# Patient Record
Sex: MALE | Race: WHITE | NOT HISPANIC OR LATINO | Employment: OTHER | URBAN - NONMETROPOLITAN AREA
[De-identification: names, ages, dates, MRNs, and addresses within clinical notes are randomized per-mention and may not be internally consistent; named-entity substitution may affect disease eponyms.]

---

## 2017-02-16 ENCOUNTER — HOSPITAL ENCOUNTER (EMERGENCY)
Facility: HOSPITAL | Age: 57
Discharge: HOME | End: 2017-02-16
Attending: EMERGENCY MEDICINE | Admitting: EMERGENCY MEDICINE

## 2017-02-16 ENCOUNTER — APPOINTMENT (OUTPATIENT)
Dept: CT IMAGING | Facility: HOSPITAL | Age: 57
End: 2017-02-16

## 2017-02-16 ENCOUNTER — TELEPHONE (OUTPATIENT)
Dept: PRIMARY CARE CLINIC | Age: 57
End: 2017-02-16

## 2017-02-16 VITALS
WEIGHT: 153 LBS | HEIGHT: 63 IN | OXYGEN SATURATION: 97 % | HEART RATE: 74 BPM | BODY MASS INDEX: 27.11 KG/M2 | DIASTOLIC BLOOD PRESSURE: 65 MMHG | SYSTOLIC BLOOD PRESSURE: 130 MMHG | RESPIRATION RATE: 14 BRPM | TEMPERATURE: 98.1 F

## 2017-02-16 DIAGNOSIS — R93.5 ABNORMAL CT OF THE ABDOMEN: ICD-10-CM

## 2017-02-16 DIAGNOSIS — R10.10 PAIN OF UPPER ABDOMEN: Primary | ICD-10-CM

## 2017-02-16 LAB
ALBUMIN SERPL-MCNC: 3.9 G/DL (ref 3.5–5)
ALBUMIN/GLOB SERPL: 1.4 G/DL (ref 1.1–2.5)
ALP SERPL-CCNC: 135 U/L (ref 24–120)
ALT SERPL W P-5'-P-CCNC: 53 U/L (ref 0–54)
AMYLASE SERPL-CCNC: 58 U/L (ref 30–110)
ANION GAP SERPL CALCULATED.3IONS-SCNC: 5 MMOL/L (ref 4–13)
AST SERPL-CCNC: 30 U/L (ref 7–45)
BASOPHILS # BLD AUTO: 0.04 10*3/MM3 (ref 0–0.2)
BASOPHILS NFR BLD AUTO: 0.3 % (ref 0–2)
BILIRUB SERPL-MCNC: 0.4 MG/DL (ref 0.1–1)
BILIRUB UR QL STRIP: NEGATIVE
BUN BLD-MCNC: 12 MG/DL (ref 5–21)
BUN/CREAT SERPL: 16.4 (ref 7–25)
CALCIUM SPEC-SCNC: 8.8 MG/DL (ref 8.4–10.4)
CHLORIDE SERPL-SCNC: 107 MMOL/L (ref 98–110)
CLARITY UR: CLEAR
CO2 SERPL-SCNC: 27 MMOL/L (ref 24–31)
COLOR UR: YELLOW
CREAT BLD-MCNC: 0.73 MG/DL (ref 0.5–1.4)
D-LACTATE SERPL-SCNC: <0.5 MMOL/L (ref 0.5–2)
DEPRECATED RDW RBC AUTO: 44.9 FL (ref 40–54)
EOSINOPHIL # BLD AUTO: 0.36 10*3/MM3 (ref 0–0.7)
EOSINOPHIL NFR BLD AUTO: 3.1 % (ref 0–4)
ERYTHROCYTE [DISTWIDTH] IN BLOOD BY AUTOMATED COUNT: 13.3 % (ref 12–15)
GFR SERPL CREATININE-BSD FRML MDRD: 111 ML/MIN/1.73
GLOBULIN UR ELPH-MCNC: 2.7 GM/DL
GLUCOSE BLD-MCNC: 83 MG/DL (ref 70–100)
GLUCOSE UR STRIP-MCNC: NEGATIVE MG/DL
HCT VFR BLD AUTO: 42.6 % (ref 40–52)
HGB BLD-MCNC: 14.6 G/DL (ref 14–18)
HGB UR QL STRIP.AUTO: NEGATIVE
HOLD SPECIMEN: NORMAL
HOLD SPECIMEN: NORMAL
IMM GRANULOCYTES # BLD: 0.04 10*3/MM3 (ref 0–0.03)
IMM GRANULOCYTES NFR BLD: 0.3 % (ref 0–5)
KETONES UR QL STRIP: NEGATIVE
LEUKOCYTE ESTERASE UR QL STRIP.AUTO: NEGATIVE
LIPASE SERPL-CCNC: 85 U/L (ref 23–203)
LYMPHOCYTES # BLD AUTO: 2.24 10*3/MM3 (ref 0.72–4.86)
LYMPHOCYTES NFR BLD AUTO: 19.5 % (ref 15–45)
MCH RBC QN AUTO: 31.9 PG (ref 28–32)
MCHC RBC AUTO-ENTMCNC: 34.3 G/DL (ref 33–36)
MCV RBC AUTO: 93 FL (ref 82–95)
MONOCYTES # BLD AUTO: 1.17 10*3/MM3 (ref 0.19–1.3)
MONOCYTES NFR BLD AUTO: 10.2 % (ref 4–12)
NEUTROPHILS # BLD AUTO: 7.63 10*3/MM3 (ref 1.87–8.4)
NEUTROPHILS NFR BLD AUTO: 66.6 % (ref 39–78)
NITRITE UR QL STRIP: NEGATIVE
PH UR STRIP.AUTO: 6.5 [PH] (ref 5–8)
PLATELET # BLD AUTO: 230 10*3/MM3 (ref 130–400)
PMV BLD AUTO: 10.8 FL (ref 6–12)
POTASSIUM BLD-SCNC: 4.9 MMOL/L (ref 3.5–5.3)
PROT SERPL-MCNC: 6.6 G/DL (ref 6.3–8.7)
PROT UR QL STRIP: NEGATIVE
RBC # BLD AUTO: 4.58 10*6/MM3 (ref 4.8–5.9)
SODIUM BLD-SCNC: 139 MMOL/L (ref 135–145)
SP GR UR STRIP: 1.02 (ref 1–1.03)
TROPONIN I SERPL-MCNC: <0.012 NG/ML (ref 0–0.03)
UROBILINOGEN UR QL STRIP: NORMAL
WBC NRBC COR # BLD: 11.48 10*3/MM3 (ref 4.8–10.8)
WHOLE BLOOD HOLD SPECIMEN: NORMAL
WHOLE BLOOD HOLD SPECIMEN: NORMAL

## 2017-02-16 PROCEDURE — 84484 ASSAY OF TROPONIN QUANT: CPT | Performed by: EMERGENCY MEDICINE

## 2017-02-16 PROCEDURE — 99284 EMERGENCY DEPT VISIT MOD MDM: CPT

## 2017-02-16 PROCEDURE — 74177 CT ABD & PELVIS W/CONTRAST: CPT

## 2017-02-16 PROCEDURE — 81003 URINALYSIS AUTO W/O SCOPE: CPT | Performed by: EMERGENCY MEDICINE

## 2017-02-16 PROCEDURE — 96375 TX/PRO/DX INJ NEW DRUG ADDON: CPT

## 2017-02-16 PROCEDURE — 25010000002 ONDANSETRON PER 1 MG: Performed by: EMERGENCY MEDICINE

## 2017-02-16 PROCEDURE — 25010000002 HYDROMORPHONE PER 4 MG: Performed by: EMERGENCY MEDICINE

## 2017-02-16 PROCEDURE — 96376 TX/PRO/DX INJ SAME DRUG ADON: CPT

## 2017-02-16 PROCEDURE — 83605 ASSAY OF LACTIC ACID: CPT | Performed by: EMERGENCY MEDICINE

## 2017-02-16 PROCEDURE — 93010 ELECTROCARDIOGRAM REPORT: CPT | Performed by: INTERNAL MEDICINE

## 2017-02-16 PROCEDURE — 83690 ASSAY OF LIPASE: CPT | Performed by: EMERGENCY MEDICINE

## 2017-02-16 PROCEDURE — 80053 COMPREHEN METABOLIC PANEL: CPT | Performed by: EMERGENCY MEDICINE

## 2017-02-16 PROCEDURE — 0 IOPAMIDOL PER 1 ML: Performed by: EMERGENCY MEDICINE

## 2017-02-16 PROCEDURE — 93005 ELECTROCARDIOGRAM TRACING: CPT

## 2017-02-16 PROCEDURE — 85025 COMPLETE CBC W/AUTO DIFF WBC: CPT | Performed by: EMERGENCY MEDICINE

## 2017-02-16 PROCEDURE — 96374 THER/PROPH/DIAG INJ IV PUSH: CPT

## 2017-02-16 PROCEDURE — 82150 ASSAY OF AMYLASE: CPT | Performed by: EMERGENCY MEDICINE

## 2017-02-16 RX ORDER — ONDANSETRON 2 MG/ML
4 INJECTION INTRAMUSCULAR; INTRAVENOUS ONCE
Status: COMPLETED | OUTPATIENT
Start: 2017-02-16 | End: 2017-02-16

## 2017-02-16 RX ORDER — AMITRIPTYLINE HYDROCHLORIDE 10 MG/1
TABLET, FILM COATED ORAL DAILY
COMMUNITY
Start: 2016-11-28 | End: 2019-06-09

## 2017-02-16 RX ORDER — HYDROCODONE BITARTRATE AND ACETAMINOPHEN 7.5; 325 MG/1; MG/1
1 TABLET ORAL EVERY 4 HOURS PRN
Qty: 15 TABLET | Refills: 0 | Status: SHIPPED | OUTPATIENT
Start: 2017-02-16 | End: 2019-06-09

## 2017-02-16 RX ORDER — SODIUM CHLORIDE 0.9 % (FLUSH) 0.9 %
10 SYRINGE (ML) INJECTION AS NEEDED
Status: DISCONTINUED | OUTPATIENT
Start: 2017-02-16 | End: 2017-02-16 | Stop reason: HOSPADM

## 2017-02-16 RX ADMIN — ONDANSETRON HYDROCHLORIDE 4 MG: 2 SOLUTION INTRAMUSCULAR; INTRAVENOUS at 05:03

## 2017-02-16 RX ADMIN — Medication 10 ML: at 07:35

## 2017-02-16 RX ADMIN — ONDANSETRON HYDROCHLORIDE 4 MG: 2 SOLUTION INTRAMUSCULAR; INTRAVENOUS at 07:34

## 2017-02-16 RX ADMIN — HYDROMORPHONE HYDROCHLORIDE 1 MG: 1 INJECTION, SOLUTION INTRAMUSCULAR; INTRAVENOUS; SUBCUTANEOUS at 07:34

## 2017-02-16 RX ADMIN — HYDROMORPHONE HYDROCHLORIDE 1 MG: 1 INJECTION, SOLUTION INTRAMUSCULAR; INTRAVENOUS; SUBCUTANEOUS at 05:03

## 2017-02-16 RX ADMIN — SODIUM CHLORIDE 1000 ML: 9 INJECTION, SOLUTION INTRAVENOUS at 05:04

## 2017-02-16 RX ADMIN — IOPAMIDOL 100 ML: 755 INJECTION, SOLUTION INTRAVENOUS at 06:20

## 2017-02-16 NOTE — ED PROVIDER NOTES
"Subjective   HPI Comments: C/o pain in epigastric area that started 4 days ago.  Has waxed and waned since then and got worse again this AM.  Similar to pain of past when had GB out and then later had to have stones removed from bile duct.  Also had to have \"bowels rerouted\" and has had appendix out.    Patient is a 56 y.o. male presenting with abdominal pain.   History provided by:  Patient   used: No    Abdominal Pain   Pain location:  Epigastric  Pain quality: aching    Pain radiates to:  LUQ and RUQ  Pain severity:  Moderate  Onset quality:  Gradual  Duration:  4 days  Timing:  Constant  Progression:  Waxing and waning  Chronicity:  Recurrent  Context: previous surgery    Context: not alcohol use, not awakening from sleep, not diet changes, not eating, not laxative use, not medication withdrawal, not recent illness, not recent sexual activity, not recent travel, not retching, not sick contacts, not suspicious food intake and not trauma    Relieved by:  Nothing  Worsened by:  Nothing  Ineffective treatments:  None tried  Associated symptoms: nausea    Associated symptoms: no anorexia, no belching, no chest pain, no chills, no constipation, no cough, no diarrhea, no dysuria, no fatigue, no fever, no flatus, no hematemesis, no hematochezia, no hematuria, no melena, no shortness of breath, no sore throat, no vaginal bleeding, no vaginal discharge and no vomiting    Risk factors: multiple surgeries    Risk factors: no alcohol abuse, no aspirin use, not elderly, no NSAID use, not obese, not pregnant and no recent hospitalization        Review of Systems   Constitutional: Negative.  Negative for chills, fatigue and fever.   HENT: Negative.  Negative for sore throat.    Respiratory: Negative.  Negative for cough and shortness of breath.    Cardiovascular: Negative.  Negative for chest pain.   Gastrointestinal: Positive for abdominal pain and nausea. Negative for anorexia, constipation, diarrhea, " flatus, hematemesis, hematochezia, melena and vomiting.   Genitourinary: Negative.  Negative for dysuria, hematuria, vaginal bleeding and vaginal discharge.   Musculoskeletal: Negative.    Neurological: Negative.    Hematological: Negative.        History reviewed. No pertinent past medical history.    No Known Allergies    Past Surgical History   Procedure Laterality Date   • Abdominal surgery     • Knee surgery Left    • Eye surgery         History reviewed. No pertinent family history.    Social History     Social History   • Marital status: Single     Spouse name: N/A   • Number of children: N/A   • Years of education: N/A     Social History Main Topics   • Smoking status: Current Every Day Smoker     Packs/day: 1.00   • Smokeless tobacco: None   • Alcohol use No   • Drug use: Defer   • Sexual activity: Defer     Other Topics Concern   • None     Social History Narrative   • None       Prior to Admission medications    Medication Sig Start Date End Date Taking? Authorizing Provider   amitriptyline (ELAVIL) 10 MG tablet Daily. 11/28/16  Yes Historical Provider, MD       Medications   HYDROmorphone (DILAUDID) injection 1 mg (1 mg Intravenous Given 2/16/17 0503)   ondansetron (ZOFRAN) injection 4 mg (4 mg Intravenous Given 2/16/17 0503)   sodium chloride 0.9 % bolus 1,000 mL (0 mL Intravenous Stopped 2/16/17 0657)   iopamidol (ISOVUE-370) 76 % injection 100 mL (100 mL Intravenous Given 2/16/17 0620)   HYDROmorphone (DILAUDID) injection 1 mg (1 mg Intravenous Given 2/16/17 0734)   ondansetron (ZOFRAN) injection 4 mg (4 mg Intravenous Given 2/16/17 0734)       Vitals:    02/16/17 0801   BP: 130/65   Pulse: 74   Resp: 14   Temp:    SpO2: 97%         Objective   Physical Exam   Constitutional: He is oriented to person, place, and time. He appears well-developed and well-nourished.   HENT:   Head: Normocephalic and atraumatic.   Neck: Normal range of motion. Neck supple.   Cardiovascular: Normal rate and regular rhythm.     Pulmonary/Chest: Effort normal and breath sounds normal.   Abdominal: Soft. There is tenderness.   Moderated epigastric tenderness   Musculoskeletal: Normal range of motion.   Neurological: He is alert and oriented to person, place, and time.   Skin: Skin is warm and dry.   Psychiatric: He has a normal mood and affect. His behavior is normal.   Nursing note and vitals reviewed.      Procedures         Lab Results (last 24 hours)     Procedure Component Value Units Date/Time    CBC & Differential [[id number]] Collected:  02/16/17 0459    Specimen:  Blood Updated:  02/16/17 0520    Narrative:       The following orders were created for panel order CBC & Differential.  Procedure                               Abnormality         Status                     ---------                               -----------         ------                     CBC Auto Differential[[id number]]         Abnormal            Final result                 Please view results for these tests on the individual orders.    CBC Auto Differential [[id number]]  (Abnormal) Collected:  02/16/17 0459    Specimen:  Blood Updated:  02/16/17 0520     WBC 11.48 (H) 10*3/mm3      RBC 4.58 (L) 10*6/mm3      Hemoglobin 14.6 g/dL      Hematocrit 42.6 %      MCV 93.0 fL      MCH 31.9 pg      MCHC 34.3 g/dL      RDW 13.3 %      RDW-SD 44.9 fl      MPV 10.8 fL      Platelets 230 10*3/mm3      Neutrophil % 66.6 %      Lymphocyte % 19.5 %      Monocyte % 10.2 %      Eosinophil % 3.1 %      Basophil % 0.3 %      Immature Grans % 0.3 %      Neutrophils, Absolute 7.63 10*3/mm3      Lymphocytes, Absolute 2.24 10*3/mm3      Monocytes, Absolute 1.17 10*3/mm3      Eosinophils, Absolute 0.36 10*3/mm3      Basophils, Absolute 0.04 10*3/mm3      Immature Grans, Absolute 0.04 (H) 10*3/mm3     Comprehensive Metabolic Panel [[id number]]  (Abnormal) Collected:  02/16/17 0547    Specimen:  Blood from Arm, Left Updated:  02/16/17 0607     Glucose 83 mg/dL      BUN 12 mg/dL      Creatinine  0.73 mg/dL      Sodium 139 mmol/L      Potassium 4.9 mmol/L      Chloride 107 mmol/L      CO2 27.0 mmol/L      Calcium 8.8 mg/dL      Total Protein 6.6 g/dL      Albumin 3.90 g/dL      ALT (SGPT) 53 U/L      AST (SGOT) 30 U/L      Alkaline Phosphatase 135 (H) U/L      Total Bilirubin 0.4 mg/dL      eGFR Non African Amer 111 mL/min/1.73      Globulin 2.7 gm/dL      A/G Ratio 1.4 g/dL      BUN/Creatinine Ratio 16.4      Anion Gap 5.0 mmol/L     Lipase [[id number]]  (Normal) Collected:  02/16/17 0547    Specimen:  Blood from Arm, Left Updated:  02/16/17 0607     Lipase 85 U/L     Lactic Acid, Plasma [[id number]]  (Abnormal) Collected:  02/16/17 0547    Specimen:  Blood from Arm, Left Updated:  02/16/17 0608     Lactate <0.5 (L) mmol/L     Amylase [[id number]]  (Normal) Collected:  02/16/17 0547    Specimen:  Blood from Arm, Left Updated:  02/16/17 0607     Amylase 58 U/L     Troponin [[id number]]  (Normal) Collected:  02/16/17 0547    Specimen:  Blood from Arm, Left Updated:  02/16/17 0618     Troponin I <0.012 ng/mL     Urinalysis With / Culture If Indicated [[id number]]  (Normal) Collected:  02/16/17 0658    Specimen:  Urine from Urine, Clean Catch Updated:  02/16/17 0705     Color, UA Yellow      Appearance, UA Clear      pH, UA 6.5      Specific Gravity, UA 1.017      Glucose, UA Negative      Ketones, UA Negative      Bilirubin, UA Negative      Blood, UA Negative      Protein, UA Negative      Leuk Esterase, UA Negative      Nitrite, UA Negative      Urobilinogen, UA 0.2 E.U./dL     Narrative:       Urine microscopic not indicated.          CT Abdomen Pelvis With Contrast   Final Result   1. There is focal dilatation of bile ducts in the left hepatic lobe of   the liver with some patchy abnormal enhancement in this area. There is   also some ill-defined low density at the location where the left hepatic   ducts become more dilated. The common hepatic and common bile ducts are   also dilated but I do not see any obvious  stone in these portions of the   duct. There is no pancreatic head mass. Infiltrating tumor within the   liver is in the differential. The most likely type would be   cholangiocarcinoma. MRI examination with liver mass protocol and GI   consultation recommended. Noncalcified stones within the biliary system   are also possible.   2. There has been prior cholecystectomy.   3. There is a 2.1 cm cyst in the right mid kidney. A tiny cortical   lesion in the mid to lower pole cortex on the left side is too small to   characterize and likely a small cyst.   4. No oral contrast administered. No small bowel distention is seen.   There are a few fluid-filled small bowel loops, nonspecific.   5. Diverticulosis of the colon with no CT findings of diverticulitis.       Results were discussed with Dr. Laboy in the emergency room.           This report was finalized on 02/16/2017 08:59 by Dr. Luis Armando Egan MD.          ED Course  ED Course   Comment By Time   Told patient of CT results and advised him to follow up with his PCP for further evaluation and GI referral and told him of possibility of cancer.  He says he has appointment with his PCP on 2/27 and will talk with him then. Sky Laboy Jr., MD 02/16 6623          [facility]    Final diagnoses:   Pain of upper abdomen   Abnormal CT of the abdomen        Sky Laboy Jr., MD  02/16/17 5344

## 2017-02-17 ENCOUNTER — OFFICE VISIT (OUTPATIENT)
Dept: PRIMARY CARE CLINIC | Age: 57
End: 2017-02-17
Payer: MEDICAID

## 2017-02-17 VITALS
BODY MASS INDEX: 27.29 KG/M2 | HEIGHT: 63 IN | SYSTOLIC BLOOD PRESSURE: 112 MMHG | DIASTOLIC BLOOD PRESSURE: 68 MMHG | HEART RATE: 95 BPM | OXYGEN SATURATION: 98 % | WEIGHT: 154 LBS | RESPIRATION RATE: 20 BRPM

## 2017-02-17 DIAGNOSIS — G89.29 CHRONIC NECK PAIN: ICD-10-CM

## 2017-02-17 DIAGNOSIS — K80.50: Primary | ICD-10-CM

## 2017-02-17 DIAGNOSIS — M54.2 CHRONIC NECK PAIN: ICD-10-CM

## 2017-02-17 PROCEDURE — 99214 OFFICE O/P EST MOD 30 MIN: CPT | Performed by: INTERNAL MEDICINE

## 2017-02-17 RX ORDER — HYDROCODONE BITARTRATE AND ACETAMINOPHEN 7.5; 325 MG/1; MG/1
1 TABLET ORAL EVERY 6 HOURS PRN
Qty: 20 TABLET | Refills: 0 | Status: SHIPPED | OUTPATIENT
Start: 2017-02-17 | End: 2017-03-06 | Stop reason: SDUPTHER

## 2017-02-17 RX ORDER — HYDROCODONE BITARTRATE AND ACETAMINOPHEN 7.5; 325 MG/1; MG/1
1 TABLET ORAL EVERY 4 HOURS PRN
COMMUNITY
Start: 2017-02-16 | End: 2017-02-17 | Stop reason: DRUGHIGH

## 2017-02-20 ENCOUNTER — APPOINTMENT (OUTPATIENT)
Dept: CT IMAGING | Age: 57
DRG: 443 | End: 2017-02-20
Payer: MEDICAID

## 2017-02-20 ENCOUNTER — TELEPHONE (OUTPATIENT)
Dept: PRIMARY CARE CLINIC | Age: 57
End: 2017-02-20

## 2017-02-20 ENCOUNTER — HOSPITAL ENCOUNTER (INPATIENT)
Age: 57
LOS: 3 days | Discharge: ANOTHER HOSPITAL | DRG: 443 | End: 2017-02-23
Attending: EMERGENCY MEDICINE | Admitting: FAMILY MEDICINE
Payer: MEDICAID

## 2017-02-20 DIAGNOSIS — K75.0 LIVER ABSCESS: Primary | ICD-10-CM

## 2017-02-20 DIAGNOSIS — R10.10 PAIN OF UPPER ABDOMEN: ICD-10-CM

## 2017-02-20 DIAGNOSIS — R79.89 ELEVATED LIVER FUNCTION TESTS: ICD-10-CM

## 2017-02-20 PROBLEM — K83.8 DILATION OF BILIARY TRACT: Status: ACTIVE | Noted: 2017-02-20

## 2017-02-20 PROBLEM — R22.2 PLEURAL NODULE: Status: ACTIVE | Noted: 2017-02-20

## 2017-02-20 LAB
ALBUMIN SERPL-MCNC: 3.6 G/DL (ref 3.5–5.2)
ALP BLD-CCNC: 336 U/L (ref 40–130)
ALT SERPL-CCNC: 131 U/L (ref 5–41)
AMYLASE: 28 U/L (ref 28–100)
ANION GAP SERPL CALCULATED.3IONS-SCNC: 12 MMOL/L (ref 7–19)
AST SERPL-CCNC: 106 U/L (ref 5–40)
BACTERIA: NEGATIVE /HPF
BASOPHILS ABSOLUTE: 0 K/UL (ref 0–0.2)
BASOPHILS RELATIVE PERCENT: 0.2 % (ref 0–1)
BILIRUB SERPL-MCNC: 1.8 MG/DL (ref 0.2–1.2)
BILIRUBIN URINE: ABNORMAL
BLOOD, URINE: NEGATIVE
BUN BLDV-MCNC: 15 MG/DL (ref 6–20)
CALCIUM SERPL-MCNC: 8.6 MG/DL (ref 8.6–10)
CHLORIDE BLD-SCNC: 99 MMOL/L (ref 98–111)
CLARITY: CLEAR
CO2: 23 MMOL/L (ref 22–29)
COLOR: ABNORMAL
CREAT SERPL-MCNC: 0.7 MG/DL (ref 0.5–1.2)
EOSINOPHILS ABSOLUTE: 0.2 K/UL (ref 0–0.6)
EOSINOPHILS RELATIVE PERCENT: 1.9 % (ref 0–5)
EPITHELIAL CELLS, UA: 3 /HPF (ref 0–5)
GFR NON-AFRICAN AMERICAN: >60
GLOBULIN: 3.2 G/DL
GLUCOSE BLD-MCNC: 108 MG/DL (ref 74–109)
GLUCOSE URINE: NEGATIVE MG/DL
HCT VFR BLD CALC: 42.5 % (ref 42–52)
HEMOGLOBIN: 14.2 G/DL (ref 14–18)
HYALINE CASTS: 17 /HPF (ref 0–8)
KETONES, URINE: ABNORMAL MG/DL
LACTIC ACID: 1.4 MG/DL (ref 0.5–1.9)
LEUKOCYTE ESTERASE, URINE: ABNORMAL
LIPASE: 56 U/L (ref 13–60)
LYMPHOCYTES ABSOLUTE: 1.8 K/UL (ref 1.1–4.5)
LYMPHOCYTES RELATIVE PERCENT: 14.1 % (ref 20–40)
MCH RBC QN AUTO: 32.1 PG (ref 27–31)
MCHC RBC AUTO-ENTMCNC: 33.4 G/DL (ref 33–37)
MCV RBC AUTO: 95.9 FL (ref 80–94)
MONOCYTES ABSOLUTE: 1.6 K/UL (ref 0–0.9)
MONOCYTES RELATIVE PERCENT: 12.7 % (ref 0–10)
NEUTROPHILS ABSOLUTE: 9.1 K/UL (ref 1.5–7.5)
NEUTROPHILS RELATIVE PERCENT: 70.9 % (ref 50–65)
NITRITE, URINE: POSITIVE
PDW BLD-RTO: 12.9 % (ref 11.5–14.5)
PH UA: 6
PLATELET # BLD: 266 K/UL (ref 130–400)
PMV BLD AUTO: 11 FL (ref 7.4–10.4)
POTASSIUM SERPL-SCNC: 4 MMOL/L (ref 3.5–5)
PROTEIN UA: 100 MG/DL
RBC # BLD: 4.43 M/UL (ref 4.7–6.1)
RBC UA: 8 /HPF (ref 0–4)
SODIUM BLD-SCNC: 134 MMOL/L (ref 136–145)
SPECIFIC GRAVITY UA: 1.03
TOTAL PROTEIN: 6.8 G/DL (ref 6.6–8.7)
UROBILINOGEN, URINE: 2 E.U./DL
WBC # BLD: 12.8 K/UL (ref 4.8–10.8)
WBC UA: 5 /HPF (ref 0–5)

## 2017-02-20 PROCEDURE — C9113 INJ PANTOPRAZOLE SODIUM, VIA: HCPCS | Performed by: CLINICAL NURSE SPECIALIST

## 2017-02-20 PROCEDURE — 93005 ELECTROCARDIOGRAM TRACING: CPT

## 2017-02-20 PROCEDURE — 6370000000 HC RX 637 (ALT 250 FOR IP): Performed by: FAMILY MEDICINE

## 2017-02-20 PROCEDURE — 87086 URINE CULTURE/COLONY COUNT: CPT

## 2017-02-20 PROCEDURE — 6360000002 HC RX W HCPCS: Performed by: EMERGENCY MEDICINE

## 2017-02-20 PROCEDURE — 87040 BLOOD CULTURE FOR BACTERIA: CPT

## 2017-02-20 PROCEDURE — 96376 TX/PRO/DX INJ SAME DRUG ADON: CPT

## 2017-02-20 PROCEDURE — 6360000002 HC RX W HCPCS: Performed by: FAMILY MEDICINE

## 2017-02-20 PROCEDURE — 99285 EMERGENCY DEPT VISIT HI MDM: CPT | Performed by: EMERGENCY MEDICINE

## 2017-02-20 PROCEDURE — 99285 EMERGENCY DEPT VISIT HI MDM: CPT

## 2017-02-20 PROCEDURE — 74177 CT ABD & PELVIS W/CONTRAST: CPT

## 2017-02-20 PROCEDURE — 2580000003 HC RX 258: Performed by: FAMILY MEDICINE

## 2017-02-20 PROCEDURE — 6360000002 HC RX W HCPCS: Performed by: CLINICAL NURSE SPECIALIST

## 2017-02-20 PROCEDURE — 83690 ASSAY OF LIPASE: CPT

## 2017-02-20 PROCEDURE — 6370000000 HC RX 637 (ALT 250 FOR IP): Performed by: CLINICAL NURSE SPECIALIST

## 2017-02-20 PROCEDURE — 6360000004 HC RX CONTRAST MEDICATION

## 2017-02-20 PROCEDURE — 81001 URINALYSIS AUTO W/SCOPE: CPT

## 2017-02-20 PROCEDURE — 1210000000 HC MED SURG R&B

## 2017-02-20 PROCEDURE — 85025 COMPLETE CBC W/AUTO DIFF WBC: CPT

## 2017-02-20 PROCEDURE — 96375 TX/PRO/DX INJ NEW DRUG ADDON: CPT

## 2017-02-20 PROCEDURE — 2580000003 HC RX 258: Performed by: EMERGENCY MEDICINE

## 2017-02-20 PROCEDURE — 82150 ASSAY OF AMYLASE: CPT

## 2017-02-20 PROCEDURE — 96365 THER/PROPH/DIAG IV INF INIT: CPT

## 2017-02-20 PROCEDURE — 99223 1ST HOSP IP/OBS HIGH 75: CPT | Performed by: FAMILY MEDICINE

## 2017-02-20 PROCEDURE — 2500000003 HC RX 250 WO HCPCS: Performed by: FAMILY MEDICINE

## 2017-02-20 PROCEDURE — 80053 COMPREHEN METABOLIC PANEL: CPT

## 2017-02-20 PROCEDURE — 2700000000 HC OXYGEN THERAPY PER DAY

## 2017-02-20 PROCEDURE — 36415 COLL VENOUS BLD VENIPUNCTURE: CPT

## 2017-02-20 PROCEDURE — 83605 ASSAY OF LACTIC ACID: CPT

## 2017-02-20 RX ORDER — 0.9 % SODIUM CHLORIDE 0.9 %
1000 INTRAVENOUS SOLUTION INTRAVENOUS ONCE
Status: COMPLETED | OUTPATIENT
Start: 2017-02-20 | End: 2017-02-20

## 2017-02-20 RX ORDER — SODIUM CHLORIDE 0.9 % (FLUSH) 0.9 %
10 SYRINGE (ML) INJECTION PRN
Status: DISCONTINUED | OUTPATIENT
Start: 2017-02-20 | End: 2017-02-24 | Stop reason: HOSPADM

## 2017-02-20 RX ORDER — SODIUM CHLORIDE 0.9 % (FLUSH) 0.9 %
10 SYRINGE (ML) INJECTION EVERY 12 HOURS SCHEDULED
Status: DISCONTINUED | OUTPATIENT
Start: 2017-02-20 | End: 2017-02-24 | Stop reason: HOSPADM

## 2017-02-20 RX ORDER — HYDROCODONE BITARTRATE AND ACETAMINOPHEN 7.5; 325 MG/1; MG/1
1 TABLET ORAL EVERY 4 HOURS PRN
Status: DISCONTINUED | OUTPATIENT
Start: 2017-02-20 | End: 2017-02-24 | Stop reason: HOSPADM

## 2017-02-20 RX ORDER — SODIUM CHLORIDE 9 MG/ML
INJECTION, SOLUTION INTRAVENOUS CONTINUOUS
Status: DISCONTINUED | OUTPATIENT
Start: 2017-02-20 | End: 2017-02-20

## 2017-02-20 RX ORDER — ONDANSETRON 2 MG/ML
4 INJECTION INTRAMUSCULAR; INTRAVENOUS ONCE
Status: COMPLETED | OUTPATIENT
Start: 2017-02-20 | End: 2017-02-20

## 2017-02-20 RX ORDER — PANTOPRAZOLE SODIUM 40 MG/10ML
40 INJECTION, POWDER, LYOPHILIZED, FOR SOLUTION INTRAVENOUS DAILY
Status: DISCONTINUED | OUTPATIENT
Start: 2017-02-20 | End: 2017-02-24 | Stop reason: HOSPADM

## 2017-02-20 RX ORDER — ONDANSETRON 2 MG/ML
4 INJECTION INTRAMUSCULAR; INTRAVENOUS EVERY 6 HOURS PRN
Status: DISCONTINUED | OUTPATIENT
Start: 2017-02-20 | End: 2017-02-24 | Stop reason: HOSPADM

## 2017-02-20 RX ORDER — AMITRIPTYLINE HYDROCHLORIDE 10 MG/1
10 TABLET, FILM COATED ORAL NIGHTLY PRN
Status: DISCONTINUED | OUTPATIENT
Start: 2017-02-20 | End: 2017-02-24 | Stop reason: HOSPADM

## 2017-02-20 RX ORDER — LEVOFLOXACIN 5 MG/ML
500 INJECTION, SOLUTION INTRAVENOUS EVERY 24 HOURS
Status: DISCONTINUED | OUTPATIENT
Start: 2017-02-20 | End: 2017-02-24 | Stop reason: HOSPADM

## 2017-02-20 RX ORDER — VANCOMYCIN HYDROCHLORIDE 1 G/200ML
1000 INJECTION, SOLUTION INTRAVENOUS ONCE
Status: COMPLETED | OUTPATIENT
Start: 2017-02-20 | End: 2017-02-20

## 2017-02-20 RX ORDER — SODIUM CHLORIDE 9 MG/ML
INJECTION, SOLUTION INTRAVENOUS CONTINUOUS
Status: DISCONTINUED | OUTPATIENT
Start: 2017-02-20 | End: 2017-02-24 | Stop reason: HOSPADM

## 2017-02-20 RX ORDER — ONDANSETRON 2 MG/ML
4 INJECTION INTRAMUSCULAR; INTRAVENOUS EVERY 4 HOURS PRN
Status: DISCONTINUED | OUTPATIENT
Start: 2017-02-20 | End: 2017-02-20

## 2017-02-20 RX ADMIN — ONDANSETRON 4 MG: 2 INJECTION INTRAMUSCULAR; INTRAVENOUS at 12:29

## 2017-02-20 RX ADMIN — HYDROCODONE BITARTRATE AND ACETAMINOPHEN 1 TABLET: 7.5; 325 TABLET ORAL at 20:52

## 2017-02-20 RX ADMIN — AMITRIPTYLINE HYDROCHLORIDE 10 MG: 10 TABLET, FILM COATED ORAL at 22:14

## 2017-02-20 RX ADMIN — LEVOFLOXACIN 500 MG: 5 INJECTION, SOLUTION INTRAVENOUS at 22:14

## 2017-02-20 RX ADMIN — HYDROMORPHONE HYDROCHLORIDE 1 MG: 1 INJECTION, SOLUTION INTRAMUSCULAR; INTRAVENOUS; SUBCUTANEOUS at 15:41

## 2017-02-20 RX ADMIN — SODIUM CHLORIDE 1000 ML: 9 INJECTION, SOLUTION INTRAVENOUS at 12:29

## 2017-02-20 RX ADMIN — METRONIDAZOLE 500 MG: 500 INJECTION, SOLUTION INTRAVENOUS at 23:23

## 2017-02-20 RX ADMIN — ONDANSETRON 4 MG: 2 INJECTION INTRAMUSCULAR; INTRAVENOUS at 15:41

## 2017-02-20 RX ADMIN — TAZOBACTAM SODIUM AND PIPERACILLIN SODIUM 3.38 G: 375; 3 INJECTION, SOLUTION INTRAVENOUS at 15:41

## 2017-02-20 RX ADMIN — VANCOMYCIN HYDROCHLORIDE 1000 MG: 1 INJECTION, SOLUTION INTRAVENOUS at 16:44

## 2017-02-20 RX ADMIN — PANTOPRAZOLE SODIUM 40 MG: 40 INJECTION, POWDER, FOR SOLUTION INTRAVENOUS at 18:52

## 2017-02-20 RX ADMIN — SODIUM CHLORIDE: 9 INJECTION, SOLUTION INTRAVENOUS at 20:52

## 2017-02-20 RX ADMIN — IOVERSOL 90 ML: 741 INJECTION INTRA-ARTERIAL; INTRAVENOUS at 13:49

## 2017-02-20 RX ADMIN — HYDROMORPHONE HYDROCHLORIDE 1 MG: 1 INJECTION, SOLUTION INTRAMUSCULAR; INTRAVENOUS; SUBCUTANEOUS at 12:29

## 2017-02-20 RX ADMIN — Medication 10 ML: at 20:52

## 2017-02-20 ASSESSMENT — ENCOUNTER SYMPTOMS
BLOOD IN STOOL: 0
ABDOMINAL PAIN: 1
NAUSEA: 1
EYES NEGATIVE: 1
VOMITING: 0
ALLERGIC/IMMUNOLOGIC NEGATIVE: 1
RESPIRATORY NEGATIVE: 1
DIARRHEA: 1
CONSTIPATION: 0
ABDOMINAL DISTENTION: 1

## 2017-02-20 ASSESSMENT — PAIN DESCRIPTION - DESCRIPTORS: DESCRIPTORS: ACHING

## 2017-02-20 ASSESSMENT — PAIN DESCRIPTION - PAIN TYPE: TYPE: ACUTE PAIN

## 2017-02-20 ASSESSMENT — PAIN SCALES - GENERAL
PAINLEVEL_OUTOF10: 7
PAINLEVEL_OUTOF10: 7

## 2017-02-21 LAB
ALBUMIN SERPL-MCNC: 3.1 G/DL (ref 3.5–5.2)
ALP BLD-CCNC: 299 U/L (ref 40–130)
ALT SERPL-CCNC: 96 U/L (ref 5–41)
ANION GAP SERPL CALCULATED.3IONS-SCNC: 14 MMOL/L (ref 7–19)
APTT: 38.8 SEC (ref 26–36.2)
AST SERPL-CCNC: 56 U/L (ref 5–40)
BASOPHILS ABSOLUTE: 0 K/UL (ref 0–0.2)
BASOPHILS RELATIVE PERCENT: 0.3 % (ref 0–1)
BILIRUB SERPL-MCNC: 1.8 MG/DL (ref 0.2–1.2)
BUN BLDV-MCNC: 13 MG/DL (ref 6–20)
CALCIUM SERPL-MCNC: 8.6 MG/DL (ref 8.6–10)
CHLORIDE BLD-SCNC: 100 MMOL/L (ref 98–111)
CHOLESTEROL, TOTAL: 144 MG/DL (ref 160–199)
CO2: 24 MMOL/L (ref 22–29)
CREAT SERPL-MCNC: 0.6 MG/DL (ref 0.5–1.2)
EKG P AXIS: 66 DEGREES
EKG P-R INTERVAL: 130 MS
EKG Q-T INTERVAL: 324 MS
EKG QRS DURATION: 88 MS
EKG QTC CALCULATION (BAZETT): 388 MS
EKG T AXIS: 56 DEGREES
EOSINOPHILS ABSOLUTE: 0.4 K/UL (ref 0–0.6)
EOSINOPHILS RELATIVE PERCENT: 3.7 % (ref 0–5)
GFR NON-AFRICAN AMERICAN: >60
GLOBULIN: 3.3 G/DL
GLUCOSE BLD-MCNC: 97 MG/DL (ref 74–109)
HCT VFR BLD CALC: 35.1 % (ref 42–52)
HDLC SERPL-MCNC: 19 MG/DL (ref 55–121)
HEMOGLOBIN: 11.7 G/DL (ref 14–18)
INR BLD: 1.12 (ref 0.88–1.18)
LDL CHOLESTEROL CALCULATED: 102 MG/DL
LYMPHOCYTES ABSOLUTE: 1.5 K/UL (ref 1.1–4.5)
LYMPHOCYTES RELATIVE PERCENT: 14.4 % (ref 20–40)
MAGNESIUM: 2.1 MG/DL (ref 1.6–2.6)
MCH RBC QN AUTO: 31.7 PG (ref 27–31)
MCHC RBC AUTO-ENTMCNC: 33.3 G/DL (ref 33–37)
MCV RBC AUTO: 95.1 FL (ref 80–94)
MONOCYTES ABSOLUTE: 1.9 K/UL (ref 0–0.9)
MONOCYTES RELATIVE PERCENT: 18.6 % (ref 0–10)
NEUTROPHILS ABSOLUTE: 6.3 K/UL (ref 1.5–7.5)
NEUTROPHILS RELATIVE PERCENT: 62.5 % (ref 50–65)
PDW BLD-RTO: 12.8 % (ref 11.5–14.5)
PHOSPHORUS: 3.4 MG/DL (ref 2.5–4.5)
PLATELET # BLD: 224 K/UL (ref 130–400)
PMV BLD AUTO: 10.3 FL (ref 7.4–10.4)
POTASSIUM SERPL-SCNC: 4.3 MMOL/L (ref 3.5–5)
PROTHROMBIN TIME: 14.4 SEC (ref 12–14.6)
RBC # BLD: 3.69 M/UL (ref 4.7–6.1)
SODIUM BLD-SCNC: 138 MMOL/L (ref 136–145)
TOTAL PROTEIN: 6.4 G/DL (ref 6.6–8.7)
TRIGL SERPL-MCNC: 114 MG/DL (ref 150–199)
TSH SERPL DL<=0.05 MIU/L-ACNC: 2.3 UIU/ML (ref 0.27–4.2)
WBC # BLD: 10.1 K/UL (ref 4.8–10.8)

## 2017-02-21 PROCEDURE — 84100 ASSAY OF PHOSPHORUS: CPT

## 2017-02-21 PROCEDURE — 2580000003 HC RX 258: Performed by: FAMILY MEDICINE

## 2017-02-21 PROCEDURE — 36415 COLL VENOUS BLD VENIPUNCTURE: CPT

## 2017-02-21 PROCEDURE — 83735 ASSAY OF MAGNESIUM: CPT

## 2017-02-21 PROCEDURE — 1210000000 HC MED SURG R&B

## 2017-02-21 PROCEDURE — 6360000002 HC RX W HCPCS: Performed by: CLINICAL NURSE SPECIALIST

## 2017-02-21 PROCEDURE — 85730 THROMBOPLASTIN TIME PARTIAL: CPT

## 2017-02-21 PROCEDURE — 80053 COMPREHEN METABOLIC PANEL: CPT

## 2017-02-21 PROCEDURE — 84443 ASSAY THYROID STIM HORMONE: CPT

## 2017-02-21 PROCEDURE — 99254 IP/OBS CNSLTJ NEW/EST MOD 60: CPT | Performed by: INTERNAL MEDICINE

## 2017-02-21 PROCEDURE — 99233 SBSQ HOSP IP/OBS HIGH 50: CPT | Performed by: HOSPITALIST

## 2017-02-21 PROCEDURE — 80061 LIPID PANEL: CPT

## 2017-02-21 PROCEDURE — 2500000003 HC RX 250 WO HCPCS: Performed by: FAMILY MEDICINE

## 2017-02-21 PROCEDURE — 85025 COMPLETE CBC W/AUTO DIFF WBC: CPT

## 2017-02-21 PROCEDURE — 85610 PROTHROMBIN TIME: CPT

## 2017-02-21 PROCEDURE — C9113 INJ PANTOPRAZOLE SODIUM, VIA: HCPCS | Performed by: CLINICAL NURSE SPECIALIST

## 2017-02-21 PROCEDURE — 6360000002 HC RX W HCPCS: Performed by: FAMILY MEDICINE

## 2017-02-21 PROCEDURE — 6370000000 HC RX 637 (ALT 250 FOR IP): Performed by: FAMILY MEDICINE

## 2017-02-21 RX ADMIN — METRONIDAZOLE 500 MG: 500 INJECTION, SOLUTION INTRAVENOUS at 06:47

## 2017-02-21 RX ADMIN — LEVOFLOXACIN 500 MG: 5 INJECTION, SOLUTION INTRAVENOUS at 20:46

## 2017-02-21 RX ADMIN — PANTOPRAZOLE SODIUM 40 MG: 40 INJECTION, POWDER, FOR SOLUTION INTRAVENOUS at 18:43

## 2017-02-21 RX ADMIN — HYDROCODONE BITARTRATE AND ACETAMINOPHEN 1 TABLET: 7.5; 325 TABLET ORAL at 19:43

## 2017-02-21 RX ADMIN — HYDROCODONE BITARTRATE AND ACETAMINOPHEN 1 TABLET: 7.5; 325 TABLET ORAL at 15:12

## 2017-02-21 RX ADMIN — HYDROMORPHONE HYDROCHLORIDE 0.5 MG: 1 INJECTION, SOLUTION INTRAMUSCULAR; INTRAVENOUS; SUBCUTANEOUS at 11:20

## 2017-02-21 RX ADMIN — METRONIDAZOLE 500 MG: 500 INJECTION, SOLUTION INTRAVENOUS at 23:18

## 2017-02-21 RX ADMIN — HYDROCODONE BITARTRATE AND ACETAMINOPHEN 1 TABLET: 7.5; 325 TABLET ORAL at 02:35

## 2017-02-21 RX ADMIN — HYDROCODONE BITARTRATE AND ACETAMINOPHEN 1 TABLET: 7.5; 325 TABLET ORAL at 08:56

## 2017-02-21 RX ADMIN — SODIUM CHLORIDE: 9 INJECTION, SOLUTION INTRAVENOUS at 20:47

## 2017-02-21 RX ADMIN — METRONIDAZOLE 500 MG: 500 INJECTION, SOLUTION INTRAVENOUS at 15:18

## 2017-02-21 ASSESSMENT — PAIN SCALES - GENERAL
PAINLEVEL_OUTOF10: 6
PAINLEVEL_OUTOF10: 7
PAINLEVEL_OUTOF10: 6
PAINLEVEL_OUTOF10: 6
PAINLEVEL_OUTOF10: 8

## 2017-02-21 ASSESSMENT — ENCOUNTER SYMPTOMS
NAUSEA: 1
EYES NEGATIVE: 1
ABDOMINAL PAIN: 1
RESPIRATORY NEGATIVE: 1

## 2017-02-22 ENCOUNTER — APPOINTMENT (OUTPATIENT)
Dept: MRI IMAGING | Age: 57
DRG: 443 | End: 2017-02-22
Payer: MEDICAID

## 2017-02-22 ENCOUNTER — APPOINTMENT (OUTPATIENT)
Dept: GENERAL RADIOLOGY | Age: 57
DRG: 443 | End: 2017-02-22
Payer: MEDICAID

## 2017-02-22 ENCOUNTER — ANESTHESIA (OUTPATIENT)
Dept: ENDOSCOPY | Age: 57
DRG: 443 | End: 2017-02-22
Payer: MEDICAID

## 2017-02-22 ENCOUNTER — ANESTHESIA EVENT (OUTPATIENT)
Dept: ENDOSCOPY | Age: 57
DRG: 443 | End: 2017-02-22
Payer: MEDICAID

## 2017-02-22 ENCOUNTER — SURGERY (OUTPATIENT)
Age: 57
End: 2017-02-22

## 2017-02-22 VITALS
DIASTOLIC BLOOD PRESSURE: 101 MMHG | SYSTOLIC BLOOD PRESSURE: 133 MMHG | RESPIRATION RATE: 2 BRPM | OXYGEN SATURATION: 100 %

## 2017-02-22 LAB
ALBUMIN SERPL-MCNC: 3 G/DL (ref 3.5–5.2)
ALP BLD-CCNC: 297 U/L (ref 40–130)
ALT SERPL-CCNC: 83 U/L (ref 5–41)
ANION GAP SERPL CALCULATED.3IONS-SCNC: 15 MMOL/L (ref 7–19)
AST SERPL-CCNC: 46 U/L (ref 5–40)
BILIRUB SERPL-MCNC: 0.9 MG/DL (ref 0.2–1.2)
BUN BLDV-MCNC: 12 MG/DL (ref 6–20)
CA 19-9: 216 U/ML (ref 0–35)
CALCIUM SERPL-MCNC: 8.4 MG/DL (ref 8.6–10)
CHLORIDE BLD-SCNC: 100 MMOL/L (ref 98–111)
CO2: 23 MMOL/L (ref 22–29)
CREAT SERPL-MCNC: 0.6 MG/DL (ref 0.5–1.2)
FERRITIN: 386 NG/ML (ref 30–400)
GFR NON-AFRICAN AMERICAN: >60
GLOBULIN: 3.2 G/DL
GLUCOSE BLD-MCNC: 103 MG/DL (ref 74–109)
HCT VFR BLD CALC: 34 % (ref 42–52)
HEMOGLOBIN: 11.4 G/DL (ref 14–18)
IRON SATURATION: 18 % (ref 14–50)
IRON: 33 UG/DL (ref 59–158)
MCH RBC QN AUTO: 31.7 PG (ref 27–31)
MCHC RBC AUTO-ENTMCNC: 33.5 G/DL (ref 33–37)
MCV RBC AUTO: 94.4 FL (ref 80–94)
PDW BLD-RTO: 12.6 % (ref 11.5–14.5)
PLATELET # BLD: 234 K/UL (ref 130–400)
PMV BLD AUTO: 10.4 FL (ref 7.4–10.4)
POTASSIUM SERPL-SCNC: 4.4 MMOL/L (ref 3.5–5)
RBC # BLD: 3.6 M/UL (ref 4.7–6.1)
SODIUM BLD-SCNC: 138 MMOL/L (ref 136–145)
TOTAL IRON BINDING CAPACITY: 181 UG/DL (ref 250–400)
TOTAL PROTEIN: 6.2 G/DL (ref 6.6–8.7)
URINE CULTURE, ROUTINE: NORMAL
VITAMIN B-12: 616 PG/ML (ref 211–946)
WBC # BLD: 11.2 K/UL (ref 4.8–10.8)

## 2017-02-22 PROCEDURE — 82607 VITAMIN B-12: CPT

## 2017-02-22 PROCEDURE — C9113 INJ PANTOPRAZOLE SODIUM, VIA: HCPCS | Performed by: CLINICAL NURSE SPECIALIST

## 2017-02-22 PROCEDURE — 83550 IRON BINDING TEST: CPT

## 2017-02-22 PROCEDURE — 0FJB8ZZ INSPECTION OF HEPATOBILIARY DUCT, VIA NATURAL OR ARTIFICIAL OPENING ENDOSCOPIC: ICD-10-PCS | Performed by: FAMILY MEDICINE

## 2017-02-22 PROCEDURE — 82728 ASSAY OF FERRITIN: CPT

## 2017-02-22 PROCEDURE — C1769 GUIDE WIRE: HCPCS | Performed by: INTERNAL MEDICINE

## 2017-02-22 PROCEDURE — 43260 ERCP W/SPECIMEN COLLECTION: CPT | Performed by: INTERNAL MEDICINE

## 2017-02-22 PROCEDURE — 1210000000 HC MED SURG R&B

## 2017-02-22 PROCEDURE — 2580000003 HC RX 258: Performed by: FAMILY MEDICINE

## 2017-02-22 PROCEDURE — 6360000004 HC RX CONTRAST MEDICATION: Performed by: INTERNAL MEDICINE

## 2017-02-22 PROCEDURE — BF11YZZ FLUOROSCOPY OF BILIARY AND PANCREATIC DUCTS USING OTHER CONTRAST: ICD-10-PCS | Performed by: FAMILY MEDICINE

## 2017-02-22 PROCEDURE — A9579 GAD-BASE MR CONTRAST NOS,1ML: HCPCS | Performed by: INTERNAL MEDICINE

## 2017-02-22 PROCEDURE — 0FJD8ZZ INSPECTION OF PANCREATIC DUCT, VIA NATURAL OR ARTIFICIAL OPENING ENDOSCOPIC: ICD-10-PCS | Performed by: FAMILY MEDICINE

## 2017-02-22 PROCEDURE — 6360000002 HC RX W HCPCS: Performed by: NURSE ANESTHETIST, CERTIFIED REGISTERED

## 2017-02-22 PROCEDURE — 6370000000 HC RX 637 (ALT 250 FOR IP): Performed by: FAMILY MEDICINE

## 2017-02-22 PROCEDURE — 86301 IMMUNOASSAY TUMOR CA 19-9: CPT

## 2017-02-22 PROCEDURE — 2500000003 HC RX 250 WO HCPCS: Performed by: NURSE ANESTHETIST, CERTIFIED REGISTERED

## 2017-02-22 PROCEDURE — 80053 COMPREHEN METABOLIC PANEL: CPT

## 2017-02-22 PROCEDURE — 74328 X-RAY BILE DUCT ENDOSCOPY: CPT | Performed by: INTERNAL MEDICINE

## 2017-02-22 PROCEDURE — 6360000002 HC RX W HCPCS: Performed by: FAMILY MEDICINE

## 2017-02-22 PROCEDURE — 83540 ASSAY OF IRON: CPT

## 2017-02-22 PROCEDURE — 3609014400 HC ERCP DIAGNOSTIC: Performed by: INTERNAL MEDICINE

## 2017-02-22 PROCEDURE — 99239 HOSP IP/OBS DSCHRG MGMT >30: CPT | Performed by: HOSPITALIST

## 2017-02-22 PROCEDURE — 2580000003 HC RX 258: Performed by: NURSE ANESTHETIST, CERTIFIED REGISTERED

## 2017-02-22 PROCEDURE — 6360000002 HC RX W HCPCS: Performed by: CLINICAL NURSE SPECIALIST

## 2017-02-22 PROCEDURE — 7100000000 HC PACU RECOVERY - FIRST 15 MIN: Performed by: INTERNAL MEDICINE

## 2017-02-22 PROCEDURE — 7100000001 HC PACU RECOVERY - ADDTL 15 MIN: Performed by: INTERNAL MEDICINE

## 2017-02-22 PROCEDURE — 74183 MRI ABD W/O CNTR FLWD CNTR: CPT

## 2017-02-22 PROCEDURE — 3209999900 FLUORO FOR SURGICAL PROCEDURES

## 2017-02-22 PROCEDURE — 74328 X-RAY BILE DUCT ENDOSCOPY: CPT

## 2017-02-22 PROCEDURE — 36415 COLL VENOUS BLD VENIPUNCTURE: CPT

## 2017-02-22 PROCEDURE — 85027 COMPLETE CBC AUTOMATED: CPT

## 2017-02-22 PROCEDURE — 2500000003 HC RX 250 WO HCPCS: Performed by: FAMILY MEDICINE

## 2017-02-22 RX ORDER — LIDOCAINE HYDROCHLORIDE 10 MG/ML
INJECTION, SOLUTION EPIDURAL; INFILTRATION; INTRACAUDAL; PERINEURAL PRN
Status: DISCONTINUED | OUTPATIENT
Start: 2017-02-22 | End: 2017-02-22 | Stop reason: SDUPTHER

## 2017-02-22 RX ORDER — FENTANYL CITRATE 50 UG/ML
INJECTION, SOLUTION INTRAMUSCULAR; INTRAVENOUS PRN
Status: DISCONTINUED | OUTPATIENT
Start: 2017-02-22 | End: 2017-02-22 | Stop reason: SDUPTHER

## 2017-02-22 RX ORDER — ENALAPRILAT 2.5 MG/2ML
1.25 INJECTION INTRAVENOUS
Status: DISCONTINUED | OUTPATIENT
Start: 2017-02-22 | End: 2017-02-22

## 2017-02-22 RX ORDER — MIDAZOLAM HYDROCHLORIDE 1 MG/ML
INJECTION INTRAMUSCULAR; INTRAVENOUS PRN
Status: DISCONTINUED | OUTPATIENT
Start: 2017-02-22 | End: 2017-02-22 | Stop reason: SDUPTHER

## 2017-02-22 RX ORDER — HYDRALAZINE HYDROCHLORIDE 20 MG/ML
5 INJECTION INTRAMUSCULAR; INTRAVENOUS EVERY 10 MIN PRN
Status: DISCONTINUED | OUTPATIENT
Start: 2017-02-22 | End: 2017-02-22

## 2017-02-22 RX ORDER — DIPHENHYDRAMINE HYDROCHLORIDE 50 MG/ML
12.5 INJECTION INTRAMUSCULAR; INTRAVENOUS
Status: DISCONTINUED | OUTPATIENT
Start: 2017-02-22 | End: 2017-02-22

## 2017-02-22 RX ORDER — MORPHINE SULFATE 4 MG/ML
2 INJECTION, SOLUTION INTRAMUSCULAR; INTRAVENOUS EVERY 5 MIN PRN
Status: DISCONTINUED | OUTPATIENT
Start: 2017-02-22 | End: 2017-02-22

## 2017-02-22 RX ORDER — METOCLOPRAMIDE HYDROCHLORIDE 5 MG/ML
10 INJECTION INTRAMUSCULAR; INTRAVENOUS
Status: DISCONTINUED | OUTPATIENT
Start: 2017-02-22 | End: 2017-02-22

## 2017-02-22 RX ORDER — SODIUM CHLORIDE, SODIUM LACTATE, POTASSIUM CHLORIDE, CALCIUM CHLORIDE 600; 310; 30; 20 MG/100ML; MG/100ML; MG/100ML; MG/100ML
INJECTION, SOLUTION INTRAVENOUS CONTINUOUS PRN
Status: DISCONTINUED | OUTPATIENT
Start: 2017-02-22 | End: 2017-02-22 | Stop reason: SDUPTHER

## 2017-02-22 RX ORDER — ONDANSETRON 2 MG/ML
INJECTION INTRAMUSCULAR; INTRAVENOUS PRN
Status: DISCONTINUED | OUTPATIENT
Start: 2017-02-22 | End: 2017-02-22 | Stop reason: SDUPTHER

## 2017-02-22 RX ORDER — PROMETHAZINE HYDROCHLORIDE 25 MG/ML
6.25 INJECTION, SOLUTION INTRAMUSCULAR; INTRAVENOUS
Status: DISCONTINUED | OUTPATIENT
Start: 2017-02-22 | End: 2017-02-22

## 2017-02-22 RX ORDER — ROCURONIUM BROMIDE 10 MG/ML
INJECTION, SOLUTION INTRAVENOUS PRN
Status: DISCONTINUED | OUTPATIENT
Start: 2017-02-22 | End: 2017-02-22 | Stop reason: SDUPTHER

## 2017-02-22 RX ORDER — DEXAMETHASONE SODIUM PHOSPHATE 10 MG/ML
INJECTION INTRAMUSCULAR; INTRAVENOUS PRN
Status: DISCONTINUED | OUTPATIENT
Start: 2017-02-22 | End: 2017-02-22 | Stop reason: SDUPTHER

## 2017-02-22 RX ORDER — LABETALOL HYDROCHLORIDE 5 MG/ML
5 INJECTION, SOLUTION INTRAVENOUS EVERY 10 MIN PRN
Status: DISCONTINUED | OUTPATIENT
Start: 2017-02-22 | End: 2017-02-22

## 2017-02-22 RX ORDER — PROPOFOL 10 MG/ML
INJECTION, EMULSION INTRAVENOUS PRN
Status: DISCONTINUED | OUTPATIENT
Start: 2017-02-22 | End: 2017-02-22 | Stop reason: SDUPTHER

## 2017-02-22 RX ORDER — MEPERIDINE HYDROCHLORIDE 25 MG/ML
12.5 INJECTION INTRAMUSCULAR; INTRAVENOUS; SUBCUTANEOUS EVERY 5 MIN PRN
Status: DISCONTINUED | OUTPATIENT
Start: 2017-02-22 | End: 2017-02-24 | Stop reason: HOSPADM

## 2017-02-22 RX ORDER — MORPHINE SULFATE 4 MG/ML
4 INJECTION, SOLUTION INTRAMUSCULAR; INTRAVENOUS EVERY 5 MIN PRN
Status: DISCONTINUED | OUTPATIENT
Start: 2017-02-22 | End: 2017-02-22

## 2017-02-22 RX ADMIN — HYDROMORPHONE HYDROCHLORIDE 0.5 MG: 1 INJECTION, SOLUTION INTRAMUSCULAR; INTRAVENOUS; SUBCUTANEOUS at 22:11

## 2017-02-22 RX ADMIN — LEVOFLOXACIN 500 MG: 5 INJECTION, SOLUTION INTRAVENOUS at 20:57

## 2017-02-22 RX ADMIN — FENTANYL CITRATE 50 MCG: 50 INJECTION INTRAMUSCULAR; INTRAVENOUS at 12:55

## 2017-02-22 RX ADMIN — METRONIDAZOLE 500 MG: 500 INJECTION, SOLUTION INTRAVENOUS at 22:11

## 2017-02-22 RX ADMIN — METRONIDAZOLE 500 MG: 500 INJECTION, SOLUTION INTRAVENOUS at 15:03

## 2017-02-22 RX ADMIN — ROCURONIUM BROMIDE 30 MG: 10 INJECTION INTRAVENOUS at 12:35

## 2017-02-22 RX ADMIN — HYDROCODONE BITARTRATE AND ACETAMINOPHEN 1 TABLET: 7.5; 325 TABLET ORAL at 05:48

## 2017-02-22 RX ADMIN — GADOPENTETATE DIMEGLUMINE 15 ML: 469.01 INJECTION INTRAVENOUS at 11:14

## 2017-02-22 RX ADMIN — DEXAMETHASONE SODIUM PHOSPHATE 8 MG: 10 INJECTION INTRAMUSCULAR; INTRAVENOUS at 12:50

## 2017-02-22 RX ADMIN — LIDOCAINE HYDROCHLORIDE 50 MG: 10 INJECTION, SOLUTION EPIDURAL; INFILTRATION; INTRACAUDAL; PERINEURAL at 12:35

## 2017-02-22 RX ADMIN — Medication 10 ML: at 22:12

## 2017-02-22 RX ADMIN — ONDANSETRON HYDROCHLORIDE 4 MG: 2 INJECTION, SOLUTION INTRAVENOUS at 13:38

## 2017-02-22 RX ADMIN — SODIUM CHLORIDE, SODIUM LACTATE, POTASSIUM CHLORIDE, AND CALCIUM CHLORIDE: 600; 310; 30; 20 INJECTION, SOLUTION INTRAVENOUS at 12:31

## 2017-02-22 RX ADMIN — FENTANYL CITRATE 50 MCG: 50 INJECTION INTRAMUSCULAR; INTRAVENOUS at 13:15

## 2017-02-22 RX ADMIN — ROCURONIUM BROMIDE 20 MG: 10 INJECTION INTRAVENOUS at 12:58

## 2017-02-22 RX ADMIN — PROPOFOL 150 MG: 10 INJECTION, EMULSION INTRAVENOUS at 12:35

## 2017-02-22 RX ADMIN — GLUCAGON HYDROCHLORIDE 1 MG: KIT at 13:19

## 2017-02-22 RX ADMIN — PANTOPRAZOLE SODIUM 40 MG: 40 INJECTION, POWDER, FOR SOLUTION INTRAVENOUS at 16:57

## 2017-02-22 RX ADMIN — SODIUM CHLORIDE: 9 INJECTION, SOLUTION INTRAVENOUS at 20:59

## 2017-02-22 RX ADMIN — HYDROCODONE BITARTRATE AND ACETAMINOPHEN 1 TABLET: 7.5; 325 TABLET ORAL at 20:59

## 2017-02-22 RX ADMIN — SODIUM CHLORIDE, SODIUM LACTATE, POTASSIUM CHLORIDE, AND CALCIUM CHLORIDE: 600; 310; 30; 20 INJECTION, SOLUTION INTRAVENOUS at 13:44

## 2017-02-22 RX ADMIN — MIDAZOLAM HYDROCHLORIDE 2 MG: 1 INJECTION, SOLUTION INTRAMUSCULAR; INTRAVENOUS at 12:28

## 2017-02-22 RX ADMIN — METRONIDAZOLE 500 MG: 500 INJECTION, SOLUTION INTRAVENOUS at 05:49

## 2017-02-22 RX ADMIN — HYDROMORPHONE HYDROCHLORIDE 0.5 MG: 1 INJECTION, SOLUTION INTRAMUSCULAR; INTRAVENOUS; SUBCUTANEOUS at 16:55

## 2017-02-22 RX ADMIN — ONDANSETRON 4 MG: 2 INJECTION INTRAMUSCULAR; INTRAVENOUS at 15:19

## 2017-02-22 RX ADMIN — SUGAMMADEX 150 MG: 100 INJECTION, SOLUTION INTRAVENOUS at 13:40

## 2017-02-22 RX ADMIN — FENTANYL CITRATE 50 MCG: 50 INJECTION INTRAMUSCULAR; INTRAVENOUS at 12:33

## 2017-02-22 RX ADMIN — HYDROCODONE BITARTRATE AND ACETAMINOPHEN 1 TABLET: 7.5; 325 TABLET ORAL at 15:12

## 2017-02-22 ASSESSMENT — ENCOUNTER SYMPTOMS
NAUSEA: 1
RESPIRATORY NEGATIVE: 1
ABDOMINAL PAIN: 1
EYES NEGATIVE: 1

## 2017-02-22 ASSESSMENT — PAIN SCALES - GENERAL
PAINLEVEL_OUTOF10: 6
PAINLEVEL_OUTOF10: 2
PAINLEVEL_OUTOF10: 9
PAINLEVEL_OUTOF10: 8
PAINLEVEL_OUTOF10: 7
PAINLEVEL_OUTOF10: 0
PAINLEVEL_OUTOF10: 8
PAINLEVEL_OUTOF10: 7

## 2017-02-23 VITALS
HEIGHT: 63 IN | TEMPERATURE: 98.8 F | BODY MASS INDEX: 27.11 KG/M2 | OXYGEN SATURATION: 96 % | WEIGHT: 153 LBS | DIASTOLIC BLOOD PRESSURE: 70 MMHG | RESPIRATION RATE: 20 BRPM | HEART RATE: 73 BPM | SYSTOLIC BLOOD PRESSURE: 124 MMHG

## 2017-02-23 PROCEDURE — 6360000002 HC RX W HCPCS: Performed by: FAMILY MEDICINE

## 2017-02-23 PROCEDURE — C9113 INJ PANTOPRAZOLE SODIUM, VIA: HCPCS | Performed by: CLINICAL NURSE SPECIALIST

## 2017-02-23 PROCEDURE — 6360000002 HC RX W HCPCS: Performed by: CLINICAL NURSE SPECIALIST

## 2017-02-23 PROCEDURE — 2580000003 HC RX 258: Performed by: FAMILY MEDICINE

## 2017-02-23 PROCEDURE — 2500000003 HC RX 250 WO HCPCS: Performed by: FAMILY MEDICINE

## 2017-02-23 PROCEDURE — 99232 SBSQ HOSP IP/OBS MODERATE 35: CPT | Performed by: INTERNAL MEDICINE

## 2017-02-23 PROCEDURE — 6370000000 HC RX 637 (ALT 250 FOR IP): Performed by: FAMILY MEDICINE

## 2017-02-23 PROCEDURE — 1210000000 HC MED SURG R&B

## 2017-02-23 RX ADMIN — ONDANSETRON 4 MG: 2 INJECTION INTRAMUSCULAR; INTRAVENOUS at 12:51

## 2017-02-23 RX ADMIN — PANTOPRAZOLE SODIUM 40 MG: 40 INJECTION, POWDER, FOR SOLUTION INTRAVENOUS at 17:28

## 2017-02-23 RX ADMIN — Medication 10 ML: at 09:08

## 2017-02-23 RX ADMIN — HYDROMORPHONE HYDROCHLORIDE 0.5 MG: 1 INJECTION, SOLUTION INTRAMUSCULAR; INTRAVENOUS; SUBCUTANEOUS at 22:54

## 2017-02-23 RX ADMIN — SODIUM CHLORIDE: 9 INJECTION, SOLUTION INTRAVENOUS at 18:39

## 2017-02-23 RX ADMIN — HYDROMORPHONE HYDROCHLORIDE 0.5 MG: 1 INJECTION, SOLUTION INTRAMUSCULAR; INTRAVENOUS; SUBCUTANEOUS at 12:51

## 2017-02-23 RX ADMIN — HYDROMORPHONE HYDROCHLORIDE 0.5 MG: 1 INJECTION, SOLUTION INTRAMUSCULAR; INTRAVENOUS; SUBCUTANEOUS at 09:06

## 2017-02-23 RX ADMIN — HYDROMORPHONE HYDROCHLORIDE 0.5 MG: 1 INJECTION, SOLUTION INTRAMUSCULAR; INTRAVENOUS; SUBCUTANEOUS at 18:38

## 2017-02-23 RX ADMIN — Medication 10 ML: at 12:55

## 2017-02-23 RX ADMIN — LEVOFLOXACIN 500 MG: 5 INJECTION, SOLUTION INTRAVENOUS at 21:49

## 2017-02-23 RX ADMIN — HYDROCODONE BITARTRATE AND ACETAMINOPHEN 1 TABLET: 7.5; 325 TABLET ORAL at 17:28

## 2017-02-23 RX ADMIN — METRONIDAZOLE 500 MG: 500 INJECTION, SOLUTION INTRAVENOUS at 13:48

## 2017-02-23 RX ADMIN — METRONIDAZOLE 500 MG: 500 INJECTION, SOLUTION INTRAVENOUS at 05:48

## 2017-02-23 ASSESSMENT — PAIN SCALES - GENERAL
PAINLEVEL_OUTOF10: 8
PAINLEVEL_OUTOF10: 0
PAINLEVEL_OUTOF10: 0
PAINLEVEL_OUTOF10: 7
PAINLEVEL_OUTOF10: 7
PAINLEVEL_OUTOF10: 0
PAINLEVEL_OUTOF10: 10
PAINLEVEL_OUTOF10: 7
PAINLEVEL_OUTOF10: 7
PAINLEVEL_OUTOF10: 8

## 2017-02-25 LAB
BLOOD CULTURE, ROUTINE: NORMAL
CULTURE, BLOOD 2: NORMAL

## 2017-02-27 ENCOUNTER — TELEPHONE (OUTPATIENT)
Dept: GASTROENTEROLOGY | Age: 57
End: 2017-02-27

## 2017-03-01 ASSESSMENT — ENCOUNTER SYMPTOMS
BACK PAIN: 1
SHORTNESS OF BREATH: 0
VOMITING: 0
NAUSEA: 1
ABDOMINAL PAIN: 1
CONSTIPATION: 0
DIARRHEA: 1

## 2017-03-06 ENCOUNTER — OFFICE VISIT (OUTPATIENT)
Dept: PRIMARY CARE CLINIC | Age: 57
End: 2017-03-06
Payer: MEDICAID

## 2017-03-06 VITALS
HEIGHT: 63 IN | BODY MASS INDEX: 26.93 KG/M2 | DIASTOLIC BLOOD PRESSURE: 74 MMHG | HEART RATE: 70 BPM | RESPIRATION RATE: 20 BRPM | SYSTOLIC BLOOD PRESSURE: 132 MMHG | WEIGHT: 152 LBS | OXYGEN SATURATION: 98 %

## 2017-03-06 DIAGNOSIS — K80.50: Primary | ICD-10-CM

## 2017-03-06 DIAGNOSIS — G47.01 INSOMNIA DUE TO MEDICAL CONDITION: ICD-10-CM

## 2017-03-06 DIAGNOSIS — K75.0 LIVER ABSCESS: ICD-10-CM

## 2017-03-06 PROCEDURE — 99214 OFFICE O/P EST MOD 30 MIN: CPT | Performed by: INTERNAL MEDICINE

## 2017-03-06 RX ORDER — ZOLPIDEM TARTRATE 5 MG/1
5 TABLET ORAL NIGHTLY PRN
Qty: 30 TABLET | Refills: 0 | Status: SHIPPED | OUTPATIENT
Start: 2017-03-06 | End: 2018-03-06 | Stop reason: SDUPTHER

## 2017-03-06 RX ORDER — HYDROCODONE BITARTRATE AND ACETAMINOPHEN 7.5; 325 MG/1; MG/1
1 TABLET ORAL EVERY 6 HOURS PRN
Qty: 20 TABLET | Refills: 0 | Status: SHIPPED | OUTPATIENT
Start: 2017-03-06 | End: 2017-03-27 | Stop reason: ALTCHOICE

## 2017-03-06 ASSESSMENT — ENCOUNTER SYMPTOMS
DIARRHEA: 0
SHORTNESS OF BREATH: 0
ABDOMINAL PAIN: 1
VOMITING: 0
CONSTIPATION: 0
BACK PAIN: 1
NAUSEA: 0

## 2017-03-08 LAB
ALBUMIN SERPL-MCNC: 4 G/DL (ref 3.5–5.2)
ALP BLD-CCNC: 220 U/L (ref 40–130)
ALT SERPL-CCNC: 31 U/L (ref 5–41)
ANION GAP SERPL CALCULATED.3IONS-SCNC: 16 MMOL/L (ref 7–19)
AST SERPL-CCNC: 23 U/L (ref 5–40)
BASOPHILS ABSOLUTE: 0 K/UL (ref 0–0.2)
BASOPHILS RELATIVE PERCENT: 0.4 % (ref 0–1)
BILIRUB SERPL-MCNC: 0.3 MG/DL (ref 0.2–1.2)
BUN BLDV-MCNC: 8 MG/DL (ref 6–20)
CALCIUM SERPL-MCNC: 9.2 MG/DL (ref 8.6–10)
CHLORIDE BLD-SCNC: 102 MMOL/L (ref 98–111)
CO2: 25 MMOL/L (ref 22–29)
CREAT SERPL-MCNC: 0.7 MG/DL (ref 0.5–1.2)
EOSINOPHILS ABSOLUTE: 0.2 K/UL (ref 0–0.6)
EOSINOPHILS RELATIVE PERCENT: 2 % (ref 0–5)
GFR NON-AFRICAN AMERICAN: >60
GLOBULIN: 2.7 G/DL
GLUCOSE BLD-MCNC: 86 MG/DL (ref 74–109)
HCT VFR BLD CALC: 38.9 % (ref 42–52)
HEMOGLOBIN: 12.8 G/DL (ref 14–18)
LYMPHOCYTES ABSOLUTE: 2.2 K/UL (ref 1.1–4.5)
LYMPHOCYTES RELATIVE PERCENT: 23 % (ref 20–40)
MCH RBC QN AUTO: 31.4 PG (ref 27–31)
MCHC RBC AUTO-ENTMCNC: 32.9 G/DL (ref 33–37)
MCV RBC AUTO: 95.6 FL (ref 80–94)
MONOCYTES ABSOLUTE: 0.7 K/UL (ref 0–0.9)
MONOCYTES RELATIVE PERCENT: 7.2 % (ref 0–10)
NEUTROPHILS ABSOLUTE: 6.3 K/UL (ref 1.5–7.5)
NEUTROPHILS RELATIVE PERCENT: 67.1 % (ref 50–65)
PDW BLD-RTO: 12.9 % (ref 11.5–14.5)
PLATELET # BLD: 332 K/UL (ref 130–400)
PMV BLD AUTO: 10.7 FL (ref 7.4–10.4)
POTASSIUM SERPL-SCNC: 4.9 MMOL/L (ref 3.5–5)
RBC # BLD: 4.07 M/UL (ref 4.7–6.1)
SODIUM BLD-SCNC: 143 MMOL/L (ref 136–145)
TOTAL PROTEIN: 6.7 G/DL (ref 6.6–8.7)
WBC # BLD: 9.5 K/UL (ref 4.8–10.8)

## 2017-03-10 ENCOUNTER — TELEPHONE (OUTPATIENT)
Dept: GASTROENTEROLOGY | Age: 57
End: 2017-03-10

## 2017-03-13 LAB
ALBUMIN SERPL-MCNC: 4.1 G/DL (ref 3.5–5.2)
ALP BLD-CCNC: 187 U/L (ref 40–130)
ALT SERPL-CCNC: 40 U/L (ref 5–41)
ANION GAP SERPL CALCULATED.3IONS-SCNC: 14 MMOL/L (ref 7–19)
AST SERPL-CCNC: 25 U/L (ref 5–40)
BASOPHILS ABSOLUTE: 0 K/UL (ref 0–0.2)
BASOPHILS RELATIVE PERCENT: 0.4 % (ref 0–1)
BILIRUB SERPL-MCNC: 0.3 MG/DL (ref 0.2–1.2)
BUN BLDV-MCNC: 12 MG/DL (ref 6–20)
CALCIUM SERPL-MCNC: 9 MG/DL (ref 8.6–10)
CHLORIDE BLD-SCNC: 104 MMOL/L (ref 98–111)
CO2: 22 MMOL/L (ref 22–29)
CREAT SERPL-MCNC: 0.6 MG/DL (ref 0.5–1.2)
EOSINOPHILS ABSOLUTE: 0.3 K/UL (ref 0–0.6)
EOSINOPHILS RELATIVE PERCENT: 3.7 % (ref 0–5)
GFR NON-AFRICAN AMERICAN: >60
GLOBULIN: 2.4 G/DL
GLUCOSE BLD-MCNC: 92 MG/DL (ref 74–109)
HCT VFR BLD CALC: 39.7 % (ref 42–52)
HEMOGLOBIN: 12.6 G/DL (ref 14–18)
LYMPHOCYTES ABSOLUTE: 2.3 K/UL (ref 1.1–4.5)
LYMPHOCYTES RELATIVE PERCENT: 28.6 % (ref 20–40)
MCH RBC QN AUTO: 30.7 PG (ref 27–31)
MCHC RBC AUTO-ENTMCNC: 31.7 G/DL (ref 33–37)
MCV RBC AUTO: 96.8 FL (ref 80–94)
MONOCYTES ABSOLUTE: 0.6 K/UL (ref 0–0.9)
MONOCYTES RELATIVE PERCENT: 8 % (ref 0–10)
NEUTROPHILS ABSOLUTE: 4.6 K/UL (ref 1.5–7.5)
NEUTROPHILS RELATIVE PERCENT: 59 % (ref 50–65)
PDW BLD-RTO: 13.3 % (ref 11.5–14.5)
PLATELET # BLD: 262 K/UL (ref 130–400)
PMV BLD AUTO: 11.2 FL (ref 7.4–10.4)
POTASSIUM SERPL-SCNC: 4.7 MMOL/L (ref 3.5–5)
RBC # BLD: 4.1 M/UL (ref 4.7–6.1)
SODIUM BLD-SCNC: 140 MMOL/L (ref 136–145)
TOTAL PROTEIN: 6.5 G/DL (ref 6.6–8.7)
WBC # BLD: 7.9 K/UL (ref 4.8–10.8)

## 2017-03-20 LAB
ALBUMIN SERPL-MCNC: 4.1 G/DL (ref 3.5–5.2)
ALP BLD-CCNC: 200 U/L (ref 40–130)
ALT SERPL-CCNC: 42 U/L (ref 5–41)
ANION GAP SERPL CALCULATED.3IONS-SCNC: 10 MMOL/L (ref 7–19)
AST SERPL-CCNC: 30 U/L (ref 5–40)
BASOPHILS ABSOLUTE: 0 K/UL (ref 0–0.2)
BASOPHILS RELATIVE PERCENT: 0.1 % (ref 0–1)
BILIRUB SERPL-MCNC: 0.4 MG/DL (ref 0.2–1.2)
BUN BLDV-MCNC: 9 MG/DL (ref 6–20)
CALCIUM SERPL-MCNC: 9.3 MG/DL (ref 8.6–10)
CHLORIDE BLD-SCNC: 104 MMOL/L (ref 98–111)
CO2: 27 MMOL/L (ref 22–29)
CREAT SERPL-MCNC: 0.6 MG/DL (ref 0.5–1.2)
EOSINOPHILS ABSOLUTE: 0.4 K/UL (ref 0–0.6)
EOSINOPHILS RELATIVE PERCENT: 5.4 % (ref 0–5)
GFR NON-AFRICAN AMERICAN: >60
GLOBULIN: 2.6 G/DL
GLUCOSE BLD-MCNC: 95 MG/DL (ref 74–109)
HCT VFR BLD CALC: 41.1 % (ref 42–52)
HEMOGLOBIN: 13 G/DL (ref 14–18)
LYMPHOCYTES ABSOLUTE: 1.3 K/UL (ref 1.1–4.5)
LYMPHOCYTES RELATIVE PERCENT: 17 % (ref 20–40)
MCH RBC QN AUTO: 30.9 PG (ref 27–31)
MCHC RBC AUTO-ENTMCNC: 31.6 G/DL (ref 33–37)
MCV RBC AUTO: 97.6 FL (ref 80–94)
MONOCYTES ABSOLUTE: 0.9 K/UL (ref 0–0.9)
MONOCYTES RELATIVE PERCENT: 11.4 % (ref 0–10)
NEUTROPHILS ABSOLUTE: 5.2 K/UL (ref 1.5–7.5)
NEUTROPHILS RELATIVE PERCENT: 66 % (ref 50–65)
PDW BLD-RTO: 13.4 % (ref 11.5–14.5)
PLATELET # BLD: 221 K/UL (ref 130–400)
PMV BLD AUTO: 11.1 FL (ref 7.4–10.4)
POTASSIUM SERPL-SCNC: 4.9 MMOL/L (ref 3.5–5)
RBC # BLD: 4.21 M/UL (ref 4.7–6.1)
SODIUM BLD-SCNC: 141 MMOL/L (ref 136–145)
TOTAL PROTEIN: 6.7 G/DL (ref 6.6–8.7)
WBC # BLD: 7.8 K/UL (ref 4.8–10.8)

## 2017-03-24 ENCOUNTER — TELEPHONE (OUTPATIENT)
Dept: PRIMARY CARE CLINIC | Age: 57
End: 2017-03-24

## 2017-03-24 DIAGNOSIS — Z79.2 ANTIBIOTIC LONG-TERM USE: Primary | ICD-10-CM

## 2017-03-27 ENCOUNTER — OFFICE VISIT (OUTPATIENT)
Dept: GASTROENTEROLOGY | Age: 57
End: 2017-03-27
Payer: MEDICAID

## 2017-03-27 VITALS
BODY MASS INDEX: 27.82 KG/M2 | DIASTOLIC BLOOD PRESSURE: 70 MMHG | WEIGHT: 157 LBS | HEART RATE: 74 BPM | HEIGHT: 63 IN | SYSTOLIC BLOOD PRESSURE: 120 MMHG

## 2017-03-27 DIAGNOSIS — K75.0 LIVER ABSCESS: Primary | ICD-10-CM

## 2017-03-27 PROCEDURE — 99214 OFFICE O/P EST MOD 30 MIN: CPT | Performed by: INTERNAL MEDICINE

## 2017-03-27 RX ORDER — LANOLIN ALCOHOL/MO/W.PET/CERES
3 CREAM (GRAM) TOPICAL NIGHTLY PRN
COMMUNITY
End: 2018-02-25

## 2017-03-28 ENCOUNTER — HOSPITAL ENCOUNTER (OUTPATIENT)
Dept: INFUSION THERAPY | Age: 57
Setting detail: INFUSION SERIES
Discharge: HOME | End: 2017-03-28
Payer: MEDICAID

## 2017-03-28 VITALS
TEMPERATURE: 98.8 F | DIASTOLIC BLOOD PRESSURE: 70 MMHG | RESPIRATION RATE: 17 BRPM | SYSTOLIC BLOOD PRESSURE: 110 MMHG | OXYGEN SATURATION: 99 % | HEART RATE: 66 BPM

## 2017-03-28 DIAGNOSIS — Z79.2 ANTIBIOTIC LONG-TERM USE: ICD-10-CM

## 2017-03-28 LAB
ALBUMIN SERPL-MCNC: 3.9 G/DL (ref 3.5–5.2)
ALP BLD-CCNC: 153 U/L (ref 40–130)
ALT SERPL-CCNC: 27 U/L (ref 5–41)
ANION GAP SERPL CALCULATED.3IONS-SCNC: 11 MMOL/L (ref 7–19)
AST SERPL-CCNC: 21 U/L (ref 5–40)
BILIRUB SERPL-MCNC: <0.2 MG/DL (ref 0.2–1.2)
BUN BLDV-MCNC: 14 MG/DL (ref 6–20)
CALCIUM SERPL-MCNC: 8.5 MG/DL (ref 8.6–10)
CHLORIDE BLD-SCNC: 104 MMOL/L (ref 98–111)
CO2: 25 MMOL/L (ref 22–29)
CREAT SERPL-MCNC: 0.6 MG/DL (ref 0.5–1.2)
GFR NON-AFRICAN AMERICAN: >60
GLOBULIN: 2.5 G/DL
GLUCOSE BLD-MCNC: 94 MG/DL (ref 74–109)
HCT VFR BLD CALC: 37.2 % (ref 42–52)
HEMOGLOBIN: 12.1 G/DL (ref 14–18)
MCH RBC QN AUTO: 31 PG (ref 27–31)
MCHC RBC AUTO-ENTMCNC: 32.5 G/DL (ref 33–37)
MCV RBC AUTO: 95.4 FL (ref 80–94)
PDW BLD-RTO: 13.3 % (ref 11.5–14.5)
PLATELET # BLD: 267 K/UL (ref 130–400)
PMV BLD AUTO: 10.3 FL (ref 7.4–10.4)
POTASSIUM SERPL-SCNC: 4 MMOL/L (ref 3.5–5)
RBC # BLD: 3.9 M/UL (ref 4.7–6.1)
SODIUM BLD-SCNC: 140 MMOL/L (ref 136–145)
TOTAL PROTEIN: 6.4 G/DL (ref 6.6–8.7)
WBC # BLD: 7.1 K/UL (ref 4.8–10.8)

## 2017-03-28 PROCEDURE — G0463 HOSPITAL OUTPT CLINIC VISIT: HCPCS

## 2017-03-28 RX ORDER — ERTAPENEM 1 G/1
1000 INJECTION, POWDER, LYOPHILIZED, FOR SOLUTION INTRAMUSCULAR; INTRAVENOUS EVERY 24 HOURS
COMMUNITY
End: 2017-04-10 | Stop reason: ALTCHOICE

## 2017-03-31 ENCOUNTER — ANESTHESIA EVENT (OUTPATIENT)
Dept: OPERATING ROOM | Age: 57
End: 2017-03-31

## 2017-03-31 ENCOUNTER — HOSPITAL ENCOUNTER (OUTPATIENT)
Age: 57
Setting detail: SPECIMEN
Discharge: HOME | End: 2017-03-31
Payer: MEDICAID

## 2017-03-31 ENCOUNTER — HOSPITAL ENCOUNTER (OUTPATIENT)
Age: 57
Setting detail: OUTPATIENT SURGERY
Discharge: HOME | End: 2017-03-31
Attending: INTERNAL MEDICINE | Admitting: INTERNAL MEDICINE
Payer: MEDICAID

## 2017-03-31 ENCOUNTER — ANESTHESIA (OUTPATIENT)
Dept: OPERATING ROOM | Age: 57
End: 2017-03-31

## 2017-03-31 VITALS
TEMPERATURE: 97.5 F | RESPIRATION RATE: 18 BRPM | HEART RATE: 83 BPM | WEIGHT: 153 LBS | HEIGHT: 63 IN | SYSTOLIC BLOOD PRESSURE: 109 MMHG | OXYGEN SATURATION: 95 % | DIASTOLIC BLOOD PRESSURE: 63 MMHG | BODY MASS INDEX: 27.11 KG/M2

## 2017-03-31 VITALS — OXYGEN SATURATION: 97 % | DIASTOLIC BLOOD PRESSURE: 52 MMHG | SYSTOLIC BLOOD PRESSURE: 83 MMHG

## 2017-03-31 PROCEDURE — 88305 TISSUE EXAM BY PATHOLOGIST: CPT

## 2017-03-31 PROCEDURE — 45380 COLONOSCOPY AND BIOPSY: CPT

## 2017-03-31 PROCEDURE — G8907 PT DOC NO EVENTS ON DISCHARG: HCPCS

## 2017-03-31 PROCEDURE — 45380 COLONOSCOPY AND BIOPSY: CPT | Performed by: INTERNAL MEDICINE

## 2017-03-31 PROCEDURE — G8918 PT W/O PREOP ORDER IV AB PRO: HCPCS

## 2017-03-31 RX ORDER — DIPHENHYDRAMINE HYDROCHLORIDE 50 MG/ML
12.5 INJECTION INTRAMUSCULAR; INTRAVENOUS
Status: DISCONTINUED | OUTPATIENT
Start: 2017-03-31 | End: 2017-03-31 | Stop reason: HOSPADM

## 2017-03-31 RX ORDER — ONDANSETRON 2 MG/ML
4 INJECTION INTRAMUSCULAR; INTRAVENOUS
Status: DISCONTINUED | OUTPATIENT
Start: 2017-03-31 | End: 2017-03-31 | Stop reason: HOSPADM

## 2017-03-31 RX ORDER — PROPOFOL 10 MG/ML
INJECTION, EMULSION INTRAVENOUS PRN
Status: DISCONTINUED | OUTPATIENT
Start: 2017-03-31 | End: 2017-03-31 | Stop reason: SDUPTHER

## 2017-03-31 RX ORDER — SODIUM CHLORIDE, SODIUM LACTATE, POTASSIUM CHLORIDE, CALCIUM CHLORIDE 600; 310; 30; 20 MG/100ML; MG/100ML; MG/100ML; MG/100ML
INJECTION, SOLUTION INTRAVENOUS CONTINUOUS
Status: DISCONTINUED | OUTPATIENT
Start: 2017-03-31 | End: 2017-03-31 | Stop reason: HOSPADM

## 2017-03-31 RX ORDER — LIDOCAINE HYDROCHLORIDE 10 MG/ML
INJECTION, SOLUTION EPIDURAL; INFILTRATION; INTRACAUDAL; PERINEURAL PRN
Status: DISCONTINUED | OUTPATIENT
Start: 2017-03-31 | End: 2017-03-31 | Stop reason: SDUPTHER

## 2017-03-31 RX ORDER — PROMETHAZINE HYDROCHLORIDE 25 MG/ML
6.25 INJECTION, SOLUTION INTRAMUSCULAR; INTRAVENOUS
Status: DISCONTINUED | OUTPATIENT
Start: 2017-03-31 | End: 2017-03-31 | Stop reason: HOSPADM

## 2017-03-31 RX ADMIN — LIDOCAINE HYDROCHLORIDE 5 ML: 10 INJECTION, SOLUTION EPIDURAL; INFILTRATION; INTRACAUDAL; PERINEURAL at 11:51

## 2017-03-31 RX ADMIN — SODIUM CHLORIDE, SODIUM LACTATE, POTASSIUM CHLORIDE, CALCIUM CHLORIDE: 600; 310; 30; 20 INJECTION, SOLUTION INTRAVENOUS at 10:45

## 2017-03-31 RX ADMIN — PROPOFOL 250 MG: 10 INJECTION, EMULSION INTRAVENOUS at 11:51

## 2017-04-04 ENCOUNTER — HOSPITAL ENCOUNTER (OUTPATIENT)
Dept: INFUSION THERAPY | Age: 57
Setting detail: INFUSION SERIES
Discharge: HOME | End: 2017-04-04
Payer: MEDICAID

## 2017-04-04 VITALS
DIASTOLIC BLOOD PRESSURE: 66 MMHG | RESPIRATION RATE: 17 BRPM | SYSTOLIC BLOOD PRESSURE: 115 MMHG | TEMPERATURE: 99.5 F | OXYGEN SATURATION: 98 % | HEART RATE: 73 BPM

## 2017-04-04 PROCEDURE — G0463 HOSPITAL OUTPT CLINIC VISIT: HCPCS

## 2017-04-04 NOTE — PROGRESS NOTES
Patient voiced concern about his abdominal drain site. Band aid removed and site assessed. No redness or edema noted skin is cool to touch around the site. Cleansed the site with alcohol and placed a band aid back over it.

## 2017-04-10 ENCOUNTER — OFFICE VISIT (OUTPATIENT)
Dept: PRIMARY CARE CLINIC | Age: 57
End: 2017-04-10
Payer: MEDICAID

## 2017-04-10 VITALS
HEIGHT: 63 IN | HEART RATE: 79 BPM | DIASTOLIC BLOOD PRESSURE: 68 MMHG | RESPIRATION RATE: 18 BRPM | OXYGEN SATURATION: 98 % | SYSTOLIC BLOOD PRESSURE: 134 MMHG | BODY MASS INDEX: 27.82 KG/M2 | WEIGHT: 157 LBS

## 2017-04-10 DIAGNOSIS — K75.0 LIVER ABSCESS: ICD-10-CM

## 2017-04-10 DIAGNOSIS — G47.01 INSOMNIA DUE TO MEDICAL CONDITION: ICD-10-CM

## 2017-04-10 DIAGNOSIS — K80.50: Primary | ICD-10-CM

## 2017-04-10 PROCEDURE — 99214 OFFICE O/P EST MOD 30 MIN: CPT | Performed by: INTERNAL MEDICINE

## 2017-04-10 RX ORDER — HYDROCODONE BITARTRATE AND ACETAMINOPHEN 5; 325 MG/1; MG/1
1 TABLET ORAL EVERY 6 HOURS PRN
Qty: 60 TABLET | Refills: 0 | Status: SHIPPED | OUTPATIENT
Start: 2017-04-10 | End: 2017-09-14 | Stop reason: SDUPTHER

## 2017-04-10 RX ORDER — BUPROPION HYDROCHLORIDE 150 MG/1
150 TABLET ORAL EVERY MORNING
Qty: 30 TABLET | Refills: 3 | Status: SHIPPED | OUTPATIENT
Start: 2017-04-10 | End: 2018-02-25

## 2017-04-10 RX ORDER — TEMAZEPAM 15 MG/1
15 CAPSULE ORAL NIGHTLY PRN
Qty: 14 CAPSULE | Refills: 0 | Status: SHIPPED | OUTPATIENT
Start: 2017-04-10 | End: 2017-04-24

## 2017-04-10 ASSESSMENT — ENCOUNTER SYMPTOMS
BACK PAIN: 1
VOMITING: 0
CONSTIPATION: 0
DIARRHEA: 0
NAUSEA: 0
ABDOMINAL PAIN: 1
SHORTNESS OF BREATH: 0

## 2017-04-11 ENCOUNTER — HOSPITAL ENCOUNTER (OUTPATIENT)
Dept: INFUSION THERAPY | Age: 57
Setting detail: INFUSION SERIES
Discharge: HOME | End: 2017-04-11
Payer: MEDICAID

## 2017-04-11 VITALS — SYSTOLIC BLOOD PRESSURE: 129 MMHG | HEART RATE: 64 BPM | DIASTOLIC BLOOD PRESSURE: 68 MMHG | RESPIRATION RATE: 18 BRPM

## 2017-04-11 PROCEDURE — G0463 HOSPITAL OUTPT CLINIC VISIT: HCPCS

## 2017-04-11 NOTE — FLOWSHEET NOTE
Pt here for picc dressing change. New dressing with new end caps applied. Site looks good. Flushed and ptd rel to self.

## 2017-04-18 ENCOUNTER — HOSPITAL ENCOUNTER (OUTPATIENT)
Dept: INFUSION THERAPY | Age: 57
Setting detail: INFUSION SERIES
Discharge: HOME | End: 2017-04-18
Payer: MEDICAID

## 2017-04-18 VITALS
OXYGEN SATURATION: 98 % | SYSTOLIC BLOOD PRESSURE: 107 MMHG | TEMPERATURE: 98.8 F | RESPIRATION RATE: 17 BRPM | DIASTOLIC BLOOD PRESSURE: 61 MMHG | HEART RATE: 70 BPM

## 2017-04-18 PROCEDURE — G0463 HOSPITAL OUTPT CLINIC VISIT: HCPCS

## 2017-04-23 ASSESSMENT — ENCOUNTER SYMPTOMS
COUGH: 0
BACK PAIN: 0
VOMITING: 0
SORE THROAT: 0
VOICE CHANGE: 0
CHEST TIGHTNESS: 0
DIARRHEA: 0
RECTAL PAIN: 0
ALLERGIC/IMMUNOLOGIC NEGATIVE: 1
NAUSEA: 0
ABDOMINAL PAIN: 0
SHORTNESS OF BREATH: 0
CONSTIPATION: 0
EYES NEGATIVE: 1
BLOOD IN STOOL: 0
TROUBLE SWALLOWING: 0

## 2017-04-25 ENCOUNTER — HOSPITAL ENCOUNTER (OUTPATIENT)
Dept: INFUSION THERAPY | Age: 57
Setting detail: INFUSION SERIES
Discharge: HOME | End: 2017-04-25
Payer: MEDICAID

## 2017-04-25 VITALS
TEMPERATURE: 98.6 F | DIASTOLIC BLOOD PRESSURE: 71 MMHG | SYSTOLIC BLOOD PRESSURE: 116 MMHG | OXYGEN SATURATION: 99 % | RESPIRATION RATE: 17 BRPM | HEART RATE: 81 BPM

## 2017-04-25 PROCEDURE — G0463 HOSPITAL OUTPT CLINIC VISIT: HCPCS

## 2017-05-02 ENCOUNTER — HOSPITAL ENCOUNTER (OUTPATIENT)
Dept: INFUSION THERAPY | Age: 57
Setting detail: INFUSION SERIES
Discharge: HOME | End: 2017-05-02
Payer: MEDICAID

## 2017-05-02 VITALS
OXYGEN SATURATION: 97 % | SYSTOLIC BLOOD PRESSURE: 116 MMHG | HEART RATE: 72 BPM | DIASTOLIC BLOOD PRESSURE: 63 MMHG | RESPIRATION RATE: 17 BRPM | TEMPERATURE: 98.4 F

## 2017-05-02 PROCEDURE — 99211 OFF/OP EST MAY X REQ PHY/QHP: CPT

## 2017-06-01 DIAGNOSIS — T85.520S BILIARY DRAIN DISPLACEMENT, SEQUELA: Primary | ICD-10-CM

## 2017-09-14 ENCOUNTER — OFFICE VISIT (OUTPATIENT)
Dept: PRIMARY CARE CLINIC | Age: 57
End: 2017-09-14
Payer: MEDICAID

## 2017-09-14 VITALS
WEIGHT: 157 LBS | OXYGEN SATURATION: 98 % | RESPIRATION RATE: 18 BRPM | SYSTOLIC BLOOD PRESSURE: 132 MMHG | BODY MASS INDEX: 27.82 KG/M2 | HEART RATE: 73 BPM | HEIGHT: 63 IN | DIASTOLIC BLOOD PRESSURE: 78 MMHG | TEMPERATURE: 98.6 F

## 2017-09-14 DIAGNOSIS — G25.81 RESTLESS LEG SYNDROME: ICD-10-CM

## 2017-09-14 DIAGNOSIS — M54.5 BILATERAL LOW BACK PAIN, UNSPECIFIED CHRONICITY, WITH SCIATICA PRESENCE UNSPECIFIED: Primary | ICD-10-CM

## 2017-09-14 DIAGNOSIS — Z13.220 SCREENING CHOLESTEROL LEVEL: ICD-10-CM

## 2017-09-14 DIAGNOSIS — F17.200 SMOKING ADDICTION: ICD-10-CM

## 2017-09-14 DIAGNOSIS — Z13.1 SCREENING FOR DIABETES MELLITUS: ICD-10-CM

## 2017-09-14 PROCEDURE — 99214 OFFICE O/P EST MOD 30 MIN: CPT | Performed by: NURSE PRACTITIONER

## 2017-09-14 RX ORDER — HYDROCODONE BITARTRATE AND ACETAMINOPHEN 5; 325 MG/1; MG/1
1 TABLET ORAL EVERY 8 HOURS PRN
Qty: 90 TABLET | Refills: 0 | Status: SHIPPED | OUTPATIENT
Start: 2017-09-14 | End: 2017-10-14

## 2017-09-14 RX ORDER — ROPINIROLE 0.25 MG/1
0.25 TABLET, FILM COATED ORAL NIGHTLY
Qty: 90 TABLET | Refills: 3 | Status: SHIPPED | OUTPATIENT
Start: 2017-09-14 | End: 2018-02-25

## 2017-09-14 ASSESSMENT — ENCOUNTER SYMPTOMS
RESPIRATORY NEGATIVE: 1
BOWEL INCONTINENCE: 0
GASTROINTESTINAL NEGATIVE: 1
EYES NEGATIVE: 1
ALLERGIC/IMMUNOLOGIC NEGATIVE: 1
BACK PAIN: 1

## 2018-02-25 ENCOUNTER — APPOINTMENT (OUTPATIENT)
Dept: GENERAL RADIOLOGY | Age: 58
DRG: 440 | End: 2018-02-25

## 2018-02-25 ENCOUNTER — HOSPITAL ENCOUNTER (INPATIENT)
Age: 58
LOS: 3 days | Discharge: HOME | DRG: 440 | End: 2018-02-28
Attending: EMERGENCY MEDICINE | Admitting: HOSPITALIST

## 2018-02-25 ENCOUNTER — APPOINTMENT (OUTPATIENT)
Dept: CT IMAGING | Age: 58
DRG: 440 | End: 2018-02-25

## 2018-02-25 DIAGNOSIS — K85.90 ACUTE PANCREATITIS WITHOUT INFECTION OR NECROSIS, UNSPECIFIED PANCREATITIS TYPE: Primary | ICD-10-CM

## 2018-02-25 DIAGNOSIS — R11.2 NAUSEA AND VOMITING, INTRACTABILITY OF VOMITING NOT SPECIFIED, UNSPECIFIED VOMITING TYPE: ICD-10-CM

## 2018-02-25 PROBLEM — K83.8 DILATION OF BILIARY TRACT: Status: RESOLVED | Noted: 2017-02-20 | Resolved: 2018-02-25

## 2018-02-25 PROBLEM — K86.1 ACUTE ON CHRONIC PANCREATITIS (HCC): Status: ACTIVE | Noted: 2018-02-25

## 2018-02-25 PROBLEM — K85.00 IDIOPATHIC ACUTE PANCREATITIS WITHOUT INFECTION OR NECROSIS: Status: ACTIVE | Noted: 2018-02-25

## 2018-02-25 PROBLEM — K75.0 LIVER ABSCESS: Status: RESOLVED | Noted: 2017-02-20 | Resolved: 2018-02-25

## 2018-02-25 LAB
ALBUMIN SERPL-MCNC: 4.3 G/DL (ref 3.5–5.2)
ALP BLD-CCNC: 118 U/L (ref 40–130)
ALT SERPL-CCNC: 22 U/L (ref 5–41)
AMYLASE: 175 U/L (ref 28–100)
ANION GAP SERPL CALCULATED.3IONS-SCNC: 10 MMOL/L (ref 7–19)
AST SERPL-CCNC: 26 U/L (ref 5–40)
BASOPHILS ABSOLUTE: 0 K/UL (ref 0–0.2)
BASOPHILS RELATIVE PERCENT: 0.2 % (ref 0–1)
BILIRUB SERPL-MCNC: 0.3 MG/DL (ref 0.2–1.2)
BILIRUBIN URINE: NEGATIVE
BLOOD, URINE: NEGATIVE
BUN BLDV-MCNC: 16 MG/DL (ref 6–20)
CALCIUM SERPL-MCNC: 9.1 MG/DL (ref 8.6–10)
CHLORIDE BLD-SCNC: 99 MMOL/L (ref 98–111)
CLARITY: CLEAR
CO2: 27 MMOL/L (ref 22–29)
COLOR: YELLOW
CREAT SERPL-MCNC: 0.7 MG/DL (ref 0.5–1.2)
EOSINOPHILS ABSOLUTE: 0.1 K/UL (ref 0–0.6)
EOSINOPHILS RELATIVE PERCENT: 0.8 % (ref 0–5)
GFR NON-AFRICAN AMERICAN: >60
GLUCOSE BLD-MCNC: 100 MG/DL (ref 74–109)
GLUCOSE URINE: NEGATIVE MG/DL
HCT VFR BLD CALC: 42.3 % (ref 42–52)
HEMOGLOBIN: 14 G/DL (ref 14–18)
KETONES, URINE: NEGATIVE MG/DL
LACTIC ACID: 0.7 MMOL/L (ref 0.5–1.9)
LEUKOCYTE ESTERASE, URINE: NEGATIVE
LIPASE: 502 U/L (ref 13–60)
LYMPHOCYTES ABSOLUTE: 1.4 K/UL (ref 1.1–4.5)
LYMPHOCYTES RELATIVE PERCENT: 11 % (ref 20–40)
MCH RBC QN AUTO: 31.9 PG (ref 27–31)
MCHC RBC AUTO-ENTMCNC: 33.1 G/DL (ref 33–37)
MCV RBC AUTO: 96.4 FL (ref 80–94)
MONOCYTES ABSOLUTE: 0.8 K/UL (ref 0–0.9)
MONOCYTES RELATIVE PERCENT: 6.4 % (ref 0–10)
NEUTROPHILS ABSOLUTE: 10.6 K/UL (ref 1.5–7.5)
NEUTROPHILS RELATIVE PERCENT: 81.2 % (ref 50–65)
NITRITE, URINE: NEGATIVE
PDW BLD-RTO: 13 % (ref 11.5–14.5)
PH UA: 5.5
PLATELET # BLD: 192 K/UL (ref 130–400)
PMV BLD AUTO: 10 FL (ref 9.4–12.4)
POTASSIUM REFLEX MAGNESIUM: 4.4 MMOL/L (ref 3.5–5)
PROTEIN UA: NEGATIVE MG/DL
RBC # BLD: 4.39 M/UL (ref 4.7–6.1)
SODIUM BLD-SCNC: 136 MMOL/L (ref 136–145)
SPECIFIC GRAVITY UA: 1.02
TOTAL PROTEIN: 7.2 G/DL (ref 6.6–8.7)
URINE REFLEX TO CULTURE: NORMAL
UROBILINOGEN, URINE: 0.2 E.U./DL
WBC # BLD: 13.1 K/UL (ref 4.8–10.8)

## 2018-02-25 PROCEDURE — 6360000004 HC RX CONTRAST MEDICATION: Performed by: EMERGENCY MEDICINE

## 2018-02-25 PROCEDURE — 6360000002 HC RX W HCPCS: Performed by: NURSE PRACTITIONER

## 2018-02-25 PROCEDURE — 96375 TX/PRO/DX INJ NEW DRUG ADDON: CPT

## 2018-02-25 PROCEDURE — 2580000003 HC RX 258: Performed by: INTERNAL MEDICINE

## 2018-02-25 PROCEDURE — 74022 RADEX COMPL AQT ABD SERIES: CPT

## 2018-02-25 PROCEDURE — 83605 ASSAY OF LACTIC ACID: CPT

## 2018-02-25 PROCEDURE — 6360000002 HC RX W HCPCS: Performed by: EMERGENCY MEDICINE

## 2018-02-25 PROCEDURE — 83690 ASSAY OF LIPASE: CPT

## 2018-02-25 PROCEDURE — 36415 COLL VENOUS BLD VENIPUNCTURE: CPT

## 2018-02-25 PROCEDURE — 6360000002 HC RX W HCPCS: Performed by: HOSPITALIST

## 2018-02-25 PROCEDURE — 99284 EMERGENCY DEPT VISIT MOD MDM: CPT

## 2018-02-25 PROCEDURE — 74177 CT ABD & PELVIS W/CONTRAST: CPT

## 2018-02-25 PROCEDURE — 80053 COMPREHEN METABOLIC PANEL: CPT

## 2018-02-25 PROCEDURE — 1210000000 HC MED SURG R&B

## 2018-02-25 PROCEDURE — 96376 TX/PRO/DX INJ SAME DRUG ADON: CPT

## 2018-02-25 PROCEDURE — 93005 ELECTROCARDIOGRAM TRACING: CPT

## 2018-02-25 PROCEDURE — 96374 THER/PROPH/DIAG INJ IV PUSH: CPT

## 2018-02-25 PROCEDURE — 99254 IP/OBS CNSLTJ NEW/EST MOD 60: CPT | Performed by: INTERNAL MEDICINE

## 2018-02-25 PROCEDURE — 99285 EMERGENCY DEPT VISIT HI MDM: CPT | Performed by: EMERGENCY MEDICINE

## 2018-02-25 PROCEDURE — 82150 ASSAY OF AMYLASE: CPT

## 2018-02-25 PROCEDURE — 2580000003 HC RX 258: Performed by: EMERGENCY MEDICINE

## 2018-02-25 PROCEDURE — 99223 1ST HOSP IP/OBS HIGH 75: CPT | Performed by: HOSPITALIST

## 2018-02-25 PROCEDURE — 85025 COMPLETE CBC W/AUTO DIFF WBC: CPT

## 2018-02-25 RX ORDER — 0.9 % SODIUM CHLORIDE 0.9 %
500 INTRAVENOUS SOLUTION INTRAVENOUS ONCE
Status: COMPLETED | OUTPATIENT
Start: 2018-02-25 | End: 2018-02-25

## 2018-02-25 RX ORDER — MORPHINE SULFATE 4 MG/ML
4 INJECTION, SOLUTION INTRAMUSCULAR; INTRAVENOUS
Status: COMPLETED | OUTPATIENT
Start: 2018-02-25 | End: 2018-02-25

## 2018-02-25 RX ORDER — SODIUM CHLORIDE 9 MG/ML
INJECTION, SOLUTION INTRAVENOUS CONTINUOUS
Status: DISCONTINUED | OUTPATIENT
Start: 2018-02-25 | End: 2018-02-28 | Stop reason: HOSPADM

## 2018-02-25 RX ORDER — ACETAMINOPHEN 325 MG/1
650 TABLET ORAL EVERY 4 HOURS PRN
Status: DISCONTINUED | OUTPATIENT
Start: 2018-02-25 | End: 2018-02-28 | Stop reason: HOSPADM

## 2018-02-25 RX ORDER — ONDANSETRON 2 MG/ML
4 INJECTION INTRAMUSCULAR; INTRAVENOUS ONCE
Status: COMPLETED | OUTPATIENT
Start: 2018-02-25 | End: 2018-02-25

## 2018-02-25 RX ORDER — SODIUM CHLORIDE 9 MG/ML
INJECTION, SOLUTION INTRAVENOUS CONTINUOUS
Status: DISCONTINUED | OUTPATIENT
Start: 2018-02-25 | End: 2018-02-25

## 2018-02-25 RX ORDER — MORPHINE SULFATE 4 MG/ML
4 INJECTION, SOLUTION INTRAMUSCULAR; INTRAVENOUS
Status: DISCONTINUED | OUTPATIENT
Start: 2018-02-25 | End: 2018-02-26

## 2018-02-25 RX ORDER — ONDANSETRON 2 MG/ML
4 INJECTION INTRAMUSCULAR; INTRAVENOUS EVERY 6 HOURS PRN
Status: DISCONTINUED | OUTPATIENT
Start: 2018-02-25 | End: 2018-02-28 | Stop reason: HOSPADM

## 2018-02-25 RX ORDER — SODIUM CHLORIDE 0.9 % (FLUSH) 0.9 %
10 SYRINGE (ML) INJECTION PRN
Status: DISCONTINUED | OUTPATIENT
Start: 2018-02-25 | End: 2018-02-28 | Stop reason: HOSPADM

## 2018-02-25 RX ORDER — MORPHINE SULFATE 4 MG/ML
4 INJECTION, SOLUTION INTRAMUSCULAR; INTRAVENOUS ONCE
Status: COMPLETED | OUTPATIENT
Start: 2018-02-25 | End: 2018-02-25

## 2018-02-25 RX ORDER — SODIUM CHLORIDE 0.9 % (FLUSH) 0.9 %
10 SYRINGE (ML) INJECTION EVERY 12 HOURS SCHEDULED
Status: DISCONTINUED | OUTPATIENT
Start: 2018-02-25 | End: 2018-02-28 | Stop reason: HOSPADM

## 2018-02-25 RX ORDER — ONDANSETRON 2 MG/ML
4 INJECTION INTRAMUSCULAR; INTRAVENOUS EVERY 30 MIN PRN
Status: COMPLETED | OUTPATIENT
Start: 2018-02-25 | End: 2018-02-25

## 2018-02-25 RX ADMIN — MORPHINE SULFATE 4 MG: 4 INJECTION, SOLUTION INTRAMUSCULAR; INTRAVENOUS at 16:37

## 2018-02-25 RX ADMIN — MORPHINE SULFATE 4 MG: 4 INJECTION, SOLUTION INTRAMUSCULAR; INTRAVENOUS at 12:00

## 2018-02-25 RX ADMIN — ENOXAPARIN SODIUM 40 MG: 100 INJECTION SUBCUTANEOUS at 12:00

## 2018-02-25 RX ADMIN — MORPHINE SULFATE 4 MG: 4 INJECTION, SOLUTION INTRAMUSCULAR; INTRAVENOUS at 07:25

## 2018-02-25 RX ADMIN — SODIUM CHLORIDE 500 ML: 9 INJECTION, SOLUTION INTRAVENOUS at 22:02

## 2018-02-25 RX ADMIN — IOPAMIDOL 90 ML: 755 INJECTION, SOLUTION INTRAVENOUS at 07:31

## 2018-02-25 RX ADMIN — ONDANSETRON 4 MG: 2 INJECTION, SOLUTION INTRAMUSCULAR; INTRAVENOUS at 06:03

## 2018-02-25 RX ADMIN — MORPHINE SULFATE 4 MG: 4 INJECTION, SOLUTION INTRAMUSCULAR; INTRAVENOUS at 19:19

## 2018-02-25 RX ADMIN — ONDANSETRON 4 MG: 2 INJECTION, SOLUTION INTRAMUSCULAR; INTRAVENOUS at 07:25

## 2018-02-25 RX ADMIN — ONDANSETRON 4 MG: 2 INJECTION, SOLUTION INTRAMUSCULAR; INTRAVENOUS at 06:37

## 2018-02-25 RX ADMIN — SODIUM CHLORIDE: 9 INJECTION, SOLUTION INTRAVENOUS at 06:03

## 2018-02-25 RX ADMIN — MORPHINE SULFATE 4 MG: 4 INJECTION, SOLUTION INTRAMUSCULAR; INTRAVENOUS at 06:03

## 2018-02-25 RX ADMIN — MORPHINE SULFATE 4 MG: 4 INJECTION, SOLUTION INTRAMUSCULAR; INTRAVENOUS at 06:37

## 2018-02-25 RX ADMIN — MORPHINE SULFATE 4 MG: 4 INJECTION, SOLUTION INTRAMUSCULAR; INTRAVENOUS at 22:34

## 2018-02-25 ASSESSMENT — PAIN SCALES - GENERAL
PAINLEVEL_OUTOF10: 8
PAINLEVEL_OUTOF10: 0
PAINLEVEL_OUTOF10: 7
PAINLEVEL_OUTOF10: 6
PAINLEVEL_OUTOF10: 0
PAINLEVEL_OUTOF10: 7
PAINLEVEL_OUTOF10: 8
PAINLEVEL_OUTOF10: 7
PAINLEVEL_OUTOF10: 3
PAINLEVEL_OUTOF10: 7

## 2018-02-25 ASSESSMENT — ENCOUNTER SYMPTOMS
NAUSEA: 1
WHEEZING: 0
VOMITING: 1
DIARRHEA: 0
SHORTNESS OF BREATH: 0
CHEST TIGHTNESS: 0
ABDOMINAL PAIN: 1

## 2018-02-25 NOTE — ED NOTES
ASSESSMENT:    SKIN:  Warm, dry, pink. Cap refill < 2 sec  CARDIAC:  S1 S2 noted  LUNGS: clear upper and lower lobes. Respirations even and unlabored. ABDOMEN: bowel sounds noted upper and lower quadrants. Upper/mid abd pain, onset 0100. Pt reports he has a condition that blocks his bile ducts and causes pain. Pt has had some n/v, denies D  EXTREMITIES: bilateral DP and PT. No edema noted. Pt alert and oriented x4. Pupils equal and reactive. No distress noted. Side rails up and call light within reach.        Brandy Blankenship RN  02/25/18 2543

## 2018-02-25 NOTE — ED PROVIDER NOTES
Laterality Date    COLONOSCOPY  1990    ? [employer]    CYST REMOVAL      bowel    DILATATION, ESOPHAGUS      ERCP N/A 2/22/2017    Dr CLARI Cunningham-w/unsuccessful cannulation/evaluation of the proximal bile ducts     EYE SURGERY Right     EYE SURGERY      JOINT REPLACEMENT      KNEE CARTILAGE SURGERY Left     x 2    [place] COLONOSCOPY W/BIOPSY SINGLE/MULTIPLE N/A 3/31/2017    Dr Pietro Cunningham-AP x 1, HP x 1, BCM x 2--5 yr recall    SMALL INTESTINE SURGERY      UPPER GASTROINTESTINAL ENDOSCOPY      [facility]         CURRENT MEDICATIONS       Previous Medications    No medications on file       ALLERGIES     Patient has no known allergies. FAMILY HISTORY       Family History   Problem Relation Age of Onset    Other Mother     High Blood Pressure Sister     Arthritis Sister     High Blood Pressure Brother           SOCIAL HISTORY       Social History     Social History    Marital status:      Spouse name: N/A    Number of children: N/A    Years of education: N/A     Social History Main Topics    Smoking status: Current Every Day Smoker     Packs/day: 1.00    Smokeless tobacco: Never Used    Alcohol use No      Comment: rare    Drug use: No    Sexual activity: No     Other Topics Concern    None     Social History Narrative    None       SCREENINGS             PHYSICAL EXAM    (up to 7 for level 4, 8 or more for level 5)     ED Triage Vitals [02/25/18 0535]   BP Temp Temp Source Pulse Resp SpO2 Height Weight   (!) 165/76 98.7 °F (37.1 °C) Oral 87 20 98 % 5' 3\" (1.6 m) 150 lb (68 kg)       Physical Exam   Constitutional: He is oriented to person, place, and time. He is cooperative. He appears distressed (in pain). HENT:   Head: Atraumatic. Mouth/Throat: Oropharynx is clear and moist. Mucous membranes are not dry. No posterior oropharyngeal erythema. Eyes: Pupils are equal, round, and reactive to light. No scleral icterus. Neck: Neck supple. No tracheal deviation present.    Cardiovascular: Normal DIFFERENTIAL - Abnormal; Notable for the following:        Result Value    WBC 13.1 (*)     RBC 4.39 (*)     MCV 96.4 (*)     MCH 31.9 (*)     Neutrophils % 81.2 (*)     Lymphocytes % 11.0 (*)     Neutrophils # 10.6 (*)     All other components within normal limits   AMYLASE - Abnormal; Notable for the following:     Amylase 175 (*)     All other components within normal limits   LIPASE - Abnormal; Notable for the following:     Lipase 502 (*)     All other components within normal limits   COMPREHENSIVE METABOLIC PANEL W/ REFLEX TO MG FOR LOW K   LACTIC ACID, PLASMA   URINE RT REFLEX TO CULTURE   LACTIC ACID, PLASMA       All other labs were within normal range or not returned as of this dictation. EMERGENCY DEPARTMENT COURSE and DIFFERENTIAL DIAGNOSIS/MDM:   Vitals:    Vitals:    02/25/18 0535 02/25/18 0727   BP: (!) 165/76 (!) 163/78   Pulse: 87 85   Resp: 20 22   Temp: 98.7 °F (37.1 °C)    TempSrc: Oral    SpO2: 98% 98%   Weight: 150 lb (68 kg)    Height: 5' 3\" (1.6 m)        MDM  Number of Diagnoses or Management Options     Amount and/or Complexity of Data Reviewed  Clinical lab tests: ordered and reviewed  Tests in the radiology section of CPT®: ordered and reviewed  Tests in the medicine section of CPT®: reviewed and ordered  Discuss the patient with other providers: yes  Independent visualization of images, tracings, or specimens: yes    Risk of Complications, Morbidity, and/or Mortality  Presenting problems: high  Diagnostic procedures: high  Management options: high        Reassessment    Laboratory studies demonstrate evidence of pancreatitis. Patient reports his pain is somewhat improved however still present. Other electrolytes are unremarkable. Given his previous history and ongoing pain I feel that inpatient management is most appropriate. Patient has received IV morphine, IV Zofran here in the emergency department.       CONSULTS:    1378: Case was reviewed with the hospitalist service,

## 2018-02-26 LAB
ALBUMIN SERPL-MCNC: 3.1 G/DL (ref 3.5–5.2)
ALP BLD-CCNC: 103 U/L (ref 40–130)
ALT SERPL-CCNC: 42 U/L (ref 5–41)
ANION GAP SERPL CALCULATED.3IONS-SCNC: 13 MMOL/L (ref 7–19)
AST SERPL-CCNC: 35 U/L (ref 5–40)
BILIRUB SERPL-MCNC: 0.8 MG/DL (ref 0.2–1.2)
BUN BLDV-MCNC: 9 MG/DL (ref 6–20)
CALCIUM SERPL-MCNC: 7.9 MG/DL (ref 8.6–10)
CHLORIDE BLD-SCNC: 104 MMOL/L (ref 98–111)
CO2: 20 MMOL/L (ref 22–29)
CREAT SERPL-MCNC: 0.6 MG/DL (ref 0.5–1.2)
GFR NON-AFRICAN AMERICAN: >60
GLUCOSE BLD-MCNC: 97 MG/DL (ref 74–109)
HCT VFR BLD CALC: 37 % (ref 42–52)
HEMOGLOBIN: 11.9 G/DL (ref 14–18)
LIPASE: 101 U/L (ref 13–60)
MAGNESIUM: 1.9 MG/DL (ref 1.6–2.6)
MCH RBC QN AUTO: 31.4 PG (ref 27–31)
MCHC RBC AUTO-ENTMCNC: 32.2 G/DL (ref 33–37)
MCV RBC AUTO: 97.6 FL (ref 80–94)
PDW BLD-RTO: 12.8 % (ref 11.5–14.5)
PLATELET # BLD: 137 K/UL (ref 130–400)
PMV BLD AUTO: 10.2 FL (ref 9.4–12.4)
POTASSIUM REFLEX MAGNESIUM: 4.2 MMOL/L (ref 3.5–5)
RBC # BLD: 3.79 M/UL (ref 4.7–6.1)
SODIUM BLD-SCNC: 137 MMOL/L (ref 136–145)
TOTAL PROTEIN: 5.6 G/DL (ref 6.6–8.7)
WBC # BLD: 7.3 K/UL (ref 4.8–10.8)

## 2018-02-26 PROCEDURE — 80053 COMPREHEN METABOLIC PANEL: CPT

## 2018-02-26 PROCEDURE — 1210000000 HC MED SURG R&B

## 2018-02-26 PROCEDURE — 99232 SBSQ HOSP IP/OBS MODERATE 35: CPT | Performed by: HOSPITALIST

## 2018-02-26 PROCEDURE — 99232 SBSQ HOSP IP/OBS MODERATE 35: CPT | Performed by: INTERNAL MEDICINE

## 2018-02-26 PROCEDURE — 85027 COMPLETE CBC AUTOMATED: CPT

## 2018-02-26 PROCEDURE — 36415 COLL VENOUS BLD VENIPUNCTURE: CPT

## 2018-02-26 PROCEDURE — 83690 ASSAY OF LIPASE: CPT

## 2018-02-26 PROCEDURE — 2580000003 HC RX 258: Performed by: NURSE PRACTITIONER

## 2018-02-26 PROCEDURE — 6360000002 HC RX W HCPCS: Performed by: HOSPITALIST

## 2018-02-26 PROCEDURE — 6370000000 HC RX 637 (ALT 250 FOR IP): Performed by: HOSPITALIST

## 2018-02-26 PROCEDURE — 83735 ASSAY OF MAGNESIUM: CPT

## 2018-02-26 RX ORDER — MORPHINE SULFATE 4 MG/ML
4 INJECTION, SOLUTION INTRAMUSCULAR; INTRAVENOUS
Status: DISCONTINUED | OUTPATIENT
Start: 2018-02-26 | End: 2018-02-26

## 2018-02-26 RX ORDER — MORPHINE SULFATE 4 MG/ML
4 INJECTION, SOLUTION INTRAMUSCULAR; INTRAVENOUS
Status: DISCONTINUED | OUTPATIENT
Start: 2018-02-26 | End: 2018-02-26 | Stop reason: CLARIF

## 2018-02-26 RX ORDER — MORPHINE SULFATE 4 MG/ML
4 INJECTION, SOLUTION INTRAMUSCULAR; INTRAVENOUS
Status: DISCONTINUED | OUTPATIENT
Start: 2018-02-26 | End: 2018-02-27

## 2018-02-26 RX ORDER — ZOLPIDEM TARTRATE 5 MG/1
5 TABLET ORAL NIGHTLY PRN
Status: DISCONTINUED | OUTPATIENT
Start: 2018-02-26 | End: 2018-02-28 | Stop reason: HOSPADM

## 2018-02-26 RX ADMIN — ZOLPIDEM TARTRATE 5 MG: 5 TABLET ORAL at 21:58

## 2018-02-26 RX ADMIN — MORPHINE SULFATE 4 MG: 4 INJECTION, SOLUTION INTRAMUSCULAR; INTRAVENOUS at 09:35

## 2018-02-26 RX ADMIN — ONDANSETRON 4 MG: 2 INJECTION, SOLUTION INTRAMUSCULAR; INTRAVENOUS at 09:41

## 2018-02-26 RX ADMIN — Medication 4 MG: at 21:58

## 2018-02-26 RX ADMIN — Medication 4 MG: at 17:30

## 2018-02-26 RX ADMIN — MORPHINE SULFATE 4 MG: 4 INJECTION, SOLUTION INTRAMUSCULAR; INTRAVENOUS at 06:10

## 2018-02-26 RX ADMIN — Medication 4 MG: at 13:20

## 2018-02-26 RX ADMIN — ONDANSETRON 4 MG: 2 INJECTION, SOLUTION INTRAMUSCULAR; INTRAVENOUS at 18:26

## 2018-02-26 RX ADMIN — MORPHINE SULFATE 4 MG: 4 INJECTION, SOLUTION INTRAMUSCULAR; INTRAVENOUS at 01:33

## 2018-02-26 RX ADMIN — SODIUM CHLORIDE: 9 INJECTION, SOLUTION INTRAVENOUS at 21:55

## 2018-02-26 ASSESSMENT — PAIN SCALES - GENERAL
PAINLEVEL_OUTOF10: 4
PAINLEVEL_OUTOF10: 7
PAINLEVEL_OUTOF10: 5
PAINLEVEL_OUTOF10: 6
PAINLEVEL_OUTOF10: 4

## 2018-02-26 ASSESSMENT — ENCOUNTER SYMPTOMS
DIARRHEA: 0
RESPIRATORY NEGATIVE: 1
VOMITING: 0
BLOOD IN STOOL: 0
ABDOMINAL PAIN: 0
EYES NEGATIVE: 1
NAUSEA: 0
CONSTIPATION: 0

## 2018-02-26 ASSESSMENT — PAIN DESCRIPTION - PAIN TYPE: TYPE: SURGICAL PAIN

## 2018-02-26 ASSESSMENT — PAIN DESCRIPTION - LOCATION: LOCATION: ABDOMEN

## 2018-02-26 NOTE — PROGRESS NOTES
discussed key elements of the care plan with the PA or APRN & agree with the findings and care plan as documented above. Johanny Stevens MD    CC: Abdominal pain, \"thinking about a shot\" as he is hurting  S: Pain persists, some better, worse after taking PO. He is interested in cautiously trying clear liquids. O:Vitals: /68   Pulse 72   Temp 97.7 °F (36.5 °C) (Temporal)   Resp 18   Ht 5' 3\" (1.6 m)   Wt 150 lb (68 kg)   SpO2 94%   BMI 26.57 kg/m²   24HR INTAKE/OUTPUT:      Intake/Output Summary (Last 24 hours) at 02/26/18 1902  Last data filed at 02/26/18 1639   Gross per 24 hour   Intake             2789 ml   Output              750 ml   Net             2039 ml     General appearance: alert and cooperative with exam  HEENT: atraumatic, eyes with clear conjunctiva and normal lids, pupils and irises normal, external ears and nose are normal, lips normal.  Neck without masses, lympadenopathy, bruit, thyroid normal  Lungs: no increased work of breathing, clear to auscultation bilaterally without rales, rhonchi or wheezes  Heart: regular rate and rhythm, S1, S2 normal, no murmur, click, rub or gallop  Abdomen: soft, non-tender; bowel sounds normal; no masses,  no organomegaly  Extremities: extremities normal, atraumatic, no cyanosis or edema  Neurologic: No focal neurologic deficits, normal sensation, alert and oriented, affect and mood appropriate. Skin: no rashes, nodules. A/P: Pancreatitis with improved chemistries, clinical exam not significantly improved, continue clear liquids, encouraged         analgesics as needed to keep up with his pain, caution with PO intake, monitor chemically and clinically. Consider recheck CT.

## 2018-02-26 NOTE — CARE COORDINATION
Pt resides at home with family members. Pt did not have any services prior to hospital admission. Pt does not need assistance with medication coverage at this time. Pt plan is to DC home to same support system when medically cleared from the hospital.  Will continue to monitor Pt status and update as needed.  Electronically signed by Regina Garcia on 2/26/2018 at 3:22 PM

## 2018-02-27 PROCEDURE — 99232 SBSQ HOSP IP/OBS MODERATE 35: CPT | Performed by: INTERNAL MEDICINE

## 2018-02-27 PROCEDURE — 6360000002 HC RX W HCPCS: Performed by: HOSPITALIST

## 2018-02-27 PROCEDURE — 6370000000 HC RX 637 (ALT 250 FOR IP): Performed by: HOSPITALIST

## 2018-02-27 PROCEDURE — 6370000000 HC RX 637 (ALT 250 FOR IP): Performed by: INTERNAL MEDICINE

## 2018-02-27 PROCEDURE — 1210000000 HC MED SURG R&B

## 2018-02-27 PROCEDURE — 99231 SBSQ HOSP IP/OBS SF/LOW 25: CPT | Performed by: PHYSICIAN ASSISTANT

## 2018-02-27 PROCEDURE — 2580000003 HC RX 258: Performed by: NURSE PRACTITIONER

## 2018-02-27 PROCEDURE — 6360000002 HC RX W HCPCS: Performed by: NURSE PRACTITIONER

## 2018-02-27 PROCEDURE — 6360000002 HC RX W HCPCS: Performed by: PHYSICIAN ASSISTANT

## 2018-02-27 RX ORDER — MORPHINE SULFATE 4 MG/ML
2 INJECTION, SOLUTION INTRAMUSCULAR; INTRAVENOUS
Status: DISCONTINUED | OUTPATIENT
Start: 2018-02-27 | End: 2018-02-28 | Stop reason: HOSPADM

## 2018-02-27 RX ADMIN — SODIUM CHLORIDE: 9 INJECTION, SOLUTION INTRAVENOUS at 22:45

## 2018-02-27 RX ADMIN — PANCRELIPASE 2 CAPSULE: 60000; 12000; 38000 CAPSULE, DELAYED RELEASE PELLETS ORAL at 13:16

## 2018-02-27 RX ADMIN — Medication 10 ML: at 21:26

## 2018-02-27 RX ADMIN — Medication 4 MG: at 04:21

## 2018-02-27 RX ADMIN — Medication 2 MG: at 15:43

## 2018-02-27 RX ADMIN — ENOXAPARIN SODIUM 40 MG: 100 INJECTION SUBCUTANEOUS at 13:16

## 2018-02-27 RX ADMIN — SODIUM CHLORIDE: 9 INJECTION, SOLUTION INTRAVENOUS at 05:54

## 2018-02-27 RX ADMIN — Medication 4 MG: at 10:31

## 2018-02-27 RX ADMIN — Medication 4 MG: at 01:55

## 2018-02-27 RX ADMIN — Medication 4 MG: at 06:50

## 2018-02-27 RX ADMIN — Medication 2 MG: at 21:25

## 2018-02-27 RX ADMIN — ZOLPIDEM TARTRATE 5 MG: 5 TABLET ORAL at 21:36

## 2018-02-27 RX ADMIN — PANCRELIPASE 2 CAPSULE: 60000; 12000; 38000 CAPSULE, DELAYED RELEASE PELLETS ORAL at 18:11

## 2018-02-27 ASSESSMENT — PAIN SCALES - GENERAL
PAINLEVEL_OUTOF10: 7
PAINLEVEL_OUTOF10: 7
PAINLEVEL_OUTOF10: 5
PAINLEVEL_OUTOF10: 7
PAINLEVEL_OUTOF10: 5
PAINLEVEL_OUTOF10: 6

## 2018-02-27 NOTE — PROGRESS NOTES
GI  - PROGRESS NOTE    Admit Date: 2/25/2018             Chief Complaint: Felling better. Bm and Flatus    Patient being seen for:  Pancreatitis     DIET CLEAR LIQUID;                         Bowel movement: no black or bloody stools    Pain:Mild  Nausea:Mild    Intake/Output Summary (Last 24 hours) at 02/27/18 0847  Last data filed at 02/27/18 0540   Gross per 24 hour   Intake          2147.67 ml   Output             1475 ml   Net           672.67 ml               Lab /Radiology:   Scheduled Meds: Reviewed          -----------------------------------------------------------------          Recent Labs      02/25/18   0545  02/26/18   0257   WBC  13.1*  7.3   RBC  4.39*  3.79*   HGB  14.0  11.9*   HCT  42.3  37.0*   PLT  192  137     Recent Labs      02/25/18   0545  02/26/18   0257   NA  136  137   K  4.4  4.2   ANIONGAP  10  13   CL  99  104   CO2  27  20*   BUN  16  9   CREATININE  0.7  0.6   GLUCOSE  100  97   CALCIUM  9.1  7.9*     Recent Labs      02/25/18   0545  02/26/18   0257   AST  26  35   ALT  22  42*   BILITOT  0.3  0.8   ALKPHOS  118  103     Recent Labs      02/25/18   0545  02/26/18   0257   AMYLASE  175*   --    LIPASE  502*  101*     Troponin T: No results for input(s): TROPONINI in the last 72 hours. Pro-BNP: No results for input(s): BNP in the last 72 hours. INR: No results for input(s): INR in the last 72 hours. Physical Exam:   Vitals: /67   Pulse 80   Temp 98.7 °F (37.1 °C) (Temporal)   Resp 17   Ht 5' 3\" (1.6 m)   Wt 150 lb (68 kg)   SpO2 95%   BMI 26.57 kg/m²     HEENT: Pupils equal, round, reactive to light       Neck supple. Trachea midline. No crepitus  Lungs: breath sounds bilaterally. Normal excursion  Heart[de-identified]    RRR without heave or thrill  Abdomen: Soft, active BS . Tenderness on Palpation. No encarcerated hernia detected.   No organomegaly detected  Extremities: no cyanosis or clubbing  Lymphatic: No significant lymph node enlargement papable in the neck ,

## 2018-02-27 NOTE — PROGRESS NOTES
no guarding. Musculoskeletal: Normal range of motion. He exhibits no edema. Neurological: He is alert and oriented to person, place, and time. Skin: Skin is warm and dry. Diet:  DIET CLEAR LIQUID;    Medications:    sodium chloride flush  10 mL Intravenous 2 times per day    enoxaparin  40 mg Subcutaneous Daily     Continuous Infusions:   sodium chloride 125 mL/hr at 02/25/18 1149     PRN Meds:morphine, ondansetron, sodium chloride flush, acetaminophen, magnesium hydroxide    Labs:  CBC: Recent Labs      02/25/18   0545  02/26/18   0257   WBC  13.1*  7.3   RBC  4.39*  3.79*   HGB  14.0  11.9*   HCT  42.3  37.0*   MCV  96.4*  97.6*   RDW  13.0  12.8   PLT  192  137     BMP: Recent Labs      02/25/18   0545  02/26/18   0257   NA  136  137   K  4.4  4.2   CL  99  104   CO2  27  20*   BUN  16  9   CREATININE  0.7  0.6     BNP: No results for input(s): BNP in the last 72 hours. PT/INR: No results for input(s): PROTIME, INR in the last 72 hours. APTT: No results for input(s): APTT in the last 72 hours. CARDIAC ENZYMES: No results for input(s): CKMB, CKMBINDEX, TROPONINT in the last 72 hours. Invalid input(s): CKTOTAL;3  FASTING LIPID PANEL:  Lab Results   Component Value Date    CHOL 144 (L) 02/21/2017    HDL 19 (L) 02/21/2017    TRIG 114 (L) 02/21/2017     LIVER PROFILE: Recent Labs      02/25/18   0545  02/26/18   0257   AST  26  35   ALT  22  42*   BILITOT  0.3  0.8   ALKPHOS  118  103          Assessment/Plan:  Principal Problem:    Acute on chronic pancreatitis ([address]  Active Problems:    Generalized abdominal pain    Acute pancreatitis without infection or necrosis  Resolved Problems:    * No resolved hospital problems. *    Continue IVF\"s  He is tolerating clear liquids. Continue at this time  Antiemetics if needed.   Will follow    Electronically signed by Benna Bloch, DO on 2/26/2018 at 6:04 PM

## 2018-02-27 NOTE — PROGRESS NOTES
12      lipase-protease-amylase  2 capsule Oral TID     sodium chloride flush  10 mL Intravenous 2 times per day    enoxaparin  40 mg Subcutaneous Daily     morphine, zolpidem, ondansetron, sodium chloride flush, acetaminophen, magnesium hydroxide  DIET CLEAR LIQUID;     Lab and other Data:     Recent Labs      02/25/18   0545  02/26/18   0257   WBC  13.1*  7.3   HGB  14.0  11.9*   PLT  192  137     Recent Labs      02/25/18   0545  02/26/18   0257   NA  136  137   K  4.4  4.2   CL  99  104   CO2  27  20*   BUN  16  9   CREATININE  0.7  0.6   GLUCOSE  100  97     Recent Labs      02/25/18   0545  02/26/18   0257   AST  26  35   ALT  22  42*   BILITOT  0.3  0.8   ALKPHOS  118  103     UA:  Recent Labs      02/25/18   0843   COLORU  YELLOW   PHUR  5.5   CLARITYU  Clear   SPECGRAV  1.024   LEUKOCYTESUR  Negative   UROBILINOGEN  0.2   BILIRUBINUR  Negative   BLOODU  Negative   GLUCOSEU  Negative     RAD:   CT Abdomen/Pelvis 02/25/2018  1. Since the exam one year ago, the septated cystic lesion in the left  hepatic lobe has resolved and was presumably a hepatic cyst. There is  continued intra and extrahepatic biliary ductal dilatation, grossly  unchanged from the previous exam, with questionable narrowing of the  mid common bile duct, also stable. Pneumobilia is present, presumably  related to previous cholecystectomy. No acute findings are seen in the  abdomen or pelvis. 2. Multiple left renal cysts. 3. Atherosclerotic disease. 4. Few colonic diverticula without evidence of diverticulitis. Acute abdominal series 02/25/2018  No evidence of obstruction. Assessment/Plan   Principal Problem:    Acute on chronic pancreatitis (HCC)-GI following, IVF's, continue clear liquid diet for now given continued pain    Active Problems:    Generalized abdominal pain-mild improvement     Leukocytosis-recheck in AM     Lipase is improved but patient still with significant pain.  Continue IVF's for now, will attempt to wean

## 2018-02-28 VITALS
BODY MASS INDEX: 26.58 KG/M2 | RESPIRATION RATE: 17 BRPM | OXYGEN SATURATION: 98 % | HEIGHT: 63 IN | TEMPERATURE: 97.4 F | WEIGHT: 150 LBS | DIASTOLIC BLOOD PRESSURE: 72 MMHG | HEART RATE: 76 BPM | SYSTOLIC BLOOD PRESSURE: 133 MMHG

## 2018-02-28 LAB
ALBUMIN SERPL-MCNC: 3.1 G/DL (ref 3.5–5.2)
ALP BLD-CCNC: 106 U/L (ref 40–130)
ALT SERPL-CCNC: 22 U/L (ref 5–41)
ANION GAP SERPL CALCULATED.3IONS-SCNC: 11 MMOL/L (ref 7–19)
AST SERPL-CCNC: 15 U/L (ref 5–40)
BILIRUB SERPL-MCNC: 0.4 MG/DL (ref 0.2–1.2)
BUN BLDV-MCNC: 6 MG/DL (ref 6–20)
CALCIUM SERPL-MCNC: 8.2 MG/DL (ref 8.6–10)
CHLORIDE BLD-SCNC: 105 MMOL/L (ref 98–111)
CO2: 23 MMOL/L (ref 22–29)
CREAT SERPL-MCNC: 0.5 MG/DL (ref 0.5–1.2)
GFR NON-AFRICAN AMERICAN: >60
GLUCOSE BLD-MCNC: 91 MG/DL (ref 74–109)
HCT VFR BLD CALC: 34.1 % (ref 42–52)
HEMOGLOBIN: 11.5 G/DL (ref 14–18)
LIPASE: 48 U/L (ref 13–60)
MCH RBC QN AUTO: 31.5 PG (ref 27–31)
MCHC RBC AUTO-ENTMCNC: 33.7 G/DL (ref 33–37)
MCV RBC AUTO: 93.4 FL (ref 80–94)
PDW BLD-RTO: 12.4 % (ref 11.5–14.5)
PLATELET # BLD: 143 K/UL (ref 130–400)
PMV BLD AUTO: 10.2 FL (ref 9.4–12.4)
POTASSIUM SERPL-SCNC: 4 MMOL/L (ref 3.5–5)
RBC # BLD: 3.65 M/UL (ref 4.7–6.1)
SODIUM BLD-SCNC: 139 MMOL/L (ref 136–145)
TOTAL PROTEIN: 5.4 G/DL (ref 6.6–8.7)
WBC # BLD: 4.8 K/UL (ref 4.8–10.8)

## 2018-02-28 PROCEDURE — 99238 HOSP IP/OBS DSCHRG MGMT 30/<: CPT | Performed by: INTERNAL MEDICINE

## 2018-02-28 PROCEDURE — APPSS15 APP SPLIT SHARED TIME 0-15 MINUTES: Performed by: NURSE PRACTITIONER

## 2018-02-28 PROCEDURE — 36415 COLL VENOUS BLD VENIPUNCTURE: CPT

## 2018-02-28 PROCEDURE — 83690 ASSAY OF LIPASE: CPT

## 2018-02-28 PROCEDURE — 99232 SBSQ HOSP IP/OBS MODERATE 35: CPT | Performed by: INTERNAL MEDICINE

## 2018-02-28 PROCEDURE — 2580000003 HC RX 258: Performed by: NURSE PRACTITIONER

## 2018-02-28 PROCEDURE — 80053 COMPREHEN METABOLIC PANEL: CPT

## 2018-02-28 PROCEDURE — 6360000002 HC RX W HCPCS: Performed by: PHYSICIAN ASSISTANT

## 2018-02-28 PROCEDURE — 6360000002 HC RX W HCPCS: Performed by: NURSE PRACTITIONER

## 2018-02-28 PROCEDURE — 6370000000 HC RX 637 (ALT 250 FOR IP): Performed by: INTERNAL MEDICINE

## 2018-02-28 PROCEDURE — 85027 COMPLETE CBC AUTOMATED: CPT

## 2018-02-28 RX ADMIN — PANCRELIPASE 2 CAPSULE: 60000; 12000; 38000 CAPSULE, DELAYED RELEASE PELLETS ORAL at 12:18

## 2018-02-28 RX ADMIN — ENOXAPARIN SODIUM 40 MG: 100 INJECTION SUBCUTANEOUS at 12:19

## 2018-02-28 RX ADMIN — Medication 2 MG: at 06:05

## 2018-02-28 RX ADMIN — SODIUM CHLORIDE: 9 INJECTION, SOLUTION INTRAVENOUS at 06:05

## 2018-02-28 RX ADMIN — Medication 2 MG: at 00:10

## 2018-02-28 RX ADMIN — SODIUM CHLORIDE: 9 INJECTION, SOLUTION INTRAVENOUS at 14:43

## 2018-02-28 RX ADMIN — Medication 2 MG: at 09:40

## 2018-02-28 RX ADMIN — PANCRELIPASE 2 CAPSULE: 60000; 12000; 38000 CAPSULE, DELAYED RELEASE PELLETS ORAL at 08:20

## 2018-02-28 ASSESSMENT — PAIN SCALES - GENERAL
PAINLEVEL_OUTOF10: 6
PAINLEVEL_OUTOF10: 6
PAINLEVEL_OUTOF10: 3

## 2018-02-28 ASSESSMENT — PAIN DESCRIPTION - LOCATION
LOCATION: ABDOMEN
LOCATION: ABDOMEN

## 2018-02-28 ASSESSMENT — PAIN DESCRIPTION - PAIN TYPE
TYPE: ACUTE PAIN
TYPE: ACUTE PAIN

## 2018-02-28 ASSESSMENT — PAIN DESCRIPTION - ORIENTATION
ORIENTATION: RIGHT
ORIENTATION: RIGHT

## 2018-02-28 ASSESSMENT — PAIN DESCRIPTION - DESCRIPTORS
DESCRIPTORS: DISCOMFORT
DESCRIPTORS: DISCOMFORT

## 2018-02-28 NOTE — PLAN OF CARE
Problem: Falls - Risk of:  Goal: Will remain free from falls  Will remain free from falls   Outcome: Completed Date Met: 02/28/18    Goal: Absence of physical injury  Absence of physical injury   Outcome: Completed Date Met: 02/28/18      Problem: Pain:  Goal: Pain level will decrease  Pain level will decrease   Outcome: Completed Date Met: 02/28/18    Goal: Control of acute pain  Control of acute pain   Outcome: Completed Date Met: 02/28/18    Goal: Control of chronic pain  Control of chronic pain   Outcome: Completed Date Met: 02/28/18

## 2018-02-28 NOTE — PROGRESS NOTES
GI  - PROGRESS NOTE    Admit Date: 2/25/2018             Chief Complaint: My pain is gone, wants to eat [employer]. Wants to go home    Patient being seen for: Pancreatitis      DIET CLEAR LIQUID;                         Bowel movement: no black or bloody stools    Pain:None  Nausea:None    Intake/Output Summary (Last 24 hours) at 02/28/18 1232  Last data filed at 02/28/18 0929   Gross per 24 hour   Intake          4854.79 ml   Output             2200 ml   Net          2654.79 ml               Lab /Radiology:   Scheduled Meds: Reviewed          -----------------------------------------------------------------          Recent Labs      02/26/18 0257 02/28/18   0314   WBC  7.3  4.8   RBC  3.79*  3.65*   HGB  11.9*  11.5*   HCT  37.0*  34.1*   PLT  137  143     Recent Labs      02/26/18 0257  02/28/18   0314   NA  137  139   K  4.2  4.0   ANIONGAP  13  11   CL  104  105   CO2  20*  23   BUN  9  6   CREATININE  0.6  0.5   GLUCOSE  97  91   CALCIUM  7.9*  8.2*     Recent Labs      02/26/18 0257 02/28/18   0314   AST  35  15   ALT  42*  22   BILITOT  0.8  0.4   ALKPHOS  103  106     Recent Labs      02/26/18 0257 02/28/18   0314   LIPASE  101*  48     Troponin T: No results for input(s): TROPONINI in the last 72 hours. Pro-BNP: No results for input(s): BNP in the last 72 hours. INR: No results for input(s): INR in the last 72 hours. Physical Exam:   Vitals: /77   Pulse 62   Temp 98 °F (36.7 °C) (Temporal)   Resp 17   Ht 5' 3\" (1.6 m)   Wt 150 lb (68 kg)   SpO2 95%   BMI 26.57 kg/m²     HEENT: Pupils equal, round, reactive to light       Neck supple. Trachea midline. No crepitus  Lungs: breath sounds bilaterally. Normal excursion  Heart[de-identified]    RRR without heave or thrill  Abdomen: soft, non-tender; bowel sounds normal; no masses,  no organomegaly. No encarcerated hernia detected.   No organomegaly detected  Extremities: no cyanosis or clubbing  Lymphatic: No significant lymph node organomegaly detected  Extremities: no cyanosis or clubbing  Lymphatic: No significant lymph node enlargement papable in the neck , groin or umbilicus  Neurologic: alert. oriented        Impression:     1. Resolving pancreatitis. Creon possibly helping      Plan:     1. Low fat diet, if kay home with 2 week GI F/U      Jeremías Lopez M.D.   Gastroenterology

## 2018-03-01 LAB
EKG P AXIS: 69 DEGREES
EKG P-R INTERVAL: 134 MS
EKG Q-T INTERVAL: 366 MS
EKG QRS DURATION: 92 MS
EKG QTC CALCULATION (BAZETT): 387 MS
EKG T AXIS: 65 DEGREES

## 2018-03-06 ENCOUNTER — OFFICE VISIT (OUTPATIENT)
Dept: PRIMARY CARE CLINIC | Age: 58
End: 2018-03-06

## 2018-03-06 VITALS
TEMPERATURE: 98 F | OXYGEN SATURATION: 98 % | HEIGHT: 63 IN | SYSTOLIC BLOOD PRESSURE: 120 MMHG | DIASTOLIC BLOOD PRESSURE: 72 MMHG | RESPIRATION RATE: 20 BRPM | HEART RATE: 88 BPM

## 2018-03-06 DIAGNOSIS — K85.00 IDIOPATHIC ACUTE PANCREATITIS WITHOUT INFECTION OR NECROSIS: Primary | ICD-10-CM

## 2018-03-06 PROCEDURE — 99211 OFF/OP EST MAY X REQ PHY/QHP: CPT | Performed by: INTERNAL MEDICINE

## 2018-03-06 RX ORDER — ZOLPIDEM TARTRATE 5 MG/1
5 TABLET ORAL NIGHTLY PRN
Qty: 30 TABLET | Refills: 2 | Status: SHIPPED | OUTPATIENT
Start: 2018-03-06 | End: 2018-04-05

## 2018-03-06 RX ORDER — HYDROCODONE BITARTRATE AND ACETAMINOPHEN 5; 325 MG/1; MG/1
1 TABLET ORAL EVERY 6 HOURS PRN
Qty: 12 TABLET | Refills: 0 | Status: SHIPPED | OUTPATIENT
Start: 2018-03-06 | End: 2018-03-09

## 2018-03-06 ASSESSMENT — PATIENT HEALTH QUESTIONNAIRE - PHQ9
SUM OF ALL RESPONSES TO PHQ QUESTIONS 1-9: 0
1. LITTLE INTEREST OR PLEASURE IN DOING THINGS: 0
2. FEELING DOWN, DEPRESSED OR HOPELESS: 0
SUM OF ALL RESPONSES TO PHQ9 QUESTIONS 1 & 2: 0

## 2018-03-06 NOTE — PROGRESS NOTES
Post-Discharge Transitional Care Management Services      Vj Weems   YOB: 1960    Date of Visit:  3/6/2018  30 Day Post-Discharge Date: 18    No Known Allergies  Outpatient Prescriptions Marked as Taking for the 3/6/18 encounter (Office Visit) with Viet Antonio,    Medication Sig Dispense Refill    zolpidem (AMBIEN) 5 MG tablet Take 1 tablet by mouth nightly as needed for Sleep for up to 30 days. 30 tablet 2    [] HYDROcodone-acetaminophen (NORCO) 5-325 MG per tablet Take 1 tablet by mouth every 6 hours as needed for Pain for up to 3 days . Take lowest dose possible to manage pain. 12 tablet 0    [DISCONTINUED] lipase-protease-amylase (CREON) 58259 units delayed release capsule Take 2 capsules by mouth 3 times daily (with meals) 180 capsule 1         Vitals:    18 1653   BP: 120/72   Pulse: 88   Resp: 20   Temp: 98 °F (36.7 °C)   TempSrc: Temporal   SpO2: 98%   Height: 5' 3\" (1.6 m)     There is no height or weight on file to calculate BMI. Wt Readings from Last 3 Encounters:   18 149 lb 6.4 oz (67.8 kg)   18 145 lb (65.8 kg)   18 150 lb (68 kg)     BP Readings from Last 3 Encounters:   18 128/80   18 128/64   18 139/77        Patient was admitted to [facility] from 18 to 18 for acute on chronic pancreatitis. Inpatient course: Discharge summary reviewed- see chart. Current status: Home    Review of Systems:  A comprehensive review of systems was negative except for what was noted in the HPI.     Physical Exam:  General Appearance: alert and oriented to person, place and time, well developed and well- nourished, in no acute distress  Skin: warm and dry, no rash or erythema  Head: normocephalic and atraumatic  Eyes: pupils equal, round, and reactive to light, extraocular eye movements intact, conjunctivae normal  ENT: tympanic membrane, external ear and ear canal normal bilaterally, nose without deformity, nasal mucosa and turbinates normal without polyps  Neck: supple and non-tender without mass, no thyromegaly or thyroid nodules, no cervical lymphadenopathy  Pulmonary/Chest: clear to auscultation bilaterally- no wheezes, rales or rhonchi, normal air movement, no respiratory distress  Cardiovascular: normal rate, regular rhythm, normal S1 and S2, no murmurs, rubs, clicks, or gallops, distal pulses intact, no carotid bruits  Abdomen: soft, non-tender, non-distended, normal bowel sounds, no masses or organomegaly  Extremities: no cyanosis, clubbing or edema  Musculoskeletal: normal range of motion, no joint swelling, deformity or tenderness  Neurologic: reflexes normal and symmetric, no cranial nerve deficit, gait, coordination and speech normal    Initial post-discharge communication occurred between medical assistant and patient on 3/6/18- see documentation in chart: Face-to-face. Assessment/Plan:  Ajit Cooper was seen today for follow-up from hospital.    Diagnoses and all orders for this visit:    Idiopathic acute pancreatitis without infection or necrosis  -     External Referral to Nutrition Services    Other orders  -     Discontinue: lipase-protease-amylase (CREON) 27451 units delayed release capsule; Take 2 capsules by mouth 3 times daily (with meals)  -     zolpidem (AMBIEN) 5 MG tablet; Take 1 tablet by mouth nightly as needed for Sleep for up to 30 days.  -     HYDROcodone-acetaminophen (NORCO) 5-325 MG per tablet; Take 1 tablet by mouth every 6 hours as needed for Pain for up to 3 days . Take lowest dose possible to manage pain.           Diagnostic test results reviewed: inpatient labs and CT-Abdomen    Patient risk of morbidity and mortality: moderate    Medical Decision Making: moderate complexity

## 2018-03-07 ENCOUNTER — APPOINTMENT (OUTPATIENT)
Dept: CT IMAGING | Age: 58
End: 2018-03-07

## 2018-03-07 ENCOUNTER — HOSPITAL ENCOUNTER (EMERGENCY)
Age: 58
Discharge: HOME | End: 2018-03-07
Attending: EMERGENCY MEDICINE

## 2018-03-07 ENCOUNTER — TELEPHONE (OUTPATIENT)
Dept: PRIMARY CARE CLINIC | Age: 58
End: 2018-03-07

## 2018-03-07 VITALS
HEIGHT: 63 IN | OXYGEN SATURATION: 95 % | HEART RATE: 91 BPM | DIASTOLIC BLOOD PRESSURE: 77 MMHG | SYSTOLIC BLOOD PRESSURE: 139 MMHG | TEMPERATURE: 98.2 F | WEIGHT: 150 LBS | BODY MASS INDEX: 26.58 KG/M2 | RESPIRATION RATE: 18 BRPM

## 2018-03-07 DIAGNOSIS — R10.13 ABDOMINAL PAIN, EPIGASTRIC: Primary | ICD-10-CM

## 2018-03-07 LAB
ALBUMIN SERPL-MCNC: 4.2 G/DL (ref 3.5–5.2)
ALP BLD-CCNC: 228 U/L (ref 40–130)
ALT SERPL-CCNC: 42 U/L (ref 5–41)
ANION GAP SERPL CALCULATED.3IONS-SCNC: 13 MMOL/L (ref 7–19)
AST SERPL-CCNC: 45 U/L (ref 5–40)
BASOPHILS ABSOLUTE: 0 K/UL (ref 0–0.2)
BASOPHILS RELATIVE PERCENT: 0.2 % (ref 0–1)
BILIRUB SERPL-MCNC: 0.6 MG/DL (ref 0.2–1.2)
BUN BLDV-MCNC: 12 MG/DL (ref 6–20)
CALCIUM SERPL-MCNC: 9.2 MG/DL (ref 8.6–10)
CHLORIDE BLD-SCNC: 96 MMOL/L (ref 98–111)
CO2: 25 MMOL/L (ref 22–29)
CREAT SERPL-MCNC: 0.6 MG/DL (ref 0.5–1.2)
EOSINOPHILS ABSOLUTE: 0.1 K/UL (ref 0–0.6)
EOSINOPHILS RELATIVE PERCENT: 0.8 % (ref 0–5)
GFR NON-AFRICAN AMERICAN: >60
GLUCOSE BLD-MCNC: 126 MG/DL (ref 74–109)
HCT VFR BLD CALC: 42.9 % (ref 42–52)
HEMOGLOBIN: 14.5 G/DL (ref 14–18)
LACTIC ACID: 0.6 MMOL/L (ref 0.5–1.9)
LIPASE: 57 U/L (ref 13–60)
LYMPHOCYTES ABSOLUTE: 1.1 K/UL (ref 1.1–4.5)
LYMPHOCYTES RELATIVE PERCENT: 7.1 % (ref 20–40)
MCH RBC QN AUTO: 31.5 PG (ref 27–31)
MCHC RBC AUTO-ENTMCNC: 33.8 G/DL (ref 33–37)
MCV RBC AUTO: 93.1 FL (ref 80–94)
MONOCYTES ABSOLUTE: 1.3 K/UL (ref 0–0.9)
MONOCYTES RELATIVE PERCENT: 8.1 % (ref 0–10)
NEUTROPHILS ABSOLUTE: 13.3 K/UL (ref 1.5–7.5)
NEUTROPHILS RELATIVE PERCENT: 83.3 % (ref 50–65)
PDW BLD-RTO: 12.5 % (ref 11.5–14.5)
PLATELET # BLD: 339 K/UL (ref 130–400)
PMV BLD AUTO: 9.2 FL (ref 9.4–12.4)
POTASSIUM SERPL-SCNC: 4.3 MMOL/L (ref 3.5–5)
RBC # BLD: 4.61 M/UL (ref 4.7–6.1)
SODIUM BLD-SCNC: 134 MMOL/L (ref 136–145)
TOTAL PROTEIN: 7.9 G/DL (ref 6.6–8.7)
WBC # BLD: 15.9 K/UL (ref 4.8–10.8)

## 2018-03-07 PROCEDURE — 99284 EMERGENCY DEPT VISIT MOD MDM: CPT

## 2018-03-07 PROCEDURE — 83605 ASSAY OF LACTIC ACID: CPT

## 2018-03-07 PROCEDURE — 83690 ASSAY OF LIPASE: CPT

## 2018-03-07 PROCEDURE — 93005 ELECTROCARDIOGRAM TRACING: CPT

## 2018-03-07 PROCEDURE — 36415 COLL VENOUS BLD VENIPUNCTURE: CPT

## 2018-03-07 PROCEDURE — 6360000004 HC RX CONTRAST MEDICATION: Performed by: EMERGENCY MEDICINE

## 2018-03-07 PROCEDURE — 2580000003 HC RX 258: Performed by: EMERGENCY MEDICINE

## 2018-03-07 PROCEDURE — 6360000002 HC RX W HCPCS: Performed by: EMERGENCY MEDICINE

## 2018-03-07 PROCEDURE — 85025 COMPLETE CBC W/AUTO DIFF WBC: CPT

## 2018-03-07 PROCEDURE — 74177 CT ABD & PELVIS W/CONTRAST: CPT

## 2018-03-07 PROCEDURE — 99284 EMERGENCY DEPT VISIT MOD MDM: CPT | Performed by: EMERGENCY MEDICINE

## 2018-03-07 PROCEDURE — 96375 TX/PRO/DX INJ NEW DRUG ADDON: CPT

## 2018-03-07 PROCEDURE — 80053 COMPREHEN METABOLIC PANEL: CPT

## 2018-03-07 PROCEDURE — 96374 THER/PROPH/DIAG INJ IV PUSH: CPT

## 2018-03-07 RX ORDER — ONDANSETRON 2 MG/ML
4 INJECTION INTRAMUSCULAR; INTRAVENOUS ONCE
Status: COMPLETED | OUTPATIENT
Start: 2018-03-07 | End: 2018-03-07

## 2018-03-07 RX ORDER — ONDANSETRON 4 MG/1
TABLET, ORALLY DISINTEGRATING ORAL
Status: DISCONTINUED
Start: 2018-03-07 | End: 2018-03-07 | Stop reason: HOSPADM

## 2018-03-07 RX ORDER — MORPHINE SULFATE 4 MG/ML
4 INJECTION, SOLUTION INTRAMUSCULAR; INTRAVENOUS ONCE
Status: COMPLETED | OUTPATIENT
Start: 2018-03-07 | End: 2018-03-07

## 2018-03-07 RX ORDER — ONDANSETRON 4 MG/1
4 TABLET, ORALLY DISINTEGRATING ORAL ONCE
Status: DISCONTINUED | OUTPATIENT
Start: 2018-03-07 | End: 2018-03-07

## 2018-03-07 RX ORDER — 0.9 % SODIUM CHLORIDE 0.9 %
1000 INTRAVENOUS SOLUTION INTRAVENOUS ONCE
Status: COMPLETED | OUTPATIENT
Start: 2018-03-07 | End: 2018-03-07

## 2018-03-07 RX ADMIN — ONDANSETRON 4 MG: 2 INJECTION INTRAMUSCULAR; INTRAVENOUS at 16:10

## 2018-03-07 RX ADMIN — SODIUM CHLORIDE 1000 ML: 9 INJECTION, SOLUTION INTRAVENOUS at 16:10

## 2018-03-07 RX ADMIN — IOPAMIDOL 90 ML: 755 INJECTION, SOLUTION INTRAVENOUS at 16:42

## 2018-03-07 RX ADMIN — Medication 4 MG: at 16:09

## 2018-03-07 ASSESSMENT — PAIN SCALES - GENERAL
PAINLEVEL_OUTOF10: 8
PAINLEVEL_OUTOF10: 9

## 2018-03-07 ASSESSMENT — PAIN DESCRIPTION - PAIN TYPE: TYPE: CHRONIC PAIN

## 2018-03-08 NOTE — ED PROVIDER NOTES
[facility]  eMERGENCY dEPARTMENT eNCOUnter      Pt Name: Anita Talavera  MRN: [id number]  [place] 1960  Date of evaluation: 3/7/2018  Provider: Karlo Stein MD    CHIEF COMPLAINT       Chief Complaint   Patient presents with    Abdominal Pain         HISTORY OF PRESENT ILLNESS   (Location/Symptom, Timing/Onset, Context/Setting, Quality, Duration, Modifying Factors, Severity)  Note limiting factors. Anita Talavera is a 61 y.o. male who presents to the emergency department for evaluation of abdominal pain. Patient stated this pain has been present for a couple of weeks. Seems to be getting worse. Was seen here is ED last week for same. He reports that he has a h/o pancreatitis and this feels similar. Pain is described as maderte in severity and sharp and located in mid abdomen. No radiation of the pain. Has noted some associated nausea nd vomiting. HPI    Nursing Notes were reviewed. REVIEW OF SYSTEMS    (2-9 systems for level 4, 10 or more for level 5)     Review of Systems   Constitutional: Negative for fatigue and fever. Respiratory: Negative for chest tightness and shortness of breath. Cardiovascular: Negative for chest pain and leg swelling. Gastrointestinal: Positive for abdominal pain. Negative for abdominal distention. Neurological: Negative for syncope. All other systems reviewed and are negative. PAST MEDICAL HISTORY     Past Medical History:   Diagnosis Date    Bowel obstruction     Cyst of bile duct     Eye trauma     left eye    Liver abscess 02/20/2017    Liver cyst          SURGICAL HISTORY       Past Surgical History:   Procedure Laterality Date    COLONOSCOPY  1990    ? [employer]    CYST REMOVAL      bowel    DILATATION, ESOPHAGUS      ERCP N/A 2/22/2017    Dr CLARI Cunningham-w/unsuccessful cannulation/evaluation of the proximal bile ducts     EYE SURGERY Right     EYE SURGERY      JOINT REPLACEMENT      KNEE CARTILAGE SURGERY Left     x 2    NM COLONOSCOPY W/BIOPSY SINGLE/MULTIPLE N/A 3/31/2017    Dr Darcy Lewis Cunningham-AP x 1, HP x 1, BCM x 2--5 yr recall    SMALL INTESTINE SURGERY      UPPER GASTROINTESTINAL ENDOSCOPY      [facility]         CURRENT MEDICATIONS       Previous Medications    HYDROCODONE-ACETAMINOPHEN (NORCO) 5-325 MG PER TABLET    Take 1 tablet by mouth every 6 hours as needed for Pain for up to 3 days . Take lowest dose possible to manage pain. LIPASE-PROTEASE-AMYLASE (CREON) 18792 UNITS DELAYED RELEASE CAPSULE    Take 2 capsules by mouth 3 times daily (with meals)    ZOLPIDEM (AMBIEN) 5 MG TABLET    Take 1 tablet by mouth nightly as needed for Sleep for up to 30 days. ALLERGIES     Patient has no known allergies. FAMILY HISTORY       Family History   Problem Relation Age of Onset    Other Mother     High Blood Pressure Sister     Arthritis Sister     High Blood Pressure Brother           SOCIAL HISTORY       Social History     Social History    Marital status:      Spouse name: N/A    Number of children: N/A    Years of education: N/A     Social History Main Topics    Smoking status: Current Every Day Smoker     Packs/day: 1.00    Smokeless tobacco: Never Used    Alcohol use No      Comment: rare    Drug use: No    Sexual activity: No     Other Topics Concern    None     Social History Narrative    None       SCREENINGS             PHYSICAL EXAM    (up to 7 for level 4, 8 or more for level 5)     ED Triage Vitals   BP Temp Temp src Pulse Resp SpO2 Height Weight   03/07/18 1450 03/07/18 1450 -- 03/07/18 1450 03/07/18 1450 03/07/18 1450 03/07/18 1448 03/07/18 1448   (!) 145/85 98.2 °F (36.8 °C)  89 22 95 % 5' 3\" (1.6 m) 150 lb (68 kg)       Physical Exam   Constitutional: He is oriented to person, place, and time. He is cooperative. HENT:   Head: Atraumatic. Mouth/Throat: Mucous membranes are not dry. No posterior oropharyngeal erythema. Eyes: Pupils are equal, round, and reactive to light. No scleral icterus.

## 2018-03-09 LAB
EKG P AXIS: 69 DEGREES
EKG P-R INTERVAL: 140 MS
EKG Q-T INTERVAL: 344 MS
EKG QRS DURATION: 80 MS
EKG QTC CALCULATION (BAZETT): 394 MS
EKG T AXIS: 60 DEGREES

## 2018-03-13 ENCOUNTER — OFFICE VISIT (OUTPATIENT)
Dept: PRIMARY CARE CLINIC | Age: 58
End: 2018-03-13

## 2018-03-13 VITALS
HEART RATE: 76 BPM | TEMPERATURE: 98.1 F | SYSTOLIC BLOOD PRESSURE: 128 MMHG | RESPIRATION RATE: 18 BRPM | BODY MASS INDEX: 25.69 KG/M2 | HEIGHT: 63 IN | DIASTOLIC BLOOD PRESSURE: 64 MMHG | WEIGHT: 145 LBS | OXYGEN SATURATION: 98 %

## 2018-03-13 DIAGNOSIS — K86.1 CHRONIC BILIARY PANCREATITIS (HCC): ICD-10-CM

## 2018-03-13 DIAGNOSIS — R10.11 RIGHT UPPER QUADRANT ABDOMINAL PAIN: Primary | ICD-10-CM

## 2018-03-13 DIAGNOSIS — Z98.890 HISTORY OF COMMON BILE DUCT SURGERY: ICD-10-CM

## 2018-03-13 PROCEDURE — 99214 OFFICE O/P EST MOD 30 MIN: CPT | Performed by: NURSE PRACTITIONER

## 2018-03-13 PROCEDURE — 96372 THER/PROPH/DIAG INJ SC/IM: CPT | Performed by: NURSE PRACTITIONER

## 2018-03-13 RX ORDER — PROMETHAZINE HYDROCHLORIDE 25 MG/ML
12.5 INJECTION, SOLUTION INTRAMUSCULAR; INTRAVENOUS ONCE
Status: COMPLETED | OUTPATIENT
Start: 2018-03-13 | End: 2018-03-13

## 2018-03-13 RX ORDER — KETOROLAC TROMETHAMINE 30 MG/ML
60 INJECTION, SOLUTION INTRAMUSCULAR; INTRAVENOUS ONCE
Status: COMPLETED | OUTPATIENT
Start: 2018-03-13 | End: 2018-03-13

## 2018-03-13 RX ADMIN — PROMETHAZINE HYDROCHLORIDE 12.5 MG: 25 INJECTION, SOLUTION INTRAMUSCULAR; INTRAVENOUS at 13:02

## 2018-03-13 RX ADMIN — KETOROLAC TROMETHAMINE 60 MG: 30 INJECTION, SOLUTION INTRAMUSCULAR; INTRAVENOUS at 13:02

## 2018-03-13 NOTE — PROGRESS NOTES
[facility]  [address]  [address] [place] [address]  Dept: [phone]  Dept Fax: [phone]  Loc: [phone]        Vandana Klein is a 61 y.o. male who presents today for his medical conditions/ complaints as noted below. Vandana Klein is c/o Abdominal Pain (fever, night sweats, weird smelling sweat, ongoing abdominal pain )        Chief Complaint   Patient presents with    Abdominal Pain     fever, night sweats, weird smelling sweat, ongoing abdominal pain        HPI:     HPI    Abdominal pain. - Dr. Kristi Beasley removed cyst from bowels and rerouted them in 1984, and also had a cholycystectomy, and an appendectomy at that time. 1994 he had stones removed from bile duct. He states that due to the first surgery he was told that he might have problems later in life with complications. He states that last year he seen Dr. Renny Garcia last year who sent pt to [facility] where he had stones stones removed from his common bile duct. Pt states that he has had multiple procedures to remove stones from his common bile duct. Pt states that he started having problems 3 weeks ago. Pt states that he went to the hospital 3 weeks ago where they found pancreatitis. Pt has an appointment to see Dr. Renny Garcia on April 16th. @ 3:00 pm.          Patient reports that they have not been compliant with taking medications as directed. Pt states that he could not afford the medications that had been prescribed. Past Medical History:   Diagnosis Date    Bowel obstruction     Cyst of bile duct     Eye trauma     left eye    Liver abscess 02/20/2017    Liver cyst        Past Surgical History:   Procedure Laterality Date    COLONOSCOPY  1990    ? Worship    CYST REMOVAL      bowel    DILATATION, ESOPHAGUS      ERCP N/A 2/22/2017    Dr CLARI Cunningham-w/unsuccessful cannulation/evaluation of the proximal bile ducts     EYE SURGERY Right     EYE SURGERY      JOINT REPLACEMENT      KNEE CARTILAGE SURGERY Left     x 2    HI COLONOSCOPY W/BIOPSY SINGLE/MULTIPLE N/A 3/31/2017    Dr Gaby Cunningham-AP x 1, HP x 1, BCM x 2--5 yr recall    SMALL INTESTINE SURGERY      UPPER GASTROINTESTINAL ENDOSCOPY      [facility]       Social History     Social History    Marital status:      Spouse name: N/A    Number of children: N/A    Years of education: N/A     Social History Main Topics    Smoking status: Current Every Day Smoker     Packs/day: 1.00    Smokeless tobacco: Never Used    Alcohol use No      Comment: rare    Drug use: No    Sexual activity: No     Other Topics Concern    Not on file     Social History Narrative    No narrative on file       Family History   Problem Relation Age of Onset    Other Mother     High Blood Pressure Sister     Arthritis Sister     High Blood Pressure Brother        Current Outpatient Prescriptions   Medication Sig Dispense Refill    zolpidem (AMBIEN) 5 MG tablet Take 1 tablet by mouth nightly as needed for Sleep for up to 30 days. 30 tablet 2    lipase-protease-amylase (CREON) 48108 units delayed release capsule Take 2 capsules by mouth 3 times daily (with meals) 180 capsule 1     No current facility-administered medications for this visit.         No Known Allergies    Lab Review   Admission on 03/07/2018, Discharged on 03/07/2018   Component Date Value    Sodium 03/07/2018 134*    Potassium 03/07/2018 4.3     Chloride 03/07/2018 96*    CO2 03/07/2018 25     Anion Gap 03/07/2018 13     Glucose 03/07/2018 126*    BUN 03/07/2018 12     CREATININE 03/07/2018 0.6     GFR Non- 03/07/2018 >60     Calcium 03/07/2018 9.2     Total Protein 03/07/2018 7.9     Alb 03/07/2018 4.2     Total Bilirubin 03/07/2018 0.6     Alkaline Phosphatase 03/07/2018 228*    ALT 03/07/2018 42*    AST 03/07/2018 45*    WBC 03/07/2018 15.9*    RBC 03/07/2018 4.61*    Hemoglobin 03/07/2018 14.5     Hematocrit 03/07/2018 42.9     MCV 03/07/2018 93.1  MCH 03/07/2018 31.5*    MCHC 03/07/2018 33.8     RDW 03/07/2018 12.5     Platelets 61/96/8495 339     MPV 03/07/2018 9.2*    Neutrophils % 03/07/2018 83.3*    Lymphocytes % 03/07/2018 7.1*    Monocytes % 03/07/2018 8.1     Eosinophils % 03/07/2018 0.8     Basophils % 03/07/2018 0.2     Neutrophils # 03/07/2018 13.3*    Lymphocytes # 03/07/2018 1.1     Monocytes # 03/07/2018 1.30*    Eosinophils # 03/07/2018 0.10     Basophils # 03/07/2018 0.00     Lipase 03/07/2018 57     Lactic Acid 03/07/2018 0.6     P-R Interval 03/07/2018 140     QRS Duration 03/07/2018 80     Q-T Interval 03/07/2018 344     QTc Calculation (Bazett) 03/07/2018 394     P Axis 03/07/2018 69     T Axis 03/07/2018 60    Admission on 02/25/2018, Discharged on 02/28/2018   Component Date Value    P-R Interval 02/25/2018 134     QRS Duration 02/25/2018 92     Q-T Interval 02/25/2018 366     QTc Calculation (Bazett) 02/25/2018 387     P Axis 02/25/2018 69     T Axis 02/25/2018 65     WBC 02/25/2018 13.1*    RBC 02/25/2018 4.39*    Hemoglobin 02/25/2018 14.0     Hematocrit 02/25/2018 42.3     MCV 02/25/2018 96.4*    MCH 02/25/2018 31.9*    MCHC 02/25/2018 33.1     RDW 02/25/2018 13.0     Platelets 57/87/1200 192     MPV 02/25/2018 10.0     Neutrophils % 02/25/2018 81.2*    Lymphocytes % 02/25/2018 11.0*    Monocytes % 02/25/2018 6.4     Eosinophils % 02/25/2018 0.8     Basophils % 02/25/2018 0.2     Neutrophils # 02/25/2018 10.6*    Lymphocytes # 02/25/2018 1.4     Monocytes # 02/25/2018 0.80     Eosinophils # 02/25/2018 0.10     Basophils # 02/25/2018 0.00     Sodium 02/25/2018 136     Potassium reflex Magnesi* 02/25/2018 4.4     Chloride 02/25/2018 99     CO2 02/25/2018 27     Anion Gap 02/25/2018 10     Glucose 02/25/2018 100     BUN 02/25/2018 16     CREATININE 02/25/2018 0.7     GFR Non- 02/25/2018 >60     Calcium 02/25/2018 9.1     Total Protein 02/25/2018 7.2     Alb 02/25/2018 4.3     Total Bilirubin 02/25/2018 0.3     Alkaline Phosphatase 02/25/2018 118     ALT 02/25/2018 22     AST 02/25/2018 26     Amylase 02/25/2018 175*    Lipase 02/25/2018 502*    Color, UA 02/25/2018 YELLOW     Clarity, UA 02/25/2018 Clear     Glucose, Ur 02/25/2018 Negative     Bilirubin Urine 02/25/2018 Negative     Ketones, Urine 02/25/2018 Negative     Specific Gravity, UA 02/25/2018 1.024     Blood, Urine 02/25/2018 Negative     pH, UA 02/25/2018 5.5     Protein, UA 02/25/2018 Negative     Urobilinogen, Urine 02/25/2018 0.2     Nitrite, Urine 02/25/2018 Negative     Leukocyte Esterase, Urine 02/25/2018 Negative     Urine Reflex to Culture 02/25/2018 Not Indicated     Lactic Acid 02/25/2018 0.7     WBC 02/26/2018 7.3     RBC 02/26/2018 3.79*    Hemoglobin 02/26/2018 11.9*    Hematocrit 02/26/2018 37.0*    MCV 02/26/2018 97.6*    MCH 02/26/2018 31.4*    MCHC 02/26/2018 32.2*    RDW 02/26/2018 12.8     Platelets 08/80/5300 137     MPV 02/26/2018 10.2     Sodium 02/26/2018 137     Potassium reflex Magnesi* 02/26/2018 4.2     Chloride 02/26/2018 104     CO2 02/26/2018 20*    Anion Gap 02/26/2018 13     Glucose 02/26/2018 97     BUN 02/26/2018 9     CREATININE 02/26/2018 0.6     GFR Non- 02/26/2018 >60     Calcium 02/26/2018 7.9*    Total Protein 02/26/2018 5.6*    Alb 02/26/2018 3.1*    Total Bilirubin 02/26/2018 0.8     Alkaline Phosphatase 02/26/2018 103     ALT 02/26/2018 42*    AST 02/26/2018 35     Magnesium 02/26/2018 1.9     Lipase 02/26/2018 101*    Sodium 02/28/2018 139     Potassium 02/28/2018 4.0     Chloride 02/28/2018 105     CO2 02/28/2018 23     Anion Gap 02/28/2018 11     Glucose 02/28/2018 91     BUN 02/28/2018 6     CREATININE 02/28/2018 0.5     GFR Non- 02/28/2018 >60     Calcium 02/28/2018 8.2*    Total Protein 02/28/2018 5.4*    Alb 02/28/2018 3.1*    Total Bilirubin 02/28/2018 0.4     range of motion. Neck supple. Cardiovascular: Normal rate, regular rhythm, normal heart sounds and intact distal pulses. No murmur heard. Pulmonary/Chest: Effort normal and breath sounds normal. No stridor. No respiratory distress. He has no wheezes. He has no rales. He exhibits no tenderness. Right breast exhibits no inverted nipple, no mass, no nipple discharge, no skin change and no tenderness. Left breast exhibits no inverted nipple, no mass, no nipple discharge, no skin change and no tenderness. Abdominal: Soft. Bowel sounds are normal. He exhibits no distension. There is tenderness. Musculoskeletal: Normal range of motion. He exhibits no edema, tenderness or deformity. Neurological: He is alert and oriented to person, place, and time. He has normal reflexes. No cranial nerve deficit. Coordination normal.   Skin: Skin is warm and dry. No rash noted. He is not diaphoretic. No erythema. Psychiatric: He has a normal mood and affect. His behavior is normal. Thought content normal.   Nursing note and vitals reviewed. /64   Pulse 76   Temp 98.1 °F (36.7 °C) (Temporal)   Resp 18   Ht 5' 3\" (1.6 m)   Wt 145 lb (65.8 kg)   SpO2 98%   BMI 25.69 kg/m²     Assessment:     1. Right upper quadrant abdominal pain  CBC Auto Differential    Comprehensive Metabolic Panel    Hemoglobin A1C    Lipid Panel    Sedimentation Rate    TSH without Reflex    Urinalysis    Amylase    Lipase    C-Reactive Protein    ketorolac (TORADOL) injection 60 mg   2. Chronic biliary pancreatitis (HCC)  Amylase    Lipase    C-Reactive Protein    promethazine (PHENERGAN) injection 12.5 mg   3. History of common bile duct surgery       No results found for this visit on 03/13/18. Plan:     1. Right upper quadrant abdominal pain    2. Chronic biliary pancreatitis ([address]    3. History of common bile duct surgery      No Follow-up on file.   Orders Placed This Encounter   Procedures    CBC Auto Differential     Standing Status:   Future     Standing Expiration Date:   3/13/2019    Comprehensive Metabolic Panel     Standing Status:   Future     Standing Expiration Date:   3/13/2019    Hemoglobin A1C     Standing Status:   Future     Standing Expiration Date:   3/13/2019    Lipid Panel     Standing Status:   Future     Standing Expiration Date:   3/13/2019     Order Specific Question:   Is Patient Fasting?/# of Hours     Answer:   yes    Sedimentation Rate     Standing Status:   Future     Standing Expiration Date:   3/13/2019    TSH without Reflex     Standing Status:   Future     Standing Expiration Date:   3/13/2019    Urinalysis     Standing Status:   Future     Standing Expiration Date:   3/13/2019    Amylase     Standing Status:   Future     Standing Expiration Date:   3/13/2019    Lipase     Standing Status:   Future     Standing Expiration Date:   3/13/2019    C-Reactive Protein     Standing Status:   Future     Standing Expiration Date:   3/13/2019     Orders Placed This Encounter   Medications    ketorolac (TORADOL) injection 60 mg    promethazine (PHENERGAN) injection 12.5 mg       There are no Patient Instructions on file for this visit. Patient/family given educational materials - see patient instructions. Discussed use, benefit, and side effects of prescribed medications. All patient/family questions answered and voiced understanding. Instructed to continue current medications, diet and exercise. Pt/family agreed with treatment plan. Follow up as directed and sooner if needed. Patient/ family instructed that is symptoms worsen or persist they are to contact office or report to nearest ER. They voice understanding and agreement with this plan.      Electronically signed by ROSALINA Chaves on 3/14/2018 at 12:13 PM

## 2018-03-14 DIAGNOSIS — R10.11 RIGHT UPPER QUADRANT ABDOMINAL PAIN: ICD-10-CM

## 2018-03-14 DIAGNOSIS — K86.1 CHRONIC BILIARY PANCREATITIS (HCC): ICD-10-CM

## 2018-03-14 LAB
ALBUMIN SERPL-MCNC: 3.5 G/DL (ref 3.5–5.2)
ALP BLD-CCNC: 326 U/L (ref 40–130)
ALT SERPL-CCNC: 47 U/L (ref 5–41)
AMYLASE: 56 U/L (ref 28–100)
ANION GAP SERPL CALCULATED.3IONS-SCNC: 14 MMOL/L (ref 7–19)
AST SERPL-CCNC: 27 U/L (ref 5–40)
BASOPHILS ABSOLUTE: 0.1 K/UL (ref 0–0.2)
BASOPHILS RELATIVE PERCENT: 0.5 % (ref 0–1)
BILIRUB SERPL-MCNC: 0.4 MG/DL (ref 0.2–1.2)
BILIRUBIN URINE: ABNORMAL
BLOOD, URINE: NEGATIVE
BUN BLDV-MCNC: 23 MG/DL (ref 6–20)
C-REACTIVE PROTEIN: 7.25 MG/DL (ref 0–0.5)
CALCIUM SERPL-MCNC: 9.2 MG/DL (ref 8.6–10)
CHLORIDE BLD-SCNC: 100 MMOL/L (ref 98–111)
CHOLESTEROL, TOTAL: 160 MG/DL (ref 160–199)
CLARITY: CLEAR
CO2: 26 MMOL/L (ref 22–29)
COLOR: ABNORMAL
CREAT SERPL-MCNC: 0.7 MG/DL (ref 0.5–1.2)
EOSINOPHILS ABSOLUTE: 0.3 K/UL (ref 0–0.6)
EOSINOPHILS RELATIVE PERCENT: 2.7 % (ref 0–5)
GFR NON-AFRICAN AMERICAN: >60
GLUCOSE BLD-MCNC: 100 MG/DL (ref 74–109)
GLUCOSE URINE: NEGATIVE MG/DL
HBA1C MFR BLD: 5.3 %
HCT VFR BLD CALC: 36.5 % (ref 42–52)
HDLC SERPL-MCNC: 28 MG/DL (ref 55–121)
HEMOGLOBIN: 11.7 G/DL (ref 14–18)
KETONES, URINE: ABNORMAL MG/DL
LDL CHOLESTEROL CALCULATED: 110 MG/DL
LEUKOCYTE ESTERASE, URINE: NEGATIVE
LIPASE: 50 U/L (ref 13–60)
LYMPHOCYTES ABSOLUTE: 1.8 K/UL (ref 1.1–4.5)
LYMPHOCYTES RELATIVE PERCENT: 18.6 % (ref 20–40)
MCH RBC QN AUTO: 30.6 PG (ref 27–31)
MCHC RBC AUTO-ENTMCNC: 32.1 G/DL (ref 33–37)
MCV RBC AUTO: 95.5 FL (ref 80–94)
MONOCYTES ABSOLUTE: 0.8 K/UL (ref 0–0.9)
MONOCYTES RELATIVE PERCENT: 7.9 % (ref 0–10)
NEUTROPHILS ABSOLUTE: 6.6 K/UL (ref 1.5–7.5)
NEUTROPHILS RELATIVE PERCENT: 69.8 % (ref 50–65)
NITRITE, URINE: NEGATIVE
PDW BLD-RTO: 12.4 % (ref 11.5–14.5)
PH UA: 6
PLATELET # BLD: 381 K/UL (ref 130–400)
PMV BLD AUTO: 9.6 FL (ref 9.4–12.4)
POTASSIUM SERPL-SCNC: 4.1 MMOL/L (ref 3.5–5)
PROTEIN UA: ABNORMAL MG/DL
RBC # BLD: 3.82 M/UL (ref 4.7–6.1)
SEDIMENTATION RATE, ERYTHROCYTE: 94 MM/HR (ref 0–15)
SODIUM BLD-SCNC: 140 MMOL/L (ref 136–145)
SPECIFIC GRAVITY UA: 1.03
TOTAL PROTEIN: 7.3 G/DL (ref 6.6–8.7)
TRIGL SERPL-MCNC: 112 MG/DL (ref 0–149)
TSH SERPL DL<=0.05 MIU/L-ACNC: 1.46 UIU/ML (ref 0.27–4.2)
UROBILINOGEN, URINE: 1 E.U./DL
WBC # BLD: 9.5 K/UL (ref 4.8–10.8)

## 2018-03-14 ASSESSMENT — ENCOUNTER SYMPTOMS
ABDOMINAL PAIN: 1
DIARRHEA: 0
TROUBLE SWALLOWING: 0
SHORTNESS OF BREATH: 0
CONSTIPATION: 0
BLOOD IN STOOL: 0
VOICE CHANGE: 0
NAUSEA: 1
RHINORRHEA: 0
EYE REDNESS: 0
VOMITING: 1
COUGH: 0
SORE THROAT: 0
WHEEZING: 0
CHEST TIGHTNESS: 0

## 2018-03-20 ENCOUNTER — TELEPHONE (OUTPATIENT)
Dept: PRIMARY CARE CLINIC | Age: 58
End: 2018-03-20

## 2018-03-26 ENCOUNTER — OFFICE VISIT (OUTPATIENT)
Dept: GASTROENTEROLOGY | Age: 58
End: 2018-03-26

## 2018-03-26 VITALS
OXYGEN SATURATION: 98 % | HEART RATE: 89 BPM | WEIGHT: 149.4 LBS | DIASTOLIC BLOOD PRESSURE: 80 MMHG | HEIGHT: 63 IN | SYSTOLIC BLOOD PRESSURE: 128 MMHG | BODY MASS INDEX: 26.47 KG/M2

## 2018-03-26 DIAGNOSIS — R10.84 GENERALIZED ABDOMINAL PAIN: ICD-10-CM

## 2018-03-26 DIAGNOSIS — K85.90 ACUTE ON CHRONIC PANCREATITIS (HCC): Primary | ICD-10-CM

## 2018-03-26 DIAGNOSIS — K86.1 ACUTE ON CHRONIC PANCREATITIS (HCC): Primary | ICD-10-CM

## 2018-03-26 PROCEDURE — 99213 OFFICE O/P EST LOW 20 MIN: CPT | Performed by: INTERNAL MEDICINE

## 2018-03-26 RX ORDER — KETOROLAC TROMETHAMINE 10 MG/1
TABLET, FILM COATED ORAL
Refills: 0 | COMMUNITY
Start: 2018-03-13 | End: 2018-05-03 | Stop reason: ALTCHOICE

## 2018-04-04 ASSESSMENT — ENCOUNTER SYMPTOMS
ABDOMINAL DISTENTION: 0
ABDOMINAL PAIN: 1
SHORTNESS OF BREATH: 0
CHEST TIGHTNESS: 0

## 2018-04-09 ENCOUNTER — OFFICE VISIT (OUTPATIENT)
Dept: PRIMARY CARE CLINIC | Age: 58
End: 2018-04-09

## 2018-04-09 VITALS
WEIGHT: 146 LBS | HEIGHT: 63 IN | TEMPERATURE: 97.8 F | SYSTOLIC BLOOD PRESSURE: 118 MMHG | BODY MASS INDEX: 25.87 KG/M2 | DIASTOLIC BLOOD PRESSURE: 72 MMHG | HEART RATE: 82 BPM | OXYGEN SATURATION: 98 %

## 2018-04-09 DIAGNOSIS — K86.1 CHRONIC BILIARY PANCREATITIS (HCC): Primary | ICD-10-CM

## 2018-04-09 DIAGNOSIS — Z98.890 HISTORY OF COMMON BILE DUCT SURGERY: ICD-10-CM

## 2018-04-09 DIAGNOSIS — K85.00 IDIOPATHIC ACUTE PANCREATITIS WITHOUT INFECTION OR NECROSIS: ICD-10-CM

## 2018-04-09 PROCEDURE — 99212 OFFICE O/P EST SF 10 MIN: CPT | Performed by: INTERNAL MEDICINE

## 2018-04-09 RX ORDER — ZOLPIDEM TARTRATE 5 MG/1
5 TABLET ORAL
COMMUNITY
Start: 2017-03-17 | End: 2018-05-03 | Stop reason: ALTCHOICE

## 2018-04-12 ENCOUNTER — TELEPHONE (OUTPATIENT)
Dept: PRIMARY CARE CLINIC | Age: 58
End: 2018-04-12

## 2018-05-01 ASSESSMENT — ENCOUNTER SYMPTOMS
BACK PAIN: 0
SHORTNESS OF BREATH: 0
CONSTIPATION: 0
DIARRHEA: 0
NAUSEA: 0
VOMITING: 0

## 2018-05-03 ENCOUNTER — OFFICE VISIT (OUTPATIENT)
Dept: PRIMARY CARE CLINIC | Age: 58
End: 2018-05-03

## 2018-05-03 VITALS
BODY MASS INDEX: 26.22 KG/M2 | OXYGEN SATURATION: 98 % | HEART RATE: 65 BPM | WEIGHT: 148 LBS | HEIGHT: 63 IN | SYSTOLIC BLOOD PRESSURE: 120 MMHG | TEMPERATURE: 97.7 F | DIASTOLIC BLOOD PRESSURE: 68 MMHG

## 2018-05-03 DIAGNOSIS — M54.16 LUMBAR RADICULAR PAIN: Primary | ICD-10-CM

## 2018-05-03 PROCEDURE — 96372 THER/PROPH/DIAG INJ SC/IM: CPT | Performed by: NURSE PRACTITIONER

## 2018-05-03 PROCEDURE — 99213 OFFICE O/P EST LOW 20 MIN: CPT | Performed by: NURSE PRACTITIONER

## 2018-05-03 RX ORDER — BETAMETHASONE SODIUM PHOSPHATE AND BETAMETHASONE ACETATE 3; 3 MG/ML; MG/ML
12 INJECTION, SUSPENSION INTRA-ARTICULAR; INTRALESIONAL; INTRAMUSCULAR; SOFT TISSUE ONCE
Status: COMPLETED | OUTPATIENT
Start: 2018-05-03 | End: 2018-05-03

## 2018-05-03 RX ORDER — KETOROLAC TROMETHAMINE 30 MG/ML
30 INJECTION, SOLUTION INTRAMUSCULAR; INTRAVENOUS ONCE
Status: COMPLETED | OUTPATIENT
Start: 2018-05-03 | End: 2018-05-03

## 2018-05-03 RX ORDER — NORTRIPTYLINE HYDROCHLORIDE 10 MG/1
10 CAPSULE ORAL NIGHTLY
Qty: 30 CAPSULE | Refills: 3 | Status: SHIPPED | OUTPATIENT
Start: 2018-05-03 | End: 2019-05-02

## 2018-05-03 RX ORDER — IBUPROFEN 800 MG/1
800 TABLET ORAL EVERY 6 HOURS PRN
Qty: 120 TABLET | Refills: 3 | Status: SHIPPED | OUTPATIENT
Start: 2018-05-03

## 2018-05-03 RX ADMIN — KETOROLAC TROMETHAMINE 30 MG: 30 INJECTION, SOLUTION INTRAMUSCULAR; INTRAVENOUS at 16:35

## 2018-05-03 RX ADMIN — BETAMETHASONE SODIUM PHOSPHATE AND BETAMETHASONE ACETATE 12 MG: 3; 3 INJECTION, SUSPENSION INTRA-ARTICULAR; INTRALESIONAL; INTRAMUSCULAR; SOFT TISSUE at 16:34

## 2018-05-03 ASSESSMENT — ENCOUNTER SYMPTOMS
DIARRHEA: 0
CONSTIPATION: 0
BACK PAIN: 1

## 2018-05-13 ASSESSMENT — ENCOUNTER SYMPTOMS
DIARRHEA: 0
BLOOD IN STOOL: 0
ABDOMINAL PAIN: 0
COUGH: 0
VOMITING: 0
TROUBLE SWALLOWING: 0
RECTAL PAIN: 0
CHEST TIGHTNESS: 0
CONSTIPATION: 0
NAUSEA: 0

## 2019-05-02 ENCOUNTER — OFFICE VISIT (OUTPATIENT)
Dept: PRIMARY CARE CLINIC | Age: 59
End: 2019-05-02

## 2019-05-02 VITALS
HEART RATE: 80 BPM | BODY MASS INDEX: 29.41 KG/M2 | TEMPERATURE: 97.9 F | SYSTOLIC BLOOD PRESSURE: 122 MMHG | DIASTOLIC BLOOD PRESSURE: 70 MMHG | HEIGHT: 63 IN | WEIGHT: 166 LBS | OXYGEN SATURATION: 99 %

## 2019-05-02 DIAGNOSIS — Z23 NEED FOR PROPHYLACTIC VACCINATION AND INOCULATION AGAINST VARICELLA: ICD-10-CM

## 2019-05-02 DIAGNOSIS — M54.16 LUMBAR RADICULAR PAIN: Primary | ICD-10-CM

## 2019-05-02 DIAGNOSIS — Z23 NEED FOR PROPHYLACTIC VACCINATION AGAINST DIPHTHERIA-TETANUS-PERTUSSIS (DTP): ICD-10-CM

## 2019-05-02 PROCEDURE — 99213 OFFICE O/P EST LOW 20 MIN: CPT | Performed by: NURSE PRACTITIONER

## 2019-05-02 RX ORDER — BACLOFEN 10 MG/1
10 TABLET ORAL 3 TIMES DAILY
Qty: 21 TABLET | Refills: 0 | Status: SHIPPED | OUTPATIENT
Start: 2019-05-02 | End: 2019-05-09

## 2019-05-02 RX ORDER — ACETAMINOPHEN 500 MG
500 TABLET ORAL EVERY 6 HOURS PRN
COMMUNITY

## 2019-05-02 RX ORDER — PREDNISONE 10 MG/1
TABLET ORAL
Qty: 42 TABLET | Refills: 0 | Status: SHIPPED | OUTPATIENT
Start: 2019-05-02

## 2019-05-02 RX ORDER — KETOROLAC TROMETHAMINE 30 MG/ML
30 INJECTION, SOLUTION INTRAMUSCULAR; INTRAVENOUS ONCE
Status: COMPLETED | OUTPATIENT
Start: 2019-05-02 | End: 2019-05-03

## 2019-05-02 ASSESSMENT — ENCOUNTER SYMPTOMS
APNEA: 0
SHORTNESS OF BREATH: 0
BACK PAIN: 1
WHEEZING: 0

## 2019-05-02 ASSESSMENT — PATIENT HEALTH QUESTIONNAIRE - PHQ9
SUM OF ALL RESPONSES TO PHQ QUESTIONS 1-9: 0
SUM OF ALL RESPONSES TO PHQ QUESTIONS 1-9: 0
2. FEELING DOWN, DEPRESSED OR HOPELESS: 0
1. LITTLE INTEREST OR PLEASURE IN DOING THINGS: 0
SUM OF ALL RESPONSES TO PHQ9 QUESTIONS 1 & 2: 0

## 2019-05-02 NOTE — PROGRESS NOTES
[address] [place] [address]  Phone:  [phone]  Fax:  [phone]    History of Present Illness     Patient Identification  Alivia Haddad is a 60 y.o. male. Chief Complaint   Lower Back Pain and Leg Pain (Bilateral leg pain)      Patient presents with complaint of back pain. This is a result of no known injury. Onset of pain was a few days ago and has been rapidly worsening since. The pain is located in left lower back, described as burning and tearing sensation and rated as severe, with radiation. Symptoms include tingling. The patient denies incontinence, dysuria, hematuria, abdominal pain, recent steroid use. The patient denies other injuries. Care prior to arrival consisted of ASA and acetaminophen with complete relief. Past Medical History:   Diagnosis Date    Bowel obstruction (HCC)     Cyst of bile duct     Eye trauma     left eye    GERD (gastroesophageal reflux disease)     Liver abscess 02/20/2017    Liver cyst      Family History   Problem Relation Age of Onset    Other Mother     High Blood Pressure Sister     Arthritis Sister     Colon Polyps Sister     High Blood Pressure Brother     Colon Cancer Neg Hx     Liver Cancer Neg Hx     Liver Disease Neg Hx     Esophageal Cancer Neg Hx     Stomach Cancer Neg Hx     Rectal Cancer Neg Hx      Current Outpatient Medications   Medication Sig Dispense Refill    acetaminophen (TYLENOL) 500 MG tablet Take 500 mg by mouth every 6 hours as needed for Pain      ibuprofen (ADVIL;MOTRIN) 800 MG tablet Take 1 tablet by mouth every 6 hours as needed for Pain 120 tablet 3     No current facility-administered medications for this visit.       No Known Allergies  Social History     Socioeconomic History    Marital status:      Spouse name: Not on file    Number of children: Not on file    Years of education: Not on file    Highest education level: Not on file   Occupational History    Not on file Social Needs    Financial resource strain: Not on file    Food insecurity:     Worry: Not on file     Inability: Not on file    Transportation needs:     Medical: Not on file     Non-medical: Not on file   Tobacco Use    Smoking status: Current Every Day Smoker     Packs/day: 1.00     Start date: 17    Smokeless tobacco: Never Used   Substance and Sexual Activity    Alcohol use: Yes     Comment: rare    Drug use: No    Sexual activity: Never   Lifestyle    Physical activity:     Days per week: Not on file     Minutes per session: Not on file    Stress: Not on file   Relationships    Social connections:     Talks on phone: Not on file     Gets together: Not on file     Attends [employer] service: Not on file     Active member of club or organization: Not on file     Attends meetings of clubs or organizations: Not on file     Relationship status: Not on file    Intimate partner violence:     Fear of current or ex partner: Not on file     Emotionally abused: Not on file     Physically abused: Not on file     Forced sexual activity: Not on file   Other Topics Concern    Not on file   Social History Narrative    Not on file     Review of Systems   Constitutional: Negative for activity change, fatigue and unexpected weight change. Respiratory: Negative for apnea, shortness of breath and wheezing. Cardiovascular: Negative for chest pain, palpitations and leg swelling. Genitourinary: Negative. Musculoskeletal: Positive for arthralgias, back pain and myalgias. Neurological: Negative. Tingling left leg   Hematological: Negative. Psychiatric/Behavioral: Negative. Physical Exam     /70   Pulse 80   Temp 97.9 °F (36.6 °C)   Ht 5' 3\" (1.6 m)   Wt 166 lb (75.3 kg)   SpO2 99%   BMI 29.41 kg/m²   Physical Exam   Constitutional: He is oriented to person, place, and time. He appears well-developed and well-nourished. HENT:   Head: Normocephalic and atraumatic.    Eyes: Pupils are equal, round, and reactive to light. Conjunctivae and EOM are normal.   Neck: Normal range of motion. Neck supple. No JVD present. No thyromegaly present. Cardiovascular: Normal rate, regular rhythm and intact distal pulses. Murmur heard. Pulmonary/Chest: Effort normal and breath sounds normal.   Abdominal: Soft. Bowel sounds are normal.   Musculoskeletal:        Lumbar back: He exhibits decreased range of motion and pain. Back:    Neurological: He is alert and oriented to person, place, and time. He has normal reflexes. Skin: Skin is warm and dry. Psychiatric: He has a normal mood and affect. His behavior is normal. Thought content normal.   Nursing note and vitals reviewed. Assessment/Plan:     Harsh Roblero was seen today for lower back pain and leg pain. Diagnoses and all orders for this visit:    Lumbar radicular pain  -     XR LUMBAR SPINE (MIN 4 VIEWS); Future    Need for prophylactic vaccination against diphtheria-tetanus-pertussis (DTP)  -     Tdap (ADACEL) 5-2-15.5 LF-MCG/0.5 injection; Inject 0.5 mLs into the muscle once for 1 dose    Need for prophylactic vaccination and inoculation against varicella  -     zoster recombinant adjuvanted vaccine [facility]) 50 MCG/0.5ML SUSR injection; Inject 0.5 mLs into the muscle once for 1 dose 50 MCG IM then repeat 2-6 months. Other orders  -     baclofen (LIORESAL) 10 MG tablet; Take 1 tablet by mouth 3 times daily for 7 days  -     ketorolac (TORADOL) injection 30 mg  -     predniSONE (DELTASONE) 10 MG tablet; Take 6 tablets for 2 days, 5 tabs for 2 days, 4 tabs for 2 days, 3 tabs for 2 days, 2 tabs for 2 days, 1 tab for 2 days.

## 2019-05-03 PROCEDURE — 96372 THER/PROPH/DIAG INJ SC/IM: CPT | Performed by: NURSE PRACTITIONER

## 2019-05-03 RX ADMIN — KETOROLAC TROMETHAMINE 30 MG: 30 INJECTION, SOLUTION INTRAMUSCULAR; INTRAVENOUS at 17:02

## 2019-06-09 ENCOUNTER — HOSPITAL ENCOUNTER (EMERGENCY)
Facility: HOSPITAL | Age: 59
Discharge: HOME | End: 2019-06-09
Admitting: EMERGENCY MEDICINE

## 2019-06-09 VITALS
HEART RATE: 70 BPM | WEIGHT: 151 LBS | OXYGEN SATURATION: 99 % | BODY MASS INDEX: 26.75 KG/M2 | TEMPERATURE: 98 F | RESPIRATION RATE: 14 BRPM | HEIGHT: 63 IN | SYSTOLIC BLOOD PRESSURE: 140 MMHG | DIASTOLIC BLOOD PRESSURE: 86 MMHG

## 2019-06-09 DIAGNOSIS — R03.0 ELEVATED BLOOD-PRESSURE READING WITHOUT DIAGNOSIS OF HYPERTENSION: ICD-10-CM

## 2019-06-09 DIAGNOSIS — M54.42 ACUTE LEFT-SIDED LOW BACK PAIN WITH LEFT-SIDED SCIATICA: Primary | ICD-10-CM

## 2019-06-09 PROCEDURE — 25010000002 KETOROLAC TROMETHAMINE PER 15 MG: Performed by: PHYSICIAN ASSISTANT

## 2019-06-09 PROCEDURE — 96372 THER/PROPH/DIAG INJ SC/IM: CPT

## 2019-06-09 PROCEDURE — 25010000002 ORPHENADRINE CITRATE PER 60 MG: Performed by: PHYSICIAN ASSISTANT

## 2019-06-09 PROCEDURE — 99283 EMERGENCY DEPT VISIT LOW MDM: CPT

## 2019-06-09 RX ORDER — KETOROLAC TROMETHAMINE 30 MG/ML
60 INJECTION, SOLUTION INTRAMUSCULAR; INTRAVENOUS ONCE
Status: COMPLETED | OUTPATIENT
Start: 2019-06-09 | End: 2019-06-09

## 2019-06-09 RX ORDER — METHYLPREDNISOLONE 4 MG/1
TABLET ORAL
Qty: 1 EACH | Refills: 0 | Status: SHIPPED | OUTPATIENT
Start: 2019-06-09 | End: 2021-01-19

## 2019-06-09 RX ORDER — CYCLOBENZAPRINE HCL 10 MG
10 TABLET ORAL 3 TIMES DAILY PRN
Qty: 9 TABLET | Refills: 0 | Status: SHIPPED | OUTPATIENT
Start: 2019-06-09 | End: 2019-06-12

## 2019-06-09 RX ORDER — ORPHENADRINE CITRATE 30 MG/ML
60 INJECTION INTRAMUSCULAR; INTRAVENOUS ONCE
Status: COMPLETED | OUTPATIENT
Start: 2019-06-09 | End: 2019-06-09

## 2019-06-09 RX ADMIN — KETOROLAC TROMETHAMINE 60 MG: 30 INJECTION, SOLUTION INTRAMUSCULAR; INTRAVENOUS at 10:25

## 2019-06-09 RX ADMIN — ORPHENADRINE CITRATE 60 MG: 60 INJECTION INTRAMUSCULAR; INTRAVENOUS at 10:28

## 2019-06-09 NOTE — ED PROVIDER NOTES
Subjective     History provided by:  Patient  Back Pain   Location:  Lumbar spine  Quality:  Aching  Radiates to:  L posterior upper leg and L thigh  Pain severity:  Moderate  Pain is:  Unable to specify  Onset quality:  Gradual  Duration: several months, current episode worsening over 1-2 weeks.  Timing:  Constant  Progression:  Waxing and waning  Chronicity:  Recurrent (says he was seen in May for similar lumbar symptoms with imaging showing degenerative disc and lumbar OA.  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   Eyes: Negative for photophobia, pain and visual disturbance.   Respiratory: Negative for cough, chest tightness, shortness of breath and stridor.    Cardiovascular: Negative for chest pain, palpitations and leg swelling.   Gastrointestinal: Negative for abdominal pain, bowel incontinence, constipation, diarrhea, nausea and vomiting.   Genitourinary: Negative for bladder incontinence, decreased urine volume, difficulty urinating, dysuria, pelvic pain and urgency.   Musculoskeletal: Positive for back pain (per HPI). Negative for gait problem, joint swelling, neck pain and neck stiffness.   Skin: Negative for color change, pallor, rash and wound.   Neurological: Negative for dizziness, tingling, tremors, weakness,  light-headedness, numbness and paresthesias.   Hematological: Negative for adenopathy. Does not bruise/bleed easily.   Psychiatric/Behavioral: Negative for behavioral problems.       History reviewed. No pertinent past medical history.    No Known Allergies    Past Surgical History:   Procedure Laterality Date   • ABDOMINAL SURGERY     • EYE SURGERY     • KNEE SURGERY Left        History reviewed. No pertinent family history.    Social History     Socioeconomic History   • Marital status: Single     Spouse name: Not on file   • Number of children: Not on file   • Years of education: Not on file   • Highest education level: Not on file   Tobacco Use   • Smoking status: Current Every Day Smoker     Packs/day: 1.00   Substance and Sexual Activity   • Alcohol use: No   • Drug use: Defer   • Sexual activity: Defer           Objective   Physical Exam   Constitutional: He is oriented to person, place, and time. He appears well-developed and well-nourished. No distress.   HENT:   Head: Normocephalic and atraumatic.   Mouth/Throat: Oropharynx is clear and moist.   Eyes: Conjunctivae and EOM are normal. Pupils are equal, round, and reactive to light.   Neck: Normal range of motion. Neck supple.   Cardiovascular: Normal rate, regular rhythm, normal heart sounds and intact distal pulses.   No murmur heard.  Pulses:       Dorsalis pedis pulses are 2+ on the right side, and 2+ on the left side.        Posterior tibial pulses are 2+ on the right side, and 2+ on the left side.   Pulmonary/Chest: Effort normal and breath sounds normal. He exhibits no tenderness.   Abdominal: Soft. Bowel sounds are normal. He exhibits no distension and no mass. There is no tenderness.   Musculoskeletal: He exhibits no edema or deformity.        Lumbar back: He exhibits tenderness, pain and spasm. He exhibits normal range of motion, no bony tenderness, no swelling, no edema and no deformity.   Localized to mid-lower lumbar soft tissue with radiation  into buttock, lateral L hip and posterior-lateral L thigh.  Lumbar ROM WNL, able to go from supine to seated wo difficulty.  Ambulating wo assistance. No vertebral tenderness or deformity.  NV intact distally.   Neurological: He is alert and oriented to person, place, and time. He displays normal reflexes. No sensory deficit. He exhibits normal muscle tone.   Skin: Skin is warm and dry. Capillary refill takes less than 2 seconds. No rash noted. He is not diaphoretic. No erythema. No pallor.   Psychiatric: He has a normal mood and affect. His behavior is normal.   Nursing note and vitals reviewed.      Procedures           ED Course  ED Course as of Jun 09 1032   Sun Jun 09, 2019   1015 Discussed imaging vs medication management.  He refuses imaging as he had xrays for similar symptoms in May.  He would just like medication for now to ease his pain.  He will f/u with PCP/specialist for further eval outpt if symptoms persist.  Discussed s/s of worsening condition- he will return with any acute changes.    [DC]      ED Course User Index  [DC] Castleman, Danna D, PA                  MDM  Number of Diagnoses or Management Options  Acute left-sided low back pain with left-sided sciatica: established and worsening  Elevated blood-pressure reading without diagnosis of hypertension:      Amount and/or Complexity of Data Reviewed  Tests in the medicine section of CPT®: ordered    Patient Progress  Patient progress: stable        Final diagnoses:   Acute left-sided low back pain with left-sided sciatica   Elevated blood-pressure reading without diagnosis of hypertension            Castleman, Danna D, PA  06/09/19 1036

## 2019-06-09 NOTE — DISCHARGE INSTRUCTIONS
Rest, limit activity, limit lifting to no greater than 5-10 lbs.  Take prescribed medication as directed.  Follow up with PCP/Ortho for further eval.  Return with new/worsening symptoms.         Sciatica  Sciatica is pain, numbness, weakness, or tingling along the path of the sciatic nerve. The sciatic nerve starts in the lower back and runs down the back of each leg. The nerve controls the muscles in the lower leg and in the back of the knee. It also provides feeling (sensation) to the back of the thigh, the lower leg, and the sole of the foot. Sciatica is a symptom of another medical condition that pinches or puts pressure on the sciatic nerve.  Generally, sciatica only affects one side of the body. Sciatica usually goes away on its own or with treatment. In some cases, sciatica may keep coming back (recur).  What are the causes?  This condition is caused by pressure on the sciatic nerve, or pinching of the sciatic nerve. This may be the result of:  · A disk in between the bones of the spine (vertebrae) bulging out too far (herniated disk).  · Age-related changes in the spinal disks (degenerative disk disease).  · A pain disorder that affects a muscle in the buttock (piriformis syndrome).  · Extra bone growth (bone spur) near the sciatic nerve.  · An injury or break (fracture) of the pelvis.  · Pregnancy.  · Tumor (rare).    What increases the risk?  The following factors may make you more likely to develop this condition:  · Playing sports that place pressure or stress on the spine, such as football or weight lifting.  · Having poor strength and flexibility.  · A history of back injury.  · A history of back surgery.  · Sitting for long periods of time.  · Doing activities that involve repetitive bending or lifting.  · Obesity.    What are the signs or symptoms?  Symptoms can vary from mild to very severe, and they may include:  · Any of these problems in the lower back, leg, hip, or buttock:  ? Mild tingling or  dull aches.  ? Burning sensations.  ? Sharp pains.  · Numbness in the back of the calf or the sole of the foot.  · Leg weakness.  · Severe back pain that makes movement difficult.    These symptoms may get worse when you cough, sneeze, or laugh, or when you sit or stand for long periods of time. Being overweight may also make symptoms worse. In some cases, symptoms may recur over time.  How is this diagnosed?  This condition may be diagnosed based on:  · Your symptoms.  · A physical exam. Your health care provider may ask you to do certain movements to check whether those movements trigger your symptoms.  · You may have tests, including:  ? Blood tests.  ? X-rays.  ? MRI.  ? CT scan.    How is this treated?  In many cases, this condition improves on its own, without any treatment. However, treatment may include:  · Reducing or modifying physical activity during periods of pain.  · Exercising and stretching to strengthen your abdomen and improve the flexibility of your spine.  · Icing and applying heat to the affected area.  · Medicines that help:  ? To relieve pain and swelling.  ? To relax your muscles.  · Injections of medicines that help to relieve pain, irritation, and inflammation around the sciatic nerve (steroids).  · Surgery.    Follow these instructions at home:  Medicines  · Take over-the-counter and prescription medicines only as told by your health care provider.  · Do not drive or operate heavy machinery while taking prescription pain medicine.  Managing pain  · If directed, apply ice to the affected area.  ? Put ice in a plastic bag.  ? Place a towel between your skin and the bag.  ? Leave the ice on for 20 minutes, 2-3 times a day.  · After icing, apply heat to the affected area before you exercise or as often as told by your health care provider. Use the heat source that your health care provider recommends, such as a moist heat pack or a heating pad.  ? Place a towel between your skin and the heat  source.  ? Leave the heat on for 20-30 minutes.  ? Remove the heat if your skin turns bright red. This is especially important if you are unable to feel pain, heat, or cold. You may have a greater risk of getting burned.  Activity  · Return to your normal activities as told by your health care provider. Ask your health care provider what activities are safe for you.  ? Avoid activities that make your symptoms worse.  · Take brief periods of rest throughout the day. Resting in a lying or standing position is usually better than sitting to rest.  ? When you rest for longer periods, mix in some mild activity or stretching between periods of rest. This will help to prevent stiffness and pain.  ? Avoid sitting for long periods of time without moving. Get up and move around at least one time each hour.  · Exercise and stretch regularly, as told by your health care provider.  · Do not lift anything that is heavier than 10 lb (4.5 kg) while you have symptoms of sciatica. When you do not have symptoms, you should still avoid heavy lifting, especially repetitive heavy lifting.  · When you lift objects, always use proper lifting technique, which includes:  ? Bending your knees.  ? Keeping the load close to your body.  ? Avoiding twisting.  General instructions  · Use good posture.  ? Avoid leaning forward while sitting.  ? Avoid hunching over while standing.  · Maintain a healthy weight. Excess weight puts extra stress on your back and makes it difficult to maintain good posture.  · Wear supportive, comfortable shoes. Avoid wearing high heels.  · Avoid sleeping on a mattress that is too soft or too hard. A mattress that is firm enough to support your back when you sleep may help to reduce your pain.  · Keep all follow-up visits as told by your health care provider. This is important.  Contact a health care provider if:  · You have pain that wakes you up when you are sleeping.  · You have pain that gets worse when you lie  down.  · Your pain is worse than you have experienced in the past.  · Your pain lasts longer than 4 weeks.  · You experience unexplained weight loss.  Get help right away if:  · You lose control of your bowel or bladder (incontinence).  · You have:  ? Weakness in your lower back, pelvis, buttocks, or legs that gets worse.  ? Redness or swelling of your back.  ? A burning sensation when you urinate.  This information is not intended to replace advice given to you by your health care provider. Make sure you discuss any questions you have with your health care provider.  Document Released: 12/12/2002 Document Revised: 05/23/2017 Document Reviewed: 08/26/2016  [employer] Interactive Patient Education © 2019 [employer]      How to Take Your Blood Pressure  You can take your blood pressure at home with a machine. You may need to check your blood pressure at home:  · To check if you have high blood pressure (hypertension).  · To check your blood pressure over time.  · To make sure your blood pressure medicine is working.    Supplies needed:  You will need a blood pressure machine, or monitor. You can buy one at a [employer] or online. When choosing one:  · Choose one with an arm cuff.  · Choose one that wraps around your upper arm. Only one finger should fit between your arm and the cuff.  · Do not choose one that measures your blood pressure from your wrist or finger.    Your doctor can suggest a monitor.  How to prepare  Avoid these things for 30 minutes before checking your blood pressure:  · Drinking caffeine.  · Drinking alcohol.  · Eating.  · Smoking.  · Exercising.    Five minutes before checking your blood pressure:  · Pee.  · Sit in a dining chair. Avoid sitting in a soft couch or armchair.  · Be quiet. Do not talk.    How to take your blood pressure  Follow the instructions that came with your machine. If you have a digital blood pressure monitor, these may be the instructions:  1. Sit up straight.  2. Place your  "feet on the floor. Do not cross your ankles or legs.  3. Rest your left arm at the level of your heart. You may rest it on a table, desk, or chair.  4. Pull up your shirt sleeve.  5. Wrap the blood pressure cuff around the upper part of your left arm. The cuff should be 1 inch (2.5 cm) above your elbow. It is best to wrap the cuff around bare skin.  6. Fit the cuff snugly around your arm. You should be able to place only one finger between the cuff and your arm.  7. Put the cord inside the groove of your elbow.  8. Press the power button.  9. Sit quietly while the cuff fills with air and loses air.  10. Write down the numbers on the screen.  11. Wait 2-3 minutes and then repeat steps 1-10.    What do the numbers mean?  Two numbers make up your blood pressure. The first number is called systolic pressure. The second is called diastolic pressure. An example of a blood pressure reading is \"120 over 80\" (or 120/80).  If you are an adult and do not have a medical condition, use this guide to find out if your blood pressure is normal:  Normal  · First number: below 120.  · Second number: below 80.  Elevated  · First number: 120-129.  · Second number: below 80.  Hypertension stage 1  · First number: 130-139.  · Second number: 80-89.  Hypertension stage 2  · First number: 140 or above.  · Second number: 90 or above.  Your blood pressure is above normal even if only the top or bottom number is above normal.  Follow these instructions at home:  · Check your blood pressure as often as your doctor tells you to.  · Take your monitor to your next doctor's appointment. Your doctor will:  ? Make sure you are using it correctly.  ? Make sure it is working right.  · Make sure you understand what your blood pressure numbers should be.  · Tell your doctor if your medicines are causing side effects.  Contact a doctor if:  · Your blood pressure keeps being high.  Get help right away if:  · Your first blood pressure number is higher than " 180.  · Your second blood pressure number is higher than 120.  This information is not intended to replace advice given to you by your health care provider. Make sure you discuss any questions you have with your health care provider.  Document Released: 11/30/2009 Document Revised: 11/15/2017 Document Reviewed: 05/26/2017  [employer] Interactive Patient Education © 2019 [employer]

## 2019-10-10 ENCOUNTER — TELEPHONE (OUTPATIENT)
Dept: PRIMARY CARE CLINIC | Age: 59
End: 2019-10-10

## 2020-01-01 ENCOUNTER — HOSPITAL ENCOUNTER (EMERGENCY)
Facility: HOSPITAL | Age: 60
Discharge: HOME | End: 2020-01-01
Admitting: EMERGENCY MEDICINE

## 2020-01-01 VITALS
HEIGHT: 63 IN | WEIGHT: 142 LBS | RESPIRATION RATE: 16 BRPM | TEMPERATURE: 98.2 F | OXYGEN SATURATION: 99 % | DIASTOLIC BLOOD PRESSURE: 67 MMHG | HEART RATE: 68 BPM | BODY MASS INDEX: 25.16 KG/M2 | SYSTOLIC BLOOD PRESSURE: 129 MMHG
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
## 2020-01-01 DIAGNOSIS — S50.862A INSECT BITE OF LEFT FOREARM, INITIAL ENCOUNTER: Primary | ICD-10-CM

## 2020-01-01 PROCEDURE — 90715 TDAP VACCINE 7 YRS/> IM: CPT | Performed by: NURSE PRACTITIONER

## 2020-01-01 PROCEDURE — 90471 IMMUNIZATION ADMIN: CPT | Performed by: NURSE PRACTITIONER

## 2020-01-01 PROCEDURE — 25010000002 TDAP 5-2.5-18.5 LF-MCG/0.5 SUSPENSION: Performed by: NURSE PRACTITIONER

## 2020-01-01 PROCEDURE — 99282 EMERGENCY DEPT VISIT SF MDM: CPT

## 2020-01-01 RX ORDER — GINSENG 100 MG
CAPSULE ORAL 2 TIMES DAILY
Qty: 14 G | Refills: 0 | Status: SHIPPED | OUTPATIENT
Start: 2020-01-01 | End: 2021-01-19

## 2020-01-01 RX ORDER — SULFAMETHOXAZOLE AND TRIMETHOPRIM 800; 160 MG/1; MG/1
1 TABLET ORAL 2 TIMES DAILY
Qty: 20 TABLET | Refills: 0 | Status: SHIPPED | OUTPATIENT
Start: 2020-01-01 | End: 2020-01-11

## 2020-01-01 RX ADMIN — TETANUS TOXOID, REDUCED DIPHTHERIA TOXOID AND ACELLULAR PERTUSSIS VACCINE, ADSORBED 0.5 ML: 5; 2.5; 8; 8; 2.5 SUSPENSION INTRAMUSCULAR at 07:37

## 2020-01-03 NOTE — ED PROVIDER NOTES
Subjective     Insect Bite   Contact animal:  Insect  Pain details:     Quality:  Sore and itching    Severity:  Mild  Incident location:  Home  Provoked: unprovoked    Tetanus status:  Unknown  Relieved by:  None tried  Worsened by:  Nothing  Ineffective treatments:  None tried  Associated symptoms: no fever    Associated symptoms comment:  Reports redness to the insect bite on the left forearm      Review of Systems   Constitutional: Negative for fever.   HENT: Negative.    Gastrointestinal: Negative.    Musculoskeletal: Negative.    Skin:        Positive for insect bite   All other systems reviewed and are negative.      History reviewed. No pertinent past medical history.    No Known Allergies    Past Surgical History:   Procedure Laterality Date   • ABDOMINAL SURGERY     • EYE SURGERY     • KNEE SURGERY Left        History reviewed. No pertinent family history.    Social History     Socioeconomic History   • Marital status: Single     Spouse name: Not on file   • Number of children: Not on file   • Years of education: Not on file   • Highest education level: Not on file   Tobacco Use   • Smoking status: Current Every Day Smoker     Packs/day: 1.00   Substance and Sexual Activity   • Alcohol use: No   • Drug use: Defer   • Sexual activity: Defer           Objective   Physical Exam   Constitutional: He is oriented to person, place, and time. He appears well-developed and well-nourished. No distress.   HENT:   Head: Normocephalic and atraumatic.   Right Ear: External ear normal.   Left Ear: External ear normal.   Nose: Nose normal.   Eyes: Pupils are equal, round, and reactive to light. Conjunctivae are normal. No scleral icterus.   Neck: Normal range of motion. Neck supple. No JVD present. No thyromegaly present.   Cardiovascular: Normal rate, regular rhythm, normal heart sounds and intact distal pulses.   No murmur heard.  Pulmonary/Chest: Effort normal and breath sounds normal. No respiratory distress. He has  no wheezes. He has no rales. He exhibits no tenderness.   Abdominal: Soft. Bowel sounds are normal. He exhibits no distension and no mass. There is no tenderness. There is no rebound and no guarding.   Musculoskeletal: Normal range of motion. He exhibits no edema.   Lymphadenopathy:     He has no cervical adenopathy.   Neurological: He is alert and oriented to person, place, and time. He has normal reflexes. No cranial nerve deficit. Coordination normal.   Skin: Skin is warm and dry. Capillary refill takes less than 2 seconds. No rash noted. He is not diaphoretic. No erythema. No pallor.   approx 2 cm erthematous area to the left forearm without streaking of the skin, no fluctuance noted; neurovascular intact   Psychiatric: He has a normal mood and affect. His behavior is normal. Judgment and thought content normal.   Nursing note and vitals reviewed.      Procedures           ED Course                                               MDM  Number of Diagnoses or Management Options  Insect bite of left forearm, initial encounter: new and does not require workup     Amount and/or Complexity of Data Reviewed  Discuss the patient with other providers: yes    Risk of Complications, Morbidity, and/or Mortality  Presenting problems: low  Diagnostic procedures: low  Management options: low    Patient Progress  Patient progress: improved      Final diagnoses:   Insect bite of left forearm, initial encounter            Kaci Zacarias, APRN  01/03/20 4311

## 2021-01-19 PROCEDURE — U0004 COV-19 TEST NON-CDC HGH THRU: HCPCS | Performed by: NURSE PRACTITIONER

## 2021-04-25 ENCOUNTER — APPOINTMENT (OUTPATIENT)
Dept: CT IMAGING | Facility: HOSPITAL | Age: 61
End: 2021-04-25

## 2021-04-25 ENCOUNTER — HOSPITAL ENCOUNTER (EMERGENCY)
Facility: HOSPITAL | Age: 61
Discharge: HOME | End: 2021-04-25
Admitting: EMERGENCY MEDICINE

## 2021-04-25 VITALS
SYSTOLIC BLOOD PRESSURE: 131 MMHG | WEIGHT: 134 LBS | RESPIRATION RATE: 18 BRPM | HEART RATE: 84 BPM | DIASTOLIC BLOOD PRESSURE: 74 MMHG | HEIGHT: 63 IN | BODY MASS INDEX: 23.74 KG/M2 | OXYGEN SATURATION: 100 % | TEMPERATURE: 97.9 F

## 2021-04-25 DIAGNOSIS — M51.36 BULGING LUMBAR DISC: ICD-10-CM

## 2021-04-25 DIAGNOSIS — S39.012A STRAIN OF LUMBAR REGION, INITIAL ENCOUNTER: Primary | ICD-10-CM

## 2021-04-25 PROCEDURE — 25010000003 HYDROMORPHONE 1 MG/ML SOLUTION: Performed by: NURSE PRACTITIONER

## 2021-04-25 PROCEDURE — 99283 EMERGENCY DEPT VISIT LOW MDM: CPT

## 2021-04-25 PROCEDURE — 72131 CT LUMBAR SPINE W/O DYE: CPT

## 2021-04-25 PROCEDURE — 25010000002 ORPHENADRINE CITRATE PER 60 MG: Performed by: NURSE PRACTITIONER

## 2021-04-25 PROCEDURE — 63710000001 ONDANSETRON ODT 4 MG TABLET DISPERSIBLE: Performed by: NURSE PRACTITIONER

## 2021-04-25 PROCEDURE — 96372 THER/PROPH/DIAG INJ SC/IM: CPT

## 2021-04-25 RX ORDER — HYDROCODONE BITARTRATE AND ACETAMINOPHEN 7.5; 325 MG/1; MG/1
1 TABLET ORAL EVERY 6 HOURS PRN
COMMUNITY
End: 2022-06-07

## 2021-04-25 RX ORDER — CYCLOBENZAPRINE HCL 10 MG
10 TABLET ORAL 2 TIMES DAILY PRN
Qty: 20 TABLET | Refills: 0 | Status: SHIPPED | OUTPATIENT
Start: 2021-04-25 | End: 2021-05-05

## 2021-04-25 RX ORDER — MELOXICAM 7.5 MG/1
7.5 TABLET ORAL DAILY
Status: ON HOLD | COMMUNITY
End: 2022-03-10

## 2021-04-25 RX ORDER — ORPHENADRINE CITRATE 30 MG/ML
60 INJECTION INTRAMUSCULAR; INTRAVENOUS ONCE
Status: COMPLETED | OUTPATIENT
Start: 2021-04-25 | End: 2021-04-25

## 2021-04-25 RX ORDER — ONDANSETRON 4 MG/1
4 TABLET, ORALLY DISINTEGRATING ORAL ONCE
Status: COMPLETED | OUTPATIENT
Start: 2021-04-25 | End: 2021-04-25

## 2021-04-25 RX ADMIN — ONDANSETRON 4 MG: 4 TABLET, ORALLY DISINTEGRATING ORAL at 11:59

## 2021-04-25 RX ADMIN — ORPHENADRINE CITRATE 60 MG: 30 INJECTION INTRAMUSCULAR; INTRAVENOUS at 11:59

## 2021-04-25 RX ADMIN — HYDROMORPHONE HYDROCHLORIDE 1 MG: 1 INJECTION, SOLUTION INTRAMUSCULAR; INTRAVENOUS; SUBCUTANEOUS at 12:00

## 2021-04-28 ENCOUNTER — HOSPITAL ENCOUNTER (EMERGENCY)
Facility: HOSPITAL | Age: 61
Discharge: HOME | End: 2021-04-28
Admitting: EMERGENCY MEDICINE

## 2021-04-28 ENCOUNTER — APPOINTMENT (OUTPATIENT)
Dept: MRI IMAGING | Facility: HOSPITAL | Age: 61
End: 2021-04-28

## 2021-04-28 VITALS
DIASTOLIC BLOOD PRESSURE: 68 MMHG | OXYGEN SATURATION: 98 % | HEIGHT: 63 IN | WEIGHT: 134 LBS | BODY MASS INDEX: 23.74 KG/M2 | HEART RATE: 78 BPM | RESPIRATION RATE: 16 BRPM | TEMPERATURE: 97.7 F | SYSTOLIC BLOOD PRESSURE: 109 MMHG

## 2021-04-28 DIAGNOSIS — M54.42 CHRONIC LEFT-SIDED LOW BACK PAIN WITH LEFT-SIDED SCIATICA: Primary | ICD-10-CM

## 2021-04-28 DIAGNOSIS — G89.29 CHRONIC LEFT-SIDED LOW BACK PAIN WITH LEFT-SIDED SCIATICA: Primary | ICD-10-CM

## 2021-04-28 LAB
ALBUMIN SERPL-MCNC: 4.3 G/DL (ref 3.5–5.2)
ALBUMIN/GLOB SERPL: 1.3 G/DL
ALP SERPL-CCNC: 134 U/L (ref 39–117)
ALT SERPL W P-5'-P-CCNC: 13 U/L (ref 1–41)
ANION GAP SERPL CALCULATED.3IONS-SCNC: 10 MMOL/L (ref 5–15)
AST SERPL-CCNC: 18 U/L (ref 1–40)
BASOPHILS # BLD AUTO: 0.05 10*3/MM3 (ref 0–0.2)
BASOPHILS NFR BLD AUTO: 0.7 % (ref 0–1.5)
BILIRUB SERPL-MCNC: 0.3 MG/DL (ref 0–1.2)
BUN SERPL-MCNC: 15 MG/DL (ref 8–23)
BUN/CREAT SERPL: 24.2 (ref 7–25)
CALCIUM SPEC-SCNC: 9.3 MG/DL (ref 8.6–10.5)
CHLORIDE SERPL-SCNC: 102 MMOL/L (ref 98–107)
CO2 SERPL-SCNC: 27 MMOL/L (ref 22–29)
CREAT SERPL-MCNC: 0.62 MG/DL (ref 0.76–1.27)
CRP SERPL-MCNC: 0.3 MG/DL (ref 0–0.5)
D-LACTATE SERPL-SCNC: 0.9 MMOL/L (ref 0.5–2)
DEPRECATED RDW RBC AUTO: 45.1 FL (ref 37–54)
EOSINOPHIL # BLD AUTO: 0.32 10*3/MM3 (ref 0–0.4)
EOSINOPHIL NFR BLD AUTO: 4.4 % (ref 0.3–6.2)
ERYTHROCYTE [DISTWIDTH] IN BLOOD BY AUTOMATED COUNT: 13.2 % (ref 12.3–15.4)
ERYTHROCYTE [SEDIMENTATION RATE] IN BLOOD: 5 MM/HR (ref 0–15)
GFR SERPL CREATININE-BSD FRML MDRD: 132 ML/MIN/1.73
GLOBULIN UR ELPH-MCNC: 3.3 GM/DL
GLUCOSE SERPL-MCNC: 74 MG/DL (ref 65–99)
HCT VFR BLD AUTO: 42.7 % (ref 37.5–51)
HGB BLD-MCNC: 14.3 G/DL (ref 13–17.7)
IMM GRANULOCYTES # BLD AUTO: 0.02 10*3/MM3 (ref 0–0.05)
IMM GRANULOCYTES NFR BLD AUTO: 0.3 % (ref 0–0.5)
LYMPHOCYTES # BLD AUTO: 1.74 10*3/MM3 (ref 0.7–3.1)
LYMPHOCYTES NFR BLD AUTO: 24 % (ref 19.6–45.3)
MCH RBC QN AUTO: 31.5 PG (ref 26.6–33)
MCHC RBC AUTO-ENTMCNC: 33.5 G/DL (ref 31.5–35.7)
MCV RBC AUTO: 94.1 FL (ref 79–97)
MONOCYTES # BLD AUTO: 0.71 10*3/MM3 (ref 0.1–0.9)
MONOCYTES NFR BLD AUTO: 9.8 % (ref 5–12)
NEUTROPHILS NFR BLD AUTO: 4.42 10*3/MM3 (ref 1.7–7)
NEUTROPHILS NFR BLD AUTO: 60.8 % (ref 42.7–76)
NRBC BLD AUTO-RTO: 0 /100 WBC (ref 0–0.2)
PLATELET # BLD AUTO: 260 10*3/MM3 (ref 140–450)
PMV BLD AUTO: 9.8 FL (ref 6–12)
POTASSIUM SERPL-SCNC: 5.1 MMOL/L (ref 3.5–5.2)
PROT SERPL-MCNC: 7.6 G/DL (ref 6–8.5)
RBC # BLD AUTO: 4.54 10*6/MM3 (ref 4.14–5.8)
SODIUM SERPL-SCNC: 139 MMOL/L (ref 136–145)
WBC # BLD AUTO: 7.26 10*3/MM3 (ref 3.4–10.8)

## 2021-04-28 PROCEDURE — 86140 C-REACTIVE PROTEIN: CPT | Performed by: NURSE PRACTITIONER

## 2021-04-28 PROCEDURE — 99283 EMERGENCY DEPT VISIT LOW MDM: CPT

## 2021-04-28 PROCEDURE — 85025 COMPLETE CBC W/AUTO DIFF WBC: CPT | Performed by: NURSE PRACTITIONER

## 2021-04-28 PROCEDURE — 96374 THER/PROPH/DIAG INJ IV PUSH: CPT

## 2021-04-28 PROCEDURE — 80053 COMPREHEN METABOLIC PANEL: CPT | Performed by: NURSE PRACTITIONER

## 2021-04-28 PROCEDURE — 96375 TX/PRO/DX INJ NEW DRUG ADDON: CPT

## 2021-04-28 PROCEDURE — 25010000002 ONDANSETRON PER 1 MG: Performed by: NURSE PRACTITIONER

## 2021-04-28 PROCEDURE — 85651 RBC SED RATE NONAUTOMATED: CPT | Performed by: NURSE PRACTITIONER

## 2021-04-28 PROCEDURE — 25010000003 HYDROMORPHONE 1 MG/ML SOLUTION: Performed by: NURSE PRACTITIONER

## 2021-04-28 PROCEDURE — 83605 ASSAY OF LACTIC ACID: CPT | Performed by: NURSE PRACTITIONER

## 2021-04-28 PROCEDURE — 72148 MRI LUMBAR SPINE W/O DYE: CPT

## 2021-04-28 PROCEDURE — 25010000002 DEXAMETHASONE PER 1 MG: Performed by: NURSE PRACTITIONER

## 2021-04-28 RX ORDER — DEXAMETHASONE SODIUM PHOSPHATE 10 MG/ML
10 INJECTION INTRAMUSCULAR; INTRAVENOUS ONCE
Status: COMPLETED | OUTPATIENT
Start: 2021-04-28 | End: 2021-04-28

## 2021-04-28 RX ORDER — METHYLPREDNISOLONE 4 MG/1
TABLET ORAL
Qty: 1 EACH | Refills: 0 | Status: ON HOLD | OUTPATIENT
Start: 2021-04-28 | End: 2022-03-10

## 2021-04-28 RX ORDER — ONDANSETRON 2 MG/ML
4 INJECTION INTRAMUSCULAR; INTRAVENOUS ONCE
Status: COMPLETED | OUTPATIENT
Start: 2021-04-28 | End: 2021-04-28

## 2021-04-28 RX ADMIN — ONDANSETRON HYDROCHLORIDE 4 MG: 2 SOLUTION INTRAMUSCULAR; INTRAVENOUS at 09:10

## 2021-04-28 RX ADMIN — DEXAMETHASONE SODIUM PHOSPHATE 10 MG: 10 INJECTION INTRAMUSCULAR; INTRAVENOUS at 09:13

## 2021-04-28 RX ADMIN — HYDROMORPHONE HYDROCHLORIDE 1 MG: 1 INJECTION, SOLUTION INTRAMUSCULAR; INTRAVENOUS; SUBCUTANEOUS at 09:18

## 2021-12-11 ENCOUNTER — APPOINTMENT (OUTPATIENT)
Dept: CT IMAGING | Facility: HOSPITAL | Age: 61
End: 2021-12-11

## 2021-12-11 ENCOUNTER — HOSPITAL ENCOUNTER (EMERGENCY)
Facility: HOSPITAL | Age: 61
Discharge: HOME | End: 2021-12-11
Admitting: EMERGENCY MEDICINE

## 2021-12-11 VITALS
OXYGEN SATURATION: 99 % | DIASTOLIC BLOOD PRESSURE: 70 MMHG | HEART RATE: 88 BPM | HEIGHT: 63 IN | SYSTOLIC BLOOD PRESSURE: 142 MMHG | RESPIRATION RATE: 18 BRPM | BODY MASS INDEX: 23.39 KG/M2 | TEMPERATURE: 98 F | WEIGHT: 132 LBS

## 2021-12-11 DIAGNOSIS — K59.00 CONSTIPATION, UNSPECIFIED CONSTIPATION TYPE: Primary | ICD-10-CM

## 2021-12-11 DIAGNOSIS — K83.8 DILATION OF BILIARY TRACT: ICD-10-CM

## 2021-12-11 DIAGNOSIS — K57.90 DIVERTICULOSIS: ICD-10-CM

## 2021-12-11 LAB
ALBUMIN SERPL-MCNC: 4.6 G/DL (ref 3.5–5.2)
ALBUMIN/GLOB SERPL: 2.1 G/DL
ALP SERPL-CCNC: 100 U/L (ref 39–117)
ALT SERPL W P-5'-P-CCNC: 19 U/L (ref 1–41)
AMMONIA BLD-SCNC: 24 UMOL/L (ref 16–60)
ANION GAP SERPL CALCULATED.3IONS-SCNC: 7 MMOL/L (ref 5–15)
AST SERPL-CCNC: 15 U/L (ref 1–40)
BASOPHILS # BLD AUTO: 0.03 10*3/MM3 (ref 0–0.2)
BASOPHILS NFR BLD AUTO: 0.3 % (ref 0–1.5)
BILIRUB SERPL-MCNC: 0.4 MG/DL (ref 0–1.2)
BILIRUB UR QL STRIP: NEGATIVE
BUN SERPL-MCNC: 17 MG/DL (ref 8–23)
BUN/CREAT SERPL: 30.9 (ref 7–25)
CALCIUM SPEC-SCNC: 9 MG/DL (ref 8.6–10.5)
CHLORIDE SERPL-SCNC: 103 MMOL/L (ref 98–107)
CLARITY UR: CLEAR
CO2 SERPL-SCNC: 27 MMOL/L (ref 22–29)
COLOR UR: YELLOW
CREAT SERPL-MCNC: 0.55 MG/DL (ref 0.76–1.27)
D-LACTATE SERPL-SCNC: 0.6 MMOL/L (ref 0.5–2)
DEPRECATED RDW RBC AUTO: 44.1 FL (ref 37–54)
EOSINOPHIL # BLD AUTO: 0.2 10*3/MM3 (ref 0–0.4)
EOSINOPHIL NFR BLD AUTO: 1.8 % (ref 0.3–6.2)
ERYTHROCYTE [DISTWIDTH] IN BLOOD BY AUTOMATED COUNT: 12.9 % (ref 12.3–15.4)
GFR SERPL CREATININE-BSD FRML MDRD: >150 ML/MIN/1.73
GLOBULIN UR ELPH-MCNC: 2.2 GM/DL
GLUCOSE SERPL-MCNC: 99 MG/DL (ref 65–99)
GLUCOSE UR STRIP-MCNC: NEGATIVE MG/DL
HCT VFR BLD AUTO: 43.3 % (ref 37.5–51)
HGB BLD-MCNC: 14.6 G/DL (ref 13–17.7)
HGB UR QL STRIP.AUTO: NEGATIVE
IMM GRANULOCYTES # BLD AUTO: 0.04 10*3/MM3 (ref 0–0.05)
IMM GRANULOCYTES NFR BLD AUTO: 0.4 % (ref 0–0.5)
KETONES UR QL STRIP: NEGATIVE
LEUKOCYTE ESTERASE UR QL STRIP.AUTO: NEGATIVE
LYMPHOCYTES # BLD AUTO: 1.59 10*3/MM3 (ref 0.7–3.1)
LYMPHOCYTES NFR BLD AUTO: 14.2 % (ref 19.6–45.3)
MAGNESIUM SERPL-MCNC: 2.2 MG/DL (ref 1.6–2.4)
MCH RBC QN AUTO: 31.8 PG (ref 26.6–33)
MCHC RBC AUTO-ENTMCNC: 33.7 G/DL (ref 31.5–35.7)
MCV RBC AUTO: 94.3 FL (ref 79–97)
MONOCYTES # BLD AUTO: 0.81 10*3/MM3 (ref 0.1–0.9)
MONOCYTES NFR BLD AUTO: 7.2 % (ref 5–12)
NEUTROPHILS NFR BLD AUTO: 76.1 % (ref 42.7–76)
NEUTROPHILS NFR BLD AUTO: 8.56 10*3/MM3 (ref 1.7–7)
NITRITE UR QL STRIP: NEGATIVE
NRBC BLD AUTO-RTO: 0 /100 WBC (ref 0–0.2)
PH UR STRIP.AUTO: 5.5 [PH] (ref 5–8)
PLATELET # BLD AUTO: 219 10*3/MM3 (ref 140–450)
PMV BLD AUTO: 10 FL (ref 6–12)
POTASSIUM SERPL-SCNC: 4.6 MMOL/L (ref 3.5–5.2)
PROCALCITONIN SERPL-MCNC: 0.07 NG/ML (ref 0–0.25)
PROT SERPL-MCNC: 6.8 G/DL (ref 6–8.5)
PROT UR QL STRIP: NEGATIVE
RBC # BLD AUTO: 4.59 10*6/MM3 (ref 4.14–5.8)
SARS-COV-2 RNA PNL SPEC NAA+PROBE: NOT DETECTED
SODIUM SERPL-SCNC: 137 MMOL/L (ref 136–145)
SP GR UR STRIP: 1.02 (ref 1–1.03)
UROBILINOGEN UR QL STRIP: NORMAL
WBC NRBC COR # BLD: 11.23 10*3/MM3 (ref 3.4–10.8)

## 2021-12-11 PROCEDURE — 96372 THER/PROPH/DIAG INJ SC/IM: CPT

## 2021-12-11 PROCEDURE — 80053 COMPREHEN METABOLIC PANEL: CPT | Performed by: PHYSICIAN ASSISTANT

## 2021-12-11 PROCEDURE — 25010000002 HYDROMORPHONE PER 4 MG: Performed by: EMERGENCY MEDICINE

## 2021-12-11 PROCEDURE — 83735 ASSAY OF MAGNESIUM: CPT | Performed by: PHYSICIAN ASSISTANT

## 2021-12-11 PROCEDURE — 96375 TX/PRO/DX INJ NEW DRUG ADDON: CPT

## 2021-12-11 PROCEDURE — 36415 COLL VENOUS BLD VENIPUNCTURE: CPT

## 2021-12-11 PROCEDURE — 81003 URINALYSIS AUTO W/O SCOPE: CPT | Performed by: PHYSICIAN ASSISTANT

## 2021-12-11 PROCEDURE — 25010000002 ONDANSETRON PER 1 MG: Performed by: PHYSICIAN ASSISTANT

## 2021-12-11 PROCEDURE — 25010000002 METHYLNALTREXONE 12 MG/0.6ML SOLUTION: Performed by: PHYSICIAN ASSISTANT

## 2021-12-11 PROCEDURE — 85025 COMPLETE CBC W/AUTO DIFF WBC: CPT | Performed by: PHYSICIAN ASSISTANT

## 2021-12-11 PROCEDURE — 74177 CT ABD & PELVIS W/CONTRAST: CPT

## 2021-12-11 PROCEDURE — 96374 THER/PROPH/DIAG INJ IV PUSH: CPT

## 2021-12-11 PROCEDURE — 87635 SARS-COV-2 COVID-19 AMP PRB: CPT | Performed by: PHYSICIAN ASSISTANT

## 2021-12-11 PROCEDURE — 84145 PROCALCITONIN (PCT): CPT | Performed by: PHYSICIAN ASSISTANT

## 2021-12-11 PROCEDURE — 83605 ASSAY OF LACTIC ACID: CPT | Performed by: PHYSICIAN ASSISTANT

## 2021-12-11 PROCEDURE — 99283 EMERGENCY DEPT VISIT LOW MDM: CPT

## 2021-12-11 PROCEDURE — 25010000002 IOPAMIDOL 61 % SOLUTION: Performed by: PHYSICIAN ASSISTANT

## 2021-12-11 PROCEDURE — 87040 BLOOD CULTURE FOR BACTERIA: CPT | Performed by: PHYSICIAN ASSISTANT

## 2021-12-11 PROCEDURE — 82140 ASSAY OF AMMONIA: CPT | Performed by: PHYSICIAN ASSISTANT

## 2021-12-11 RX ORDER — SODIUM CHLORIDE 0.9 % (FLUSH) 0.9 %
10 SYRINGE (ML) INJECTION AS NEEDED
Status: DISCONTINUED | OUTPATIENT
Start: 2021-12-11 | End: 2021-12-11 | Stop reason: HOSPADM

## 2021-12-11 RX ORDER — POLYETHYLENE GLYCOL 3350 17 G/17G
17 POWDER, FOR SOLUTION ORAL DAILY
Qty: 30 EACH | Refills: 0 | Status: ON HOLD | OUTPATIENT
Start: 2021-12-11 | End: 2022-03-10

## 2021-12-11 RX ORDER — HYDROMORPHONE HYDROCHLORIDE 1 MG/ML
0.5 INJECTION, SOLUTION INTRAMUSCULAR; INTRAVENOUS; SUBCUTANEOUS ONCE
Status: COMPLETED | OUTPATIENT
Start: 2021-12-11 | End: 2021-12-11

## 2021-12-11 RX ORDER — ONDANSETRON 2 MG/ML
4 INJECTION INTRAMUSCULAR; INTRAVENOUS ONCE
Status: COMPLETED | OUTPATIENT
Start: 2021-12-11 | End: 2021-12-11

## 2021-12-11 RX ORDER — MAGNESIUM CARB/ALUMINUM HYDROX 105-160MG
300 TABLET,CHEWABLE ORAL ONCE
Status: COMPLETED | OUTPATIENT
Start: 2021-12-11 | End: 2021-12-11

## 2021-12-11 RX ADMIN — SODIUM CHLORIDE, PRESERVATIVE FREE 10 ML: 5 INJECTION INTRAVENOUS at 11:51

## 2021-12-11 RX ADMIN — Medication 300 ML: at 14:19

## 2021-12-11 RX ADMIN — IOPAMIDOL 100 ML: 612 INJECTION, SOLUTION INTRAVENOUS at 12:32

## 2021-12-11 RX ADMIN — METHYLNALTREXONE BROMIDE 12 MG: 12 INJECTION, SOLUTION SUBCUTANEOUS at 14:19

## 2021-12-11 RX ADMIN — HYDROMORPHONE HYDROCHLORIDE 0.5 MG: 1 INJECTION, SOLUTION INTRAMUSCULAR; INTRAVENOUS; SUBCUTANEOUS at 11:51

## 2021-12-11 RX ADMIN — ONDANSETRON 4 MG: 2 INJECTION INTRAMUSCULAR; INTRAVENOUS at 11:50

## 2021-12-11 NOTE — ED PROVIDER NOTES
"Subjective   History of Present Illness    Patient is a 61-year-old male presenting to ED with epigastric pain and NV. PMH significant for previous liver abscess. Patient states for the past 2 weeks he has had slowly progressively worsening epigastric pain that has become more consistent and intense. Patient states he was having intermittent nausea which turned into emesis last night.  Patient denies any radiation of the pain, or constipation.  Patient had one episode of diarrhea a few days ago with non since. Patient denies any hematemesis or black/bloody/tarry stools.  Patient denies dysuria, hematuria, vomiting.  Patient denies any fevers, chills, or diaphoresis.  Patient states that he has been trying to ride out the pain however this morning after his oxycodone around 7 AM did not work he \"finally had to give them.\"  Patient states he is very concerned because this pain feels similar to March 2017 when he was admitted at [facility] for liver abscess due to \"too many stones.\"  Patient states since that time he has had no known complications or difficulties.  Patient denies any medication use since the pain medicine at 7 AM. Patient did note he has had chronic back pain for years and intermittently take pain medications. Patient has been taking the pain medications more regularly over the past 7-10 days.     Records reviewed show patient was last seen in the ED on 4/28/2021 for chronic left-sided back pain with sciatica.    Patient was seen in the ED on 2/20/2017 at which time he was admitted for a liver abscess, elevated LFTs, pain of the upper abdomen.    Review of Systems   Constitutional: Negative.  Negative for chills, diaphoresis and fever.   HENT: Negative.    Eyes: Negative.    Respiratory: Negative.    Cardiovascular: Negative.    Gastrointestinal: Positive for abdominal pain (epigastric, worsneing for 2 weeks), nausea and vomiting (started last night). Negative for abdominal distention, constipation and " diarrhea.   Genitourinary: Negative.  Negative for dysuria, flank pain and hematuria.   Musculoskeletal: Negative.    Skin: Negative.    Neurological: Negative.    Psychiatric/Behavioral: Negative.    All other systems reviewed and are negative.      No past medical history on file.    Allergies   Allergen Reactions   • Morphine Other (See Comments)     Causes Hallucinations       Past Surgical History:   Procedure Laterality Date   • ABDOMINAL SURGERY     • EYE SURGERY     • KNEE SURGERY Left        No family history on file.    Social History     Socioeconomic History   • Marital status: Single   Tobacco Use   • Smoking status: Current Every Day Smoker     Packs/day: 1.00   Substance and Sexual Activity   • Alcohol use: No   • Drug use: Defer   • Sexual activity: Defer           Objective   Physical Exam  Vitals and nursing note reviewed.   Constitutional:       General: He is in acute distress (appears uncomfortable due to pain).      Appearance: Normal appearance. He is well-developed, well-groomed and normal weight. He is ill-appearing. He is not toxic-appearing or diaphoretic.   HENT:      Head: Normocephalic.      Mouth/Throat:      Mouth: Mucous membranes are moist.      Pharynx: Oropharynx is clear. No posterior oropharyngeal erythema.   Eyes:      General: No scleral icterus.     Extraocular Movements: Extraocular movements intact.      Conjunctiva/sclera: Conjunctivae normal.      Pupils: Pupils are equal, round, and reactive to light.   Cardiovascular:      Rate and Rhythm: Normal rate.   Pulmonary:      Effort: Pulmonary effort is normal. No respiratory distress.      Breath sounds: Normal breath sounds.   Chest:      Chest wall: No tenderness.   Abdominal:      General: A surgical scar is present. Bowel sounds are normal. There is no distension. There are no signs of injury.      Palpations: Abdomen is soft.      Tenderness: There is abdominal tenderness in the epigastric area. There is guarding. There  is no right CVA tenderness or left CVA tenderness.   Musculoskeletal:         General: Normal range of motion.      Cervical back: Normal range of motion and neck supple.      Right lower leg: No edema.      Left lower leg: No edema.   Skin:     General: Skin is warm and dry.      Coloration: Skin is not jaundiced or pale.   Neurological:      Mental Status: He is alert and oriented to person, place, and time.      Gait: Gait normal.   Psychiatric:         Attention and Perception: Attention normal.         Mood and Affect: Mood and affect normal.         Speech: Speech normal.         Behavior: Behavior normal. Behavior is cooperative.         Procedures           ED Course                                                 MDM  Number of Diagnoses or Management Options     Amount and/or Complexity of Data Reviewed  Clinical lab tests: reviewed and ordered  Tests in the radiology section of CPT®: reviewed and ordered  Tests in the medicine section of CPT®: ordered and reviewed  Decide to obtain previous medical records or to obtain history from someone other than the patient: yes  Review and summarize past medical records: yes  Discuss the patient with other providers: yes (Dr. Jlius Hood (attending))    Patient is a 61-year-old male presenting to ED with epigastric pain and NV. PMH significant for previous liver abscess. CBC with leukocytosis 11.23 with no further acute findings. CMP unremarkable. Procalcitonin and lactic acid WNL. Ammonia and magnesium WNL. Urinalysis with no findings of infection or hematuria. COVID negative. CT abd/pel with IV contrast showed: Fecal stasis with large stool burden, atherosclerotic disease, stable intra and extrahepatic biliary ductal dilation compared to 2017, colonic diverticulosis without evidence of acute diverticulitis. Patient given a dose of pain medication and reported feeling better. Patient given a dose of zofran and tolerated PO fluids with no difficulties. Patient  given a dose of IV fluids secondary to contrast. Discussed with patient ability to give a dose of relistor and mag citrate. Discussed need for miralax daily, hydration, high fiber diet, and importance of PCP follow up for reevaluation and discussion of management of opioid induced constipation. Discussed ability to continue following up outpatient for monitoring of diverticulosis as well as chronic biliary duct dilation.  Discussed return precautions any for immediate return to ED should he develop any new or worsening symptoms.  Patient very appreciative with no further questions, concerns, needs at this time and is stable for discharge.    MDM: The patient was evaluated for different concerning etiologies of abdominal pain including: choledocolithiasis, pancreatitis but the patient did not have any right upper quadrant abdominal pain, no jaundice, no LFT or lipase changes. The patient did not have any right lower quadrant abdominal pain or McBurney point tenderness and no clinical signs of appendicitis. The patient is having normal BM with + flatus and no clinical signs of bowel obstruction or colitis. The patient's abdominal exam is benign and no signs of an acute or surgical abdomen. The patient denies dysuria/hematuria, UA is unremarkable and does not have and clinical signs of UTI, pyelonephritis, or kidney stone. The patient improved in the ED, is tolerating PO, and feels comfortable with discharge home.     DIFFERENTIAL: The patient was evaluated for different concerning etiologies of acute abdominal pain including: toxic injection, hepatitis, pancreatitis, appendicitis, small bowel obstruction, perforated viscus, colitis, acute abdomen, AAA or genital urinary complaints. After evaluation the patient did not show evidence of the more concerning above listed conditions.    Final diagnoses:   Constipation, unspecified constipation type   Diverticulosis   Dilation of biliary tract       ED Disposition  ED  Disposition     ED Disposition Condition Comment    Discharge Stable           Provider, No Known  [facility] [id number]  [phone]    Schedule an appointment as soon as possible for a visit in 2 days      [facility] Emergency Department  [address] [place] [id number] [phone]    As needed         Medication List      New Prescriptions    polyethylene glycol 17 g packet  Commonly known as: MIRALAX  Take 17 g by mouth Daily.           Where to Get Your Medications      These medications were sent to [facility] [email or web address] - [place], [place] - [facility] in [address] - [phone]  - [phone] FX  [address], [facility] [address]    Phone: [phone]   · polyethylene glycol 17 g packet          Cristofer Fernández PA-C  12/11/21 1437

## 2021-12-11 NOTE — DISCHARGE INSTRUCTIONS
It is very important that you have a primary care provider, please see the list below for available providers in the area.   Please make sure that you are staying well hydrated, using miralax daily, and discuss with your provider medications to help with constipation while on pain medication.   Your imaging showed today diverticulosis, the outpouching of your colon as we discussed. You have NO evidence of infection at this time (diverticulitis).  Please continue to follow up outpatient for monitoring of your liver and biliary ducts.   Please follow up with your primary care provider I the next 24-48 hours for a reevaluation.   Should you develop any new or worsening symptoms please return to the ED for further evaluation.     Follow up with one of the [facility] physician groups below to setup primary care. If you have trouble making an appointment, please call the [facility] Nurse Line at [phone]    Dr. Shayna Hewitt DO, Dr. Boaz Logan DO, and JUDD Quiñones  [facility]  [address] [place] [address], [address] [phone]    Dr. Jewel Vilchis MD  [facility] - [address] [address], [address], [place] [address] [phone]    Dr. German Sanchez DO, Dr. Sudhir Gore DO,  JUDD Irvin, and JUDD Marie  [facility] - [address] [address], [address], [place] [address] [phone]     Dr. Mel Cooper MD, and JUDD Pak  [facility] - [address] [address], [place], [place] [id number] [phone]    Dr. Reji Coley MD and Dr. Sky Leal MD  [facility] [address], [address] [phone]    Dr. Luis Carlos Lane MD  [facility]  [address] [place] [address], [address],  [place], [address] [phone]    Dr. Mark Monique MD  [facility]  [address] [facility], [place], [address] [phone]                  Constipation, Adult  Constipation is when a person has fewer than three bowel movements in a week, has difficulty having a bowel movement, or has stools (feces) that are dry, hard, or larger than normal. Constipation may be caused by an underlying condition. It may become worse with age if a person takes certain medicines and does not take in enough fluids.  Follow these instructions at home:  Eating and drinking    · Eat foods that have a lot of fiber, such as beans, whole grains, and fresh fruits and vegetables.  · Limit foods that are low in fiber and high in fat and processed sugars, such as fried or sweet foods. These include french fries, hamburgers, cookies, candies, and soda.  · Drink enough fluid to keep your urine pale yellow.    General instructions  · Exercise regularly or as told by your health care provider. Try to do 150 minutes of moderate exercise each week.  · Use the bathroom when you have the urge to go. Do not hold it in.  · Take over-the-counter and prescription medicines only as told by your health care provider. This includes any fiber supplements.  · During bowel movements:  ? Practice deep breathing while relaxing the lower abdomen.  ? Practice pelvic floor relaxation.  · Watch your condition for any changes. Let your health care provider know about them.  · Keep all follow-up visits as told by your health care provider. This is important.  Contact a health care provider if:  · You have pain that gets worse.  · You have a fever.  · You do not have a bowel movement after 4 days.  · You vomit.  · You are not hungry or you lose weight.  · You are bleeding from the opening between the buttocks (anus).  · You have thin, pencil-like stools.  Get help right away if:  · You have a fever and your symptoms  suddenly get worse.  · You leak stool or have blood in your stool.  · Your abdomen is bloated.  · You have severe pain in your abdomen.  · You feel dizzy or you faint.  Summary  · Constipation is when a person has fewer than three bowel movements in a week, has difficulty having a bowel movement, or has stools (feces) that are dry, hard, or larger than normal.  · Eat foods that have a lot of fiber, such as beans, whole grains, and fresh fruits and vegetables.  · Drink enough fluid to keep your urine pale yellow.  · Take over-the-counter and prescription medicines only as told by your health care provider. This includes any fiber supplements.  This information is not intended to replace advice given to you by your health care provider. Make sure you discuss any questions you have with your health care provider.  Document Revised: 11/04/2020 Document Reviewed: 11/04/2020  [employer] Patient Education © 2021 [employer]

## 2021-12-16 LAB
BACTERIA SPEC AEROBE CULT: NORMAL
BACTERIA SPEC AEROBE CULT: NORMAL

## 2022-03-07 ENCOUNTER — HOSPITAL ENCOUNTER (INPATIENT)
Facility: HOSPITAL | Age: 62
LOS: 5 days | Discharge: HOME | End: 2022-03-13
Attending: EMERGENCY MEDICINE | Admitting: FAMILY MEDICINE

## 2022-03-07 DIAGNOSIS — R10.10 UPPER ABDOMINAL PAIN: ICD-10-CM

## 2022-03-07 DIAGNOSIS — R79.89 ELEVATED LFTS: ICD-10-CM

## 2022-03-07 DIAGNOSIS — K52.9 COLITIS: Primary | ICD-10-CM

## 2022-03-07 PROCEDURE — 36415 COLL VENOUS BLD VENIPUNCTURE: CPT

## 2022-03-07 PROCEDURE — 84145 PROCALCITONIN (PCT): CPT | Performed by: PHYSICIAN ASSISTANT

## 2022-03-07 PROCEDURE — 87040 BLOOD CULTURE FOR BACTERIA: CPT | Performed by: PHYSICIAN ASSISTANT

## 2022-03-07 PROCEDURE — 83690 ASSAY OF LIPASE: CPT | Performed by: PHYSICIAN ASSISTANT

## 2022-03-07 PROCEDURE — 99284 EMERGENCY DEPT VISIT MOD MDM: CPT

## 2022-03-07 PROCEDURE — 87635 SARS-COV-2 COVID-19 AMP PRB: CPT | Performed by: PHYSICIAN ASSISTANT

## 2022-03-07 PROCEDURE — 85025 COMPLETE CBC W/AUTO DIFF WBC: CPT | Performed by: PHYSICIAN ASSISTANT

## 2022-03-07 PROCEDURE — 80053 COMPREHEN METABOLIC PANEL: CPT | Performed by: PHYSICIAN ASSISTANT

## 2022-03-07 PROCEDURE — 83605 ASSAY OF LACTIC ACID: CPT | Performed by: PHYSICIAN ASSISTANT

## 2022-03-07 RX ORDER — SODIUM CHLORIDE 0.9 % (FLUSH) 0.9 %
10 SYRINGE (ML) INJECTION AS NEEDED
Status: DISCONTINUED | OUTPATIENT
Start: 2022-03-07 | End: 2022-03-13 | Stop reason: HOSPADM

## 2022-03-07 RX ADMIN — SODIUM CHLORIDE 1000 ML: 9 INJECTION, SOLUTION INTRAVENOUS at 23:55

## 2022-03-08 ENCOUNTER — APPOINTMENT (OUTPATIENT)
Dept: ULTRASOUND IMAGING | Facility: HOSPITAL | Age: 62
End: 2022-03-08

## 2022-03-08 ENCOUNTER — APPOINTMENT (OUTPATIENT)
Dept: CT IMAGING | Facility: HOSPITAL | Age: 62
End: 2022-03-08

## 2022-03-08 PROBLEM — R10.11 RIGHT UPPER QUADRANT PAIN: Status: ACTIVE | Noted: 2022-03-08

## 2022-03-08 PROBLEM — K52.9 COLITIS: Status: ACTIVE | Noted: 2022-03-08

## 2022-03-08 PROBLEM — R11.10 INTRACTABLE VOMITING: Status: ACTIVE | Noted: 2022-03-08

## 2022-03-08 PROBLEM — K59.04 CHRONIC IDIOPATHIC CONSTIPATION: Status: ACTIVE | Noted: 2022-03-08

## 2022-03-08 LAB
ALBUMIN SERPL-MCNC: 4.5 G/DL (ref 3.5–5.2)
ALBUMIN/GLOB SERPL: 1.6 G/DL
ALP SERPL-CCNC: 143 U/L (ref 39–117)
ALT SERPL W P-5'-P-CCNC: 47 U/L (ref 1–41)
ANION GAP SERPL CALCULATED.3IONS-SCNC: 9 MMOL/L (ref 5–15)
AST SERPL-CCNC: 85 U/L (ref 1–40)
BASOPHILS # BLD AUTO: 0.07 10*3/MM3 (ref 0–0.2)
BASOPHILS NFR BLD AUTO: 0.4 % (ref 0–1.5)
BILIRUB SERPL-MCNC: 0.3 MG/DL (ref 0–1.2)
BILIRUB UR QL STRIP: NEGATIVE
BUN SERPL-MCNC: 20 MG/DL (ref 8–23)
BUN/CREAT SERPL: 30.3 (ref 7–25)
CALCIUM SPEC-SCNC: 9.8 MG/DL (ref 8.6–10.5)
CHLORIDE SERPL-SCNC: 110 MMOL/L (ref 98–107)
CLARITY UR: CLEAR
CO2 SERPL-SCNC: 25 MMOL/L (ref 22–29)
COLOR UR: YELLOW
CREAT SERPL-MCNC: 0.66 MG/DL (ref 0.76–1.27)
D-LACTATE SERPL-SCNC: 1.3 MMOL/L (ref 0.5–2)
DEPRECATED RDW RBC AUTO: 45.7 FL (ref 37–54)
EGFRCR SERPLBLD CKD-EPI 2021: 106 ML/MIN/1.73
EOSINOPHIL # BLD AUTO: 0.29 10*3/MM3 (ref 0–0.4)
EOSINOPHIL NFR BLD AUTO: 1.7 % (ref 0.3–6.2)
ERYTHROCYTE [DISTWIDTH] IN BLOOD BY AUTOMATED COUNT: 12.9 % (ref 12.3–15.4)
GLOBULIN UR ELPH-MCNC: 2.9 GM/DL
GLUCOSE SERPL-MCNC: 76 MG/DL (ref 65–99)
GLUCOSE UR STRIP-MCNC: NEGATIVE MG/DL
HCT VFR BLD AUTO: 43.2 % (ref 37.5–51)
HGB BLD-MCNC: 14.7 G/DL (ref 13–17.7)
HGB UR QL STRIP.AUTO: NEGATIVE
IMM GRANULOCYTES # BLD AUTO: 0.08 10*3/MM3 (ref 0–0.05)
IMM GRANULOCYTES NFR BLD AUTO: 0.5 % (ref 0–0.5)
KETONES UR QL STRIP: NEGATIVE
LEUKOCYTE ESTERASE UR QL STRIP.AUTO: NEGATIVE
LIPASE SERPL-CCNC: 27 U/L (ref 13–60)
LYMPHOCYTES # BLD AUTO: 2.3 10*3/MM3 (ref 0.7–3.1)
LYMPHOCYTES NFR BLD AUTO: 13.3 % (ref 19.6–45.3)
MCH RBC QN AUTO: 32.5 PG (ref 26.6–33)
MCHC RBC AUTO-ENTMCNC: 34 G/DL (ref 31.5–35.7)
MCV RBC AUTO: 95.4 FL (ref 79–97)
MONOCYTES # BLD AUTO: 1.09 10*3/MM3 (ref 0.1–0.9)
MONOCYTES NFR BLD AUTO: 6.3 % (ref 5–12)
NEUTROPHILS NFR BLD AUTO: 13.52 10*3/MM3 (ref 1.7–7)
NEUTROPHILS NFR BLD AUTO: 77.8 % (ref 42.7–76)
NITRITE UR QL STRIP: NEGATIVE
NRBC BLD AUTO-RTO: 0 /100 WBC (ref 0–0.2)
PH UR STRIP.AUTO: 5.5 [PH] (ref 5–8)
PLATELET # BLD AUTO: 243 10*3/MM3 (ref 140–450)
PMV BLD AUTO: 10 FL (ref 6–12)
POTASSIUM SERPL-SCNC: 4.2 MMOL/L (ref 3.5–5.2)
PROCALCITONIN SERPL-MCNC: 0.06 NG/ML (ref 0–0.25)
PROT SERPL-MCNC: 7.4 G/DL (ref 6–8.5)
PROT UR QL STRIP: NEGATIVE
RBC # BLD AUTO: 4.53 10*6/MM3 (ref 4.14–5.8)
SARS-COV-2 RNA PNL SPEC NAA+PROBE: NOT DETECTED
SODIUM SERPL-SCNC: 144 MMOL/L (ref 136–145)
SP GR UR STRIP: >1.03 (ref 1–1.03)
UROBILINOGEN UR QL STRIP: ABNORMAL
WBC NRBC COR # BLD: 17.35 10*3/MM3 (ref 3.4–10.8)

## 2022-03-08 PROCEDURE — 0 HYDROMORPHONE 1 MG/ML SOLUTION: Performed by: FAMILY MEDICINE

## 2022-03-08 PROCEDURE — 25010000002 ENOXAPARIN PER 10 MG: Performed by: FAMILY MEDICINE

## 2022-03-08 PROCEDURE — 0 HYDROMORPHONE 1 MG/ML SOLUTION: Performed by: EMERGENCY MEDICINE

## 2022-03-08 PROCEDURE — 25010000002 LEVOFLOXACIN PER 250 MG: Performed by: EMERGENCY MEDICINE

## 2022-03-08 PROCEDURE — 25010000002 IOPAMIDOL 61 % SOLUTION: Performed by: PHYSICIAN ASSISTANT

## 2022-03-08 PROCEDURE — 25010000002 ONDANSETRON PER 1 MG: Performed by: EMERGENCY MEDICINE

## 2022-03-08 PROCEDURE — 81003 URINALYSIS AUTO W/O SCOPE: CPT | Performed by: PHYSICIAN ASSISTANT

## 2022-03-08 PROCEDURE — 74177 CT ABD & PELVIS W/CONTRAST: CPT

## 2022-03-08 PROCEDURE — 25010000002 ONDANSETRON PER 1 MG: Performed by: FAMILY MEDICINE

## 2022-03-08 PROCEDURE — 76705 ECHO EXAM OF ABDOMEN: CPT

## 2022-03-08 RX ORDER — ONDANSETRON 2 MG/ML
4 INJECTION INTRAMUSCULAR; INTRAVENOUS EVERY 6 HOURS PRN
Status: DISCONTINUED | OUTPATIENT
Start: 2022-03-08 | End: 2022-03-13 | Stop reason: HOSPADM

## 2022-03-08 RX ORDER — ONDANSETRON 2 MG/ML
4 INJECTION INTRAMUSCULAR; INTRAVENOUS ONCE
Status: COMPLETED | OUTPATIENT
Start: 2022-03-08 | End: 2022-03-08

## 2022-03-08 RX ORDER — HYDROCODONE BITARTRATE AND ACETAMINOPHEN 5; 325 MG/1; MG/1
1 TABLET ORAL EVERY 4 HOURS PRN
Status: DISCONTINUED | OUTPATIENT
Start: 2022-03-08 | End: 2022-03-13 | Stop reason: HOSPADM

## 2022-03-08 RX ORDER — SODIUM CHLORIDE 9 MG/ML
100 INJECTION, SOLUTION INTRAVENOUS CONTINUOUS
Status: DISCONTINUED | OUTPATIENT
Start: 2022-03-08 | End: 2022-03-13 | Stop reason: HOSPADM

## 2022-03-08 RX ORDER — METRONIDAZOLE 500 MG/100ML
500 INJECTION, SOLUTION INTRAVENOUS EVERY 8 HOURS
Status: DISCONTINUED | OUTPATIENT
Start: 2022-03-08 | End: 2022-03-13 | Stop reason: HOSPADM

## 2022-03-08 RX ORDER — LEVOFLOXACIN 5 MG/ML
500 INJECTION, SOLUTION INTRAVENOUS ONCE
Status: COMPLETED | OUTPATIENT
Start: 2022-03-08 | End: 2022-03-08

## 2022-03-08 RX ORDER — SODIUM CHLORIDE 0.9 % (FLUSH) 0.9 %
10 SYRINGE (ML) INJECTION AS NEEDED
Status: DISCONTINUED | OUTPATIENT
Start: 2022-03-08 | End: 2022-03-13 | Stop reason: HOSPADM

## 2022-03-08 RX ORDER — SODIUM CHLORIDE 0.9 % (FLUSH) 0.9 %
10 SYRINGE (ML) INJECTION EVERY 12 HOURS SCHEDULED
Status: DISCONTINUED | OUTPATIENT
Start: 2022-03-08 | End: 2022-03-13 | Stop reason: HOSPADM

## 2022-03-08 RX ORDER — METRONIDAZOLE 500 MG/100ML
500 INJECTION, SOLUTION INTRAVENOUS ONCE
Status: COMPLETED | OUTPATIENT
Start: 2022-03-08 | End: 2022-03-08

## 2022-03-08 RX ORDER — LEVOFLOXACIN 5 MG/ML
750 INJECTION, SOLUTION INTRAVENOUS EVERY 24 HOURS
Status: DISCONTINUED | OUTPATIENT
Start: 2022-03-09 | End: 2022-03-13 | Stop reason: HOSPADM

## 2022-03-08 RX ADMIN — HYDROMORPHONE HYDROCHLORIDE 0.5 MG: 1 INJECTION, SOLUTION INTRAMUSCULAR; INTRAVENOUS; SUBCUTANEOUS at 00:45

## 2022-03-08 RX ADMIN — HYDROMORPHONE HYDROCHLORIDE 0.5 MG: 1 INJECTION, SOLUTION INTRAMUSCULAR; INTRAVENOUS; SUBCUTANEOUS at 06:29

## 2022-03-08 RX ADMIN — METRONIDAZOLE 500 MG: 500 INJECTION, SOLUTION INTRAVENOUS at 03:24

## 2022-03-08 RX ADMIN — ONDANSETRON 4 MG: 2 INJECTION INTRAMUSCULAR; INTRAVENOUS at 18:33

## 2022-03-08 RX ADMIN — HYDROMORPHONE HYDROCHLORIDE 0.5 MG: 1 INJECTION, SOLUTION INTRAMUSCULAR; INTRAVENOUS; SUBCUTANEOUS at 09:32

## 2022-03-08 RX ADMIN — HYDROMORPHONE HYDROCHLORIDE 0.5 MG: 1 INJECTION, SOLUTION INTRAMUSCULAR; INTRAVENOUS; SUBCUTANEOUS at 15:50

## 2022-03-08 RX ADMIN — HYDROMORPHONE HYDROCHLORIDE 0.5 MG: 1 INJECTION, SOLUTION INTRAMUSCULAR; INTRAVENOUS; SUBCUTANEOUS at 22:37

## 2022-03-08 RX ADMIN — METRONIDAZOLE 500 MG: 500 INJECTION, SOLUTION INTRAVENOUS at 11:26

## 2022-03-08 RX ADMIN — LEVOFLOXACIN 500 MG: 5 INJECTION, SOLUTION INTRAVENOUS at 03:24

## 2022-03-08 RX ADMIN — SODIUM CHLORIDE 100 ML/HR: 9 INJECTION, SOLUTION INTRAVENOUS at 08:58

## 2022-03-08 RX ADMIN — SODIUM CHLORIDE 100 ML/HR: 9 INJECTION, SOLUTION INTRAVENOUS at 20:27

## 2022-03-08 RX ADMIN — METRONIDAZOLE 500 MG: 500 INJECTION, SOLUTION INTRAVENOUS at 20:27

## 2022-03-08 RX ADMIN — HYDROMORPHONE HYDROCHLORIDE 0.5 MG: 1 INJECTION, SOLUTION INTRAMUSCULAR; INTRAVENOUS; SUBCUTANEOUS at 20:27

## 2022-03-08 RX ADMIN — HYDROMORPHONE HYDROCHLORIDE 0.5 MG: 1 INJECTION, SOLUTION INTRAMUSCULAR; INTRAVENOUS; SUBCUTANEOUS at 13:42

## 2022-03-08 RX ADMIN — HYDROMORPHONE HYDROCHLORIDE 0.5 MG: 1 INJECTION, SOLUTION INTRAMUSCULAR; INTRAVENOUS; SUBCUTANEOUS at 11:35

## 2022-03-08 RX ADMIN — HYDROMORPHONE HYDROCHLORIDE 0.5 MG: 1 INJECTION, SOLUTION INTRAMUSCULAR; INTRAVENOUS; SUBCUTANEOUS at 18:33

## 2022-03-08 RX ADMIN — HYDROMORPHONE HYDROCHLORIDE 0.5 MG: 1 INJECTION, SOLUTION INTRAMUSCULAR; INTRAVENOUS; SUBCUTANEOUS at 03:25

## 2022-03-08 RX ADMIN — IOPAMIDOL 70 ML: 612 INJECTION, SOLUTION INTRAVENOUS at 01:17

## 2022-03-08 RX ADMIN — ONDANSETRON 4 MG: 2 INJECTION INTRAMUSCULAR; INTRAVENOUS at 00:45

## 2022-03-08 RX ADMIN — ENOXAPARIN SODIUM 40 MG: 40 INJECTION SUBCUTANEOUS at 08:54

## 2022-03-08 NOTE — PLAN OF CARE
Goal Outcome Evaluation:  Plan of Care Reviewed With: patient        Progress: no change  Outcome Evaluation: Patient went for abdominal US, see results, IV Fluids infusing and IV pain meds x4. Tolerating clear liquids, safety maintained.

## 2022-03-08 NOTE — PAYOR COMM NOTE
"Christa Campos (61 y.o. Male) [id number]  Admit  3/7  [facility] phone    Fax                Date of Birth   1960    Social Security Number       Address   [address] [place] [address] [place] [address]    Home Phone   [phone]    MRN   [id number]       [employer]   Other    Marital Status   Single                            Admission Date   3/7/22    Admission Type   Emergency    Admitting Provider   Ian Allan MD    Attending Provider   Ian Allan MD    Department, Room/Bed   [facility] 3C, 387/1       Discharge Date       Discharge Disposition       Discharge Destination                               Attending Provider: Ian Allan MD    Allergies: Morphine    Isolation: None   Infection: None   Code Status: CPR   Advance Care Planning Activity    Ht: 160 cm (63\")   Wt: 59.4 kg (131 lb)    Admission Cmt: None   Principal Problem: Colitis [K52.9]                 Active Insurance as of 3/7/2022     Primary Coverage     Payor Plan Insurance Group Employer/Plan Group    [employer] PPO [id number]     Payor Plan Address Payor Plan Phone Number Payor Plan Fax Number Effective Dates    [address] [phone]  1/1/2020 - None Entered    [facility] [id number]       Subscriber Name Subscriber Birth Date Member ID       CHRISTA CAMPOS 1960 [id number]                 Emergency Contacts      (Rel.) Home Phone Work Phone Mobile Phone    Peace Guidry (Sister) [phone] -- [phone]    Maryam Campos (Sister) [phone] -- --               History & Physical      Ian Allan MD at 03/08/22 0717            History and Physical    Patient:  Christa Campos  MRN: [id number]    CHIEF COMPLAINT: Abdominal pain, vomiting    History Obtained From: the patient   PCP: Ian Allan MD    HISTORY OF PRESENT ILLNESS:   The patient is a 61 y.o. male who presents with " opioid-induced constipation, chronic pain syndrome, history of prior abdominal surgery who presents with acute onset severe upper abdominal pain with intractable nausea and vomiting that began around 830 yesterday evening.  Patient was prior in a good state of health other than having congestion sinusitis for which I had a virtual visit with him yesterday afternoon.  Patient states that acutely yesterday evening he developed severe abdominal pain and vomiting with complete clearance of his stomach and proceeded to have dry heaves following.  Denies any blood in his emesis.  Does have a history of prior biliary stones.    REVIEW OF SYSTEMS:    Review of Systems   Constitutional: Negative for chills and fever.   HENT: Positive for congestion. Negative for sore throat.    Respiratory: Negative for cough and shortness of breath.    Cardiovascular: Negative for chest pain and leg swelling.   Gastrointestinal: Positive for abdominal pain, constipation, nausea and vomiting. Negative for anal bleeding, blood in stool and diarrhea.   Genitourinary: Negative for dysuria and urgency.   Musculoskeletal: Negative for arthralgias and back pain.   Skin: Negative for pallor and rash.   Neurological: Negative for dizziness and numbness.   Psychiatric/Behavioral: Negative for confusion and dysphoric mood.          Past Medical History:  History reviewed. No pertinent past medical history.    Past Surgical History:  Past Surgical History:   Procedure Laterality Date   • ABDOMINAL SURGERY     • EYE SURGERY     • KNEE SURGERY Left        Medications Prior to Admission:    Medications Prior to Admission   Medication Sig Dispense Refill Last Dose   • acetaminophen (TYLENOL) 500 MG tablet Take 500 mg by mouth.      • HYDROcodone-acetaminophen (NORCO) 7.5-325 MG per tablet Take 1 tablet by mouth Every 6 (Six) Hours As Needed for Moderate Pain .      • meloxicam (MOBIC) 7.5 MG tablet Take 7.5 mg by mouth Daily.      • methylPREDNISolone  "(MEDROL) 4 MG dose pack Take as directed on package instructions. 1 each 0    • polyethylene glycol (MIRALAX) 17 g packet Take 17 g by mouth Daily. 30 each 0        Allergies:  Morphine    Social History:   Social History     Socioeconomic History   • Marital status: Single   Tobacco Use   • Smoking status: Current Every Day Smoker     Packs/day: 1.00   Substance and Sexual Activity   • Alcohol use: No   • Drug use: Defer   • Sexual activity: Defer       Family History:   History reviewed. No pertinent family history.        Physical Exam:    Vitals: /57 (BP Location: Left arm, Patient Position: Lying)   Pulse 53   Temp 97.7 °F (36.5 °C) (Oral)   Resp 22   Ht 160 cm (63\")   Wt 59.4 kg (131 lb)   SpO2 100%   BMI 23.21 kg/m²   Physical Exam  Constitutional:       Appearance: He is well-developed.   HENT:      Head: Normocephalic and atraumatic.   Eyes:      Conjunctiva/sclera: Conjunctivae normal.      Pupils: Pupils are equal, round, and reactive to light.   Cardiovascular:      Rate and Rhythm: Normal rate and regular rhythm.      Heart sounds: Normal heart sounds. No murmur heard.    No friction rub.   Pulmonary:      Effort: Pulmonary effort is normal. No respiratory distress.      Breath sounds: Normal breath sounds. No wheezing or rales.   Abdominal:      Comments: Exquisite epigastric and right upper quadrant tenderness, minimal lower quadrant tenderness   Musculoskeletal:         General: Normal range of motion.      Cervical back: Normal range of motion.   Skin:     Capillary Refill: Capillary refill takes less than 2 seconds.   Neurological:      Mental Status: He is alert and oriented to person, place, and time.      Cranial Nerves: No cranial nerve deficit.   Psychiatric:         Behavior: Behavior normal.           Lab Results (last 24 hours)     Procedure Component Value Units Date/Time    Urinalysis With Culture If Indicated - Urine, Clean Catch [[id number]]  (Abnormal) Collected: 03/08/22 " "0207    Specimen: Urine, Clean Catch Updated: 03/08/22 0218     Color, UA Yellow     Appearance, UA Clear     pH, UA 5.5     Specific Gravity, UA >1.030     Glucose, UA Negative     Ketones, UA Negative     Bilirubin, UA Negative     Blood, UA Negative     Protein, UA Negative     Leuk Esterase, UA Negative     Nitrite, UA Negative     Urobilinogen, UA 0.2 E.U./dL    Narrative:      Urine microscopic not indicated.    COVID-19,[facility] IN-HOUSE,Nasal Swab No Transport Media 3-4 HR TAT - Swab, Nasal Cavity [[id number]]  (Normal) Collected: 03/07/22 2354    Specimen: Swab from Nasal Cavity Updated: 03/08/22 0057     COVID19 Not Detected    Narrative:      Fact sheet for providers: https://www.fda.gov/media/402739/download     Fact sheet for patients: https://www.fda.gov/media/626250/download    Test performed by PCR.    Consider negative results in combination with clinical observations, patient history, and epidemiological information.    Procalcitonin [[id number]]  (Normal) Collected: 03/07/22 2354    Specimen: Blood Updated: 03/08/22 0030     Procalcitonin 0.06 ng/mL     Narrative:      As a Marker for Sepsis (Non-Neonates):    1. <0.5 ng/mL represents a low risk of severe sepsis and/or septic shock.  2. >2 ng/mL represents a high risk of severe sepsis and/or septic shock.    As a Marker for Lower Respiratory Tract Infections that require antibiotic therapy:    PCT on Admission    Antibiotic Therapy       6-12 Hrs later    >0.5                Strongly Recommended  >0.25 - <0.5        Recommended   0.1 - 0.25          Discouraged              Remeasure/reassess PCT  <0.1                Strongly Discouraged     Remeasure/reassess PCT    As 28 day mortality risk marker: \"Change in Procalcitonin Result\" (>80% or <=80%) if Day 0 (or Day 1) and Day 4 values are available. Refer to http://www.[facility]-pct-calculator.com    Change in PCT <=80%  A decrease of PCT levels below or equal to 80% defines a positive change in PCT " test result representing a higher risk for 28-day all-cause mortality of patients diagnosed with severe sepsis for septic shock.    Change in PCT >80%  A decrease of PCT levels of more than 80% defines a negative change in PCT result representing a lower risk for 28-day all-cause mortality of patients diagnosed with severe sepsis or septic shock.       Comprehensive Metabolic Panel [[id number]]  (Abnormal) Collected: 03/07/22 2354    Specimen: Blood Updated: 03/08/22 0024     Glucose 76 mg/dL      BUN 20 mg/dL      Creatinine 0.66 mg/dL      Sodium 144 mmol/L      Potassium 4.2 mmol/L      Chloride 110 mmol/L      CO2 25.0 mmol/L      Calcium 9.8 mg/dL      Total Protein 7.4 g/dL      Albumin 4.50 g/dL      ALT (SGPT) 47 U/L      AST (SGOT) 85 U/L      Alkaline Phosphatase 143 U/L      Total Bilirubin 0.3 mg/dL      Globulin 2.9 gm/dL      A/G Ratio 1.6 g/dL      BUN/Creatinine Ratio 30.3     Anion Gap 9.0 mmol/L      eGFR 106.0 mL/min/1.73      Comment: National Kidney Foundation and American Society of Nephrology (ASN) Task Force recommended calculation based on the Chronic Kidney Disease Epidemiology Collaboration (CKD-EPI) equation refit without adjustment for race.       Narrative:      GFR Normal >60  Chronic Kidney Disease <60  Kidney Failure <15      Lactic Acid, Plasma [[id number]]  (Normal) Collected: 03/07/22 2354    Specimen: Blood Updated: 03/08/22 0022     Lactate 1.3 mmol/L     Lipase [[id number]]  (Normal) Collected: 03/07/22 2354    Specimen: Blood Updated: 03/08/22 0019     Lipase 27 U/L     CBC & Differential [[id number]]  (Abnormal) Collected: 03/07/22 2354    Specimen: Blood Updated: 03/08/22 0009    Narrative:      The following orders were created for panel order CBC & Differential.  Procedure                               Abnormality         Status                     ---------                               -----------         ------                     CBC Auto Differential[[id number]]         Abnormal            Final result                 Please view results for these tests on the individual orders.    CBC Auto Differential [[id number]]  (Abnormal) Collected: 03/07/22 2354    Specimen: Blood Updated: 03/08/22 0009     WBC 17.35 10*3/mm3      RBC 4.53 10*6/mm3      Hemoglobin 14.7 g/dL      Hematocrit 43.2 %      MCV 95.4 fL      MCH 32.5 pg      MCHC 34.0 g/dL      RDW 12.9 %      RDW-SD 45.7 fl      MPV 10.0 fL      Platelets 243 10*3/mm3      Neutrophil % 77.8 %      Lymphocyte % 13.3 %      Monocyte % 6.3 %      Eosinophil % 1.7 %      Basophil % 0.4 %      Immature Grans % 0.5 %      Neutrophils, Absolute 13.52 10*3/mm3      Lymphocytes, Absolute 2.30 10*3/mm3      Monocytes, Absolute 1.09 10*3/mm3      Eosinophils, Absolute 0.29 10*3/mm3      Basophils, Absolute 0.07 10*3/mm3      Immature Grans, Absolute 0.08 10*3/mm3      nRBC 0.0 /100 WBC     Blood Culture - Blood, Arm, Right [[id number]] Collected: 03/07/22 2354    Specimen: Blood from Arm, Right Updated: 03/08/22 0005    Blood Culture - Blood, Arm, Left [[id number]] Collected: 03/07/22 2354    Specimen: Blood from Arm, Left Updated: 03/08/22 0005           -----------------------------------------------------------------    Radiology:     CT Abdomen Pelvis With Contrast    Result Date: 3/8/2022  CT ABDOMEN PELVIS W CONTRAST- 3/8/2022 1:11 AM CST  HISTORY: abd pain, nausea, vomiting; R10.10-Upper abdominal pain, unspecified; R79.89-Other specified abnormal findings of blood chemistry   COMPARISON: 12/11/2021.  DLP: 216 mGy cm. All CT scans are performed using dose optimization techniques as appropriate to the performed exam and including at least one of the following: Automated exposure control, adjustment of the mA and/or kV according to size, and the use of the iterative reconstruction technique.  TECHNIQUE: Following the intravenous administration of contrast, helical CT tomographic images of the abdomen and pelvis were acquired. Coronal  reformatted images were also provided for review.  FINDINGS: Bibasilar atelectasis is present. The base of the heart is unremarkable. There is no evidence of pericardial effusion..  LIVER: Again demonstrated is intrahepatic dilatation of the left biliary tree. There is also extrahepatic dilatation unchanged from the previous examination. A discrete mass is not appreciated. No evidence of hepatic lesion. There is a small amount of air within the left biliary tree suggesting previous sphincterotomy or biliary diversion..  BILIARY SYSTEM: The gallbladder is surgically absent. Intra and extrahepatic biliary dilatation is stable from the previous exam. No discrete common duct stone is appreciated..  PANCREAS: No focal pancreatic lesion.  SPLEEN: Unremarkable.  KIDNEYS AND ADRENALS: The adrenals are unremarkable. There are cortical cysts of the right kidney. Left kidney demonstrates homogeneous enhancement. There is no evidence of nephrolithiasis.. The ureters are decompressed and normal in appearance.  RETROPERITONEUM: No mass, lymphadenopathy or hemorrhage.  GI TRACT: There is no evidence of mechanical obstruction. Multiple diverticula are noted within the distal descending and sigmoid colon with mild colonic wall thickening and mild pericolonic stranding involving the sigmoid colon. Differential would include diverticulitis versus a segmental colitis. There is no obstruction. No evidence of extraluminal air or discrete drainable fluid collection.. No evidence of acute appendicitis with the appendix not clearly demonstrated..  OTHER: There is no mesenteric mass, lymphadenopathy or fluid collection. The abdominopelvic vasculature is patent. The osseous structures and soft tissues demonstrate no worrisome lesions. No free fluid is present. No evidence of a ventral wall hernia.  PELVIS: The prostate gland is mildly enlarged. There is no free fluid in the pelvis. A radiodensity is noted within the central pelvis-likely  postsurgical in nature and stable from previous studies.. The urinary bladder is normal in appearance.      1. Mild colonic wall thickening as well as multiple diverticula within the distal descending and sigmoid colon with mild pericolonic stranding with differential to include a mild segmental colitis versus diverticulitis. No evidence of extraluminal air or diverticular abscess. No evidence of mechanical bowel obstruction. 2. Cortical cysts of the right kidney stable from the previous exam. No evidence of nephrolithiasis or obstructive uropathy. 3. Stable intrahepatic dilatation within the left lobe of the liver and extrahepatic biliary dilatation. A small amount of pneumobilia is noted within the left lobe suggesting previous sphincterotomy or biliary diversion. FINDINGS are stable from the previous exam. 4. There is mild enlargement of the prostate gland. The urinary bladder is normal in appearance..   This report was finalized on 03/08/2022 07:06 by Dr. Ralph Hicks MD.      Assessment and Plan   Colitis  CT abdomen pelvis showing colonic wall thickening and pericolonic cyst stranding concerning for diverticulitis versus colitis.  Continue Levaquin and Flagyl.    Abdominal pain  Right upper quadrant abdominal pain.  CT findings of his prior biliary surgery are stable however will get ultrasound to better elucidate to ensure that there is no biliary pathology.    Intractable vomiting  Antiemetics    Chronic idiopathic/drug-induced constipation  Chronic opioid therapy.  Takes Symproic at home.  Will resume once abdominal pain improves.    Disposition: Inpatient    CODE STATUS: Full    DVT prophylaxis: Lovenox    Colitis    Right upper quadrant pain    Intractable vomiting    Chronic idiopathic constipation      Ian Allan MD 3/8/2022 07:18 CST    Electronically signed by Ian Allan MD at 03/08/22 4385          Emergency Department Notes      Jluis Hood MD at 03/07/22 3626           Subjective   History of Present Illness    Patient is a 61-year-old male presenting to ED with abdominal pain and vomiting.  PMH significant for previous liver abscess, status post abdominal surgery.  Patient states at 10 PM tonight he had recently eaten and was getting ready for bed when he had sudden onset of pain in his upper abdomen radiating between the left and right upper quadrants.  Patient reports that he developed nausea followed by numerous episodes of vomiting at which time he presents for further evaluation.  Patient denies any bowel changes including diarrhea, constipation, black/bloody/tarry stools.  Patient denies any hematemesis.  Patient denies flank pain, dysuria, hematuria, or lower abdominal pain.  Patient reports he is concerned because he has a history of biliary problems after an abscess.  Patient notes that for dinner tonight he ate a bowl of cereal, denies any known recent sick contact.  Patient denies any medication use prior to arrival.    Records reviewed show patient was last seen in the ED on 12/11/2021 for constipation, diverticulosis, dilation of the biliary tract.    Patient has since been following up outpatient was most recently seen at the PCP today for acute nonrecurrent maxillary sinusitis.    Review of Systems   Constitutional: Negative.  Negative for chills, diaphoresis and fever.   HENT: Negative.    Eyes: Negative.    Respiratory: Negative.    Cardiovascular: Negative.    Gastrointestinal: Positive for abdominal pain (upper abdomen), nausea and vomiting. Negative for constipation and diarrhea.   Genitourinary: Negative.  Negative for dysuria, flank pain and hematuria.   Musculoskeletal: Negative.    Skin: Negative.    Neurological: Negative.    Psychiatric/Behavioral: Negative.    All other systems reviewed and are negative.      History reviewed. No pertinent past medical history.    Allergies   Allergen Reactions   • Morphine Other (See Comments)     Causes  Hallucinations       Past Surgical History:   Procedure Laterality Date   • ABDOMINAL SURGERY     • EYE SURGERY     • KNEE SURGERY Left        History reviewed. No pertinent family history.    Social History     Socioeconomic History   • Marital status: Single   Tobacco Use   • Smoking status: Current Every Day Smoker     Packs/day: 1.00   Substance and Sexual Activity   • Alcohol use: No   • Drug use: Defer   • Sexual activity: Defer           Objective   Physical Exam  Vitals and nursing note reviewed.   Constitutional:       General: He is in acute distress (appears uncomfortable due to pain).      Appearance: Normal appearance. He is well-developed, well-groomed and normal weight. He is ill-appearing. He is not toxic-appearing or diaphoretic.   HENT:      Head: Normocephalic.      Mouth/Throat:      Mouth: Mucous membranes are moist.      Pharynx: Oropharynx is clear.   Eyes:      Conjunctiva/sclera: Conjunctivae normal.      Pupils: Pupils are equal, round, and reactive to light.   Cardiovascular:      Rate and Rhythm: Normal rate.   Pulmonary:      Effort: Pulmonary effort is normal.      Breath sounds: Normal breath sounds.   Chest:      Chest wall: No tenderness.   Abdominal:      General: Bowel sounds are normal.      Palpations: Abdomen is soft.      Tenderness: There is abdominal tenderness (upper abdomen). There is no right CVA tenderness or left CVA tenderness.   Musculoskeletal:         General: Normal range of motion.      Cervical back: Normal range of motion and neck supple.      Right lower leg: No edema.      Left lower leg: No edema.   Skin:     General: Skin is warm and dry.      Coloration: Skin is not jaundiced or pale.   Neurological:      Mental Status: He is alert and oriented to person, place, and time.   Psychiatric:         Attention and Perception: Attention normal.         Mood and Affect: Mood normal.         Speech: Speech normal.         Behavior: Behavior normal. Behavior is  cooperative.         Procedures        Lab Results (last 24 hours)     Procedure Component Value Units Date/Time    COVID-19,[facility] IN-HOUSE,Nasal Swab No Transport Media 3-4 HR TAT - Swab, Nasal Cavity [[id number]]  (Normal) Collected: 03/07/22 2354    Specimen: Swab from Nasal Cavity Updated: 03/08/22 0057     COVID19 Not Detected    Narrative:      Fact sheet for providers: https://www.fda.gov/media/174633/download     Fact sheet for patients: https://www.fda.gov/media/000186/download    Test performed by PCR.    Consider negative results in combination with clinical observations, patient history, and epidemiological information.    CBC & Differential [[id number]]  (Abnormal) Collected: 03/07/22 2354    Specimen: Blood Updated: 03/08/22 0009    Narrative:      The following orders were created for panel order CBC & Differential.  Procedure                               Abnormality         Status                     ---------                               -----------         ------                     CBC Auto Differential[[id number]]        Abnormal            Final result                 Please view results for these tests on the individual orders.    Comprehensive Metabolic Panel [[id number]]  (Abnormal) Collected: 03/07/22 2354    Specimen: Blood Updated: 03/08/22 0024     Glucose 76 mg/dL      BUN 20 mg/dL      Creatinine 0.66 mg/dL      Sodium 144 mmol/L      Potassium 4.2 mmol/L      Chloride 110 mmol/L      CO2 25.0 mmol/L      Calcium 9.8 mg/dL      Total Protein 7.4 g/dL      Albumin 4.50 g/dL      ALT (SGPT) 47 U/L      AST (SGOT) 85 U/L      Alkaline Phosphatase 143 U/L      Total Bilirubin 0.3 mg/dL      Globulin 2.9 gm/dL      A/G Ratio 1.6 g/dL      BUN/Creatinine Ratio 30.3     Anion Gap 9.0 mmol/L      eGFR 106.0 mL/min/1.73      Comment: National Kidney Foundation and American Society of Nephrology (ASN) Task Force recommended calculation based on the Chronic Kidney Disease Epidemiology  "Collaboration (CKD-EPI) equation refit without adjustment for race.       Narrative:      GFR Normal >60  Chronic Kidney Disease <60  Kidney Failure <15      Lipase [[id number]]  (Normal) Collected: 03/07/22 2354    Specimen: Blood Updated: 03/08/22 0019     Lipase 27 U/L     Lactic Acid, Plasma [[id number]]  (Normal) Collected: 03/07/22 2354    Specimen: Blood Updated: 03/08/22 0022     Lactate 1.3 mmol/L     Procalcitonin [[id number]]  (Normal) Collected: 03/07/22 2354    Specimen: Blood Updated: 03/08/22 0030     Procalcitonin 0.06 ng/mL     Narrative:      As a Marker for Sepsis (Non-Neonates):    1. <0.5 ng/mL represents a low risk of severe sepsis and/or septic shock.  2. >2 ng/mL represents a high risk of severe sepsis and/or septic shock.    As a Marker for Lower Respiratory Tract Infections that require antibiotic therapy:    PCT on Admission    Antibiotic Therapy       6-12 Hrs later    >0.5                Strongly Recommended  >0.25 - <0.5        Recommended   0.1 - 0.25          Discouraged              Remeasure/reassess PCT  <0.1                Strongly Discouraged     Remeasure/reassess PCT    As 28 day mortality risk marker: \"Change in Procalcitonin Result\" (>80% or <=80%) if Day 0 (or Day 1) and Day 4 values are available. Refer to http://www.[employer]-pct-calculator.com    Change in PCT <=80%  A decrease of PCT levels below or equal to 80% defines a positive change in PCT test result representing a higher risk for 28-day all-cause mortality of patients diagnosed with severe sepsis for septic shock.    Change in PCT >80%  A decrease of PCT levels of more than 80% defines a negative change in PCT result representing a lower risk for 28-day all-cause mortality of patients diagnosed with severe sepsis or septic shock.       Blood Culture - Blood, Arm, Right [[id number]] Collected: 03/07/22 2354    Specimen: Blood from Arm, Right Updated: 03/08/22 0005    Blood Culture - Blood, Arm, Left [[id number]] Collected: " 03/07/22 2354    Specimen: Blood from Arm, Left Updated: 03/08/22 0005    CBC Auto Differential [[id number]]  (Abnormal) Collected: 03/07/22 2354    Specimen: Blood Updated: 03/08/22 0009     WBC 17.35 10*3/mm3      RBC 4.53 10*6/mm3      Hemoglobin 14.7 g/dL      Hematocrit 43.2 %      MCV 95.4 fL      MCH 32.5 pg      MCHC 34.0 g/dL      RDW 12.9 %      RDW-SD 45.7 fl      MPV 10.0 fL      Platelets 243 10*3/mm3      Neutrophil % 77.8 %      Lymphocyte % 13.3 %      Monocyte % 6.3 %      Eosinophil % 1.7 %      Basophil % 0.4 %      Immature Grans % 0.5 %      Neutrophils, Absolute 13.52 10*3/mm3      Lymphocytes, Absolute 2.30 10*3/mm3      Monocytes, Absolute 1.09 10*3/mm3      Eosinophils, Absolute 0.29 10*3/mm3      Basophils, Absolute 0.07 10*3/mm3      Immature Grans, Absolute 0.08 10*3/mm3      nRBC 0.0 /100 WBC     Urinalysis With Culture If Indicated - Urine, Clean Catch [[id number]]  (Abnormal) Collected: 03/08/22 0207    Specimen: Urine, Clean Catch Updated: 03/08/22 0218     Color, UA Yellow     Appearance, UA Clear     pH, UA 5.5     Specific Gravity, UA >1.030     Glucose, UA Negative     Ketones, UA Negative     Bilirubin, UA Negative     Blood, UA Negative     Protein, UA Negative     Leuk Esterase, UA Negative     Nitrite, UA Negative     Urobilinogen, UA 0.2 E.U./dL    Narrative:      Urine microscopic not indicated.      No Radiology Exams Resulted Within Past 24 Hours   ED Course  ED Course as of 03/08/22 0323   Tue Mar 08, 2022   0108 Case discussed at this time with Dr. Jluis Hood who will be assuming care of patient.  Pending results of CT imaging Dr. Hood will reevaluate and disposition accordingly.  Please see Dr. Hood's note below for further ED course as well as final diagnosis.    Working diagnosis: Upper abdominal pain, nausea, vomiting, elevated LFTs, leukocytosis. [JS]   0318 61-year-old male with a previous history of liver abscess presents to the ER with complaint of  abdominal pain, nausea and vomiting.  Had numerous episodes of nonbloody nonbilious emesis.  He was seen initially by Cristofer Cordova PA-C, who ordered some basic blood work and imaging.  At time of signout, patient's labs have resulted but his CT abdomen pelvis was pending.  White count was 17,000.  LFTs are minimally elevated but this is around baseline.    CT of his abdomen and pelvis with IV contrast shows a diffuse thickening of the descending and sigmoid colon with adjacent stranding is likely related to colitis versus diverticulitis.    He does not have a fever.  I went to go reassess the patient to see if he be a candidate for outpatient treatment.  He continues to complain moderate to severe abdominal pain and nausea despite receiving Zofran and Dilaudid.  We will treat as colitis/diverticulitis with Levaquin and Flagyl.  We will keep the patient n.p.o. and admit to his primary care provider for inpatient treatment. [AW]      ED Course User Index  [AW] Jluis Hood MD  [JS] Cristofer Fernández PA-C                                                 MDM  Number of Diagnoses or Management Options     Amount and/or Complexity of Data Reviewed  Clinical lab tests: reviewed and ordered  Tests in the radiology section of CPT®: reviewed and ordered  Tests in the medicine section of CPT®: ordered and reviewed  Decide to obtain previous medical records or to obtain history from someone other than the patient: yes  Review and summarize past medical records: yes  Discuss the patient with other providers: yes (Dr. Jluis Hood (attending))          Final diagnoses:   Upper abdominal pain   Elevated LFTs   Colitis       ED Disposition  ED Disposition     ED Disposition   Decision to Admit    Condition   --    Comment   Level of Care: Med/Surg [1]   Diagnosis: Colitis [[id number]]   Admitting Physician: VALERIANO HESS [[id number]]   Attending Physician: VALERIANO HESS [[id number]]   Isolate for COVID?: No [0]    Certification: I Certify That Inpatient Hospital Services Are Medically Necessary For Greater Than 2 Midnights               No follow-up provider specified.       Medication List      No changes were made to your prescriptions during this visit.          Jluis Hood MD  03/08/22 0323      Electronically signed by Jluis Hood MD at 03/08/22 0323     Bree Ríos, RN at 03/08/22 0459        Report called to 3C - spoke with SHELBY Leal. Pt to be transferred up to  387 by ED staff.      Bree Ríos, RN  03/08/22 0500      Electronically signed by Bree Ríos, SHELBY at 03/08/22 0500       Vital Signs (last day)     Date/Time Temp Temp src Pulse Resp BP Patient Position SpO2    03/08/22 1113 98.7 (37.1) Oral 84 18 112/56 Lying 97    03/08/22 0746 100.6 (38.1) Oral 95 20 115/50 Lying 97    03/08/22 0522 97.7 (36.5) Oral 53 22 128/57 Lying 100    03/08/22 0416 -- -- 87 23 124/64 -- 97    03/08/22 0331 -- -- 90 -- 129/68 -- 96    03/08/22 0316 -- -- 80 24 115/59 -- 93    03/08/22 0301 -- -- 85 -- 107/44 -- 97    03/08/22 0246 -- -- 95 -- 114/62 -- 96    03/08/22 0231 -- -- 92 -- 135/64 -- 95    03/08/22 0216 -- -- 95 -- 137/64 -- 97    03/08/22 0201 -- -- 96 32 142/64 -- 97    03/08/22 0146 -- -- 95 29 137/67 -- 97    03/08/22 0131 -- -- 93 -- 141/67 -- 95    03/08/22 0126 -- -- 100 -- 148/63 -- --    03/08/22 0101 -- -- 88 25 142/72 -- 98    03/08/22 0046 -- -- 89 -- 144/73 -- 98    03/08/22 0031 -- -- 88 -- 153/74 -- 98    03/08/22 0016 -- -- 82 26 153/80 -- 96    03/08/22 0001 -- -- 69 26 136/69 -- 97    03/07/22 2347 -- -- -- -- 161/77 -- --    03/07/22 2340 97.7 (36.5) Oral 88 25 149/110 Sitting 100            Current Facility-Administered Medications   Medication Dose Route Frequency Provider Last Rate Last Admin   • enoxaparin (LOVENOX) syringe 40 mg  40 mg Subcutaneous Q24H Ian Allan MD   40 mg at 03/08/22 0854   • HYDROcodone-acetaminophen (NORCO) 5-325 MG per tablet 1 tablet  1  tablet Oral Q4H PRN Ian Allan MD       • HYDROmorphone (DILAUDID) injection 0.5 mg  0.5 mg Intravenous Q2H PRN Ian Allan MD   0.5 mg at 03/08/22 1135   • [START ON 3/9/2022] levoFLOXacin (LEVAQUIN) 750 mg/150 mL D5W (premix) (LEVAQUIN) 750 mg  750 mg Intravenous Q24H Ian Allan MD       • metroNIDAZOLE (FLAGYL) IVPB 500 mg  500 mg Intravenous Q8H Ian Allan MD   500 mg at 03/08/22 1126   • ondansetron (ZOFRAN) injection 4 mg  4 mg Intravenous Q6H PRN Ian Allan MD       • sodium chloride 0.9 % flush 10 mL  10 mL Intravenous PRN Ian Allan MD       • sodium chloride 0.9 % flush 10 mL  10 mL Intravenous Q12H Ian Allan MD       • sodium chloride 0.9 % flush 10 mL  10 mL Intravenous PRN Ian Allan MD       • sodium chloride 0.9 % infusion  100 mL/hr Intravenous Continuous Ian Allan  mL/hr at 03/08/22 0858 100 mL/hr at 03/08/22 0858       B/p 149/110   Wbc 17.35   Iv 100 hr    3/8 dilaudid iv x4    Temp 100.8     resp 29 , 32        Note CT  Colitis vs diverticulitis        Iv abx's

## 2022-03-08 NOTE — ED PROVIDER NOTES
Subjective   History of Present Illness    Patient is a 61-year-old male presenting to ED with abdominal pain and vomiting.  PMH significant for previous liver abscess, status post abdominal surgery.  Patient states at 10 PM tonight he had recently eaten and was getting ready for bed when he had sudden onset of pain in his upper abdomen radiating between the left and right upper quadrants.  Patient reports that he developed nausea followed by numerous episodes of vomiting at which time he presents for further evaluation.  Patient denies any bowel changes including diarrhea, constipation, black/bloody/tarry stools.  Patient denies any hematemesis.  Patient denies flank pain, dysuria, hematuria, or lower abdominal pain.  Patient reports he is concerned because he has a history of biliary problems after an abscess.  Patient notes that for dinner tonight he ate a bowl of cereal, denies any known recent sick contact.  Patient denies any medication use prior to arrival.    Records reviewed show patient was last seen in the ED on 12/11/2021 for constipation, diverticulosis, dilation of the biliary tract.    Patient has since been following up outpatient was most recently seen at the PCP today for acute nonrecurrent maxillary sinusitis.    Review of Systems   Constitutional: Negative.  Negative for chills, diaphoresis and fever.   HENT: Negative.    Eyes: Negative.    Respiratory: Negative.    Cardiovascular: Negative.    Gastrointestinal: Positive for abdominal pain (upper abdomen), nausea and vomiting. Negative for constipation and diarrhea.   Genitourinary: Negative.  Negative for dysuria, flank pain and hematuria.   Musculoskeletal: Negative.    Skin: Negative.    Neurological: Negative.    Psychiatric/Behavioral: Negative.    All other systems reviewed and are negative.      History reviewed. No pertinent past medical history.    Allergies   Allergen Reactions   • Morphine Other (See Comments)     Causes Hallucinations        Past Surgical History:   Procedure Laterality Date   • ABDOMINAL SURGERY     • EYE SURGERY     • KNEE SURGERY Left        History reviewed. No pertinent family history.    Social History     Socioeconomic History   • Marital status: Single   Tobacco Use   • Smoking status: Current Every Day Smoker     Packs/day: 1.00   Substance and Sexual Activity   • Alcohol use: No   • Drug use: Defer   • Sexual activity: Defer           Objective   Physical Exam  Vitals and nursing note reviewed.   Constitutional:       General: He is in acute distress (appears uncomfortable due to pain).      Appearance: Normal appearance. He is well-developed, well-groomed and normal weight. He is ill-appearing. He is not toxic-appearing or diaphoretic.   HENT:      Head: Normocephalic.      Mouth/Throat:      Mouth: Mucous membranes are moist.      Pharynx: Oropharynx is clear.   Eyes:      Conjunctiva/sclera: Conjunctivae normal.      Pupils: Pupils are equal, round, and reactive to light.   Cardiovascular:      Rate and Rhythm: Normal rate.   Pulmonary:      Effort: Pulmonary effort is normal.      Breath sounds: Normal breath sounds.   Chest:      Chest wall: No tenderness.   Abdominal:      General: Bowel sounds are normal.      Palpations: Abdomen is soft.      Tenderness: There is abdominal tenderness (upper abdomen). There is no right CVA tenderness or left CVA tenderness.   Musculoskeletal:         General: Normal range of motion.      Cervical back: Normal range of motion and neck supple.      Right lower leg: No edema.      Left lower leg: No edema.   Skin:     General: Skin is warm and dry.      Coloration: Skin is not jaundiced or pale.   Neurological:      Mental Status: He is alert and oriented to person, place, and time.   Psychiatric:         Attention and Perception: Attention normal.         Mood and Affect: Mood normal.         Speech: Speech normal.         Behavior: Behavior normal. Behavior is cooperative.          Procedures         Lab Results (last 24 hours)     Procedure Component Value Units Date/Time    COVID-19,[facility] IN-HOUSE,Nasal Swab No Transport Media 3-4 HR TAT - Swab, Nasal Cavity [[id number]]  (Normal) Collected: 03/07/22 2354    Specimen: Swab from Nasal Cavity Updated: 03/08/22 0057     COVID19 Not Detected    Narrative:      Fact sheet for providers: https://www.fda.gov/media/752437/download     Fact sheet for patients: https://www.fda.gov/media/492108/download    Test performed by PCR.    Consider negative results in combination with clinical observations, patient history, and epidemiological information.    CBC & Differential [[id number]]  (Abnormal) Collected: 03/07/22 2354    Specimen: Blood Updated: 03/08/22 0009    Narrative:      The following orders were created for panel order CBC & Differential.  Procedure                               Abnormality         Status                     ---------                               -----------         ------                     CBC Auto Differential[[id number]]        Abnormal            Final result                 Please view results for these tests on the individual orders.    Comprehensive Metabolic Panel [[id number]]  (Abnormal) Collected: 03/07/22 2354    Specimen: Blood Updated: 03/08/22 0024     Glucose 76 mg/dL      BUN 20 mg/dL      Creatinine 0.66 mg/dL      Sodium 144 mmol/L      Potassium 4.2 mmol/L      Chloride 110 mmol/L      CO2 25.0 mmol/L      Calcium 9.8 mg/dL      Total Protein 7.4 g/dL      Albumin 4.50 g/dL      ALT (SGPT) 47 U/L      AST (SGOT) 85 U/L      Alkaline Phosphatase 143 U/L      Total Bilirubin 0.3 mg/dL      Globulin 2.9 gm/dL      A/G Ratio 1.6 g/dL      BUN/Creatinine Ratio 30.3     Anion Gap 9.0 mmol/L      eGFR 106.0 mL/min/1.73      Comment: National Kidney Foundation and American Society of Nephrology (ASN) Task Force recommended calculation based on the Chronic Kidney Disease Epidemiology Collaboration (CKD-EPI)  "equation refit without adjustment for race.       Narrative:      GFR Normal >60  Chronic Kidney Disease <60  Kidney Failure <15      Lipase [[id number]]  (Normal) Collected: 03/07/22 2354    Specimen: Blood Updated: 03/08/22 0019     Lipase 27 U/L     Lactic Acid, Plasma [[id number]]  (Normal) Collected: 03/07/22 2354    Specimen: Blood Updated: 03/08/22 0022     Lactate 1.3 mmol/L     Procalcitonin [[id number]]  (Normal) Collected: 03/07/22 2354    Specimen: Blood Updated: 03/08/22 0030     Procalcitonin 0.06 ng/mL     Narrative:      As a Marker for Sepsis (Non-Neonates):    1. <0.5 ng/mL represents a low risk of severe sepsis and/or septic shock.  2. >2 ng/mL represents a high risk of severe sepsis and/or septic shock.    As a Marker for Lower Respiratory Tract Infections that require antibiotic therapy:    PCT on Admission    Antibiotic Therapy       6-12 Hrs later    >0.5                Strongly Recommended  >0.25 - <0.5        Recommended   0.1 - 0.25          Discouraged              Remeasure/reassess PCT  <0.1                Strongly Discouraged     Remeasure/reassess PCT    As 28 day mortality risk marker: \"Change in Procalcitonin Result\" (>80% or <=80%) if Day 0 (or Day 1) and Day 4 values are available. Refer to http://www.[employer]-pct-calculator.com    Change in PCT <=80%  A decrease of PCT levels below or equal to 80% defines a positive change in PCT test result representing a higher risk for 28-day all-cause mortality of patients diagnosed with severe sepsis for septic shock.    Change in PCT >80%  A decrease of PCT levels of more than 80% defines a negative change in PCT result representing a lower risk for 28-day all-cause mortality of patients diagnosed with severe sepsis or septic shock.       Blood Culture - Blood, Arm, Right [[id number]] Collected: 03/07/22 2354    Specimen: Blood from Arm, Right Updated: 03/08/22 0005    Blood Culture - Blood, Arm, Left [[id number]] Collected: 03/07/22 2354    " Specimen: Blood from Arm, Left Updated: 03/08/22 0005    CBC Auto Differential [[id number]]  (Abnormal) Collected: 03/07/22 2354    Specimen: Blood Updated: 03/08/22 0009     WBC 17.35 10*3/mm3      RBC 4.53 10*6/mm3      Hemoglobin 14.7 g/dL      Hematocrit 43.2 %      MCV 95.4 fL      MCH 32.5 pg      MCHC 34.0 g/dL      RDW 12.9 %      RDW-SD 45.7 fl      MPV 10.0 fL      Platelets 243 10*3/mm3      Neutrophil % 77.8 %      Lymphocyte % 13.3 %      Monocyte % 6.3 %      Eosinophil % 1.7 %      Basophil % 0.4 %      Immature Grans % 0.5 %      Neutrophils, Absolute 13.52 10*3/mm3      Lymphocytes, Absolute 2.30 10*3/mm3      Monocytes, Absolute 1.09 10*3/mm3      Eosinophils, Absolute 0.29 10*3/mm3      Basophils, Absolute 0.07 10*3/mm3      Immature Grans, Absolute 0.08 10*3/mm3      nRBC 0.0 /100 WBC     Urinalysis With Culture If Indicated - Urine, Clean Catch [[id number]]  (Abnormal) Collected: 03/08/22 0207    Specimen: Urine, Clean Catch Updated: 03/08/22 0218     Color, UA Yellow     Appearance, UA Clear     pH, UA 5.5     Specific Gravity, UA >1.030     Glucose, UA Negative     Ketones, UA Negative     Bilirubin, UA Negative     Blood, UA Negative     Protein, UA Negative     Leuk Esterase, UA Negative     Nitrite, UA Negative     Urobilinogen, UA 0.2 E.U./dL    Narrative:      Urine microscopic not indicated.      No Radiology Exams Resulted Within Past 24 Hours   ED Course  ED Course as of 03/08/22 0323   Tue Mar 08, 2022   0108 Case discussed at this time with Dr. Jluis Hood who will be assuming care of patient.  Pending results of CT imaging Dr. Hood will reevaluate and disposition accordingly.  Please see Dr. Hood's note below for further ED course as well as final diagnosis.    Working diagnosis: Upper abdominal pain, nausea, vomiting, elevated LFTs, leukocytosis. [JS]   0318 61-year-old male with a previous history of liver abscess presents to the ER with complaint of abdominal pain, nausea  and vomiting.  Had numerous episodes of nonbloody nonbilious emesis.  He was seen initially by Cristofer Cordvoa PA-C, who ordered some basic blood work and imaging.  At time of signout, patient's labs have resulted but his CT abdomen pelvis was pending.  White count was 17,000.  LFTs are minimally elevated but this is around baseline.    CT of his abdomen and pelvis with IV contrast shows a diffuse thickening of the descending and sigmoid colon with adjacent stranding is likely related to colitis versus diverticulitis.    He does not have a fever.  I went to go reassess the patient to see if he be a candidate for outpatient treatment.  He continues to complain moderate to severe abdominal pain and nausea despite receiving Zofran and Dilaudid.  We will treat as colitis/diverticulitis with Levaquin and Flagyl.  We will keep the patient n.p.o. and admit to his primary care provider for inpatient treatment. [AW]      ED Course User Index  [AW] Jluis Hood MD  [JS] Cristofer Fernández PA-C                                                 MDM  Number of Diagnoses or Management Options     Amount and/or Complexity of Data Reviewed  Clinical lab tests: reviewed and ordered  Tests in the radiology section of CPT®: reviewed and ordered  Tests in the medicine section of CPT®: ordered and reviewed  Decide to obtain previous medical records or to obtain history from someone other than the patient: yes  Review and summarize past medical records: yes  Discuss the patient with other providers: yes (Dr. Jluis Hood (attending))          Final diagnoses:   Upper abdominal pain   Elevated LFTs   Colitis       ED Disposition  ED Disposition     ED Disposition   Decision to Admit    Condition   --    Comment   Level of Care: Med/Surg [1]   Diagnosis: Colitis [[id number]]   Admitting Physician: VALERIANO HESS [[id number]]   Attending Physician: VALERIANO HESS [[id number]]   Isolate for COVID?: No [0]   Certification: I Certify  That Inpatient Hospital Services Are Medically Necessary For Greater Than 2 Midnights               No follow-up provider specified.       Medication List      No changes were made to your prescriptions during this visit.          Jluis Hood MD  03/08/22 1276

## 2022-03-08 NOTE — ED NOTES
Report called to 3C - spoke with SHELBY Leal. Pt to be transferred up to rm 387 by ED staff.      Bree Ríos RN  03/08/22 5181

## 2022-03-08 NOTE — H&P
History and Physical    Patient:  Lyndon Campos  MRN: [id number]    CHIEF COMPLAINT: Abdominal pain, vomiting    History Obtained From: the patient   PCP: Ian Allan MD    HISTORY OF PRESENT ILLNESS:   The patient is a 61 y.o. male who presents with opioid-induced constipation, chronic pain syndrome, history of prior abdominal surgery who presents with acute onset severe upper abdominal pain with intractable nausea and vomiting that began around 830 yesterday evening.  Patient was prior in a good state of health other than having congestion sinusitis for which I had a virtual visit with him yesterday afternoon.  Patient states that acutely yesterday evening he developed severe abdominal pain and vomiting with complete clearance of his stomach and proceeded to have dry heaves following.  Denies any blood in his emesis.  Does have a history of prior biliary stones.    REVIEW OF SYSTEMS:    Review of Systems   Constitutional: Negative for chills and fever.   HENT: Positive for congestion. Negative for sore throat.    Respiratory: Negative for cough and shortness of breath.    Cardiovascular: Negative for chest pain and leg swelling.   Gastrointestinal: Positive for abdominal pain, constipation, nausea and vomiting. Negative for anal bleeding, blood in stool and diarrhea.   Genitourinary: Negative for dysuria and urgency.   Musculoskeletal: Negative for arthralgias and back pain.   Skin: Negative for pallor and rash.   Neurological: Negative for dizziness and numbness.   Psychiatric/Behavioral: Negative for confusion and dysphoric mood.          Past Medical History:  History reviewed. No pertinent past medical history.    Past Surgical History:  Past Surgical History:   Procedure Laterality Date   • ABDOMINAL SURGERY     • EYE SURGERY     • KNEE SURGERY Left        Medications Prior to Admission:    Medications Prior to Admission   Medication Sig Dispense Refill Last Dose   • acetaminophen (TYLENOL) 500 MG  "tablet Take 500 mg by mouth.      • HYDROcodone-acetaminophen (NORCO) 7.5-325 MG per tablet Take 1 tablet by mouth Every 6 (Six) Hours As Needed for Moderate Pain .      • meloxicam (MOBIC) 7.5 MG tablet Take 7.5 mg by mouth Daily.      • methylPREDNISolone (MEDROL) 4 MG dose pack Take as directed on package instructions. 1 each 0    • polyethylene glycol (MIRALAX) 17 g packet Take 17 g by mouth Daily. 30 each 0        Allergies:  Morphine    Social History:   Social History     Socioeconomic History   • Marital status: Single   Tobacco Use   • Smoking status: Current Every Day Smoker     Packs/day: 1.00   Substance and Sexual Activity   • Alcohol use: No   • Drug use: Defer   • Sexual activity: Defer       Family History:   History reviewed. No pertinent family history.        Physical Exam:    Vitals: /57 (BP Location: Left arm, Patient Position: Lying)   Pulse 53   Temp 97.7 °F (36.5 °C) (Oral)   Resp 22   Ht 160 cm (63\")   Wt 59.4 kg (131 lb)   SpO2 100%   BMI 23.21 kg/m²   Physical Exam  Constitutional:       Appearance: He is well-developed.   HENT:      Head: Normocephalic and atraumatic.   Eyes:      Conjunctiva/sclera: Conjunctivae normal.      Pupils: Pupils are equal, round, and reactive to light.   Cardiovascular:      Rate and Rhythm: Normal rate and regular rhythm.      Heart sounds: Normal heart sounds. No murmur heard.    No friction rub.   Pulmonary:      Effort: Pulmonary effort is normal. No respiratory distress.      Breath sounds: Normal breath sounds. No wheezing or rales.   Abdominal:      Comments: Exquisite epigastric and right upper quadrant tenderness, minimal lower quadrant tenderness   Musculoskeletal:         General: Normal range of motion.      Cervical back: Normal range of motion.   Skin:     Capillary Refill: Capillary refill takes less than 2 seconds.   Neurological:      Mental Status: He is alert and oriented to person, place, and time.      Cranial Nerves: No " cranial nerve deficit.   Psychiatric:         Behavior: Behavior normal.           Lab Results (last 24 hours)     Procedure Component Value Units Date/Time    Urinalysis With Culture If Indicated - Urine, Clean Catch [[id number]]  (Abnormal) Collected: 03/08/22 0207    Specimen: Urine, Clean Catch Updated: 03/08/22 0218     Color, UA Yellow     Appearance, UA Clear     pH, UA 5.5     Specific Gravity, UA >1.030     Glucose, UA Negative     Ketones, UA Negative     Bilirubin, UA Negative     Blood, UA Negative     Protein, UA Negative     Leuk Esterase, UA Negative     Nitrite, UA Negative     Urobilinogen, UA 0.2 E.U./dL    Narrative:      Urine microscopic not indicated.    COVID-19,[facility] IN-HOUSE,Nasal Swab No Transport Media 3-4 HR TAT - Swab, Nasal Cavity [[id number]]  (Normal) Collected: 03/07/22 2354    Specimen: Swab from Nasal Cavity Updated: 03/08/22 0057     COVID19 Not Detected    Narrative:      Fact sheet for providers: https://www.fda.gov/media/829303/download     Fact sheet for patients: https://www.fda.gov/media/400038/download    Test performed by PCR.    Consider negative results in combination with clinical observations, patient history, and epidemiological information.    Procalcitonin [[id number]]  (Normal) Collected: 03/07/22 2354    Specimen: Blood Updated: 03/08/22 0030     Procalcitonin 0.06 ng/mL     Narrative:      As a Marker for Sepsis (Non-Neonates):    1. <0.5 ng/mL represents a low risk of severe sepsis and/or septic shock.  2. >2 ng/mL represents a high risk of severe sepsis and/or septic shock.    As a Marker for Lower Respiratory Tract Infections that require antibiotic therapy:    PCT on Admission    Antibiotic Therapy       6-12 Hrs later    >0.5                Strongly Recommended  >0.25 - <0.5        Recommended   0.1 - 0.25          Discouraged              Remeasure/reassess PCT  <0.1                Strongly Discouraged     Remeasure/reassess PCT    As 28 day mortality risk  "marker: \"Change in Procalcitonin Result\" (>80% or <=80%) if Day 0 (or Day 1) and Day 4 values are available. Refer to http://[email or web address] [email or web address]-pct-[email or web address]    Change in PCT <=80%  A decrease of PCT levels below or equal to 80% defines a positive change in PCT test result representing a higher risk for 28-day all-cause mortality of patients diagnosed with severe sepsis for septic shock.    Change in PCT >80%  A decrease of PCT levels of more than 80% defines a negative change in PCT result representing a lower risk for 28-day all-cause mortality of patients diagnosed with severe sepsis or septic shock.       Comprehensive Metabolic Panel [[id number]]  (Abnormal) Collected: 03/07/22 2354    Specimen: Blood Updated: 03/08/22 0024     Glucose 76 mg/dL      BUN 20 mg/dL      Creatinine 0.66 mg/dL      Sodium 144 mmol/L      Potassium 4.2 mmol/L      Chloride 110 mmol/L      CO2 25.0 mmol/L      Calcium 9.8 mg/dL      Total Protein 7.4 g/dL      Albumin 4.50 g/dL      ALT (SGPT) 47 U/L      AST (SGOT) 85 U/L      Alkaline Phosphatase 143 U/L      Total Bilirubin 0.3 mg/dL      Globulin 2.9 gm/dL      A/G Ratio 1.6 g/dL      BUN/Creatinine Ratio 30.3     Anion Gap 9.0 mmol/L      eGFR 106.0 mL/min/1.73      Comment: National Kidney Foundation and American Society of Nephrology (ASN) Task Force recommended calculation based on the Chronic Kidney Disease Epidemiology Collaboration (CKD-EPI) equation refit without adjustment for race.       Narrative:      GFR Normal >60  Chronic Kidney Disease <60  Kidney Failure <15      Lactic Acid, Plasma [[id number]]  (Normal) Collected: 03/07/22 2354    Specimen: Blood Updated: 03/08/22 0022     Lactate 1.3 mmol/L     Lipase [[id number]]  (Normal) Collected: 03/07/22 2354    Specimen: Blood Updated: 03/08/22 0019     Lipase 27 U/L     CBC & Differential [[id number]]  (Abnormal) Collected: 03/07/22 2354    Specimen: Blood Updated: 03/08/22 0009    Narrative:      The following orders were " created for panel order CBC & Differential.  Procedure                               Abnormality         Status                     ---------                               -----------         ------                     CBC Auto Differential[[id number]]        Abnormal            Final result                 Please view results for these tests on the individual orders.    CBC Auto Differential [[id number]]  (Abnormal) Collected: 03/07/22 2354    Specimen: Blood Updated: 03/08/22 0009     WBC 17.35 10*3/mm3      RBC 4.53 10*6/mm3      Hemoglobin 14.7 g/dL      Hematocrit 43.2 %      MCV 95.4 fL      MCH 32.5 pg      MCHC 34.0 g/dL      RDW 12.9 %      RDW-SD 45.7 fl      MPV 10.0 fL      Platelets 243 10*3/mm3      Neutrophil % 77.8 %      Lymphocyte % 13.3 %      Monocyte % 6.3 %      Eosinophil % 1.7 %      Basophil % 0.4 %      Immature Grans % 0.5 %      Neutrophils, Absolute 13.52 10*3/mm3      Lymphocytes, Absolute 2.30 10*3/mm3      Monocytes, Absolute 1.09 10*3/mm3      Eosinophils, Absolute 0.29 10*3/mm3      Basophils, Absolute 0.07 10*3/mm3      Immature Grans, Absolute 0.08 10*3/mm3      nRBC 0.0 /100 WBC     Blood Culture - Blood, Arm, Right [[id number]] Collected: 03/07/22 2354    Specimen: Blood from Arm, Right Updated: 03/08/22 0005    Blood Culture - Blood, Arm, Left [[id number]] Collected: 03/07/22 2354    Specimen: Blood from Arm, Left Updated: 03/08/22 0005           -----------------------------------------------------------------    Radiology:     CT Abdomen Pelvis With Contrast    Result Date: 3/8/2022  CT ABDOMEN PELVIS W CONTRAST- 3/8/2022 1:11 AM CST  HISTORY: abd pain, nausea, vomiting; R10.10-Upper abdominal pain, unspecified; R79.89-Other specified abnormal findings of blood chemistry   COMPARISON: 12/11/2021.  DLP: 216 mGy cm. All CT scans are performed using dose optimization techniques as appropriate to the performed exam and including at least one of the following: Automated exposure  control, adjustment of the mA and/or kV according to size, and the use of the iterative reconstruction technique.  TECHNIQUE: Following the intravenous administration of contrast, helical CT tomographic images of the abdomen and pelvis were acquired. Coronal reformatted images were also provided for review.  FINDINGS: Bibasilar atelectasis is present. The base of the heart is unremarkable. There is no evidence of pericardial effusion..  LIVER: Again demonstrated is intrahepatic dilatation of the left biliary tree. There is also extrahepatic dilatation unchanged from the previous examination. A discrete mass is not appreciated. No evidence of hepatic lesion. There is a small amount of air within the left biliary tree suggesting previous sphincterotomy or biliary diversion..  BILIARY SYSTEM: The gallbladder is surgically absent. Intra and extrahepatic biliary dilatation is stable from the previous exam. No discrete common duct stone is appreciated..  PANCREAS: No focal pancreatic lesion.  SPLEEN: Unremarkable.  KIDNEYS AND ADRENALS: The adrenals are unremarkable. There are cortical cysts of the right kidney. Left kidney demonstrates homogeneous enhancement. There is no evidence of nephrolithiasis.. The ureters are decompressed and normal in appearance.  RETROPERITONEUM: No mass, lymphadenopathy or hemorrhage.  GI TRACT: There is no evidence of mechanical obstruction. Multiple diverticula are noted within the distal descending and sigmoid colon with mild colonic wall thickening and mild pericolonic stranding involving the sigmoid colon. Differential would include diverticulitis versus a segmental colitis. There is no obstruction. No evidence of extraluminal air or discrete drainable fluid collection.. No evidence of acute appendicitis with the appendix not clearly demonstrated..  OTHER: There is no mesenteric mass, lymphadenopathy or fluid collection. The abdominopelvic vasculature is patent. The osseous structures and  soft tissues demonstrate no worrisome lesions. No free fluid is present. No evidence of a ventral wall hernia.  PELVIS: The prostate gland is mildly enlarged. There is no free fluid in the pelvis. A radiodensity is noted within the central pelvis-likely postsurgical in nature and stable from previous studies.. The urinary bladder is normal in appearance.      1. Mild colonic wall thickening as well as multiple diverticula within the distal descending and sigmoid colon with mild pericolonic stranding with differential to include a mild segmental colitis versus diverticulitis. No evidence of extraluminal air or diverticular abscess. No evidence of mechanical bowel obstruction. 2. Cortical cysts of the right kidney stable from the previous exam. No evidence of nephrolithiasis or obstructive uropathy. 3. Stable intrahepatic dilatation within the left lobe of the liver and extrahepatic biliary dilatation. A small amount of pneumobilia is noted within the left lobe suggesting previous sphincterotomy or biliary diversion. FINDINGS are stable from the previous exam. 4. There is mild enlargement of the prostate gland. The urinary bladder is normal in appearance..   This report was finalized on 03/08/2022 07:06 by Dr. Ralph Hicks MD.      Assessment and Plan   Colitis  CT abdomen pelvis showing colonic wall thickening and pericolonic cyst stranding concerning for diverticulitis versus colitis.  Continue Levaquin and Flagyl.    Abdominal pain  Right upper quadrant abdominal pain.  CT findings of his prior biliary surgery are stable however will get ultrasound to better elucidate to ensure that there is no biliary pathology.    Intractable vomiting  Antiemetics    Chronic idiopathic/drug-induced constipation  Chronic opioid therapy.  Takes Symproic at home.  Will resume once abdominal pain improves.    Disposition: Inpatient    CODE STATUS: Full    DVT prophylaxis: Lovenox    Colitis    Right upper quadrant pain     Intractable vomiting    Chronic idiopathic constipation      Ian Allan MD 3/8/2022 07:18 CST

## 2022-03-09 LAB
ALBUMIN SERPL-MCNC: 3.4 G/DL (ref 3.5–5.2)
ALBUMIN/GLOB SERPL: 1.8 G/DL
ALP SERPL-CCNC: 190 U/L (ref 39–117)
ALT SERPL W P-5'-P-CCNC: 178 U/L (ref 1–41)
ANION GAP SERPL CALCULATED.3IONS-SCNC: 7 MMOL/L (ref 5–15)
AST SERPL-CCNC: 122 U/L (ref 1–40)
BASOPHILS # BLD AUTO: 0.03 10*3/MM3 (ref 0–0.2)
BASOPHILS NFR BLD AUTO: 0.2 % (ref 0–1.5)
BILIRUB SERPL-MCNC: 0.7 MG/DL (ref 0–1.2)
BUN SERPL-MCNC: 15 MG/DL (ref 8–23)
BUN/CREAT SERPL: 23.1 (ref 7–25)
CALCIUM SPEC-SCNC: 8.4 MG/DL (ref 8.6–10.5)
CHLORIDE SERPL-SCNC: 106 MMOL/L (ref 98–107)
CO2 SERPL-SCNC: 25 MMOL/L (ref 22–29)
CREAT SERPL-MCNC: 0.65 MG/DL (ref 0.76–1.27)
DEPRECATED RDW RBC AUTO: 46.8 FL (ref 37–54)
EGFRCR SERPLBLD CKD-EPI 2021: 106.5 ML/MIN/1.73
EOSINOPHIL # BLD AUTO: 0.08 10*3/MM3 (ref 0–0.4)
EOSINOPHIL NFR BLD AUTO: 0.6 % (ref 0.3–6.2)
ERYTHROCYTE [DISTWIDTH] IN BLOOD BY AUTOMATED COUNT: 13.2 % (ref 12.3–15.4)
GLOBULIN UR ELPH-MCNC: 1.9 GM/DL
GLUCOSE SERPL-MCNC: 108 MG/DL (ref 65–99)
HCT VFR BLD AUTO: 36 % (ref 37.5–51)
HGB BLD-MCNC: 12 G/DL (ref 13–17.7)
IMM GRANULOCYTES # BLD AUTO: 0.13 10*3/MM3 (ref 0–0.05)
IMM GRANULOCYTES NFR BLD AUTO: 0.9 % (ref 0–0.5)
LYMPHOCYTES # BLD AUTO: 0.77 10*3/MM3 (ref 0.7–3.1)
LYMPHOCYTES NFR BLD AUTO: 5.6 % (ref 19.6–45.3)
MCH RBC QN AUTO: 32.2 PG (ref 26.6–33)
MCHC RBC AUTO-ENTMCNC: 33.3 G/DL (ref 31.5–35.7)
MCV RBC AUTO: 96.5 FL (ref 79–97)
MONOCYTES # BLD AUTO: 0.82 10*3/MM3 (ref 0.1–0.9)
MONOCYTES NFR BLD AUTO: 6 % (ref 5–12)
NEUTROPHILS NFR BLD AUTO: 11.87 10*3/MM3 (ref 1.7–7)
NEUTROPHILS NFR BLD AUTO: 86.7 % (ref 42.7–76)
NRBC BLD AUTO-RTO: 0 /100 WBC (ref 0–0.2)
PLATELET # BLD AUTO: 116 10*3/MM3 (ref 140–450)
PMV BLD AUTO: 10.3 FL (ref 6–12)
POTASSIUM SERPL-SCNC: 4.4 MMOL/L (ref 3.5–5.2)
PROT SERPL-MCNC: 5.3 G/DL (ref 6–8.5)
RBC # BLD AUTO: 3.73 10*6/MM3 (ref 4.14–5.8)
SODIUM SERPL-SCNC: 138 MMOL/L (ref 136–145)
WBC NRBC COR # BLD: 13.7 10*3/MM3 (ref 3.4–10.8)

## 2022-03-09 PROCEDURE — 25010000002 ONDANSETRON PER 1 MG: Performed by: FAMILY MEDICINE

## 2022-03-09 PROCEDURE — 0 HYDROMORPHONE 1 MG/ML SOLUTION: Performed by: FAMILY MEDICINE

## 2022-03-09 PROCEDURE — 80053 COMPREHEN METABOLIC PANEL: CPT | Performed by: FAMILY MEDICINE

## 2022-03-09 PROCEDURE — 25010000002 LEVOFLOXACIN PER 250 MG: Performed by: FAMILY MEDICINE

## 2022-03-09 PROCEDURE — 85025 COMPLETE CBC W/AUTO DIFF WBC: CPT | Performed by: FAMILY MEDICINE

## 2022-03-09 PROCEDURE — 25010000002 ENOXAPARIN PER 10 MG: Performed by: FAMILY MEDICINE

## 2022-03-09 RX ADMIN — METRONIDAZOLE 500 MG: 500 INJECTION, SOLUTION INTRAVENOUS at 04:01

## 2022-03-09 RX ADMIN — SODIUM CHLORIDE 100 ML/HR: 9 INJECTION, SOLUTION INTRAVENOUS at 23:33

## 2022-03-09 RX ADMIN — HYDROMORPHONE HYDROCHLORIDE 0.5 MG: 1 INJECTION, SOLUTION INTRAMUSCULAR; INTRAVENOUS; SUBCUTANEOUS at 10:55

## 2022-03-09 RX ADMIN — SODIUM CHLORIDE 100 ML/HR: 9 INJECTION, SOLUTION INTRAVENOUS at 11:01

## 2022-03-09 RX ADMIN — HYDROMORPHONE HYDROCHLORIDE 0.5 MG: 1 INJECTION, SOLUTION INTRAMUSCULAR; INTRAVENOUS; SUBCUTANEOUS at 00:27

## 2022-03-09 RX ADMIN — METRONIDAZOLE 500 MG: 500 INJECTION, SOLUTION INTRAVENOUS at 12:31

## 2022-03-09 RX ADMIN — METRONIDAZOLE 500 MG: 500 INJECTION, SOLUTION INTRAVENOUS at 20:29

## 2022-03-09 RX ADMIN — HYDROMORPHONE HYDROCHLORIDE 0.5 MG: 1 INJECTION, SOLUTION INTRAMUSCULAR; INTRAVENOUS; SUBCUTANEOUS at 07:27

## 2022-03-09 RX ADMIN — HYDROMORPHONE HYDROCHLORIDE 0.5 MG: 1 INJECTION, SOLUTION INTRAMUSCULAR; INTRAVENOUS; SUBCUTANEOUS at 16:58

## 2022-03-09 RX ADMIN — Medication 10 ML: at 08:18

## 2022-03-09 RX ADMIN — LEVOFLOXACIN 750 MG: 5 INJECTION, SOLUTION INTRAVENOUS at 07:27

## 2022-03-09 RX ADMIN — HYDROMORPHONE HYDROCHLORIDE 0.5 MG: 1 INJECTION, SOLUTION INTRAMUSCULAR; INTRAVENOUS; SUBCUTANEOUS at 04:01

## 2022-03-09 RX ADMIN — HYDROCODONE BITARTRATE AND ACETAMINOPHEN 1 TABLET: 5; 325 TABLET ORAL at 20:28

## 2022-03-09 RX ADMIN — ONDANSETRON 4 MG: 2 INJECTION INTRAMUSCULAR; INTRAVENOUS at 07:27

## 2022-03-09 RX ADMIN — ENOXAPARIN SODIUM 40 MG: 40 INJECTION SUBCUTANEOUS at 08:18

## 2022-03-09 RX ADMIN — ONDANSETRON 4 MG: 2 INJECTION INTRAMUSCULAR; INTRAVENOUS at 00:27

## 2022-03-09 NOTE — PAYOR COMM NOTE
"Christa Campos (61 y.o. Male) [id number]     Attn  Nurses    Please re review clinical for reconsideration of denial    Additional Clinical        [facility] phone    Fax    Pt having to take a lot of iv pain med  And  Temp this am  100.4  - see below note    Thank you              Date of Birth   1960    Social Security Number       Address   [address] [place] [address]    Home Phone   [phone]    MRN   [id number]       Mosque   Other    Marital Status   Single                            Admission Date   3/7/22    Admission Type   Emergency    Admitting Provider   Ian Allan MD    Attending Provider   Ian Allan MD    Department, Room/Bed   [address], 387/1       Discharge Date       Discharge Disposition       Discharge Destination                               Attending Provider: Ian Allan MD    Allergies: Morphine    Isolation: None   Infection: None   Code Status: CPR   Advance Care Planning Activity    Ht: 160 cm (63\")   Wt: 59.4 kg (131 lb)    Admission Cmt: None   Principal Problem: Colitis [K52.9]                 Active Insurance as of 3/7/2022     Primary Coverage     Payor Plan Insurance Group Employer/Plan Group    [employer] PPO [id number]     Payor Plan Address Payor Plan Phone Number Payor Plan Fax Number Effective Dates    [address] [phone]  1/1/2020 - None Entered    [place] [address]       Subscriber Name Subscriber Birth Date Member ID       CHRISTA CAMPOS 1960 [id number]                 Emergency Contacts      (Rel.) Home Phone Work Phone Mobile Phone    Peace Guidry (Sister) [phone] -- [phone]    Maryam Campos (Sister) [phone] -- --              Current Facility-Administered Medications   Medication Dose Route Frequency Provider Last Rate Last Admin   • enoxaparin (LOVENOX) syringe 40 mg  40 " mg Subcutaneous Q24H Ian Allan MD   40 mg at 03/08/22 0854   • HYDROcodone-acetaminophen (NORCO) 5-325 MG per tablet 1 tablet  1 tablet Oral Q4H PRN Ian Allan MD       • HYDROmorphone (DILAUDID) injection 0.5 mg  0.5 mg Intravenous Q2H PRN Ian Allan MD   0.5 mg at 03/09/22 0727   • levoFLOXacin (LEVAQUIN) 750 mg/150 mL D5W (premix) (LEVAQUIN) 750 mg  750 mg Intravenous Q24H Ian Allan MD   750 mg at 03/09/22 0727   • metroNIDAZOLE (FLAGYL) IVPB 500 mg  500 mg Intravenous Q8H Ian Allan MD   500 mg at 03/09/22 0401   • ondansetron (ZOFRAN) injection 4 mg  4 mg Intravenous Q6H PRN Ian Allan MD   4 mg at 03/09/22 0727   • sodium chloride 0.9 % flush 10 mL  10 mL Intravenous PRN Ian Allan MD       • sodium chloride 0.9 % flush 10 mL  10 mL Intravenous Q12H Ian Allan MD       • sodium chloride 0.9 % flush 10 mL  10 mL Intravenous PRN Ian Allan MD       • sodium chloride 0.9 % infusion  100 mL/hr Intravenous Continuous Ian Allan  mL/hr at 03/08/22 2027 100 mL/hr at 03/08/22 2027        Physician Progress Notes (last 24 hours)      Ian Allan MD at 03/09/22 0719            Daily Progress Note  Lyndon Campos  MRN: [id number] LOS: 1    Admit Date: 3/7/2022   3/9/2022 07:19 CST    Subjective:          Chief Complaint:  Chief Complaint   Patient presents with   • Abdominal Pain   • Vomiting       Interval History:    Reviewed overnight events and nursing notes.   Feeling some better this morning.  Reports that his abdominal pain is improved and he has had no further emesis.  Does note some nausea that is relieved with Zofran.  Did have elevated temp to 100.4 this morning.    Review of Systems   Constitutional: Positive for chills and fever.   Respiratory: Negative for shortness of breath.    Cardiovascular: Negative for chest pain.   Gastrointestinal: Positive for abdominal pain and nausea. Negative for  vomiting.       DIET:  Diet Full Liquid    Medications:   sodium chloride, 100 mL/hr, Last Rate: 100 mL/hr (03/08/22 2027)      enoxaparin, 40 mg, Subcutaneous, Q24H  levoFLOXacin, 750 mg, Intravenous, Q24H  metroNIDAZOLE, 500 mg, Intravenous, Q8H  sodium chloride, 10 mL, Intravenous, Q12H        Data:     Code Status:   Code Status and Medical Interventions:   Ordered at: 03/08/22 0717     Code Status (Patient has no pulse and is not breathing):    CPR (Attempt to Resuscitate)     Medical Interventions (Patient has pulse or is breathing):    Full Support       History reviewed. No pertinent family history.  Social History     Socioeconomic History   • Marital status: Single   Tobacco Use   • Smoking status: Current Every Day Smoker     Packs/day: 1.00   Substance and Sexual Activity   • Alcohol use: No   • Drug use: Defer   • Sexual activity: Defer       Labs:    Lab Results (last 72 hours)     Procedure Component Value Units Date/Time    Comprehensive Metabolic Panel [[id number]] Collected: 03/09/22 0712    Specimen: Blood Updated: 03/09/22 0716    CBC Auto Differential [[id number]] Collected: 03/09/22 0712    Specimen: Blood Updated: 03/09/22 0716    Blood Culture - Blood, Arm, Right [[id number]]  (Normal) Collected: 03/07/22 2354    Specimen: Blood from Arm, Right Updated: 03/09/22 0017     Blood Culture No growth at 24 hours    Blood Culture - Blood, Arm, Left [[id number]]  (Normal) Collected: 03/07/22 2354    Specimen: Blood from Arm, Left Updated: 03/09/22 0017     Blood Culture No growth at 24 hours    Urinalysis With Culture If Indicated - Urine, Clean Catch [[id number]]  (Abnormal) Collected: 03/08/22 0207    Specimen: Urine, Clean Catch Updated: 03/08/22 0218     Color, UA Yellow     Appearance, UA Clear     pH, UA 5.5     Specific Gravity, UA >1.030     Glucose, UA Negative     Ketones, UA Negative     Bilirubin, UA Negative     Blood, UA Negative     Protein, UA Negative     Leuk Esterase, UA Negative      "Nitrite, UA Negative     Urobilinogen, UA 0.2 E.U./dL    Narrative:      Urine microscopic not indicated.    COVID-19,[facility] IN-HOUSE,Nasal Swab No Transport Media 3-4 HR TAT - Swab, Nasal Cavity [[id number]]  (Normal) Collected: 03/07/22 2354    Specimen: Swab from Nasal Cavity Updated: 03/08/22 0057     COVID19 Not Detected    Narrative:      Fact sheet for providers: https://www.fda.gov/media/392657/download     Fact sheet for patients: https://www.fda.gov/media/002437/download    Test performed by PCR.    Consider negative results in combination with clinical observations, patient history, and epidemiological information.    Procalcitonin [[id number]]  (Normal) Collected: 03/07/22 2354    Specimen: Blood Updated: 03/08/22 0030     Procalcitonin 0.06 ng/mL     Narrative:      As a Marker for Sepsis (Non-Neonates):    1. <0.5 ng/mL represents a low risk of severe sepsis and/or septic shock.  2. >2 ng/mL represents a high risk of severe sepsis and/or septic shock.    As a Marker for Lower Respiratory Tract Infections that require antibiotic therapy:    PCT on Admission    Antibiotic Therapy       6-12 Hrs later    >0.5                Strongly Recommended  >0.25 - <0.5        Recommended   0.1 - 0.25          Discouraged              Remeasure/reassess PCT  <0.1                Strongly Discouraged     Remeasure/reassess PCT    As 28 day mortality risk marker: \"Change in Procalcitonin Result\" (>80% or <=80%) if Day 0 (or Day 1) and Day 4 values are available. Refer to http://www.[facility]-pct-calculator.com    Change in PCT <=80%  A decrease of PCT levels below or equal to 80% defines a positive change in PCT test result representing a higher risk for 28-day all-cause mortality of patients diagnosed with severe sepsis for septic shock.    Change in PCT >80%  A decrease of PCT levels of more than 80% defines a negative change in PCT result representing a lower risk for 28-day all-cause mortality of patients diagnosed " with severe sepsis or septic shock.       Comprehensive Metabolic Panel [[id number]]  (Abnormal) Collected: 03/07/22 2354    Specimen: Blood Updated: 03/08/22 0024     Glucose 76 mg/dL      BUN 20 mg/dL      Creatinine 0.66 mg/dL      Sodium 144 mmol/L      Potassium 4.2 mmol/L      Chloride 110 mmol/L      CO2 25.0 mmol/L      Calcium 9.8 mg/dL      Total Protein 7.4 g/dL      Albumin 4.50 g/dL      ALT (SGPT) 47 U/L      AST (SGOT) 85 U/L      Alkaline Phosphatase 143 U/L      Total Bilirubin 0.3 mg/dL      Globulin 2.9 gm/dL      A/G Ratio 1.6 g/dL      BUN/Creatinine Ratio 30.3     Anion Gap 9.0 mmol/L      eGFR 106.0 mL/min/1.73      Comment: National Kidney Foundation and American Society of Nephrology (ASN) Task Force recommended calculation based on the Chronic Kidney Disease Epidemiology Collaboration (CKD-EPI) equation refit without adjustment for race.       Narrative:      GFR Normal >60  Chronic Kidney Disease <60  Kidney Failure <15      Lactic Acid, Plasma [[id number]]  (Normal) Collected: 03/07/22 2354    Specimen: Blood Updated: 03/08/22 0022     Lactate 1.3 mmol/L     Lipase [[id number]]  (Normal) Collected: 03/07/22 2354    Specimen: Blood Updated: 03/08/22 0019     Lipase 27 U/L     CBC & Differential [[id number]]  (Abnormal) Collected: 03/07/22 2354    Specimen: Blood Updated: 03/08/22 0009    Narrative:      The following orders were created for panel order CBC & Differential.  Procedure                               Abnormality         Status                     ---------                               -----------         ------                     CBC Auto Differential[[id number]]        Abnormal            Final result                 Please view results for these tests on the individual orders.    CBC Auto Differential [[id number]]  (Abnormal) Collected: 03/07/22 2354    Specimen: Blood Updated: 03/08/22 0009     WBC 17.35 10*3/mm3      RBC 4.53 10*6/mm3      Hemoglobin 14.7 g/dL      Hematocrit  "43.2 %      MCV 95.4 fL      MCH 32.5 pg      MCHC 34.0 g/dL      RDW 12.9 %      RDW-SD 45.7 fl      MPV 10.0 fL      Platelets 243 10*3/mm3      Neutrophil % 77.8 %      Lymphocyte % 13.3 %      Monocyte % 6.3 %      Eosinophil % 1.7 %      Basophil % 0.4 %      Immature Grans % 0.5 %      Neutrophils, Absolute 13.52 10*3/mm3      Lymphocytes, Absolute 2.30 10*3/mm3      Monocytes, Absolute 1.09 10*3/mm3      Eosinophils, Absolute 0.29 10*3/mm3      Basophils, Absolute 0.07 10*3/mm3      Immature Grans, Absolute 0.08 10*3/mm3      nRBC 0.0 /100 WBC             Objective:     Vitals: /57 (BP Location: Right arm, Patient Position: Lying)   Pulse 83   Temp 100.4 °F (38 °C) (Oral)   Resp 20   Ht 160 cm (63\")   Wt 59.4 kg (131 lb)   SpO2 94%   BMI 23.21 kg/m²      Intake/Output Summary (Last 24 hours) at 3/9/2022 0719  Last data filed at 3/9/2022 0401  Gross per 24 hour   Intake 2062 ml   Output 550 ml   Net 1512 ml    Temp (24hrs), Av.3 °F (37.4 °C), Min:98.4 °F (36.9 °C), Max:100.6 °F (38.1 °C)      Physical Exam  Constitutional:       Appearance: He is well-developed.   HENT:      Head: Normocephalic and atraumatic.   Eyes:      Conjunctiva/sclera: Conjunctivae normal.      Pupils: Pupils are equal, round, and reactive to light.   Cardiovascular:      Rate and Rhythm: Normal rate and regular rhythm.      Heart sounds: Normal heart sounds. No murmur heard.    No friction rub.   Pulmonary:      Effort: Pulmonary effort is normal. No respiratory distress.      Breath sounds: Normal breath sounds. No wheezing or rales.   Abdominal:      General: Bowel sounds are normal. There is no distension.      Palpations: Abdomen is soft.      Comments: Diffuse abdominal tenderness that is worsened in the right upper quadrant   Musculoskeletal:         General: Normal range of motion.      Cervical back: Normal range of motion.   Skin:     Capillary Refill: Capillary refill takes less than 2 seconds. "   Neurological:      Mental Status: He is alert and oriented to person, place, and time.      Cranial Nerves: No cranial nerve deficit.   Psychiatric:         Behavior: Behavior normal.             Assessment and Plan:     Primary Problem:  Colitis    Hospital Problem list:    Colitis    Right upper quadrant pain    Intractable vomiting    Chronic idiopathic constipation      PMH:  History reviewed. No pertinent past medical history.    Treatment Plan:  Colitis  CT abdomen pelvis showing colonic wall thickening and pericolonic cyst stranding concerning for diverticulitis versus colitis.  Continue Levaquin and Flagyl.     Abdominal pain  Right upper quadrant abdominal pain.  CT findings of his prior biliary surgery are stable and ultrasound was fairly unremarkable compared to prior.  Suspect referred pain from his colitis.    Intractable vomiting  Resolved with antiemetics     Chronic idiopathic/drug-induced constipation  Chronic opioid therapy.  Takes Symproic at home.  Will resume once abdominal pain improves.    Disposition: Continue inpatient care, hopeful for discharge home tomorrow    Reviewed treatment plans with the patient and/or family.   30 minutes spent in face to face interaction and coordination of care.     Electronically signed by Ian Allan MD on 3/9/2022 at 07:19 CST    Electronically signed by Ian Allan MD at 03/09/22 0721       3/9  Nurse note  :    Evaluation: IVF and IV abx given as ordered. Pt requests pain meds q2hr. Pt states he is finally getting some pain relief.                3/9  Temp 100.4  WBC 13.70    Cont on iv abx's    Iv fl 100hr    So far today iv prn meds    Dilaudid iv x3   zofran iv x2  Pain rated 7's        3/8  Temp 100.6  WBC 17.35   zofran iv x1   Dilaudid  Iv x10        Pain rated 8's  And 7's  And 10

## 2022-03-09 NOTE — PROGRESS NOTES
Daily Progress Note  Lyndon Campos  MRN: [id number] LOS: 1    Admit Date: 3/7/2022   3/9/2022 07:19 CST    Subjective:          Chief Complaint:  Chief Complaint   Patient presents with   • Abdominal Pain   • Vomiting       Interval History:    Reviewed overnight events and nursing notes.   Feeling some better this morning.  Reports that his abdominal pain is improved and he has had no further emesis.  Does note some nausea that is relieved with Zofran.  Did have elevated temp to 100.4 this morning.    Review of Systems   Constitutional: Positive for chills and fever.   Respiratory: Negative for shortness of breath.    Cardiovascular: Negative for chest pain.   Gastrointestinal: Positive for abdominal pain and nausea. Negative for vomiting.       DIET:  Diet Full Liquid    Medications:   sodium chloride, 100 mL/hr, Last Rate: 100 mL/hr (03/08/22 2027)      enoxaparin, 40 mg, Subcutaneous, Q24H  levoFLOXacin, 750 mg, Intravenous, Q24H  metroNIDAZOLE, 500 mg, Intravenous, Q8H  sodium chloride, 10 mL, Intravenous, Q12H        Data:     Code Status:   Code Status and Medical Interventions:   Ordered at: 03/08/22 0717     Code Status (Patient has no pulse and is not breathing):    CPR (Attempt to Resuscitate)     Medical Interventions (Patient has pulse or is breathing):    Full Support       History reviewed. No pertinent family history.  Social History     Socioeconomic History   • Marital status: Single   Tobacco Use   • Smoking status: Current Every Day Smoker     Packs/day: 1.00   Substance and Sexual Activity   • Alcohol use: No   • Drug use: Defer   • Sexual activity: Defer       Labs:    Lab Results (last 72 hours)     Procedure Component Value Units Date/Time    Comprehensive Metabolic Panel [[id number]] Collected: 03/09/22 0712    Specimen: Blood Updated: 03/09/22 0716    CBC Auto Differential [[id number]] Collected: 03/09/22 0712    Specimen: Blood Updated: 03/09/22 0716    Blood Culture - Blood, Arm, Right  [[id number]]  (Normal) Collected: 03/07/22 2354    Specimen: Blood from Arm, Right Updated: 03/09/22 0017     Blood Culture No growth at 24 hours    Blood Culture - Blood, Arm, Left [[id number]]  (Normal) Collected: 03/07/22 2354    Specimen: Blood from Arm, Left Updated: 03/09/22 0017     Blood Culture No growth at 24 hours    Urinalysis With Culture If Indicated - Urine, Clean Catch [[id number]]  (Abnormal) Collected: 03/08/22 0207    Specimen: Urine, Clean Catch Updated: 03/08/22 0218     Color, UA Yellow     Appearance, UA Clear     pH, UA 5.5     Specific Gravity, UA >1.030     Glucose, UA Negative     Ketones, UA Negative     Bilirubin, UA Negative     Blood, UA Negative     Protein, UA Negative     Leuk Esterase, UA Negative     Nitrite, UA Negative     Urobilinogen, UA 0.2 E.U./dL    Narrative:      Urine microscopic not indicated.    COVID-19,[facility] IN-HOUSE,Nasal Swab No Transport Media 3-4 HR TAT - Swab, Nasal Cavity [[id number]]  (Normal) Collected: 03/07/22 2354    Specimen: Swab from Nasal Cavity Updated: 03/08/22 0057     COVID19 Not Detected    Narrative:      Fact sheet for providers: https://www.fda.gov/media/863613/download     Fact sheet for patients: https://www.fda.gov/media/848552/download    Test performed by PCR.    Consider negative results in combination with clinical observations, patient history, and epidemiological information.    Procalcitonin [[id number]]  (Normal) Collected: 03/07/22 2354    Specimen: Blood Updated: 03/08/22 0030     Procalcitonin 0.06 ng/mL     Narrative:      As a Marker for Sepsis (Non-Neonates):    1. <0.5 ng/mL represents a low risk of severe sepsis and/or septic shock.  2. >2 ng/mL represents a high risk of severe sepsis and/or septic shock.    As a Marker for Lower Respiratory Tract Infections that require antibiotic therapy:    PCT on Admission    Antibiotic Therapy       6-12 Hrs later    >0.5                Strongly Recommended  >0.25 - <0.5        Recommended  "  0.1 - 0.25          Discouraged              Remeasure/reassess PCT  <0.1                Strongly Discouraged     Remeasure/reassess PCT    As 28 day mortality risk marker: \"Change in Procalcitonin Result\" (>80% or <=80%) if Day 0 (or Day 1) and Day 4 values are available. Refer to http://www.[facility]-pct-calculator.com    Change in PCT <=80%  A decrease of PCT levels below or equal to 80% defines a positive change in PCT test result representing a higher risk for 28-day all-cause mortality of patients diagnosed with severe sepsis for septic shock.    Change in PCT >80%  A decrease of PCT levels of more than 80% defines a negative change in PCT result representing a lower risk for 28-day all-cause mortality of patients diagnosed with severe sepsis or septic shock.       Comprehensive Metabolic Panel [[id number]]  (Abnormal) Collected: 03/07/22 2354    Specimen: Blood Updated: 03/08/22 0024     Glucose 76 mg/dL      BUN 20 mg/dL      Creatinine 0.66 mg/dL      Sodium 144 mmol/L      Potassium 4.2 mmol/L      Chloride 110 mmol/L      CO2 25.0 mmol/L      Calcium 9.8 mg/dL      Total Protein 7.4 g/dL      Albumin 4.50 g/dL      ALT (SGPT) 47 U/L      AST (SGOT) 85 U/L      Alkaline Phosphatase 143 U/L      Total Bilirubin 0.3 mg/dL      Globulin 2.9 gm/dL      A/G Ratio 1.6 g/dL      BUN/Creatinine Ratio 30.3     Anion Gap 9.0 mmol/L      eGFR 106.0 mL/min/1.73      Comment: National Kidney Foundation and American Society of Nephrology (ASN) Task Force recommended calculation based on the Chronic Kidney Disease Epidemiology Collaboration (CKD-EPI) equation refit without adjustment for race.       Narrative:      GFR Normal >60  Chronic Kidney Disease <60  Kidney Failure <15      Lactic Acid, Plasma [[id number]]  (Normal) Collected: 03/07/22 2354    Specimen: Blood Updated: 03/08/22 0022     Lactate 1.3 mmol/L     Lipase [[id number]]  (Normal) Collected: 03/07/22 2354    Specimen: Blood Updated: 03/08/22 0019     Lipase 27 " "U/L     CBC & Differential [[id number]]  (Abnormal) Collected: 22    Specimen: Blood Updated: 22    Narrative:      The following orders were created for panel order CBC & Differential.  Procedure                               Abnormality         Status                     ---------                               -----------         ------                     CBC Auto Differential[[id number]]        Abnormal            Final result                 Please view results for these tests on the individual orders.    CBC Auto Differential [[id number]]  (Abnormal) Collected: 22    Specimen: Blood Updated: 22     WBC 17.35 10*3/mm3      RBC 4.53 10*6/mm3      Hemoglobin 14.7 g/dL      Hematocrit 43.2 %      MCV 95.4 fL      MCH 32.5 pg      MCHC 34.0 g/dL      RDW 12.9 %      RDW-SD 45.7 fl      MPV 10.0 fL      Platelets 243 10*3/mm3      Neutrophil % 77.8 %      Lymphocyte % 13.3 %      Monocyte % 6.3 %      Eosinophil % 1.7 %      Basophil % 0.4 %      Immature Grans % 0.5 %      Neutrophils, Absolute 13.52 10*3/mm3      Lymphocytes, Absolute 2.30 10*3/mm3      Monocytes, Absolute 1.09 10*3/mm3      Eosinophils, Absolute 0.29 10*3/mm3      Basophils, Absolute 0.07 10*3/mm3      Immature Grans, Absolute 0.08 10*3/mm3      nRBC 0.0 /100 WBC             Objective:     Vitals: /57 (BP Location: Right arm, Patient Position: Lying)   Pulse 83   Temp 100.4 °F (38 °C) (Oral)   Resp 20   Ht 160 cm (63\")   Wt 59.4 kg (131 lb)   SpO2 94%   BMI 23.21 kg/m²      Intake/Output Summary (Last 24 hours) at 3/9/2022 0719  Last data filed at 3/9/2022 0401  Gross per 24 hour   Intake 2062 ml   Output 550 ml   Net 1512 ml    Temp (24hrs), Av.3 °F (37.4 °C), Min:98.4 °F (36.9 °C), Max:100.6 °F (38.1 °C)      Physical Exam  Constitutional:       Appearance: He is well-developed.   HENT:      Head: Normocephalic and atraumatic.   Eyes:      Conjunctiva/sclera: Conjunctivae normal.      " Pupils: Pupils are equal, round, and reactive to light.   Cardiovascular:      Rate and Rhythm: Normal rate and regular rhythm.      Heart sounds: Normal heart sounds. No murmur heard.    No friction rub.   Pulmonary:      Effort: Pulmonary effort is normal. No respiratory distress.      Breath sounds: Normal breath sounds. No wheezing or rales.   Abdominal:      General: Bowel sounds are normal. There is no distension.      Palpations: Abdomen is soft.      Comments: Diffuse abdominal tenderness that is worsened in the right upper quadrant   Musculoskeletal:         General: Normal range of motion.      Cervical back: Normal range of motion.   Skin:     Capillary Refill: Capillary refill takes less than 2 seconds.   Neurological:      Mental Status: He is alert and oriented to person, place, and time.      Cranial Nerves: No cranial nerve deficit.   Psychiatric:         Behavior: Behavior normal.             Assessment and Plan:     Primary Problem:  Colitis    Hospital Problem list:    Colitis    Right upper quadrant pain    Intractable vomiting    Chronic idiopathic constipation      PMH:  History reviewed. No pertinent past medical history.    Treatment Plan:  Colitis  CT abdomen pelvis showing colonic wall thickening and pericolonic cyst stranding concerning for diverticulitis versus colitis.  Continue Levaquin and Flagyl.     Abdominal pain  Right upper quadrant abdominal pain.  CT findings of his prior biliary surgery are stable and ultrasound was fairly unremarkable compared to prior.  Suspect referred pain from his colitis.    Intractable vomiting  Resolved with antiemetics     Chronic idiopathic/drug-induced constipation  Chronic opioid therapy.  Takes Symproic at home.  Will resume once abdominal pain improves.    Disposition: Continue inpatient care, hopeful for discharge home tomorrow    Reviewed treatment plans with the patient and/or family.   30 minutes spent in face to face interaction and  coordination of care.     Electronically signed by Ian Allan MD on 3/9/2022 at 07:19 CST

## 2022-03-09 NOTE — PLAN OF CARE
Goal Outcome Evaluation:  Plan of Care Reviewed With: patient        Progress: no change  Outcome Evaluation: IVF and IV abx given as ordered. Pt requests pain meds q2hr. Pt states he is finally getting some pain relief.

## 2022-03-09 NOTE — PLAN OF CARE
Goal Outcome Evaluation:              Outcome Evaluation: Pt was advanced to full liquid diet and pt ate very little of meals. C/O much abd pain this shift and nausea at noon. IV pain med given X 3 and Zofran X 1. No stools noted. Pt has been anxious.

## 2022-03-10 ENCOUNTER — APPOINTMENT (OUTPATIENT)
Dept: MRI IMAGING | Facility: HOSPITAL | Age: 62
End: 2022-03-10

## 2022-03-10 LAB
ALBUMIN SERPL-MCNC: 3.1 G/DL (ref 3.5–5.2)
ALBUMIN/GLOB SERPL: 1.7 G/DL
ALP SERPL-CCNC: 239 U/L (ref 39–117)
ALT SERPL W P-5'-P-CCNC: 124 U/L (ref 1–41)
ANION GAP SERPL CALCULATED.3IONS-SCNC: 10 MMOL/L (ref 5–15)
AST SERPL-CCNC: 157 U/L (ref 1–40)
BASOPHILS # BLD AUTO: 0.02 10*3/MM3 (ref 0–0.2)
BASOPHILS NFR BLD AUTO: 0.1 % (ref 0–1.5)
BILIRUB SERPL-MCNC: 1.3 MG/DL (ref 0–1.2)
BUN SERPL-MCNC: 16 MG/DL (ref 8–23)
BUN/CREAT SERPL: 27.6 (ref 7–25)
CALCIUM SPEC-SCNC: 8.3 MG/DL (ref 8.6–10.5)
CHLORIDE SERPL-SCNC: 105 MMOL/L (ref 98–107)
CO2 SERPL-SCNC: 22 MMOL/L (ref 22–29)
CREAT SERPL-MCNC: 0.58 MG/DL (ref 0.76–1.27)
DEPRECATED RDW RBC AUTO: 45 FL (ref 37–54)
EGFRCR SERPLBLD CKD-EPI 2021: 110.3 ML/MIN/1.73
EOSINOPHIL # BLD AUTO: 0.01 10*3/MM3 (ref 0–0.4)
EOSINOPHIL NFR BLD AUTO: 0.1 % (ref 0.3–6.2)
ERYTHROCYTE [DISTWIDTH] IN BLOOD BY AUTOMATED COUNT: 13 % (ref 12.3–15.4)
GLOBULIN UR ELPH-MCNC: 1.8 GM/DL
GLUCOSE SERPL-MCNC: 119 MG/DL (ref 65–99)
HCT VFR BLD AUTO: 33.5 % (ref 37.5–51)
HGB BLD-MCNC: 11.4 G/DL (ref 13–17.7)
IMM GRANULOCYTES # BLD AUTO: 0.12 10*3/MM3 (ref 0–0.05)
IMM GRANULOCYTES NFR BLD AUTO: 0.9 % (ref 0–0.5)
LYMPHOCYTES # BLD AUTO: 0.48 10*3/MM3 (ref 0.7–3.1)
LYMPHOCYTES NFR BLD AUTO: 3.5 % (ref 19.6–45.3)
MCH RBC QN AUTO: 31.8 PG (ref 26.6–33)
MCHC RBC AUTO-ENTMCNC: 34 G/DL (ref 31.5–35.7)
MCV RBC AUTO: 93.6 FL (ref 79–97)
MONOCYTES # BLD AUTO: 0.82 10*3/MM3 (ref 0.1–0.9)
MONOCYTES NFR BLD AUTO: 5.9 % (ref 5–12)
NEUTROPHILS NFR BLD AUTO: 12.41 10*3/MM3 (ref 1.7–7)
NEUTROPHILS NFR BLD AUTO: 89.5 % (ref 42.7–76)
NRBC BLD AUTO-RTO: 0 /100 WBC (ref 0–0.2)
PLATELET # BLD AUTO: 90 10*3/MM3 (ref 140–450)
PMV BLD AUTO: 11.1 FL (ref 6–12)
POTASSIUM SERPL-SCNC: 3.3 MMOL/L (ref 3.5–5.2)
PROT SERPL-MCNC: 4.9 G/DL (ref 6–8.5)
RBC # BLD AUTO: 3.58 10*6/MM3 (ref 4.14–5.8)
SODIUM SERPL-SCNC: 137 MMOL/L (ref 136–145)
WBC NRBC COR # BLD: 13.86 10*3/MM3 (ref 3.4–10.8)

## 2022-03-10 PROCEDURE — 85025 COMPLETE CBC W/AUTO DIFF WBC: CPT | Performed by: FAMILY MEDICINE

## 2022-03-10 PROCEDURE — 80053 COMPREHEN METABOLIC PANEL: CPT | Performed by: FAMILY MEDICINE

## 2022-03-10 PROCEDURE — 25010000002 LEVOFLOXACIN PER 250 MG: Performed by: FAMILY MEDICINE

## 2022-03-10 PROCEDURE — 74183 MRI ABD W/O CNTR FLWD CNTR: CPT

## 2022-03-10 PROCEDURE — 25010000002 ENOXAPARIN PER 10 MG: Performed by: FAMILY MEDICINE

## 2022-03-10 PROCEDURE — 0 HYDROMORPHONE 1 MG/ML SOLUTION: Performed by: FAMILY MEDICINE

## 2022-03-10 PROCEDURE — 99222 1ST HOSP IP/OBS MODERATE 55: CPT | Performed by: CLINICAL NURSE SPECIALIST

## 2022-03-10 PROCEDURE — 0 GADOBENATE DIMEGLUMINE 529 MG/ML SOLUTION: Performed by: FAMILY MEDICINE

## 2022-03-10 PROCEDURE — A9577 INJ MULTIHANCE: HCPCS | Performed by: FAMILY MEDICINE

## 2022-03-10 RX ORDER — ROSUVASTATIN CALCIUM 10 MG/1
10 TABLET, COATED ORAL DAILY
COMMUNITY

## 2022-03-10 RX ORDER — TRAZODONE HYDROCHLORIDE 50 MG/1
50 TABLET ORAL NIGHTLY PRN
COMMUNITY

## 2022-03-10 RX ORDER — POLYETHYLENE GLYCOL 3350 17 G/17G
17 POWDER, FOR SOLUTION ORAL DAILY PRN
COMMUNITY
End: 2022-03-13 | Stop reason: HOSPADM

## 2022-03-10 RX ORDER — DICLOFENAC SODIUM 75 MG/1
75 TABLET, DELAYED RELEASE ORAL DAILY
COMMUNITY
End: 2022-03-13 | Stop reason: HOSPADM

## 2022-03-10 RX ADMIN — HYDROMORPHONE HYDROCHLORIDE 1 MG: 1 INJECTION, SOLUTION INTRAMUSCULAR; INTRAVENOUS; SUBCUTANEOUS at 10:10

## 2022-03-10 RX ADMIN — HYDROMORPHONE HYDROCHLORIDE 1 MG: 1 INJECTION, SOLUTION INTRAMUSCULAR; INTRAVENOUS; SUBCUTANEOUS at 12:46

## 2022-03-10 RX ADMIN — METRONIDAZOLE 500 MG: 500 INJECTION, SOLUTION INTRAVENOUS at 05:03

## 2022-03-10 RX ADMIN — HYDROMORPHONE HYDROCHLORIDE 1 MG: 1 INJECTION, SOLUTION INTRAMUSCULAR; INTRAVENOUS; SUBCUTANEOUS at 07:33

## 2022-03-10 RX ADMIN — GADOBENATE DIMEGLUMINE 10 ML: 529 INJECTION, SOLUTION INTRAVENOUS at 14:19

## 2022-03-10 RX ADMIN — HYDROMORPHONE HYDROCHLORIDE 1 MG: 1 INJECTION, SOLUTION INTRAMUSCULAR; INTRAVENOUS; SUBCUTANEOUS at 15:53

## 2022-03-10 RX ADMIN — HYDROMORPHONE HYDROCHLORIDE 0.5 MG: 1 INJECTION, SOLUTION INTRAMUSCULAR; INTRAVENOUS; SUBCUTANEOUS at 03:01

## 2022-03-10 RX ADMIN — HYDROCODONE BITARTRATE AND ACETAMINOPHEN 1 TABLET: 5; 325 TABLET ORAL at 19:26

## 2022-03-10 RX ADMIN — SODIUM CHLORIDE 100 ML/HR: 9 INJECTION, SOLUTION INTRAVENOUS at 18:17

## 2022-03-10 RX ADMIN — METRONIDAZOLE 500 MG: 500 INJECTION, SOLUTION INTRAVENOUS at 20:05

## 2022-03-10 RX ADMIN — HYDROMORPHONE HYDROCHLORIDE 0.5 MG: 1 INJECTION, SOLUTION INTRAMUSCULAR; INTRAVENOUS; SUBCUTANEOUS at 00:32

## 2022-03-10 RX ADMIN — ENOXAPARIN SODIUM 40 MG: 40 INJECTION SUBCUTANEOUS at 08:12

## 2022-03-10 RX ADMIN — LEVOFLOXACIN 750 MG: 5 INJECTION, SOLUTION INTRAVENOUS at 06:15

## 2022-03-10 RX ADMIN — HYDROMORPHONE HYDROCHLORIDE 1 MG: 1 INJECTION, SOLUTION INTRAMUSCULAR; INTRAVENOUS; SUBCUTANEOUS at 21:16

## 2022-03-10 RX ADMIN — HYDROMORPHONE HYDROCHLORIDE 1 MG: 1 INJECTION, SOLUTION INTRAMUSCULAR; INTRAVENOUS; SUBCUTANEOUS at 18:11

## 2022-03-10 RX ADMIN — METRONIDAZOLE 500 MG: 500 INJECTION, SOLUTION INTRAVENOUS at 12:05

## 2022-03-10 NOTE — CONSULTS
[facility] GASTROENTEROLOGY              Initial Inpatient Consult Note  Lyndon Campos  1960    Referring Provider: No Known Provider    Admission: 3/7/2022  Consult date: 3/10/2022  Chief complaint: abdominal pain    Subjective     History of present illness:  Pt is a 61 year old male routinely followed by Dr Josiah Ring at [facility] admitted here with abdominal pain and abnormal CT scan showing diverticulitis vs colitis. He is having upper abdominal pain sharp and stabbing upper abdomen that began at 8:30 PM Monday night. Rated 5 out of 10. It is constant no triggers no alleviating factors. No diarrhea or rectal bleeding. He has had some associated nausea    He has a hx of acute on chronic pancreatitis. Hx of hepaticojejunostomy, found during an attempted ERCP at [facility] in 2017. He was seen at [facility] where they repeated an attempted ERCP. They were unsuccessful as well. PTC drain and subsequent internalization of the drain was performed.     Past Medical History:  Bowel obstruction  Cyst of bile duct  Hepaticojejunostomy  Eye trauma left eye  GERD  Liver abscess 2//20/2017  Liver cyst    Past Surgical History:  Past Surgical History:   Procedure Laterality Date   • ABDOMINAL SURGERY     • EYE SURGERY     • KNEE SURGERY Left        Social History:   Social History     Tobacco Use   • Smoking status: Current Every Day Smoker     Packs/day: 1.00   • Smokeless tobacco: Not on file   Substance Use Topics   • Alcohol use: No        Family History:  History reviewed. No pertinent family history.    Home Meds:  Medications Prior to Admission   Medication Sig Dispense Refill Last Dose   • acetaminophen (TYLENOL) 500 MG tablet Take 500 mg by mouth.      • HYDROcodone-acetaminophen (NORCO) 7.5-325 MG per tablet Take 1 tablet by mouth Every 6 (Six) Hours As Needed for Moderate Pain .      • meloxicam (MOBIC) 7.5 MG tablet Take 7.5 mg by mouth Daily.      • methylPREDNISolone (MEDROL) 4 MG dose pack Take as  directed on package instructions. 1 each 0    • polyethylene glycol (MIRALAX) 17 g packet Take 17 g by mouth Daily. 30 each 0        Current Meds:   Hospital Medications (active)       Dose Frequency Start End    enoxaparin (LOVENOX) syringe 40 mg 40 mg Every 24 Hours 3/8/2022     Admin Instructions: Give subcutaneous in abdomen only. Do not massage site after injection.    Route: Subcutaneous    HYDROcodone-acetaminophen (NORCO) 5-325 MG per tablet 1 tablet 1 tablet Every 4 Hours PRN 3/8/2022 3/15/2022    Admin Instructions: [MAIDA]    Do not exceed 4 grams of acetaminophen in a 24 hr period. Max dose of 2gm for AST/ALT greater than 120 units/L        If given for pain, use the following pain scale:   Mild Pain = Pain Score of 1-3, CPOT 1-2  Moderate Pain = Pain Score of 4-6, CPOT 3-4  Severe Pain = Pain Score of 7-10, CPOT 5-8    Route: Oral    HYDROmorphone (DILAUDID) injection 1 mg 1 mg Every 2 Hours PRN 3/10/2022 3/15/2022    Admin Instructions:     Caution: Look alike/sound alike drug alert    If given for pain, use the following pain scale:  Mild Pain = Pain Score of 1-3, CPOT 1-2  Moderate Pain = Pain Score of 4-6, CPOT 3-4  Severe Pain = Pain Score of 7-10, CPOT 5-8    Route: Intravenous    levoFLOXacin (LEVAQUIN) 750 mg/150 mL D5W (premix) (LEVAQUIN) 750 mg 750 mg Every 24 Hours 3/9/2022 3/15/2022    Admin Instructions: Caution: Look alike/sound alike drug alert. Protect from light. Do NOT refrigerate.    Route: Intravenous    metroNIDAZOLE (FLAGYL) IVPB 500 mg 500 mg Every 8 Hours 3/8/2022 3/15/2022    Admin Instructions: Caution: Look alike/sound alike drug alert.  Do not refrigerate.    Route: Intravenous    ondansetron (ZOFRAN) injection 4 mg 4 mg Every 6 Hours PRN 3/8/2022     Admin Instructions: If BOTH ondansetron (ZOFRAN) and promethazine (PHENERGAN) are ordered use ondansetron first and THEN promethazine IF ondansetron is ineffective.    Route: Intravenous    sodium chloride 0.9 % flush 10 mL 10 mL  "As Needed 3/7/2022     Route: Intravenous    Cosign for Ordering: Accepted by Jluis Hood MD on 3/8/2022  7:01 AM    Linked Group 1: \"And\" Linked Group Details        sodium chloride 0.9 % flush 10 mL 10 mL Every 12 Hours Scheduled 3/8/2022     Route: Intravenous    sodium chloride 0.9 % flush 10 mL 10 mL As Needed 3/8/2022     Route: Intravenous    sodium chloride 0.9 % infusion 100 mL/hr Continuous 3/8/2022     Route: Intravenous          Allergies:  Allergies   Allergen Reactions   • Morphine Other (See Comments)     Causes Hallucinations       Review of Systems  Review of Systems   Constitutional: Negative for activity change, appetite change, chills, diaphoresis, fatigue, fever and unexpected weight change.   HENT: Negative for ear pain, hearing loss, mouth sores, sore throat, trouble swallowing and voice change.    Eyes: Negative.    Respiratory: Negative for cough, choking, shortness of breath and wheezing.    Cardiovascular: Negative for chest pain and palpitations.   Gastrointestinal: Positive for abdominal pain and nausea. Negative for blood in stool, constipation, diarrhea and vomiting.   Endocrine: Negative for cold intolerance and heat intolerance.   Genitourinary: Negative for decreased urine volume, dysuria, frequency, hematuria and urgency.   Musculoskeletal: Negative for back pain, gait problem and myalgias.   Skin: Negative for color change, pallor and rash.   Allergic/Immunologic: Negative for food allergies and immunocompromised state.   Neurological: Negative for dizziness, tremors, seizures, syncope, weakness, light-headedness, numbness and headaches.   Hematological: Negative for adenopathy. Does not bruise/bleed easily.   Psychiatric/Behavioral: Negative for agitation and confusion. The patient is not nervous/anxious.    All other systems reviewed and are negative.       Objective     Vital Signs  Temp:  [98.1 °F (36.7 °C)-100.9 °F (38.3 °C)] 98.1 °F (36.7 °C)  Heart Rate:  [73-88] " 78  Resp:  [17-18] 18  BP: (104-119)/(58-60) 106/58  Body mass index is 23.21 kg/m².    Physical Exam:  General Appearance:    Alert, cooperative, in no acute distress   Head:    Normocephalic, without obvious abnormality, atraumatic   Eyes:            Lids and lashes normal, conjunctivae and sclerae normal, no icterus, conjunctival pallor   Throat:   No oral lesions, no thrush, oral mucosa moist, posterior oropharynx clear   Neck:   No adenopathy, supple, trachea midline, no thyromegaly, no   carotid bruit, no JVD   Lungs:     Clear to auscultation,respirations regular, even and            unlabored    Heart:    Regular rhythm and normal rate, normal S1 and S2,           no   murmur   Chest Wall:    No abnormalities observed   Abdomen:     Normal bowel sounds, no masses, no organomegaly,     Tender upper abdomen, nondistended, no guarding, no rebound      tenderness   Rectal:     Deferred   Extremities:   no edema, no cyanosis   Skin:   No open lesions, bruising or rash   Lymph nodes:   No palpable cervical adenopathy   Psychiatric:  Judgment and insight: normal   Orientation to person place and time: normal   Mood and affect: normal     Results Review:  Results from last 7 days   Lab Units 03/10/22  0742 03/09/22  0712 03/07/22  2354   WBC 10*3/mm3 13.86* 13.70* 17.35*   HEMOGLOBIN g/dL 11.4* 12.0* 14.7   HEMATOCRIT % 33.5* 36.0* 43.2   PLATELETS 10*3/mm3 90* 116* 243       Results from last 7 days   Lab Units 03/09/22  0712 03/07/22  2354   SODIUM mmol/L 138 144   POTASSIUM mmol/L 4.4 4.2   CHLORIDE mmol/L 106 110*   CO2 mmol/L 25.0 25.0   BUN mg/dL 15 20   CREATININE mg/dL 0.65* 0.66*   CALCIUM mg/dL 8.4* 9.8   BILIRUBIN mg/dL 0.7 0.3   ALK PHOS U/L 190* 143*   ALT (SGPT) U/L 178* 47*   AST (SGOT) U/L 122* 85*   GLUCOSE mg/dL 108* 76              Radiology Review:  Imaging Results (Last 72 Hours)     Procedure Component Value Units Date/Time    US Abdomen Limited [[id number]] Collected: 03/08/22 1352     Updated:  03/08/22 1404    Narrative:      US ABDOMEN LIMITED- 3/8/2022 1:20 PM CST     REASON FOR EXAM: RUQ pain; K52.9-Noninfective gastroenteritis and  colitis, unspecified; R10.10-Upper abdominal pain, unspecified;  R79.89-Other specified abnormal findings of blood chemistry       COMPARISON: CT scan dated 3/8/2022, CT scan dated 12/11/2021.     TECHNIQUE: Multiple longitudinal and transverse realtime sonographic  images of the right upper quadrant of the abdomen are obtained.      FINDINGS:    Pancreas: Normal in size and echogenicity.      Liver: Intrahepatic biliary dilation in the left hepatic lobe. This is a  chronic finding and is stable from the earlier 2021 CT scan. No biliary  dilation in the right lobe. Portal veins are patent with hepatopedal  flow. No focal lesion identified.     Gallbladder: Surgically absent.      Bile ducts: Common bile duct is not well visualized by ultrasound.  Common duct was seen on today's CT scan and measures approximately 8 mm.  Left lobe intrahepatic biliary dilation as previously described, not  seen in the right lobe.     Other: No ascites. Visualized portions of the IVC and aorta are  unremarkable. There are 2 simple appearing right renal cysts, the larger  measuring 2.9 cm. No hydronephrosis.       Impression:      1. Previous cholecystectomy. Left lobe intrahepatic biliary dilation  which is stable and chronic, seen on the earlier CT scan from December 2021. Unsure as to the exact etiology for this left lobe intrahepatic  dilation. I do not see any obvious central mass lesion and there is no  intrahepatic biliary dilation of the right lobe.  2. There are 2 simple appearing right renal cyst.  This report was finalized on 03/08/2022 14:01 by Dr Corey Brown, .    SCANNED - IMAGING [[id number]] Resulted: 03/07/22     Updated: 03/08/22 1009    CT Abdomen Pelvis With Contrast [[id number]] Collected: 03/08/22 0656     Updated: 03/08/22 0709    Narrative:      CT ABDOMEN PELVIS W  CONTRAST- 3/8/2022 1:11 AM CST     HISTORY: abd pain, nausea, vomiting; R10.10-Upper abdominal pain,  unspecified; R79.89-Other specified abnormal findings of blood chemistry        COMPARISON: 12/11/2021.      DLP: 216 mGy cm. All CT scans are performed using dose optimization  techniques as appropriate to the performed exam and including at least  one of the following: Automated exposure control, adjustment of the mA  and/or kV according to size, and the use of the iterative reconstruction  technique.     TECHNIQUE: Following the intravenous administration of contrast, helical  CT tomographic images of the abdomen and pelvis were acquired. Coronal  reformatted images were also provided for review.      FINDINGS:   Bibasilar atelectasis is present. The base of the heart is unremarkable.  There is no evidence of pericardial effusion..      LIVER: Again demonstrated is intrahepatic dilatation of the left biliary  tree. There is also extrahepatic dilatation unchanged from the previous  examination. A discrete mass is not appreciated. No evidence of hepatic  lesion. There is a small amount of air within the left biliary tree  suggesting previous sphincterotomy or biliary diversion..      BILIARY SYSTEM: The gallbladder is surgically absent. Intra and  extrahepatic biliary dilatation is stable from the previous exam. No  discrete common duct stone is appreciated..      PANCREAS: No focal pancreatic lesion.      SPLEEN: Unremarkable.      KIDNEYS AND ADRENALS: The adrenals are unremarkable. There are cortical  cysts of the right kidney. Left kidney demonstrates homogeneous  enhancement. There is no evidence of nephrolithiasis.. The ureters are  decompressed and normal in appearance.     RETROPERITONEUM: No mass, lymphadenopathy or hemorrhage.      GI TRACT: There is no evidence of mechanical obstruction. Multiple  diverticula are noted within the distal descending and sigmoid colon  with mild colonic wall thickening and  mild pericolonic stranding  involving the sigmoid colon. Differential would include diverticulitis  versus a segmental colitis. There is no obstruction. No evidence of  extraluminal air or discrete drainable fluid collection.. No evidence of  acute appendicitis with the appendix not clearly demonstrated..     OTHER: There is no mesenteric mass, lymphadenopathy or fluid collection.  The abdominopelvic vasculature is patent. The osseous structures and  soft tissues demonstrate no worrisome lesions. No free fluid is present.  No evidence of a ventral wall hernia.      PELVIS: The prostate gland is mildly enlarged. There is no free fluid in  the pelvis. A radiodensity is noted within the central pelvis-likely  postsurgical in nature and stable from previous studies.. The urinary  bladder is normal in appearance.       Impression:      1. Mild colonic wall thickening as well as multiple diverticula within  the distal descending and sigmoid colon with mild pericolonic stranding  with differential to include a mild segmental colitis versus  diverticulitis. No evidence of extraluminal air or diverticular abscess.  No evidence of mechanical bowel obstruction.  2. Cortical cysts of the right kidney stable from the previous exam. No  evidence of nephrolithiasis or obstructive uropathy.  3. Stable intrahepatic dilatation within the left lobe of the liver and  extrahepatic biliary dilatation. A small amount of pneumobilia is noted  within the left lobe suggesting previous sphincterotomy or biliary  diversion. FINDINGS are stable from the previous exam.  4. There is mild enlargement of the prostate gland. The urinary bladder  is normal in appearance..         This report was finalized on 03/08/2022 07:06 by Dr. Ralph Hicks MD.          Assessment/Plan         Colitis    Right upper quadrant pain    Intractable vomiting    Chronic idiopathic constipation      1. Diverticulitis vs colitis per CT  2. Abdominal pain  3.  Vomiting  3. Hx of hepaticojejunostomy and attempted ERCP at [facility] in 2017 with referral to [facility] at that time PTC drain placed and subsequent internalization of percutaneous drain performed  4. Elevated LFTs. T bili 1.3 (0.7),  (178),  (122), Lipase normal    Agree with MRCP today to further evaluate will await these results  Agree with antibiotics   Will follow here and provide supportive care as he improves. I recommend given his extensive GI history he plan a follow up with his primary gastroenterologist post discharge so that follow up colonoscopy can be performed. He may need transfer to a center that has ERCP capabilities as well.   Last colonoscopy 3/31/2017 Dr Ring mucosa was normal throughout, polyps removed repeat 5 years    EMR Dragon/transcription disclaimer: Much of this encounter note is electronic transcription/translation of spoken language to printed text. The electronic translation of spoken language may be erroneous, or at times, nonsensical words or phrases may be inadvertently transcribed. Although I have reviewed the note for such errors, some may still exist.  JUDD Marcus  [facility] Gastroenterology  03/10/22  09:41 CST

## 2022-03-10 NOTE — PROGRESS NOTES
Daily Progress Note  Lyndon Campos  MRN: [id number] LOS: 2    Admit Date: 3/7/2022   3/10/2022 07:11 CST    Subjective:          Chief Complaint:  Chief Complaint   Patient presents with   • Abdominal Pain   • Vomiting       Interval History:    Reviewed overnight events and nursing notes.   Unable to tolerate any diet yesterday.  Severe worsening right upper quadrant abdominal pain this morning.  Pain not well controlled.  Had emesis yesterday.    Review of Systems   Constitutional: Positive for chills and fever.   Respiratory: Negative for shortness of breath.    Cardiovascular: Negative for chest pain.   Gastrointestinal: Positive for abdominal pain and nausea. Negative for vomiting.       DIET:  Diet Full Liquid    Medications:   sodium chloride, 100 mL/hr, Last Rate: 100 mL/hr (03/09/22 1593)      enoxaparin, 40 mg, Subcutaneous, Q24H  levoFLOXacin, 750 mg, Intravenous, Q24H  metroNIDAZOLE, 500 mg, Intravenous, Q8H  sodium chloride, 10 mL, Intravenous, Q12H        Data:     Code Status:   Code Status and Medical Interventions:   Ordered at: 03/08/22 0717     Code Status (Patient has no pulse and is not breathing):    CPR (Attempt to Resuscitate)     Medical Interventions (Patient has pulse or is breathing):    Full Support       History reviewed. No pertinent family history.  Social History     Socioeconomic History   • Marital status: Single   Tobacco Use   • Smoking status: Current Every Day Smoker     Packs/day: 1.00   Substance and Sexual Activity   • Alcohol use: No   • Drug use: Defer   • Sexual activity: Defer       Labs:    Lab Results (last 72 hours)     Procedure Component Value Units Date/Time    Comprehensive Metabolic Panel [[id number]] Collected: 03/09/22 0712    Specimen: Blood Updated: 03/09/22 0716    CBC Auto Differential [[id number]] Collected: 03/09/22 0712    Specimen: Blood Updated: 03/09/22 0716    Blood Culture - Blood, Arm, Right [[id number]]  (Normal) Collected: 03/07/22 7254     Specimen: Blood from Arm, Right Updated: 03/09/22 0017     Blood Culture No growth at 24 hours    Blood Culture - Blood, Arm, Left [[id number]]  (Normal) Collected: 03/07/22 2354    Specimen: Blood from Arm, Left Updated: 03/09/22 0017     Blood Culture No growth at 24 hours    Urinalysis With Culture If Indicated - Urine, Clean Catch [[id number]]  (Abnormal) Collected: 03/08/22 0207    Specimen: Urine, Clean Catch Updated: 03/08/22 0218     Color, UA Yellow     Appearance, UA Clear     pH, UA 5.5     Specific Gravity, UA >1.030     Glucose, UA Negative     Ketones, UA Negative     Bilirubin, UA Negative     Blood, UA Negative     Protein, UA Negative     Leuk Esterase, UA Negative     Nitrite, UA Negative     Urobilinogen, UA 0.2 E.U./dL    Narrative:      Urine microscopic not indicated.    COVID-19,[facility] IN-HOUSE,Nasal Swab No Transport Media 3-4 HR TAT - Swab, Nasal Cavity [[id number]]  (Normal) Collected: 03/07/22 2354    Specimen: Swab from Nasal Cavity Updated: 03/08/22 0057     COVID19 Not Detected    Narrative:      Fact sheet for providers: https://www.fda.gov/media/512156/download     Fact sheet for patients: https://www.fda.gov/media/394061/download    Test performed by PCR.    Consider negative results in combination with clinical observations, patient history, and epidemiological information.    Procalcitonin [[id number]]  (Normal) Collected: 03/07/22 2354    Specimen: Blood Updated: 03/08/22 0030     Procalcitonin 0.06 ng/mL     Narrative:      As a Marker for Sepsis (Non-Neonates):    1. <0.5 ng/mL represents a low risk of severe sepsis and/or septic shock.  2. >2 ng/mL represents a high risk of severe sepsis and/or septic shock.    As a Marker for Lower Respiratory Tract Infections that require antibiotic therapy:    PCT on Admission    Antibiotic Therapy       6-12 Hrs later    >0.5                Strongly Recommended  >0.25 - <0.5        Recommended   0.1 - 0.25          Discouraged               "Remeasure/reassess PCT  <0.1                Strongly Discouraged     Remeasure/reassess PCT    As 28 day mortality risk marker: \"Change in Procalcitonin Result\" (>80% or <=80%) if Day 0 (or Day 1) and Day 4 values are available. Refer to http://www.[facility]-pct-calculator.com    Change in PCT <=80%  A decrease of PCT levels below or equal to 80% defines a positive change in PCT test result representing a higher risk for 28-day all-cause mortality of patients diagnosed with severe sepsis for septic shock.    Change in PCT >80%  A decrease of PCT levels of more than 80% defines a negative change in PCT result representing a lower risk for 28-day all-cause mortality of patients diagnosed with severe sepsis or septic shock.       Comprehensive Metabolic Panel [[id number]]  (Abnormal) Collected: 03/07/22 2354    Specimen: Blood Updated: 03/08/22 0024     Glucose 76 mg/dL      BUN 20 mg/dL      Creatinine 0.66 mg/dL      Sodium 144 mmol/L      Potassium 4.2 mmol/L      Chloride 110 mmol/L      CO2 25.0 mmol/L      Calcium 9.8 mg/dL      Total Protein 7.4 g/dL      Albumin 4.50 g/dL      ALT (SGPT) 47 U/L      AST (SGOT) 85 U/L      Alkaline Phosphatase 143 U/L      Total Bilirubin 0.3 mg/dL      Globulin 2.9 gm/dL      A/G Ratio 1.6 g/dL      BUN/Creatinine Ratio 30.3     Anion Gap 9.0 mmol/L      eGFR 106.0 mL/min/1.73      Comment: National Kidney Foundation and American Society of Nephrology (ASN) Task Force recommended calculation based on the Chronic Kidney Disease Epidemiology Collaboration (CKD-EPI) equation refit without adjustment for race.       Narrative:      GFR Normal >60  Chronic Kidney Disease <60  Kidney Failure <15      Lactic Acid, Plasma [[id number]]  (Normal) Collected: 03/07/22 2354    Specimen: Blood Updated: 03/08/22 0022     Lactate 1.3 mmol/L     Lipase [[id number]]  (Normal) Collected: 03/07/22 2354    Specimen: Blood Updated: 03/08/22 0019     Lipase 27 U/L     CBC & Differential [[id number]]  " "(Abnormal) Collected: 22    Specimen: Blood Updated: 22    Narrative:      The following orders were created for panel order CBC & Differential.  Procedure                               Abnormality         Status                     ---------                               -----------         ------                     CBC Auto Differential[[id number]]        Abnormal            Final result                 Please view results for these tests on the individual orders.    CBC Auto Differential [[id number]]  (Abnormal) Collected: 22    Specimen: Blood Updated: 22     WBC 17.35 10*3/mm3      RBC 4.53 10*6/mm3      Hemoglobin 14.7 g/dL      Hematocrit 43.2 %      MCV 95.4 fL      MCH 32.5 pg      MCHC 34.0 g/dL      RDW 12.9 %      RDW-SD 45.7 fl      MPV 10.0 fL      Platelets 243 10*3/mm3      Neutrophil % 77.8 %      Lymphocyte % 13.3 %      Monocyte % 6.3 %      Eosinophil % 1.7 %      Basophil % 0.4 %      Immature Grans % 0.5 %      Neutrophils, Absolute 13.52 10*3/mm3      Lymphocytes, Absolute 2.30 10*3/mm3      Monocytes, Absolute 1.09 10*3/mm3      Eosinophils, Absolute 0.29 10*3/mm3      Basophils, Absolute 0.07 10*3/mm3      Immature Grans, Absolute 0.08 10*3/mm3      nRBC 0.0 /100 WBC             Objective:     Vitals: /59 (BP Location: Right arm, Patient Position: Lying)   Pulse 88   Temp 98.6 °F (37 °C) (Oral)   Resp 17   Ht 160 cm (63\")   Wt 59.4 kg (131 lb)   SpO2 94%   BMI 23.21 kg/m²      Intake/Output Summary (Last 24 hours) at 3/10/2022 0711  Last data filed at 3/10/2022 0452  Gross per 24 hour   Intake 1130.45 ml   Output 610 ml   Net 520.45 ml    Temp (24hrs), Av.9 °F (37.2 °C), Min:98.1 °F (36.7 °C), Max:100.9 °F (38.3 °C)      Physical Exam  Constitutional:       Appearance: He is well-developed.   HENT:      Head: Normocephalic and atraumatic.   Eyes:      Conjunctiva/sclera: Conjunctivae normal.      Pupils: Pupils are equal, round, and " reactive to light.   Cardiovascular:      Rate and Rhythm: Normal rate and regular rhythm.      Heart sounds: Normal heart sounds. No murmur heard.    No friction rub.   Pulmonary:      Effort: Pulmonary effort is normal. No respiratory distress.      Breath sounds: Normal breath sounds. No wheezing or rales.   Abdominal:      General: Bowel sounds are normal. There is no distension.      Palpations: Abdomen is soft.      Comments: Severe, exquisite right upper quadrant tenderness   Musculoskeletal:         General: Normal range of motion.      Cervical back: Normal range of motion.   Skin:     Capillary Refill: Capillary refill takes less than 2 seconds.   Neurological:      Mental Status: He is alert and oriented to person, place, and time.      Cranial Nerves: No cranial nerve deficit.   Psychiatric:         Behavior: Behavior normal.             Assessment and Plan:     Primary Problem:  Colitis    Hospital Problem list:    Colitis    Right upper quadrant pain    Intractable vomiting    Chronic idiopathic constipation      PMH:  History reviewed. No pertinent past medical history.    Treatment Plan:  Colitis  CT abdomen pelvis showing colonic wall thickening and pericolonic stranding concerning for diverticulitis versus colitis.  Continue Levaquin and Flagyl.     Right upper quadrant pain   CT findings of his prior biliary surgery are stable and ultrasound was fairly unremarkable compared to prior.  Pain is worsened today and labs show obstructive picture, morning labs pending.  Will get MRCP and consult GI as significant concern for biliary obstruction at this time.  May need ERCP    Intractable vomiting  Resolved with antiemetics     Chronic idiopathic/drug-induced constipation  Chronic opioid therapy.  Takes Symproic at home.      Disposition: Continue inpatient care    Reviewed treatment plans with the patient and/or family.   30 minutes spent in face to face interaction and coordination of care.      Electronically signed by Ian Allan MD on 3/10/2022 at 07:11 CST

## 2022-03-10 NOTE — PLAN OF CARE
Goal Outcome Evaluation:      Pt c/o pain this shift; prn meds given per orders. Pt continues to tolerate RA and is up with SBA. Pt voiding per urinal. VSS; safety maintained.

## 2022-03-11 LAB
ALBUMIN SERPL-MCNC: 3 G/DL (ref 3.5–5.2)
ALBUMIN/GLOB SERPL: 1.8 G/DL
ALP SERPL-CCNC: 216 U/L (ref 39–117)
ALT SERPL W P-5'-P-CCNC: 95 U/L (ref 1–41)
ANION GAP SERPL CALCULATED.3IONS-SCNC: 12 MMOL/L (ref 5–15)
AST SERPL-CCNC: 114 U/L (ref 1–40)
BASOPHILS # BLD AUTO: 0.02 10*3/MM3 (ref 0–0.2)
BASOPHILS NFR BLD AUTO: 0.2 % (ref 0–1.5)
BILIRUB SERPL-MCNC: 0.9 MG/DL (ref 0–1.2)
BUN SERPL-MCNC: 18 MG/DL (ref 8–23)
BUN/CREAT SERPL: 34 (ref 7–25)
CALCIUM SPEC-SCNC: 8.2 MG/DL (ref 8.6–10.5)
CHLORIDE SERPL-SCNC: 105 MMOL/L (ref 98–107)
CO2 SERPL-SCNC: 21 MMOL/L (ref 22–29)
CREAT SERPL-MCNC: 0.53 MG/DL (ref 0.76–1.27)
DEPRECATED RDW RBC AUTO: 45.5 FL (ref 37–54)
EGFRCR SERPLBLD CKD-EPI 2021: 113.3 ML/MIN/1.73
EOSINOPHIL # BLD AUTO: 0.08 10*3/MM3 (ref 0–0.4)
EOSINOPHIL NFR BLD AUTO: 0.6 % (ref 0.3–6.2)
ERYTHROCYTE [DISTWIDTH] IN BLOOD BY AUTOMATED COUNT: 13.2 % (ref 12.3–15.4)
GLOBULIN UR ELPH-MCNC: 1.7 GM/DL
GLUCOSE SERPL-MCNC: 124 MG/DL (ref 65–99)
HCT VFR BLD AUTO: 35.1 % (ref 37.5–51)
HGB BLD-MCNC: 12 G/DL (ref 13–17.7)
IMM GRANULOCYTES # BLD AUTO: 0.11 10*3/MM3 (ref 0–0.05)
IMM GRANULOCYTES NFR BLD AUTO: 0.9 % (ref 0–0.5)
LYMPHOCYTES # BLD AUTO: 0.63 10*3/MM3 (ref 0.7–3.1)
LYMPHOCYTES NFR BLD AUTO: 5 % (ref 19.6–45.3)
MCH RBC QN AUTO: 31.9 PG (ref 26.6–33)
MCHC RBC AUTO-ENTMCNC: 34.2 G/DL (ref 31.5–35.7)
MCV RBC AUTO: 93.4 FL (ref 79–97)
MONOCYTES # BLD AUTO: 1.22 10*3/MM3 (ref 0.1–0.9)
MONOCYTES NFR BLD AUTO: 9.7 % (ref 5–12)
NEUTROPHILS NFR BLD AUTO: 10.57 10*3/MM3 (ref 1.7–7)
NEUTROPHILS NFR BLD AUTO: 83.6 % (ref 42.7–76)
NRBC BLD AUTO-RTO: 0 /100 WBC (ref 0–0.2)
PLATELET # BLD AUTO: 106 10*3/MM3 (ref 140–450)
PMV BLD AUTO: 11.5 FL (ref 6–12)
POTASSIUM SERPL-SCNC: 3.8 MMOL/L (ref 3.5–5.2)
PROT SERPL-MCNC: 4.7 G/DL (ref 6–8.5)
RBC # BLD AUTO: 3.76 10*6/MM3 (ref 4.14–5.8)
SODIUM SERPL-SCNC: 138 MMOL/L (ref 136–145)
WBC NRBC COR # BLD: 12.63 10*3/MM3 (ref 3.4–10.8)

## 2022-03-11 PROCEDURE — 25010000002 ENOXAPARIN PER 10 MG: Performed by: FAMILY MEDICINE

## 2022-03-11 PROCEDURE — 25010000002 LEVOFLOXACIN PER 250 MG: Performed by: FAMILY MEDICINE

## 2022-03-11 PROCEDURE — 80053 COMPREHEN METABOLIC PANEL: CPT | Performed by: FAMILY MEDICINE

## 2022-03-11 PROCEDURE — 0 HYDROMORPHONE 1 MG/ML SOLUTION: Performed by: FAMILY MEDICINE

## 2022-03-11 PROCEDURE — 85025 COMPLETE CBC W/AUTO DIFF WBC: CPT | Performed by: FAMILY MEDICINE

## 2022-03-11 RX ORDER — POTASSIUM CHLORIDE 750 MG/1
40 CAPSULE, EXTENDED RELEASE ORAL DAILY
Status: DISCONTINUED | OUTPATIENT
Start: 2022-03-11 | End: 2022-03-13 | Stop reason: HOSPADM

## 2022-03-11 RX ADMIN — HYDROMORPHONE HYDROCHLORIDE 1 MG: 1 INJECTION, SOLUTION INTRAMUSCULAR; INTRAVENOUS; SUBCUTANEOUS at 18:41

## 2022-03-11 RX ADMIN — HYDROMORPHONE HYDROCHLORIDE 1 MG: 1 INJECTION, SOLUTION INTRAMUSCULAR; INTRAVENOUS; SUBCUTANEOUS at 15:40

## 2022-03-11 RX ADMIN — ENOXAPARIN SODIUM 40 MG: 40 INJECTION SUBCUTANEOUS at 08:23

## 2022-03-11 RX ADMIN — METRONIDAZOLE 500 MG: 500 INJECTION, SOLUTION INTRAVENOUS at 04:56

## 2022-03-11 RX ADMIN — HYDROMORPHONE HYDROCHLORIDE 1 MG: 1 INJECTION, SOLUTION INTRAMUSCULAR; INTRAVENOUS; SUBCUTANEOUS at 07:29

## 2022-03-11 RX ADMIN — LEVOFLOXACIN 750 MG: 5 INJECTION, SOLUTION INTRAVENOUS at 06:11

## 2022-03-11 RX ADMIN — HYDROCODONE BITARTRATE AND ACETAMINOPHEN 1 TABLET: 5; 325 TABLET ORAL at 20:09

## 2022-03-11 RX ADMIN — HYDROCODONE BITARTRATE AND ACETAMINOPHEN 1 TABLET: 5; 325 TABLET ORAL at 06:14

## 2022-03-11 RX ADMIN — METRONIDAZOLE 500 MG: 500 INJECTION, SOLUTION INTRAVENOUS at 13:01

## 2022-03-11 RX ADMIN — SODIUM CHLORIDE 100 ML/HR: 9 INJECTION, SOLUTION INTRAVENOUS at 15:41

## 2022-03-11 RX ADMIN — HYDROMORPHONE HYDROCHLORIDE 1 MG: 1 INJECTION, SOLUTION INTRAMUSCULAR; INTRAVENOUS; SUBCUTANEOUS at 12:51

## 2022-03-11 RX ADMIN — HYDROMORPHONE HYDROCHLORIDE 1 MG: 1 INJECTION, SOLUTION INTRAMUSCULAR; INTRAVENOUS; SUBCUTANEOUS at 21:41

## 2022-03-11 RX ADMIN — POTASSIUM CHLORIDE 40 MEQ: 10 CAPSULE, COATED, EXTENDED RELEASE ORAL at 08:23

## 2022-03-11 RX ADMIN — HYDROMORPHONE HYDROCHLORIDE 1 MG: 1 INJECTION, SOLUTION INTRAMUSCULAR; INTRAVENOUS; SUBCUTANEOUS at 02:31

## 2022-03-11 RX ADMIN — METRONIDAZOLE 500 MG: 500 INJECTION, SOLUTION INTRAVENOUS at 20:09

## 2022-03-11 NOTE — PROGRESS NOTES
Daily Progress Note  Lyndon Campos  MRN: [id number] LOS: 3    Admit Date: 3/7/2022   3/11/2022 07:21 CST    Subjective:          Chief Complaint:  Chief Complaint   Patient presents with   • Abdominal Pain   • Vomiting       Interval History:    Reviewed overnight events and nursing notes.   Patient doing better this morning.  Pain improved.  Was able to tolerate some food last night.  No vomiting.    Review of Systems   Constitutional: Positive for chills and fever.   Respiratory: Negative for shortness of breath.    Cardiovascular: Negative for chest pain.   Gastrointestinal: Positive for abdominal pain and nausea. Negative for vomiting.       DIET:  Diet Regular    Medications:   sodium chloride, 100 mL/hr, Last Rate: 100 mL/hr (03/10/22 1817)      enoxaparin, 40 mg, Subcutaneous, Q24H  levoFLOXacin, 750 mg, Intravenous, Q24H  metroNIDAZOLE, 500 mg, Intravenous, Q8H  sodium chloride, 10 mL, Intravenous, Q12H        Data:     Code Status:   Code Status and Medical Interventions:   Ordered at: 03/08/22 0717     Code Status (Patient has no pulse and is not breathing):    CPR (Attempt to Resuscitate)     Medical Interventions (Patient has pulse or is breathing):    Full Support       History reviewed. No pertinent family history.  Social History     Socioeconomic History   • Marital status: Single   Tobacco Use   • Smoking status: Current Every Day Smoker     Packs/day: 1.00   Substance and Sexual Activity   • Alcohol use: No   • Drug use: Defer   • Sexual activity: Defer       Labs:    Lab Results (last 72 hours)     Procedure Component Value Units Date/Time    Comprehensive Metabolic Panel [[id number]] Collected: 03/09/22 0712    Specimen: Blood Updated: 03/09/22 0716    CBC Auto Differential [[id number]] Collected: 03/09/22 0712    Specimen: Blood Updated: 03/09/22 0716    Blood Culture - Blood, Arm, Right [[id number]]  (Normal) Collected: 03/07/22 4184    Specimen: Blood from Arm, Right Updated: 03/09/22 0017      Blood Culture No growth at 24 hours    Blood Culture - Blood, Arm, Left [[id number]]  (Normal) Collected: 03/07/22 2354    Specimen: Blood from Arm, Left Updated: 03/09/22 0017     Blood Culture No growth at 24 hours    Urinalysis With Culture If Indicated - Urine, Clean Catch [[id number]]  (Abnormal) Collected: 03/08/22 0207    Specimen: Urine, Clean Catch Updated: 03/08/22 0218     Color, UA Yellow     Appearance, UA Clear     pH, UA 5.5     Specific Gravity, UA >1.030     Glucose, UA Negative     Ketones, UA Negative     Bilirubin, UA Negative     Blood, UA Negative     Protein, UA Negative     Leuk Esterase, UA Negative     Nitrite, UA Negative     Urobilinogen, UA 0.2 E.U./dL    Narrative:      Urine microscopic not indicated.    COVID-19,[facility] IN-HOUSE,Nasal Swab No Transport Media 3-4 HR TAT - Swab, Nasal Cavity [[id number]]  (Normal) Collected: 03/07/22 2354    Specimen: Swab from Nasal Cavity Updated: 03/08/22 0057     COVID19 Not Detected    Narrative:      Fact sheet for providers: https://www.fda.gov/media/471543/download     Fact sheet for patients: https://www.fda.gov/media/028423/download    Test performed by PCR.    Consider negative results in combination with clinical observations, patient history, and epidemiological information.    Procalcitonin [[id number]]  (Normal) Collected: 03/07/22 2354    Specimen: Blood Updated: 03/08/22 0030     Procalcitonin 0.06 ng/mL     Narrative:      As a Marker for Sepsis (Non-Neonates):    1. <0.5 ng/mL represents a low risk of severe sepsis and/or septic shock.  2. >2 ng/mL represents a high risk of severe sepsis and/or septic shock.    As a Marker for Lower Respiratory Tract Infections that require antibiotic therapy:    PCT on Admission    Antibiotic Therapy       6-12 Hrs later    >0.5                Strongly Recommended  >0.25 - <0.5        Recommended   0.1 - 0.25          Discouraged              Remeasure/reassess PCT  <0.1                Strongly  "Discouraged     Remeasure/reassess PCT    As 28 day mortality risk marker: \"Change in Procalcitonin Result\" (>80% or <=80%) if Day 0 (or Day 1) and Day 4 values are available. Refer to http://www.[facility]-pct-calculator.com    Change in PCT <=80%  A decrease of PCT levels below or equal to 80% defines a positive change in PCT test result representing a higher risk for 28-day all-cause mortality of patients diagnosed with severe sepsis for septic shock.    Change in PCT >80%  A decrease of PCT levels of more than 80% defines a negative change in PCT result representing a lower risk for 28-day all-cause mortality of patients diagnosed with severe sepsis or septic shock.       Comprehensive Metabolic Panel [[id number]]  (Abnormal) Collected: 03/07/22 2354    Specimen: Blood Updated: 03/08/22 0024     Glucose 76 mg/dL      BUN 20 mg/dL      Creatinine 0.66 mg/dL      Sodium 144 mmol/L      Potassium 4.2 mmol/L      Chloride 110 mmol/L      CO2 25.0 mmol/L      Calcium 9.8 mg/dL      Total Protein 7.4 g/dL      Albumin 4.50 g/dL      ALT (SGPT) 47 U/L      AST (SGOT) 85 U/L      Alkaline Phosphatase 143 U/L      Total Bilirubin 0.3 mg/dL      Globulin 2.9 gm/dL      A/G Ratio 1.6 g/dL      BUN/Creatinine Ratio 30.3     Anion Gap 9.0 mmol/L      eGFR 106.0 mL/min/1.73      Comment: National Kidney Foundation and American Society of Nephrology (ASN) Task Force recommended calculation based on the Chronic Kidney Disease Epidemiology Collaboration (CKD-EPI) equation refit without adjustment for race.       Narrative:      GFR Normal >60  Chronic Kidney Disease <60  Kidney Failure <15      Lactic Acid, Plasma [[id number]]  (Normal) Collected: 03/07/22 2354    Specimen: Blood Updated: 03/08/22 0022     Lactate 1.3 mmol/L     Lipase [[id number]]  (Normal) Collected: 03/07/22 2354    Specimen: Blood Updated: 03/08/22 0019     Lipase 27 U/L     CBC & Differential [[id number]]  (Abnormal) Collected: 03/07/22 2354    Specimen: Blood " "Updated: 22    Narrative:      The following orders were created for panel order CBC & Differential.  Procedure                               Abnormality         Status                     ---------                               -----------         ------                     CBC Auto Differential[[id number]]        Abnormal            Final result                 Please view results for these tests on the individual orders.    CBC Auto Differential [[id number]]  (Abnormal) Collected: 22 1660    Specimen: Blood Updated: 22     WBC 17.35 10*3/mm3      RBC 4.53 10*6/mm3      Hemoglobin 14.7 g/dL      Hematocrit 43.2 %      MCV 95.4 fL      MCH 32.5 pg      MCHC 34.0 g/dL      RDW 12.9 %      RDW-SD 45.7 fl      MPV 10.0 fL      Platelets 243 10*3/mm3      Neutrophil % 77.8 %      Lymphocyte % 13.3 %      Monocyte % 6.3 %      Eosinophil % 1.7 %      Basophil % 0.4 %      Immature Grans % 0.5 %      Neutrophils, Absolute 13.52 10*3/mm3      Lymphocytes, Absolute 2.30 10*3/mm3      Monocytes, Absolute 1.09 10*3/mm3      Eosinophils, Absolute 0.29 10*3/mm3      Basophils, Absolute 0.07 10*3/mm3      Immature Grans, Absolute 0.08 10*3/mm3      nRBC 0.0 /100 WBC             Objective:     Vitals: /61 (BP Location: Right arm, Patient Position: Lying)   Pulse 83   Temp 99.5 °F (37.5 °C) (Oral)   Resp 18   Ht 160 cm (63\")   Wt 59.4 kg (131 lb)   SpO2 94%   BMI 23.21 kg/m²      Intake/Output Summary (Last 24 hours) at 3/11/2022 0721  Last data filed at 3/11/2022 0515  Gross per 24 hour   Intake --   Output 300 ml   Net -300 ml    Temp (24hrs), Av.3 °F (36.8 °C), Min:97.7 °F (36.5 °C), Max:99.5 °F (37.5 °C)      Physical Exam  Constitutional:       Appearance: He is well-developed.   HENT:      Head: Normocephalic and atraumatic.   Eyes:      Conjunctiva/sclera: Conjunctivae normal.      Pupils: Pupils are equal, round, and reactive to light.   Cardiovascular:      Rate and Rhythm: " Normal rate and regular rhythm.      Heart sounds: Normal heart sounds. No murmur heard.    No friction rub.   Pulmonary:      Effort: Pulmonary effort is normal. No respiratory distress.      Breath sounds: Normal breath sounds. No wheezing or rales.   Abdominal:      General: Bowel sounds are normal. There is no distension.      Palpations: Abdomen is soft.      Comments: Severe, exquisite right upper quadrant tenderness   Musculoskeletal:         General: Normal range of motion.      Cervical back: Normal range of motion.   Skin:     Capillary Refill: Capillary refill takes less than 2 seconds.   Neurological:      Mental Status: He is alert and oriented to person, place, and time.      Cranial Nerves: No cranial nerve deficit.   Psychiatric:         Behavior: Behavior normal.             Assessment and Plan:     Primary Problem:  Colitis    Hospital Problem list:    Colitis    Right upper quadrant pain    Intractable vomiting    Chronic idiopathic constipation      PMH:  History reviewed. No pertinent past medical history.    Treatment Plan:  Colitis  CT abdomen pelvis showing colonic wall thickening and pericolonic stranding concerning for diverticulitis versus colitis.  Continue Levaquin and Flagyl.     Right upper quadrant pain   CT findings of his prior biliary surgery are stable and ultrasound was fairly unremarkable compared to prior.  MRCP stable from 2017.  GI consulted.  Do not feel that he needs procedure at this time recommend continue antibiotics for diverticulitis.  Would be a very complicated case and if his condition were to worsen, would likely need transfer to tertiary care.    Intractable vomiting  Resolved with antiemetics     Chronic idiopathic/drug-induced constipation  Chronic opioid therapy.  Takes Symproic at home.      Disposition: Continue inpatient care, once pain and nausea controlled can discharge home on oral antibiotics.  Potentially over the weekend.    Reviewed treatment plans  with the patient and/or family.   30 minutes spent in face to face interaction and coordination of care.     Electronically signed by Ian Allan MD on 3/11/2022 at 07:21 CST

## 2022-03-11 NOTE — PLAN OF CARE
Goal Outcome Evaluation:      Pt has c/o pain this shift; PRN meds given per orders. Pt has asked for pain medicine less this shift. Pt is on a regular diet. Pt is up with SBA and voiding. Pt continues to tolerate RA. Fluids infusing per orders. VSS; safety maintained.

## 2022-03-12 LAB
ALBUMIN SERPL-MCNC: 2.5 G/DL (ref 3.5–5.2)
ALBUMIN/GLOB SERPL: 0.9 G/DL
ALP SERPL-CCNC: 211 U/L (ref 39–117)
ALT SERPL W P-5'-P-CCNC: 65 U/L (ref 1–41)
ANION GAP SERPL CALCULATED.3IONS-SCNC: 8 MMOL/L (ref 5–15)
AST SERPL-CCNC: 66 U/L (ref 1–40)
BASOPHILS # BLD AUTO: 0.03 10*3/MM3 (ref 0–0.2)
BASOPHILS NFR BLD AUTO: 0.3 % (ref 0–1.5)
BILIRUB SERPL-MCNC: 1.1 MG/DL (ref 0–1.2)
BUN SERPL-MCNC: 13 MG/DL (ref 8–23)
BUN/CREAT SERPL: 28.3 (ref 7–25)
CALCIUM SPEC-SCNC: 8.2 MG/DL (ref 8.6–10.5)
CHLORIDE SERPL-SCNC: 108 MMOL/L (ref 98–107)
CO2 SERPL-SCNC: 23 MMOL/L (ref 22–29)
CREAT SERPL-MCNC: 0.46 MG/DL (ref 0.76–1.27)
DEPRECATED RDW RBC AUTO: 45.4 FL (ref 37–54)
EGFRCR SERPLBLD CKD-EPI 2021: 118.3 ML/MIN/1.73
EOSINOPHIL # BLD AUTO: 0.16 10*3/MM3 (ref 0–0.4)
EOSINOPHIL NFR BLD AUTO: 1.5 % (ref 0.3–6.2)
ERYTHROCYTE [DISTWIDTH] IN BLOOD BY AUTOMATED COUNT: 13.2 % (ref 12.3–15.4)
GLOBULIN UR ELPH-MCNC: 2.7 GM/DL
GLUCOSE SERPL-MCNC: 90 MG/DL (ref 65–99)
HCT VFR BLD AUTO: 34.7 % (ref 37.5–51)
HGB BLD-MCNC: 11.7 G/DL (ref 13–17.7)
IMM GRANULOCYTES # BLD AUTO: 0.08 10*3/MM3 (ref 0–0.05)
IMM GRANULOCYTES NFR BLD AUTO: 0.7 % (ref 0–0.5)
LYMPHOCYTES # BLD AUTO: 0.88 10*3/MM3 (ref 0.7–3.1)
LYMPHOCYTES NFR BLD AUTO: 8.1 % (ref 19.6–45.3)
MCH RBC QN AUTO: 31.6 PG (ref 26.6–33)
MCHC RBC AUTO-ENTMCNC: 33.7 G/DL (ref 31.5–35.7)
MCV RBC AUTO: 93.8 FL (ref 79–97)
MONOCYTES # BLD AUTO: 1.44 10*3/MM3 (ref 0.1–0.9)
MONOCYTES NFR BLD AUTO: 13.3 % (ref 5–12)
NEUTROPHILS NFR BLD AUTO: 76.1 % (ref 42.7–76)
NEUTROPHILS NFR BLD AUTO: 8.24 10*3/MM3 (ref 1.7–7)
NRBC BLD AUTO-RTO: 0 /100 WBC (ref 0–0.2)
PLATELET # BLD AUTO: 117 10*3/MM3 (ref 140–450)
PMV BLD AUTO: 12.1 FL (ref 6–12)
POTASSIUM SERPL-SCNC: 3.9 MMOL/L (ref 3.5–5.2)
PROT SERPL-MCNC: 5.2 G/DL (ref 6–8.5)
RBC # BLD AUTO: 3.7 10*6/MM3 (ref 4.14–5.8)
SODIUM SERPL-SCNC: 139 MMOL/L (ref 136–145)
WBC NRBC COR # BLD: 10.83 10*3/MM3 (ref 3.4–10.8)

## 2022-03-12 PROCEDURE — 80053 COMPREHEN METABOLIC PANEL: CPT | Performed by: FAMILY MEDICINE

## 2022-03-12 PROCEDURE — 25010000002 ENOXAPARIN PER 10 MG: Performed by: FAMILY MEDICINE

## 2022-03-12 PROCEDURE — 85025 COMPLETE CBC W/AUTO DIFF WBC: CPT | Performed by: FAMILY MEDICINE

## 2022-03-12 PROCEDURE — 25010000002 ONDANSETRON PER 1 MG: Performed by: FAMILY MEDICINE

## 2022-03-12 PROCEDURE — 0 HYDROMORPHONE 1 MG/ML SOLUTION: Performed by: FAMILY MEDICINE

## 2022-03-12 PROCEDURE — 25010000002 LEVOFLOXACIN PER 250 MG: Performed by: FAMILY MEDICINE

## 2022-03-12 RX ADMIN — POTASSIUM CHLORIDE 40 MEQ: 10 CAPSULE, COATED, EXTENDED RELEASE ORAL at 08:08

## 2022-03-12 RX ADMIN — HYDROCODONE BITARTRATE AND ACETAMINOPHEN 1 TABLET: 5; 325 TABLET ORAL at 22:29

## 2022-03-12 RX ADMIN — SODIUM CHLORIDE 100 ML/HR: 9 INJECTION, SOLUTION INTRAVENOUS at 16:06

## 2022-03-12 RX ADMIN — SODIUM CHLORIDE 100 ML/HR: 9 INJECTION, SOLUTION INTRAVENOUS at 01:54

## 2022-03-12 RX ADMIN — METRONIDAZOLE 500 MG: 500 INJECTION, SOLUTION INTRAVENOUS at 20:43

## 2022-03-12 RX ADMIN — ONDANSETRON 4 MG: 2 INJECTION INTRAMUSCULAR; INTRAVENOUS at 22:29

## 2022-03-12 RX ADMIN — METRONIDAZOLE 500 MG: 500 INJECTION, SOLUTION INTRAVENOUS at 11:04

## 2022-03-12 RX ADMIN — Medication 10 ML: at 20:43

## 2022-03-12 RX ADMIN — HYDROCODONE BITARTRATE AND ACETAMINOPHEN 1 TABLET: 5; 325 TABLET ORAL at 02:37

## 2022-03-12 RX ADMIN — METRONIDAZOLE 500 MG: 500 INJECTION, SOLUTION INTRAVENOUS at 05:18

## 2022-03-12 RX ADMIN — HYDROCODONE BITARTRATE AND ACETAMINOPHEN 1 TABLET: 5; 325 TABLET ORAL at 13:02

## 2022-03-12 RX ADMIN — HYDROMORPHONE HYDROCHLORIDE 1 MG: 1 INJECTION, SOLUTION INTRAMUSCULAR; INTRAVENOUS; SUBCUTANEOUS at 04:24

## 2022-03-12 RX ADMIN — HYDROCODONE BITARTRATE AND ACETAMINOPHEN 1 TABLET: 5; 325 TABLET ORAL at 08:09

## 2022-03-12 RX ADMIN — LEVOFLOXACIN 750 MG: 5 INJECTION, SOLUTION INTRAVENOUS at 06:52

## 2022-03-12 RX ADMIN — ENOXAPARIN SODIUM 40 MG: 40 INJECTION SUBCUTANEOUS at 08:09

## 2022-03-12 RX ADMIN — HYDROCODONE BITARTRATE AND ACETAMINOPHEN 1 TABLET: 5; 325 TABLET ORAL at 17:19

## 2022-03-12 NOTE — PROGRESS NOTES
Inpatient Gastroenterology Service    Follow-up Note    Félix Gunn MD, FACP        Complaints  -No new GI complaints        Physical Exam  -GI component unchanged        Laboratory of Note  -see EMR        Imaging of Note  -see EMR  MRCP    IMPRESSION:     1.  Chronic intrahepatic and extra hepatic biliary ductal dilatation  with gradual caliber tapering down to the ampulla. Appearance is  unchanged from a study performed in 2017. This may represent reservoir  phenomenon although distal biliary stricture is also within the  differential. No obstructing stone or mass identified.     2.  Small bilateral pleural effusions and trace perihepatic ascites.     This report was finalized on 03/10/2022 16:13 by Dr. Adalberto Jones MD.          Assessment  -diverticulitis, treatment underway, symptoms improving  -biliary tract abnormalities S/P hepaticojejunostomy        Recommendations  -complete course of antibiotics for diverticulitis  -follow-up with established gastroenterologist post-convalescence in 2 -- 4 weeks  -patient is due for surveillance colonoscopy, and this should be arranged electively post-convalescence with his established gastroenterologist  -will sign off: please re-consult inpatient GI service prn          Thank you for allowing us to participate in the care of this patient.    Sincerely,    JAM Gunn MD, FACP

## 2022-03-12 NOTE — PROGRESS NOTES
"    Daily Progress Note  Lyndon Campos  MRN: [id number] LOS: 4    Admit Date: 3/7/2022   3/12/2022 07:27 CST    Subjective:         Interval History:    Reviewed overnight events and nursing notes.     Patient feels he is clinically improving.  Still has right upper quadrant pain which is chronic for him.  Feels like his left lower quadrant pain is improving.  No significant diarrhea nausea or vomiting.  Still has not had robust oral intake.    ROS:  Review of Systems   Constitutional: Negative for chills and fever.   Respiratory: Negative for cough and shortness of breath.    Cardiovascular: Negative for chest pain and palpitations.   Gastrointestinal: Positive for abdominal pain. Negative for blood in stool and constipation.       DIET:  Diet Regular    Medications:   sodium chloride, 100 mL/hr, Last Rate: 100 mL/hr (22 0154)      enoxaparin, 40 mg, Subcutaneous, Q24H  levoFLOXacin, 750 mg, Intravenous, Q24H  metroNIDAZOLE, 500 mg, Intravenous, Q8H  potassium chloride, 40 mEq, Oral, Daily  sodium chloride, 10 mL, Intravenous, Q12H          Objective:     Vitals: /48 (BP Location: Right arm, Patient Position: Lying)   Pulse 106   Temp 98.6 °F (37 °C) (Oral)   Resp 16   Ht 160 cm (63\")   Wt 59.4 kg (131 lb)   SpO2 93%   BMI 23.21 kg/m²    Intake/Output Summary (Last 24 hours) at 3/12/2022 0727  Last data filed at 3/12/2022 0421  Gross per 24 hour   Intake 360 ml   Output 1275 ml   Net -915 ml    Temp (24hrs), Av.4 °F (36.9 °C), Min:97.8 °F (36.6 °C), Max:99 °F (37.2 °C)    Glucose:  No results found for: POCGLU  Physical Examination:   Physical Exam  Constitutional:       General: He is not in acute distress.     Appearance: Normal appearance. He is normal weight. He is not ill-appearing.   Cardiovascular:      Rate and Rhythm: Normal rate and regular rhythm.      Pulses: Normal pulses.      Heart sounds: Normal heart sounds. No murmur heard.  Pulmonary:      Effort: Pulmonary effort is " normal.      Breath sounds: Normal breath sounds. No wheezing, rhonchi or rales.   Abdominal:      General: Abdomen is flat. Bowel sounds are normal.      Palpations: Abdomen is soft.      Tenderness: There is abdominal tenderness.   Neurological:      Mental Status: He is alert.         Labs:  Lab Results (last 24 hours)     Procedure Component Value Units Date/Time    Comprehensive Metabolic Panel [[id number]]  (Abnormal) Collected: 03/12/22 0436    Specimen: Blood Updated: 03/12/22 0548     Glucose 90 mg/dL      BUN 13 mg/dL      Creatinine 0.46 mg/dL      Sodium 139 mmol/L      Potassium 3.9 mmol/L      Chloride 108 mmol/L      CO2 23.0 mmol/L      Calcium 8.2 mg/dL      Total Protein 5.2 g/dL      Albumin 2.50 g/dL      ALT (SGPT) 65 U/L      AST (SGOT) 66 U/L      Alkaline Phosphatase 211 U/L      Total Bilirubin 1.1 mg/dL      Globulin 2.7 gm/dL      A/G Ratio 0.9 g/dL      BUN/Creatinine Ratio 28.3     Anion Gap 8.0 mmol/L      eGFR 118.3 mL/min/1.73      Comment: National Kidney Foundation and American Society of Nephrology (ASN) Task Force recommended calculation based on the Chronic Kidney Disease Epidemiology Collaboration (CKD-EPI) equation refit without adjustment for race.       Narrative:      GFR Normal >60  Chronic Kidney Disease <60  Kidney Failure <15      CBC & Differential [[id number]]  (Abnormal) Collected: 03/12/22 0436    Specimen: Blood Updated: 03/12/22 0534    Narrative:      The following orders were created for panel order CBC & Differential.  Procedure                               Abnormality         Status                     ---------                               -----------         ------                     CBC Auto Differential[[id number]]        Abnormal            Final result                 Please view results for these tests on the individual orders.    CBC Auto Differential [[id number]]  (Abnormal) Collected: 03/12/22 0436    Specimen: Blood Updated: 03/12/22 0534     WBC  10.83 10*3/mm3      RBC 3.70 10*6/mm3      Hemoglobin 11.7 g/dL      Hematocrit 34.7 %      MCV 93.8 fL      MCH 31.6 pg      MCHC 33.7 g/dL      RDW 13.2 %      RDW-SD 45.4 fl      MPV 12.1 fL      Platelets 117 10*3/mm3      Neutrophil % 76.1 %      Lymphocyte % 8.1 %      Monocyte % 13.3 %      Eosinophil % 1.5 %      Basophil % 0.3 %      Immature Grans % 0.7 %      Neutrophils, Absolute 8.24 10*3/mm3      Lymphocytes, Absolute 0.88 10*3/mm3      Monocytes, Absolute 1.44 10*3/mm3      Eosinophils, Absolute 0.16 10*3/mm3      Basophils, Absolute 0.03 10*3/mm3      Immature Grans, Absolute 0.08 10*3/mm3      nRBC 0.0 /100 WBC     Blood Culture - Blood, Arm, Right [[id number]]  (Normal) Collected: 03/07/22 2354    Specimen: Blood from Arm, Right Updated: 03/12/22 0017     Blood Culture No growth at 4 days    Blood Culture - Blood, Arm, Left [[id number]]  (Normal) Collected: 03/07/22 2354    Specimen: Blood from Arm, Left Updated: 03/12/22 0017     Blood Culture No growth at 4 days    Comprehensive Metabolic Panel [[id number]]  (Abnormal) Collected: 03/11/22 0737    Specimen: Blood Updated: 03/11/22 0806     Glucose 124 mg/dL      BUN 18 mg/dL      Creatinine 0.53 mg/dL      Sodium 138 mmol/L      Potassium 3.8 mmol/L      Chloride 105 mmol/L      CO2 21.0 mmol/L      Calcium 8.2 mg/dL      Total Protein 4.7 g/dL      Albumin 3.00 g/dL      ALT (SGPT) 95 U/L      AST (SGOT) 114 U/L      Alkaline Phosphatase 216 U/L      Total Bilirubin 0.9 mg/dL      Globulin 1.7 gm/dL      A/G Ratio 1.8 g/dL      BUN/Creatinine Ratio 34.0     Anion Gap 12.0 mmol/L      eGFR 113.3 mL/min/1.73      Comment: National Kidney Foundation and American Society of Nephrology (ASN) Task Force recommended calculation based on the Chronic Kidney Disease Epidemiology Collaboration (CKD-EPI) equation refit without adjustment for race.       Narrative:      GFR Normal >60  Chronic Kidney Disease <60  Kidney Failure <15      CBC & Differential  [[id number]]  (Abnormal) Collected: 03/11/22 0737    Specimen: Blood Updated: 03/11/22 0804    Narrative:      The following orders were created for panel order CBC & Differential.  Procedure                               Abnormality         Status                     ---------                               -----------         ------                     CBC Auto Differential[[id number]]        Abnormal            Final result                 Please view results for these tests on the individual orders.    CBC Auto Differential [[id number]]  (Abnormal) Collected: 03/11/22 0737    Specimen: Blood Updated: 03/11/22 0804     WBC 12.63 10*3/mm3      RBC 3.76 10*6/mm3      Hemoglobin 12.0 g/dL      Hematocrit 35.1 %      MCV 93.4 fL      MCH 31.9 pg      MCHC 34.2 g/dL      RDW 13.2 %      RDW-SD 45.5 fl      MPV 11.5 fL      Platelets 106 10*3/mm3      Neutrophil % 83.6 %      Lymphocyte % 5.0 %      Monocyte % 9.7 %      Eosinophil % 0.6 %      Basophil % 0.2 %      Immature Grans % 0.9 %      Neutrophils, Absolute 10.57 10*3/mm3      Lymphocytes, Absolute 0.63 10*3/mm3      Monocytes, Absolute 1.22 10*3/mm3      Eosinophils, Absolute 0.08 10*3/mm3      Basophils, Absolute 0.02 10*3/mm3      Immature Grans, Absolute 0.11 10*3/mm3      nRBC 0.0 /100 WBC            Imaging:  MRI abdomen w wo contrast mrcp    Result Date: 3/10/2022  EXAM: MRI abdomen without and with IV contrast including 3-D MRCP sequences  INDICATION: H/o biliary stones, severe RUQ pain and labs consistent with obstruction; K52.9-Noninfective gastroenteritis and colitis, unspecified; R10.10-Upper abdominal pain, unspecified; R79.89-Other specified abnormal findings of blood chemistry  COMPARISON: 3/8/2022, 2/16/2017  FINDINGS:  Liver: No hepatic steatosis or morphologic changes of cirrhosis. No suspicious liver lesion.  Bile ducts: Intrahepatic biliary ductal dilatation is present, most pronounced in the LEFT liver segment 2. The common bile duct is dilated  with gradual tapering down to the ampulla. Common bile duct measures 14 mm diameter. No obstructing stone or mass identified. Biliary caliber is similar to exams dating back to 2/16/2017.  Gallbladder: Prior cholecystectomy.  Vasculature: Nonaneurysmal abdominal aorta. Conventional hepatic arterial anatomy. Hepatic veins and IVC are patent. The main portal vein is widely patent. The LEFT portal vein is not well visualized.  Pancreas: Normal.  Spleen: Normal.  Adrenals: Normal.  Kidneys: Multiple bilateral renal cysts. No solid renal mass. No hydronephrosis.  Other Findings: Small bilateral pleural effusions and atelectasis/consolidation in the lower lobes. Trace perihepatic ascites. No abnormal bowel distention or evidence of active bowel formation the abdomen. No abdominal lymphadenopathy. No aggressive bone lesion.      Impression:  1.  Chronic intrahepatic and extra hepatic biliary ductal dilatation with gradual caliber tapering down to the ampulla. Appearance is unchanged from a study performed in 2017. This may represent reservoir phenomenon although distal biliary stricture is also within the differential. No obstructing stone or mass identified.  2.  Small bilateral pleural effusions and trace perihepatic ascites.  This report was finalized on 03/10/2022 16:13 by Dr. Adalberto Jones MD.         Assessment and Plan:     Primary Problem:  Colitis    Hospital Problem list:    Colitis    Right upper quadrant pain    Intractable vomiting    Chronic idiopathic constipation    Assessment: 61-year-old male with history of pancreatitis, hepaticojejunostomy with multiple failed ERCPs who presents with colitis.    Colitis  Continue antibiotics at this time.  Improving oral intake and improving clinical condition.  We will do another day of antibiotics and plan for discharge tomorrow.    Right upper quadrant pain  Unchanged imaging since 2017.  Patient says his pain is similar to baseline, labs also appear to be at his  baseline.  No intervention currently.    Discharge planning:   Home    Reviewed treatment plans with the patient and/or family.     Code Status:   Code Status and Medical Interventions:   Ordered at: 03/08/22 0717     Code Status (Patient has no pulse and is not breathing):    CPR (Attempt to Resuscitate)     Medical Interventions (Patient has pulse or is breathing):    Full Support       Electronically signed by Amish Burns MD on 3/12/2022 at 07:27 CST

## 2022-03-12 NOTE — PLAN OF CARE
Goal Outcome Evaluation:           Progress: no change  Outcome Evaluation: Patient tolerating regular diet, no c/o nausea, voiding per urinal, c/o pain see MAR, IVF and IV abx as ordered, safety maintained.

## 2022-03-13 ENCOUNTER — READMISSION MANAGEMENT (OUTPATIENT)
Dept: CALL CENTER | Facility: HOSPITAL | Age: 62
End: 2022-03-13

## 2022-03-13 VITALS
WEIGHT: 131 LBS | SYSTOLIC BLOOD PRESSURE: 113 MMHG | HEIGHT: 63 IN | HEART RATE: 100 BPM | DIASTOLIC BLOOD PRESSURE: 57 MMHG | OXYGEN SATURATION: 95 % | TEMPERATURE: 98.7 F | RESPIRATION RATE: 16 BRPM | BODY MASS INDEX: 23.21 KG/M2

## 2022-03-13 LAB
ALBUMIN SERPL-MCNC: 2.6 G/DL (ref 3.5–5.2)
ALBUMIN/GLOB SERPL: 1.2 G/DL
ALP SERPL-CCNC: 249 U/L (ref 39–117)
ALT SERPL W P-5'-P-CCNC: 49 U/L (ref 1–41)
ANION GAP SERPL CALCULATED.3IONS-SCNC: 8 MMOL/L (ref 5–15)
ANISOCYTOSIS BLD QL: ABNORMAL
AST SERPL-CCNC: 39 U/L (ref 1–40)
BACTERIA SPEC AEROBE CULT: NORMAL
BACTERIA SPEC AEROBE CULT: NORMAL
BILIRUB SERPL-MCNC: 1 MG/DL (ref 0–1.2)
BUN SERPL-MCNC: 11 MG/DL (ref 8–23)
BUN/CREAT SERPL: 20.8 (ref 7–25)
CALCIUM SPEC-SCNC: 8.2 MG/DL (ref 8.6–10.5)
CHLORIDE SERPL-SCNC: 107 MMOL/L (ref 98–107)
CO2 SERPL-SCNC: 25 MMOL/L (ref 22–29)
CREAT SERPL-MCNC: 0.53 MG/DL (ref 0.76–1.27)
DEPRECATED RDW RBC AUTO: 47 FL (ref 37–54)
EGFRCR SERPLBLD CKD-EPI 2021: 113.3 ML/MIN/1.73
EOSINOPHIL # BLD MANUAL: 0.1 10*3/MM3 (ref 0–0.4)
EOSINOPHIL NFR BLD MANUAL: 1 % (ref 0.3–6.2)
ERYTHROCYTE [DISTWIDTH] IN BLOOD BY AUTOMATED COUNT: 13.4 % (ref 12.3–15.4)
GLOBULIN UR ELPH-MCNC: 2.2 GM/DL
GLUCOSE SERPL-MCNC: 128 MG/DL (ref 65–99)
HCT VFR BLD AUTO: 34.8 % (ref 37.5–51)
HGB BLD-MCNC: 11.6 G/DL (ref 13–17.7)
LYMPHOCYTES # BLD MANUAL: 0.84 10*3/MM3 (ref 0.7–3.1)
LYMPHOCYTES NFR BLD MANUAL: 6 % (ref 5–12)
MCH RBC QN AUTO: 31.6 PG (ref 26.6–33)
MCHC RBC AUTO-ENTMCNC: 33.3 G/DL (ref 31.5–35.7)
MCV RBC AUTO: 94.8 FL (ref 79–97)
MONOCYTES # BLD: 0.63 10*3/MM3 (ref 0.1–0.9)
NEUTROPHILS # BLD AUTO: 8.92 10*3/MM3 (ref 1.7–7)
NEUTROPHILS NFR BLD MANUAL: 82 % (ref 42.7–76)
NEUTS BAND NFR BLD MANUAL: 3 % (ref 0–5)
PLATELET # BLD AUTO: 136 10*3/MM3 (ref 140–450)
PMV BLD AUTO: 11.8 FL (ref 6–12)
POTASSIUM SERPL-SCNC: 4 MMOL/L (ref 3.5–5.2)
PROT SERPL-MCNC: 4.8 G/DL (ref 6–8.5)
RBC # BLD AUTO: 3.67 10*6/MM3 (ref 4.14–5.8)
SMALL PLATELETS BLD QL SMEAR: ABNORMAL
SODIUM SERPL-SCNC: 140 MMOL/L (ref 136–145)
VARIANT LYMPHS NFR BLD MANUAL: 2 % (ref 0–5)
VARIANT LYMPHS NFR BLD MANUAL: 6 % (ref 19.6–45.3)
WBC MORPH BLD: NORMAL
WBC NRBC COR # BLD: 10.49 10*3/MM3 (ref 3.4–10.8)

## 2022-03-13 PROCEDURE — 25010000002 ENOXAPARIN PER 10 MG: Performed by: FAMILY MEDICINE

## 2022-03-13 PROCEDURE — 25010000002 ONDANSETRON PER 1 MG: Performed by: FAMILY MEDICINE

## 2022-03-13 PROCEDURE — 25010000002 LEVOFLOXACIN PER 250 MG: Performed by: FAMILY MEDICINE

## 2022-03-13 PROCEDURE — 85025 COMPLETE CBC W/AUTO DIFF WBC: CPT | Performed by: FAMILY MEDICINE

## 2022-03-13 PROCEDURE — 80053 COMPREHEN METABOLIC PANEL: CPT | Performed by: FAMILY MEDICINE

## 2022-03-13 PROCEDURE — 85007 BL SMEAR W/DIFF WBC COUNT: CPT | Performed by: FAMILY MEDICINE

## 2022-03-13 RX ORDER — ONDANSETRON 4 MG/1
4 TABLET, ORALLY DISINTEGRATING ORAL EVERY 8 HOURS PRN
Qty: 30 TABLET | Refills: 0 | Status: SHIPPED | OUTPATIENT
Start: 2022-03-13 | End: 2022-03-23

## 2022-03-13 RX ORDER — METRONIDAZOLE 500 MG/1
500 TABLET ORAL 3 TIMES DAILY
Qty: 21 TABLET | Refills: 0 | Status: SHIPPED | OUTPATIENT
Start: 2022-03-13 | End: 2022-03-20

## 2022-03-13 RX ORDER — METRONIDAZOLE 500 MG/1
500 TABLET ORAL 3 TIMES DAILY
Qty: 21 TABLET | Refills: 0 | Status: SHIPPED | OUTPATIENT
Start: 2022-03-13 | End: 2022-03-13 | Stop reason: SDUPTHER

## 2022-03-13 RX ORDER — ONDANSETRON 4 MG/1
4 TABLET, ORALLY DISINTEGRATING ORAL EVERY 8 HOURS PRN
Qty: 30 TABLET | Refills: 0 | Status: SHIPPED | OUTPATIENT
Start: 2022-03-13 | End: 2022-03-13 | Stop reason: SDUPTHER

## 2022-03-13 RX ORDER — LEVOFLOXACIN 750 MG/1
750 TABLET ORAL DAILY
Qty: 7 TABLET | Refills: 0 | Status: SHIPPED | OUTPATIENT
Start: 2022-03-13 | End: 2022-03-20

## 2022-03-13 RX ORDER — LEVOFLOXACIN 750 MG/1
750 TABLET ORAL DAILY
Qty: 7 TABLET | Refills: 0 | Status: SHIPPED | OUTPATIENT
Start: 2022-03-13 | End: 2022-03-13 | Stop reason: SDUPTHER

## 2022-03-13 RX ADMIN — ONDANSETRON 4 MG: 2 INJECTION INTRAMUSCULAR; INTRAVENOUS at 08:04

## 2022-03-13 RX ADMIN — ENOXAPARIN SODIUM 40 MG: 40 INJECTION SUBCUTANEOUS at 08:04

## 2022-03-13 RX ADMIN — POTASSIUM CHLORIDE 40 MEQ: 10 CAPSULE, COATED, EXTENDED RELEASE ORAL at 08:04

## 2022-03-13 RX ADMIN — METRONIDAZOLE 500 MG: 500 INJECTION, SOLUTION INTRAVENOUS at 04:17

## 2022-03-13 RX ADMIN — SODIUM CHLORIDE 100 ML/HR: 9 INJECTION, SOLUTION INTRAVENOUS at 04:17

## 2022-03-13 RX ADMIN — HYDROCODONE BITARTRATE AND ACETAMINOPHEN 1 TABLET: 5; 325 TABLET ORAL at 08:04

## 2022-03-13 RX ADMIN — LEVOFLOXACIN 750 MG: 5 INJECTION, SOLUTION INTRAVENOUS at 06:00

## 2022-03-13 NOTE — DISCHARGE SUMMARY
Hospital Discharge Summary    Lyndon Campos  :  1960  MRN:  [id number]    Admit date:  3/7/2022  Discharge date:      Admitting Physician:    Ian Allan MD    Discharge Diagnoses:      Colitis    Right upper quadrant pain    Intractable vomiting    Chronic idiopathic constipation      Hospital Course:       Mr. Campos is a 61-year-old male with past medical history of prior hepaticojejunostomy with chronic right upper quadrant pain who presented with fever, abdominal pain, and diarrhea consistent with diverticulitis.  Admission showed colonic wall thickening and pericolonic cyst with stranding consistent with diverticulitis versus colitis.  He was treated with Levaquin and Flagyl for empiric diverticulitis therapy with improvement in symptoms.  He will be discharged on 1 week of Levaquin/Flagyl complete diverticulitis treatment.    Of note the patient CT earlier findings were consistent with his prior studies.  GI was consulted for evaluation of right upper quadrant pain and possible ERCP.  It was determined that it would be difficult to proceed with ERCP given his prior abdominal surgeries and observation would be appropriate for now.    Follow-up within 1 week of discharge with [facility] medicine and Dr. Allan.    Discharge Medications:         Discharge Medications      New Medications      Instructions Start Date   levoFLOXacin 750 MG tablet  Commonly known as: Levaquin   750 mg, Oral, Daily      metroNIDAZOLE 500 MG tablet  Commonly known as: Flagyl   500 mg, Oral, 3 Times Daily      ondansetron ODT 4 MG disintegrating tablet  Commonly known as: Zofran ODT   4 mg, Translingual, Every 8 Hours PRN         Continue These Medications      Instructions Start Date   HYDROcodone-acetaminophen 7.5-325 MG per tablet  Commonly known as: NORCO   1 tablet, Oral, Every 6 Hours PRN      rosuvastatin 10 MG tablet  Commonly known as: CRESTOR   10 mg, Oral, Daily      traZODone 50 MG  tablet  Commonly known as: DESYREL   50 mg, Oral, Nightly PRN         Stop These Medications    acetaminophen 500 MG tablet  Commonly known as: TYLENOL     diclofenac 75 MG EC tablet  Commonly known as: VOLTAREN     polyethylene glycol 17 g packet  Commonly known as: MIRALAX            Consults: Gastroenterology    Significant Diagnostic Studies:      CT Abdomen Pelvis With Contrast    Result Date: 3/8/2022  1. Mild colonic wall thickening as well as multiple diverticula within the distal descending and sigmoid colon with mild pericolonic stranding with differential to include a mild segmental colitis versus diverticulitis. No evidence of extraluminal air or diverticular abscess. No evidence of mechanical bowel obstruction. 2. Cortical cysts of the right kidney stable from the previous exam. No evidence of nephrolithiasis or obstructive uropathy. 3. Stable intrahepatic dilatation within the left lobe of the liver and extrahepatic biliary dilatation. A small amount of pneumobilia is noted within the left lobe suggesting previous sphincterotomy or biliary diversion. FINDINGS are stable from the previous exam. 4. There is mild enlargement of the prostate gland. The urinary bladder is normal in appearance..   This report was finalized on 03/08/2022 07:06 by Dr. Ralph Hicks MD.    MRI abdomen w wo contrast mrcp    Result Date: 3/10/2022   1.  Chronic intrahepatic and extra hepatic biliary ductal dilatation with gradual caliber tapering down to the ampulla. Appearance is unchanged from a study performed in 2017. This may represent reservoir phenomenon although distal biliary stricture is also within the differential. No obstructing stone or mass identified.  2.  Small bilateral pleural effusions and trace perihepatic ascites.  This report was finalized on 03/10/2022 16:13 by Dr. Adalberto Jones MD.    US Abdomen Limited    Result Date: 3/8/2022  1. Previous cholecystectomy. Left lobe intrahepatic biliary dilation  which is stable and chronic, seen on the earlier CT scan from December 2021. Unsure as to the exact etiology for this left lobe intrahepatic dilation. I do not see any obvious central mass lesion and there is no intrahepatic biliary dilation of the right lobe. 2. There are 2 simple appearing right renal cyst. This report was finalized on 03/08/2022 14:01 by Dr Corey Brown, .       Treatments:   Antibiotics    Disposition:   Home  Follow up with [facility] in 1 weeks.    Signed:  Amish Burns MD   3/13/2022, 10:36 CDT

## 2022-03-13 NOTE — PLAN OF CARE
Goal Outcome Evaluation:           Progress: no change  Outcome Evaluation: Patient c.o pain and nausea x1 see mar. voiding. IVF infusing. IV ABX as ordered. Safety Maintained.

## 2022-03-14 NOTE — OUTREACH NOTE
Prep Survey    Flowsheet Row Responses   [facility] discharged from? [place]   Is LACE score < 7 ? No   Emergency Room discharge w/ pulse ox? No   Eligibility Readm Mgmt   Discharge diagnosis Colitis   Does the patient have one of the following disease processes/diagnoses(primary or secondary)? Other   Does the patient have Home health ordered? No   Is there a DME ordered? No   Prep survey completed? Yes          JOHN GOMEZ - Registered Nurse

## 2022-03-14 NOTE — PAYOR COMM NOTE
"Ref:  [id number]  [facility]  [place],  [phone]  OR  FAX  [phone]         Christa Campos (61 y.o. Male)             Date of Birth   1960    Social Security Number       Address   [address] [place] [address] [place] [address]    Home Phone   [phone]    MRN   [id number]       [place]   Other    Marital Status   Single                            Admission Date   3/7/22    Admission Type   Emergency    Admitting Provider   Ian Allan MD    Attending Provider       Department, Room/Bed   [facility] 3C, 387/1       Discharge Date   3/13/2022    Discharge Disposition   Home or Self Care    Discharge Destination                               Attending Provider: (none)   Allergies: Morphine    Isolation: None   Infection: None   Code Status: Prior   Advance Care Planning Activity    Ht: 160 cm (63\")   Wt: 59.4 kg (131 lb)    Admission Cmt: None   Principal Problem: Colitis [K52.9]                 Active Insurance as of 3/7/2022     Primary Coverage     Payor Plan Insurance Group Employer/Plan Group    [employer] [facility] PPO [id number]     Payor Plan Address Payor Plan Phone Number Payor Plan Fax Number Effective Dates    [address] [phone]  2020 - None Entered    [place] [address]       Subscriber Name Subscriber Birth Date Member ID       CHRISTA CAMPOS 1960 [id number]                 Emergency Contacts      (Rel.) Home Phone Work Phone Mobile Phone    [employer] (Sister) [phone] -- [phone]    Maryam Campos (Sister) [phone] -- --               Discharge Summary      Amish Burns MD at 22 1035              Hospital Discharge Summary    Christa Campos  :  1960  MRN:  [id number]    Admit date:  3/7/2022  Discharge date:      Admitting Physician:    Ian Allan MD    Discharge Diagnoses:      Colitis    Right upper quadrant pain    Intractable vomiting    Chronic " idiopathic constipation      Hospital Course:       Mr. Campos is a 61-year-old male with past medical history of prior hepaticojejunostomy with chronic right upper quadrant pain who presented with fever, abdominal pain, and diarrhea consistent with diverticulitis.  Admission showed colonic wall thickening and pericolonic cyst with stranding consistent with diverticulitis versus colitis.  He was treated with Levaquin and Flagyl for empiric diverticulitis therapy with improvement in symptoms.  He will be discharged on 1 week of Levaquin/Flagyl complete diverticulitis treatment.    Of note the patient CT earlier findings were consistent with his prior studies.  GI was consulted for evaluation of right upper quadrant pain and possible ERCP.  It was determined that it would be difficult to proceed with ERCP given his prior abdominal surgeries and observation would be appropriate for now.    Follow-up within 1 week of discharge with [facility] medicine and Dr. Allan.    Discharge Medications:         Discharge Medications      New Medications      Instructions Start Date   levoFLOXacin 750 MG tablet  Commonly known as: Levaquin   750 mg, Oral, Daily      metroNIDAZOLE 500 MG tablet  Commonly known as: Flagyl   500 mg, Oral, 3 Times Daily      ondansetron ODT 4 MG disintegrating tablet  Commonly known as: Zofran ODT   4 mg, Translingual, Every 8 Hours PRN         Continue These Medications      Instructions Start Date   HYDROcodone-acetaminophen 7.5-325 MG per tablet  Commonly known as: NORCO   1 tablet, Oral, Every 6 Hours PRN      rosuvastatin 10 MG tablet  Commonly known as: CRESTOR   10 mg, Oral, Daily      traZODone 50 MG tablet  Commonly known as: DESYREL   50 mg, Oral, Nightly PRN         Stop These Medications    acetaminophen 500 MG tablet  Commonly known as: TYLENOL     diclofenac 75 MG EC tablet  Commonly known as: VOLTAREN     polyethylene glycol 17 g packet  Commonly known as: MIRALAX             Consults: Gastroenterology    Significant Diagnostic Studies:      CT Abdomen Pelvis With Contrast    Result Date: 3/8/2022  1. Mild colonic wall thickening as well as multiple diverticula within the distal descending and sigmoid colon with mild pericolonic stranding with differential to include a mild segmental colitis versus diverticulitis. No evidence of extraluminal air or diverticular abscess. No evidence of mechanical bowel obstruction. 2. Cortical cysts of the right kidney stable from the previous exam. No evidence of nephrolithiasis or obstructive uropathy. 3. Stable intrahepatic dilatation within the left lobe of the liver and extrahepatic biliary dilatation. A small amount of pneumobilia is noted within the left lobe suggesting previous sphincterotomy or biliary diversion. FINDINGS are stable from the previous exam. 4. There is mild enlargement of the prostate gland. The urinary bladder is normal in appearance..   This report was finalized on 03/08/2022 07:06 by Dr. Ralph Hicks MD.    MRI abdomen w wo contrast mrcp    Result Date: 3/10/2022   1.  Chronic intrahepatic and extra hepatic biliary ductal dilatation with gradual caliber tapering down to the ampulla. Appearance is unchanged from a study performed in 2017. This may represent reservoir phenomenon although distal biliary stricture is also within the differential. No obstructing stone or mass identified.  2.  Small bilateral pleural effusions and trace perihepatic ascites.  This report was finalized on 03/10/2022 16:13 by Dr. Adalberto Jones MD.    US Abdomen Limited    Result Date: 3/8/2022  1. Previous cholecystectomy. Left lobe intrahepatic biliary dilation which is stable and chronic, seen on the earlier CT scan from December 2021. Unsure as to the exact etiology for this left lobe intrahepatic dilation. I do not see any obvious central mass lesion and there is no intrahepatic biliary dilation of the right lobe. 2. There are 2 simple  appearing right renal cyst. This report was finalized on 03/08/2022 14:01 by Dr Corey Brown, .       Treatments:   Antibiotics    Disposition:   Home  Follow up with [facility] in 1 weeks.    Signed:  Trenton Soto MD   3/13/2022, 10:36 CDT      Electronically signed by Trenton Soto MD at 03/13/22 1041       Discharge Order (From admission, onward)     Start     Ordered    03/13/22 1028  Discharge patient  Once        Expected Discharge Date: 03/13/22    Expected Discharge Time: Midday    Discharge Disposition: Home or Self Care    Physician of Record for Attribution - Please select from Treatment Team: TRENTON SOTO [[id number]]    Review needed by CMO to determine Physician of Record: No       Question Answer Comment   Physician of Record for Attribution - Please select from Treatment Team TRENTON SOTO    Review needed by CMO to determine Physician of Record No        03/13/22 1026

## 2022-03-16 ENCOUNTER — READMISSION MANAGEMENT (OUTPATIENT)
Dept: CALL CENTER | Facility: HOSPITAL | Age: 62
End: 2022-03-16

## 2022-03-16 NOTE — OUTREACH NOTE
Medical Week 1 Survey    Flowsheet Row Responses   [facility] facility patient discharged from? [place]   Does the patient have one of the following disease processes/diagnoses(primary or secondary)? Other   Week 1 attempt successful? No   Unsuccessful attempts Attempt 1          DARLYN Marroquin Registered Nurse

## 2022-03-22 ENCOUNTER — READMISSION MANAGEMENT (OUTPATIENT)
Dept: CALL CENTER | Facility: HOSPITAL | Age: 62
End: 2022-03-22

## 2022-03-22 NOTE — OUTREACH NOTE
Medical Week 1 Survey    Flowsheet Row Responses   [facility] patient discharged from? [place]   Does the patient have one of the following disease processes/diagnoses(primary or secondary)? Other   Week 1 attempt successful? Yes   Call start time 1033   Call end time 1035   Discharge diagnosis Colitis   Meds reviewed with patient/caregiver? Yes   Is the patient having any side effects they believe may be caused by any medication additions or changes? No   Does the patient have all medications ordered at discharge? Yes   Is the patient taking all medications as directed (includes completed medication regime)? Yes   Does the patient have a primary care provider?  Yes   Comments regarding PCP PATIENT HAS SEEN HIS PCP   Has the patient kept scheduled appointments due by today? Yes   Has home health visited the patient within 72 hours of discharge? N/A   Psychosocial issues? No   Did the patient receive a copy of their discharge instructions? Yes   Nursing interventions Reviewed instructions with patient   What is the patient's perception of their health status since discharge? Improving   Is the patient/caregiver able to teach back signs and symptoms related to disease process for when to call PCP? Yes   Is the patient/caregiver able to teach back signs and symptoms related to disease process for when to call 911? Yes   Is the patient/caregiver able to teach back the hierarchy of who to call/visit for symptoms/problems? PCP, Specialist, Home health nurse, Urgent Care, ED, 911 Yes   If the patient is a current smoker, are they able to teach back resources for cessation? [id number]   Week 1 call completed? Yes          FLORIN ALBA - Licensed Nurse

## 2022-03-30 ENCOUNTER — READMISSION MANAGEMENT (OUTPATIENT)
Dept: CALL CENTER | Facility: HOSPITAL | Age: 62
End: 2022-03-30

## 2022-03-30 NOTE — OUTREACH NOTE
Medical Week 2 Survey    Flowsheet Row Responses   [facility] facility patient discharged from? [place]   Does the patient have one of the following disease processes/diagnoses(primary or secondary)? Other   Week 2 attempt successful? No   Unsuccessful attempts Attempt 1          ROSA Marroquin Registered Nurse

## 2022-04-02 ENCOUNTER — READMISSION MANAGEMENT (OUTPATIENT)
Dept: CALL CENTER | Facility: HOSPITAL | Age: 62
End: 2022-04-02

## 2022-04-02 NOTE — OUTREACH NOTE
"Medical Week 2 Survey    Flowsheet Row Responses   [facility] patient discharged from? [place]   Does the patient have one of the following disease processes/diagnoses(primary or secondary)? Other   Week 2 attempt successful? Yes   Call start time 0924   Discharge diagnosis Colitis   Call end time 0927   Meds reviewed with patient/caregiver? Yes   Is the patient having any side effects they believe may be caused by any medication additions or changes? No   Does the patient have all medications ordered at discharge? Yes   Is the patient taking all medications as directed (includes completed medication regime)? Yes   Medication comments Finished abo last week   Does the patient have a primary care provider?  Yes   Has the patient kept scheduled appointments due by today? Yes   Has home health visited the patient within 72 hours of discharge? N/A   Psychosocial issues? No   Did the patient receive a copy of their discharge instructions? Yes   Nursing interventions Reviewed instructions with patient   What is the patient's perception of their health status since discharge? Improving  [Slow going. ]   Is the patient/caregiver able to teach back signs and symptoms related to disease process for when to call PCP? Yes   Is the patient/caregiver able to teach back signs and symptoms related to disease process for when to call 911? Yes   Is the patient/caregiver able to teach back the hierarchy of who to call/visit for symptoms/problems? PCP, Specialist, Home health nurse, Urgent Care, ED, 911 Yes   If the patient is a current smoker, are they able to teach back resources for cessation? [id number]   Week 2 Call Completed? Yes   Wrap up additional comments Pt states he is getting better \"it's a slow go\". No needs identifed.           PAULINO COYLE - Registered Nurse  "

## 2022-04-12 ENCOUNTER — READMISSION MANAGEMENT (OUTPATIENT)
Dept: CALL CENTER | Facility: HOSPITAL | Age: 62
End: 2022-04-12

## 2022-04-12 NOTE — OUTREACH NOTE
Medical Week 3 Survey    Flowsheet Row Responses   [facility] [place] patient discharged from? [place]   Does the patient have one of the following disease processes/diagnoses(primary or secondary)? Other   Week 3 attempt successful? Yes   Call start time 1354   Call end time 1355   Week 3 Call Completed? Yes   Is the patient interested in additional calls from an ambulatory ?  NOTE:  applies to high risk patients requiring additional follow-up. No   Revoked No further contact(revokes)-requires comment   Graduated/Revoked comments Pt feeling well requesting no more follow up calls.          ALEXSANDER COYLE - Registered Nurse

## 2022-06-07 ENCOUNTER — APPOINTMENT (OUTPATIENT)
Dept: CT IMAGING | Facility: HOSPITAL | Age: 62
End: 2022-06-07

## 2022-06-07 ENCOUNTER — APPOINTMENT (OUTPATIENT)
Dept: GENERAL RADIOLOGY | Facility: HOSPITAL | Age: 62
End: 2022-06-07

## 2022-06-07 ENCOUNTER — HOSPITAL ENCOUNTER (EMERGENCY)
Facility: HOSPITAL | Age: 62
Discharge: HOME | End: 2022-06-07
Attending: EMERGENCY MEDICINE | Admitting: EMERGENCY MEDICINE

## 2022-06-07 VITALS
BODY MASS INDEX: 22.68 KG/M2 | DIASTOLIC BLOOD PRESSURE: 68 MMHG | OXYGEN SATURATION: 100 % | RESPIRATION RATE: 16 BRPM | TEMPERATURE: 98.8 F | HEART RATE: 75 BPM | HEIGHT: 63 IN | SYSTOLIC BLOOD PRESSURE: 113 MMHG | WEIGHT: 128 LBS

## 2022-06-07 DIAGNOSIS — K31.89 GASTRIC WALL THICKENING: ICD-10-CM

## 2022-06-07 DIAGNOSIS — N28.1 RENAL CYST: ICD-10-CM

## 2022-06-07 DIAGNOSIS — S22.32XA CLOSED FRACTURE OF ONE RIB OF LEFT SIDE, INITIAL ENCOUNTER: Primary | ICD-10-CM

## 2022-06-07 DIAGNOSIS — R91.1 PULMONARY NODULE: ICD-10-CM

## 2022-06-07 DIAGNOSIS — N40.0 ENLARGED PROSTATE: ICD-10-CM

## 2022-06-07 DIAGNOSIS — Q25.40 AORTIC ANOMALY: ICD-10-CM

## 2022-06-07 LAB
ANION GAP SERPL CALCULATED.3IONS-SCNC: 6 MMOL/L (ref 5–15)
BASOPHILS # BLD AUTO: 0.03 10*3/MM3 (ref 0–0.2)
BASOPHILS NFR BLD AUTO: 0.3 % (ref 0–1.5)
BUN SERPL-MCNC: 11 MG/DL (ref 8–23)
BUN/CREAT SERPL: 18.3 (ref 7–25)
CALCIUM SPEC-SCNC: 9.1 MG/DL (ref 8.6–10.5)
CHLORIDE SERPL-SCNC: 108 MMOL/L (ref 98–107)
CO2 SERPL-SCNC: 26 MMOL/L (ref 22–29)
CREAT SERPL-MCNC: 0.6 MG/DL (ref 0.76–1.27)
DEPRECATED RDW RBC AUTO: 49.4 FL (ref 37–54)
EGFRCR SERPLBLD CKD-EPI 2021: 109.1 ML/MIN/1.73
EOSINOPHIL # BLD AUTO: 0.24 10*3/MM3 (ref 0–0.4)
EOSINOPHIL NFR BLD AUTO: 2.6 % (ref 0.3–6.2)
ERYTHROCYTE [DISTWIDTH] IN BLOOD BY AUTOMATED COUNT: 14.2 % (ref 12.3–15.4)
GLUCOSE SERPL-MCNC: 83 MG/DL (ref 65–99)
HCT VFR BLD AUTO: 40.5 % (ref 37.5–51)
HGB BLD-MCNC: 13.3 G/DL (ref 13–17.7)
IMM GRANULOCYTES # BLD AUTO: 0.04 10*3/MM3 (ref 0–0.05)
IMM GRANULOCYTES NFR BLD AUTO: 0.4 % (ref 0–0.5)
LYMPHOCYTES # BLD AUTO: 1.42 10*3/MM3 (ref 0.7–3.1)
LYMPHOCYTES NFR BLD AUTO: 15.2 % (ref 19.6–45.3)
MCH RBC QN AUTO: 31.3 PG (ref 26.6–33)
MCHC RBC AUTO-ENTMCNC: 32.8 G/DL (ref 31.5–35.7)
MCV RBC AUTO: 95.3 FL (ref 79–97)
MONOCYTES # BLD AUTO: 0.56 10*3/MM3 (ref 0.1–0.9)
MONOCYTES NFR BLD AUTO: 6 % (ref 5–12)
NEUTROPHILS NFR BLD AUTO: 7.05 10*3/MM3 (ref 1.7–7)
NEUTROPHILS NFR BLD AUTO: 75.5 % (ref 42.7–76)
NRBC BLD AUTO-RTO: 0 /100 WBC (ref 0–0.2)
PLATELET # BLD AUTO: 207 10*3/MM3 (ref 140–450)
PMV BLD AUTO: 9.8 FL (ref 6–12)
POTASSIUM SERPL-SCNC: 5.1 MMOL/L (ref 3.5–5.2)
RBC # BLD AUTO: 4.25 10*6/MM3 (ref 4.14–5.8)
SODIUM SERPL-SCNC: 140 MMOL/L (ref 136–145)
WBC NRBC COR # BLD: 9.34 10*3/MM3 (ref 3.4–10.8)

## 2022-06-07 PROCEDURE — 74176 CT ABD & PELVIS W/O CONTRAST: CPT

## 2022-06-07 PROCEDURE — 71045 X-RAY EXAM CHEST 1 VIEW: CPT

## 2022-06-07 PROCEDURE — 93005 ELECTROCARDIOGRAM TRACING: CPT | Performed by: EMERGENCY MEDICINE

## 2022-06-07 PROCEDURE — 80048 BASIC METABOLIC PNL TOTAL CA: CPT | Performed by: EMERGENCY MEDICINE

## 2022-06-07 PROCEDURE — 99283 EMERGENCY DEPT VISIT LOW MDM: CPT

## 2022-06-07 PROCEDURE — 85025 COMPLETE CBC W/AUTO DIFF WBC: CPT | Performed by: EMERGENCY MEDICINE

## 2022-06-07 PROCEDURE — 36415 COLL VENOUS BLD VENIPUNCTURE: CPT

## 2022-06-07 PROCEDURE — 71250 CT THORAX DX C-: CPT

## 2022-06-07 PROCEDURE — 93010 ELECTROCARDIOGRAM REPORT: CPT | Performed by: INTERNAL MEDICINE

## 2022-06-07 RX ORDER — OXYCODONE AND ACETAMINOPHEN 7.5; 325 MG/1; MG/1
1 TABLET ORAL EVERY 6 HOURS PRN
Qty: 6 TABLET | Refills: 0 | Status: ON HOLD | OUTPATIENT
Start: 2022-06-07 | End: 2023-03-22

## 2022-06-07 RX ORDER — OXYCODONE AND ACETAMINOPHEN 7.5; 325 MG/1; MG/1
1 TABLET ORAL ONCE
Status: COMPLETED | OUTPATIENT
Start: 2022-06-07 | End: 2022-06-07

## 2022-06-07 RX ORDER — OXYCODONE AND ACETAMINOPHEN 7.5; 325 MG/1; MG/1
1 TABLET ORAL EVERY 6 HOURS PRN
COMMUNITY
End: 2022-06-07

## 2022-06-07 RX ADMIN — OXYCODONE HYDROCHLORIDE AND ACETAMINOPHEN 1 TABLET: 7.5; 325 TABLET ORAL at 10:13

## 2022-06-07 NOTE — DISCHARGE INSTRUCTIONS
Follow up with one of the [facility] physician groups below to setup primary care. If you have trouble making an appointment, please call the [facility] Nurse Line at [phone]    Dr. Shayna Hewitt DO, Dr. Boaz Logan DO, and JUDD Quiñones  [facility]  [address] [place] [address], [address] [phone]    Dr. Jewel Vilchis MD  [facility] - [address] [address], [address], [place], [place] [id number] [phone]    Dr. German Sanchez DO, Dr. Sudhir Gore DO,  JUDD Irvin, and JUDD Marie  [facility] - [address] [address], [address], [place], [place] [id number] [phone]     Dr. Mel Cooper MD, and JUDD Pak  [facility] [address] [address], [place], [place] [address] [phone]    Dr. Reji Coley MD and Dr. Sky Leal MD  [address], [address] [phone]    Dr. Luis Carlos Lane MD  [facility]  [address] [place] [address], [address], [place], [place], [address] [phone]    Dr. Mark Monique MD  [facility]  [address] [place], [place], [address] [phone]

## 2022-06-07 NOTE — ED PROVIDER NOTES
Subjective   Patient is a 61-year-old gentleman who came to the ED after a fall couple days ago when he fell over his left side against a furniture coming her left chest wall pain no nausea no vomiting no fever no shortness of breath no back pain      Trauma  Mechanism of injury: fall  Injury location: torso  Injury location detail: L chest  Incident location: at work  Arrived directly from scene: no     Fall:       Fall occurred: walking       Impact surface: furniture       Point of impact: Left chest wall.    Protective equipment:        No boots.        None       Suspicion of alcohol use: no       Suspicion of drug use: no    EMS/PTA data:       Bystander interventions: none       Loss of consciousness: no       Cardiac interventions: none       Medications administered: none       Airway condition since incident: stable       Breathing condition since incident: stable       Circulation condition since incident: stable       Mental status condition since incident: stable       Disability condition since incident: stable    Current symptoms:       Pain scale: 5/10       Pain quality: sharp       Pain timing: constant       Associated symptoms:             Denies abdominal pain, chest pain, difficulty breathing, headache, hearing loss, loss of consciousness, nausea, neck pain, seizures and vomiting.     Relevant PMH:       Medical risk factors:             No CHF or past MI.        Pharmacological risk factors:             No anticoagulation therapy.        The patient has not been admitted to the hospital due to injury in the past year, and has not been treated and released from the ED due to injury in the past year.      Review of Systems   Constitutional: Negative.  Negative for chills, fatigue and fever.   HENT: Negative.  Negative for congestion and hearing loss.    Respiratory: Negative.  Negative for cough, chest tightness and stridor.    Cardiovascular: Negative.  Negative for chest pain.    Gastrointestinal: Negative.  Negative for abdominal distention, abdominal pain, nausea and vomiting.   Endocrine: Negative.    Genitourinary: Negative.  Negative for difficulty urinating and flank pain.   Musculoskeletal: Negative.  Negative for neck pain.   Skin: Negative.  Negative for color change.   Neurological: Negative.  Negative for dizziness, seizures, loss of consciousness and headaches.   All other systems reviewed and are negative.      History reviewed. No pertinent past medical history.    Allergies   Allergen Reactions   • Morphine Other (See Comments)     Causes Hallucinations       Past Surgical History:   Procedure Laterality Date   • ABDOMINAL SURGERY     • EYE SURGERY     • KNEE SURGERY Left        History reviewed. No pertinent family history.    Social History     Socioeconomic History   • Marital status: Single   Tobacco Use   • Smoking status: Current Every Day Smoker     Packs/day: 1.00   • Smokeless tobacco: Never Used   Substance and Sexual Activity   • Alcohol use: No   • Drug use: Defer   • Sexual activity: Defer           Objective   Physical Exam  Vitals and nursing note reviewed. Exam conducted with a chaperone present.   Constitutional:       General: He is awake. He is not in acute distress.     Appearance: Normal appearance. He is well-developed. He is not ill-appearing or diaphoretic.   HENT:      Head: Normocephalic. No raccoon eyes, Chappell's sign, abrasion, contusion or laceration.      Jaw: There is normal jaw occlusion. No tenderness.      Right Ear: Tympanic membrane and external ear normal.      Left Ear: Tympanic membrane and external ear normal.      Nose: Nose normal.   Eyes:      General: Lids are normal.      Extraocular Movements: Extraocular movements intact.      Conjunctiva/sclera: Conjunctivae normal.      Pupils: Pupils are equal, round, and reactive to light.   Neck:      Vascular: Normal carotid pulses. No JVD.      Trachea: Trachea normal. No tracheal  tenderness or tracheal deviation.   Cardiovascular:      Rate and Rhythm: Normal rate and regular rhythm.      Pulses: Normal pulses.      Heart sounds: Normal heart sounds.   Pulmonary:      Effort: Pulmonary effort is normal. No accessory muscle usage or respiratory distress.      Breath sounds: Normal breath sounds and air entry. No stridor.   Chest:      Chest wall: Tenderness present. No mass, lacerations, deformity, swelling or crepitus.      Comments: Left chest wall tenderness  Abdominal:      General: Abdomen is flat. Bowel sounds are normal. There is no distension.      Palpations: Abdomen is soft. There is no pulsatile mass.      Tenderness: There is no abdominal tenderness. There is no right CVA tenderness or left CVA tenderness.      Hernia: No hernia is present.   Musculoskeletal:         General: No tenderness or deformity. Normal range of motion.      Cervical back: Normal, normal range of motion and neck supple. No deformity, rigidity, tenderness, bony tenderness or crepitus. No spinous process tenderness or muscular tenderness. Normal range of motion.      Thoracic back: Normal. No spasms, tenderness or bony tenderness.      Lumbar back: Normal. No deformity, tenderness or bony tenderness. Normal range of motion.      Right lower leg: No edema.      Left lower leg: No edema.      Comments: Axial skeletal examination is negative cervical spine T-spine L-spine negative step-off laxity or tenderness   Skin:     General: Skin is warm and dry.      Coloration: Skin is not cyanotic, mottled or pale.   Neurological:      General: No focal deficit present.      Mental Status: He is alert and oriented to person, place, and time. Mental status is at baseline.      GCS: GCS eye subscore is 4. GCS verbal subscore is 5. GCS motor subscore is 6.      Cranial Nerves: Cranial nerves are intact. No cranial nerve deficit.      Sensory: Sensation is intact. No sensory deficit.      Motor: Motor function is intact. No  weakness or abnormal muscle tone.      Coordination: Coordination is intact.      Deep Tendon Reflexes: Reflexes are normal and symmetric. Reflexes normal.   Psychiatric:         Speech: Speech normal.         Behavior: Behavior normal. Behavior is cooperative.         Procedures           ED Course  ED Course as of 06/07/22 1414   Tue Jun 07, 2022   6684 This patient was evaluated during a time of global shortage of iodinated contrast media. Based on guidance from the American College of Radiology, best practices, and local institutional approaches an alternative path for evaluating and managing the patient may have been employed in order to provide optimal care during this shortage. The current situation has been discussed with the patient.  [TS]   1018 Normal sinus rhythm LVH [TS]   1315 1. Prior cholecystectomy. Air in the biliary tree suggesting  sphincterotomy. Intrahepatic and extrahepatic biliary ductal dilatation  with common duct measuring 1.2 cm. No visible calcified stone within the  biliary system.  2. There are 2 probable renal cysts on the right, not fully  characterized without contrast.  3. Enlarged prostate measuring 5.1 cm transversely.  4. Dilated loop of small bowel in the upper to mid abdomen to the left  of midline measuring up to 5.1 cm. No other small bowel distention is  seen. There is diverticulosis of the colon.  5. Thickening of the gastric wall may be due to underdistention.  Gastritis, Menetrier's disease and neoplasm less likely.  6. Atheromatous disease of the aortoiliac vessels. Degenerative changes  of the spine and bilateral hips.  This was discussed with the patient [TS]   1315 . Acute nondisplaced fracture of the left ninth rib posterolaterally.  No other acute bony abnormality is seen.  2. Dilated ascending thoracic aorta measuring 4.7 cm. Left main  pulmonary artery is dilated at 2.6 cm.  3. There is a 6.5 mm right lower lobe pulmonary nodule. Please see  Fleischner Society  "guidelines recommendations below. This was marked as  \"new lung findings\" in PACS for follow-up.  4. Centrilobular emphysema. Atelectasis in both lung bases.  5. Prior cholecystectomy. Air in the biliary tree. Common bile duct  measures up to 1.4 cm. There is a 2.9 cm renal cyst on the right.  This was discussed with her. [TS]   1408 I have discussed this case at length with the patient and told him about the incidental findings which which she needs follow-up he already knows about his pulmonary nodule [TS]   1410 Risks and benefits of treatments given and alternative treatment options discussed with patient/family. I answered all the questions in simple, plain language, and there was voiced understanding and agreement with plan of care. There were no further questions. Differential diagnosis discussed. Patient/family was advised that the practice of medicine is not always an exact science, and sometimes tests, physical exam, or history may not show the underlying conditions with certainty. Additionally, the condition may change or show itself later after initial presentation. There was also expressed understanding and agreement with this limitation of emergency medicine practice. Patient/family was asked to return to ED if any problem or issues or if condition worsens or does not improved. Patient/family agreed to follow up with PCP/specialist as advised, or return to ED if unable to see a provider in a timely fashion for continued symptoms.  [TS]      ED Course User Index  [TS] Angel Draper MD                                   [facility] reviewed by Angel Draper MD             MDM  Number of Diagnoses or Management Options  Diagnosis management comments: Chest wall trauma       Amount and/or Complexity of Data Reviewed  Clinical lab tests: ordered and reviewed  Tests in the radiology section of CPT®: ordered and reviewed  Tests in the medicine section of CPT®: reviewed and ordered    Risk of Complications, " Morbidity, and/or Mortality  Presenting problems: low  Diagnostic procedures: low  Management options: low        Final diagnoses:   Closed fracture of one rib of left side, initial encounter   Pulmonary nodule   Aortic anomaly   Gastric wall thickening   Renal cyst   Enlarged prostate       ED Disposition  ED Disposition     ED Disposition   Discharge    Condition   Stable    Comment   --             No follow-up provider specified.       Medication List      Changed    oxyCODONE-acetaminophen 7.5-325 MG per tablet  Commonly known as: PERCOCET  Take 1 tablet by mouth Every 6 (Six) Hours As Needed for Moderate Pain  for up to 6 doses.  What changed: reasons to take this        Stop    HYDROcodone-acetaminophen 7.5-325 MG per tablet  Commonly known as: NORCO           Where to Get Your Medications      These medications were sent to [facility] - [place] - [address]  - [phone]  - [phone]   [address], formerly [facility] [address]    Phone: [phone]   · oxyCODONE-acetaminophen 7.5-325 MG per tablet          Angel Draper MD  06/07/22 7209

## 2022-06-09 LAB
QT INTERVAL: 410 MS
QTC INTERVAL: 439 MS

## 2022-06-30 ENCOUNTER — OFFICE VISIT (OUTPATIENT)
Dept: CARDIAC SURGERY | Facility: CLINIC | Age: 62
End: 2022-06-30

## 2022-06-30 VITALS
HEART RATE: 87 BPM | DIASTOLIC BLOOD PRESSURE: 64 MMHG | SYSTOLIC BLOOD PRESSURE: 132 MMHG | BODY MASS INDEX: 21.58 KG/M2 | HEIGHT: 63 IN | WEIGHT: 121.8 LBS | OXYGEN SATURATION: 97 %

## 2022-06-30 DIAGNOSIS — I71.20 THORACIC AORTIC ANEURYSM WITHOUT RUPTURE: Primary | ICD-10-CM

## 2022-06-30 PROCEDURE — 99204 OFFICE O/P NEW MOD 45 MIN: CPT | Performed by: SURGERY

## 2022-06-30 RX ORDER — DICLOFENAC SODIUM 75 MG/1
75 TABLET, DELAYED RELEASE ORAL DAILY
COMMUNITY
Start: 2022-06-21 | End: 2022-08-07 | Stop reason: SDUPTHER

## 2022-06-30 RX ORDER — NALDEMEDINE 0.2 MG/1
0.2 TABLET ORAL DAILY
COMMUNITY
End: 2023-02-07

## 2022-06-30 RX ORDER — CYCLOBENZAPRINE HCL 5 MG
5 TABLET ORAL AS NEEDED
COMMUNITY
Start: 2022-06-21 | End: 2022-08-07 | Stop reason: SDUPTHER

## 2022-06-30 RX ORDER — DIPHENHYDRAMINE HCL 25 MG
25 CAPSULE ORAL EVERY 6 HOURS PRN
COMMUNITY

## 2022-06-30 NOTE — PROGRESS NOTES
"Cardiothoracic Surgery Consultation    Referring Physician: Dr. Ian Allan    Primary Care Physician: Dr. Ian Allan    Chief Complaint   Patient presents with   • New Patient     Referred by Dr. Allan for thoracic aortic aneurysm         Subjective     Mr. Campos is a 61-year-old male who presents with incidental finding of ascending aortic aneurysm.  A few weeks ago he fell and had some broken ribs.  3 to 4 days afterwards he had significant pain and was having trouble taking deep breaths of present in the emergency department.  With this he underwent a CT scan of the chest was incidentally noted ascending aortic aneurysm.  He has never had surgery on his heart lungs or chest.  He has had abdominal cyst removal and some problems with colitis.  He smokes 1 pack/day and has done so for about 30 years.  He has not had problems with hypertension.  He has no family history of dissection aneurysm or sudden death that he knows of.  He works at the [employer].  He is otherwise quite healthy does all the things he wants to do as far as activity goes.        Review of Systems   Constitutional: Negative for activity change, fatigue and unexpected weight change.   Respiratory: Negative for apnea and shortness of breath.    Cardiovascular: Positive for chest pain. Negative for palpitations.        Chest pain associated with rib fractures   Musculoskeletal: Negative for back pain, joint swelling and neck pain.        A complete review of systems was performed, is negative except stated above.    No past medical history on file.  Past Surgical History:   Procedure Laterality Date   • ABDOMINAL SURGERY     • EYE SURGERY     • KNEE SURGERY Left      No family history on file.  Social History     Tobacco Use   • Smoking status: Current Every Day Smoker     Packs/day: 1.00     Years: 33.00     Pack years: 33.00     Start date: 1989   • Smokeless tobacco: Never Used   • Tobacco comment: \"I've slowed " "down, I need to quit\"   Substance Use Topics   • Alcohol use: Never   • Drug use: Defer     Current Outpatient Medications   Medication Sig Dispense Refill   • cyclobenzaprine (FLEXERIL) 5 MG tablet Take 5 mg by mouth As Needed.     • diclofenac (VOLTAREN) 75 MG EC tablet Take 75 mg by mouth Daily.     • diphenhydrAMINE (BENADRYL) 25 mg capsule As Needed.     • Naldemedine Tosylate (Symproic) 0.2 MG tablet Take 0.2 mg by mouth Daily. Stool Softener     • oxyCODONE-acetaminophen (PERCOCET) 7.5-325 MG per tablet Take 1 tablet by mouth Every 6 (Six) Hours As Needed for Moderate Pain  for up to 6 doses. 6 tablet 0   • rosuvastatin (CRESTOR) 10 MG tablet Take 10 mg by mouth Daily.     • traZODone (DESYREL) 50 MG tablet Take 50 mg by mouth At Night As Needed for Sleep.       No current facility-administered medications for this visit.     Allergies:  Dilaudid [hydromorphone hcl]    Objective      Vital Signs  Visit Vitals  /64 (BP Location: Right arm, Patient Position: Sitting, Cuff Size: Adult)   Pulse 87   Ht 160 cm (63\")   Wt 55.2 kg (121 lb 12.8 oz)   SpO2 97%   BMI 21.58 kg/m²         Physical Exam  Constitutional:       General: He is not in acute distress.     Appearance: He is well-developed. He is not diaphoretic.   HENT:      Head: Normocephalic and atraumatic.      Right Ear: External ear normal.      Left Ear: External ear normal.   Eyes:      General:         Right eye: No discharge.         Left eye: No discharge.      Pupils: Pupils are equal, round, and reactive to light.   Neck:      Vascular: No JVD.      Trachea: No tracheal deviation.   Cardiovascular:      Rate and Rhythm: Normal rate and regular rhythm.      Heart sounds: Normal heart sounds. No murmur heard.  Pulmonary:      Effort: Pulmonary effort is normal. No respiratory distress.      Breath sounds: Normal breath sounds. No stridor. No wheezing.   Abdominal:      General: There is no distension.      Palpations: Abdomen is soft.      " Tenderness: There is no abdominal tenderness. There is no guarding.   Musculoskeletal:         General: No tenderness or deformity. Normal range of motion.      Cervical back: Normal range of motion and neck supple.   Skin:     General: Skin is warm and dry.      Capillary Refill: Capillary refill takes less than 2 seconds.      Coloration: Skin is not pale.      Findings: No erythema or rash.   Neurological:      Mental Status: He is alert and oriented to person, place, and time.      Motor: No abnormal muscle tone.      Coordination: Coordination normal.   Psychiatric:         Behavior: Behavior normal.         Thought Content: Thought content normal.         Judgment: Judgment normal.         Results Review:   WBC   Date Value Ref Range Status   06/07/2022 9.34 3.40 - 10.80 10*3/mm3 Final   03/14/2018 9.5 4.8 - 10.8 K/uL Final     RBC   Date Value Ref Range Status   06/07/2022 4.25 4.14 - 5.80 10*6/mm3 Final   03/14/2018 3.82 (L) 4.70 - 6.10 M/uL Final     Hemoglobin   Date Value Ref Range Status   06/07/2022 13.3 13.0 - 17.7 g/dL Final   03/14/2018 11.7 (L) 14.0 - 18.0 g/dL Final     Hematocrit   Date Value Ref Range Status   06/07/2022 40.5 37.5 - 51.0 % Final   03/14/2018 36.5 (L) 42.0 - 52.0 % Final     MCV   Date Value Ref Range Status   06/07/2022 95.3 79.0 - 97.0 fL Final   03/14/2018 95.5 (H) 80.0 - 94.0 fL Final     MCH   Date Value Ref Range Status   06/07/2022 31.3 26.6 - 33.0 pg Final   03/14/2018 30.6 27.0 - 31.0 pg Final     MCHC   Date Value Ref Range Status   06/07/2022 32.8 31.5 - 35.7 g/dL Final   03/14/2018 32.1 (L) 33.0 - 37.0 g/dL Final     RDW   Date Value Ref Range Status   06/07/2022 14.2 12.3 - 15.4 % Final   03/14/2018 12.4 11.5 - 14.5 % Final     RDW-SD   Date Value Ref Range Status   06/07/2022 49.4 37.0 - 54.0 fl Final     MPV   Date Value Ref Range Status   06/07/2022 9.8 6.0 - 12.0 fL Final   03/14/2018 9.6 9.4 - 12.4 fL Final     Platelets   Date Value Ref Range Status   06/07/2022  207 140 - 450 10*3/mm3 Final   03/14/2018 381 130 - 400 K/uL Final     Neutrophil Rel %   Date Value Ref Range Status   03/14/2018 69.8 (H) 50.0 - 65.0 % Final     Neutrophil %   Date Value Ref Range Status   06/07/2022 75.5 42.7 - 76.0 % Final     Lymphocyte Rel %   Date Value Ref Range Status   03/14/2018 18.6 (L) 20.0 - 40.0 % Final     Lymphocyte %   Date Value Ref Range Status   06/07/2022 15.2 (L) 19.6 - 45.3 % Final     Monocyte Rel %   Date Value Ref Range Status   03/14/2018 7.9 0.0 - 10.0 % Final     Monocyte %   Date Value Ref Range Status   06/07/2022 6.0 5.0 - 12.0 % Final     Eosinophil Rel %   Date Value Ref Range Status   03/14/2018 2.7 0.0 - 5.0 % Final     Eosinophil %   Date Value Ref Range Status   06/07/2022 2.6 0.3 - 6.2 % Final     Basophil Rel %   Date Value Ref Range Status   03/14/2018 0.5 0.0 - 1.0 % Final     Basophil %   Date Value Ref Range Status   06/07/2022 0.3 0.0 - 1.5 % Final     Immature Grans %   Date Value Ref Range Status   06/07/2022 0.4 0.0 - 0.5 % Final     Neutrophils Absolute   Date Value Ref Range Status   03/14/2018 6.6 1.5 - 7.5 K/uL Final     Neutrophils, Absolute   Date Value Ref Range Status   06/07/2022 7.05 (H) 1.70 - 7.00 10*3/mm3 Final     Lymphocytes Absolute   Date Value Ref Range Status   03/14/2018 1.8 1.1 - 4.5 K/uL Final     Lymphocytes, Absolute   Date Value Ref Range Status   06/07/2022 1.42 0.70 - 3.10 10*3/mm3 Final     Monocytes Absolute   Date Value Ref Range Status   03/14/2018 0.80 0.00 - 0.90 K/uL Final     Monocytes, Absolute   Date Value Ref Range Status   06/07/2022 0.56 0.10 - 0.90 10*3/mm3 Final     Eosinophils Absolute   Date Value Ref Range Status   03/14/2018 0.30 0.00 - 0.60 K/uL Final     Eosinophils, Absolute   Date Value Ref Range Status   06/07/2022 0.24 0.00 - 0.40 10*3/mm3 Final     Basophils Absolute   Date Value Ref Range Status   03/14/2018 0.10 0.00 - 0.20 K/uL Final     Basophils, Absolute   Date Value Ref Range Status    06/07/2022 0.03 0.00 - 0.20 10*3/mm3 Final     Immature Grans, Absolute   Date Value Ref Range Status   06/07/2022 0.04 0.00 - 0.05 10*3/mm3 Final     nRBC   Date Value Ref Range Status   06/07/2022 0.0 0.0 - 0.2 /100 WBC Final     Glucose   Date Value Ref Range Status   06/07/2022 83 65 - 99 mg/dL Final   03/14/2018 100 74 - 109 mg/dL Final     Sodium   Date Value Ref Range Status   06/07/2022 140 136 - 145 mmol/L Final   03/14/2018 140 136 - 145 mmol/L Final     Potassium   Date Value Ref Range Status   06/07/2022 5.1 3.5 - 5.2 mmol/L Final     Comment:     Slight hemolysis detected by analyzer. Results may be affected.   03/14/2018 4.1 3.5 - 5.0 mmol/L Final     CO2   Date Value Ref Range Status   06/07/2022 26.0 22.0 - 29.0 mmol/L Final   03/14/2018 26 22 - 29 mmol/L Final     Chloride   Date Value Ref Range Status   06/07/2022 108 (H) 98 - 107 mmol/L Final   03/14/2018 100 98 - 111 mmol/L Final     Anion Gap   Date Value Ref Range Status   06/07/2022 6.0 5.0 - 15.0 mmol/L Final   03/14/2018 14 7 - 19 mmol/L Final     Creatinine   Date Value Ref Range Status   06/07/2022 0.60 (L) 0.76 - 1.27 mg/dL Final   03/14/2018 0.7 0.5 - 1.2 mg/dL Final     BUN   Date Value Ref Range Status   06/07/2022 11 8 - 23 mg/dL Final   03/14/2018 23 (H) 6 - 20 mg/dL Final     BUN/Creatinine Ratio   Date Value Ref Range Status   06/07/2022 18.3 7.0 - 25.0 Final     Calcium   Date Value Ref Range Status   06/07/2022 9.1 8.6 - 10.5 mg/dL Final   03/14/2018 9.2 8.6 - 10.0 mg/dL Final     eGFR Non  Am   Date Value Ref Range Status   03/14/2018 >60 >60 Final     Comment:     This calculation may be inaccurate for patients under the age of 18 years.  For ages 18 and older, a GFR >60 mL/min/1.73m2 (not corrected for weight) is  valid for stable renal function.     eGFR Non  Amer   Date Value Ref Range Status   12/11/2021 >150 >60 mL/min/1.73 Final     Alkaline Phosphatase   Date Value Ref Range Status   03/13/2022 249 (H) 39  - 117 U/L Final   03/14/2018 326 (H) 40 - 130 U/L Final     Total Protein   Date Value Ref Range Status   03/13/2022 4.8 (L) 6.0 - 8.5 g/dL Final   03/14/2018 7.3 6.6 - 8.7 g/dL Final     ALT (SGPT)   Date Value Ref Range Status   03/13/2022 49 (H) 1 - 41 U/L Final   03/14/2018 47 (H) 5 - 41 U/L Final     AST (SGOT)   Date Value Ref Range Status   03/13/2022 39 1 - 40 U/L Final   03/14/2018 27 5 - 40 U/L Final     Total Bilirubin   Date Value Ref Range Status   03/13/2022 1.0 0.0 - 1.2 mg/dL Final   03/14/2018 0.4 0.2 - 1.2 mg/dL Final     Albumin   Date Value Ref Range Status   03/13/2022 2.60 (L) 3.50 - 5.20 g/dL Final   03/14/2018 3.5 3.5 - 5.2 g/dL Final     Globulin   Date Value Ref Range Status   03/13/2022 2.2 gm/dL Final        I reviewed the patient's clinical results and discussed with patient.    CT Chest:  MEDIASTINUM, HEART AND VASCULAR STRUCTURES: The ascending thoracic aorta  is dilated at 4.7 cm. The aortic arch and descending thoracic aorta are  normal caliber. There is mild atheromatous disease of the thoracic  aorta. Coronary artery calcification is noted. The heart is normal in  size. The left main pulmonary artery is mildly dilated at 2.6 cm. Main  pulmonary artery segment measures 2.6 cm. There are no enlarged  mediastinal lymph nodes.     LUNGS: There is centrilobular emphysema. There is a 6.5 mm right lower  lobe pulmonary nodule image 110 series 3. There is mild dependent  atelectasis.     UPPER ABDOMEN: Prior cholecystectomy. There is air within the biliary  tree. The common bile duct measures up to 1.4 cm. There is a 2.9 cm  probable renal cyst on the right. Hounsfield unit measurement is 7.6.     BONES: There are degenerative changes of the spine. There is a  nondisplaced fracture of the left ninth rib posterolaterally. There are  bilateral cervical ribs.     IMPRESSION:  1. Acute nondisplaced fracture of the left ninth rib posterolaterally.  No other acute bony abnormality is seen.  2.  "Dilated ascending thoracic aorta measuring 4.7 cm. Left main  pulmonary artery is dilated at 2.6 cm.  3. There is a 6.5 mm right lower lobe pulmonary nodule. Please see  Fleischner Society guidelines recommendations below. This was marked as  \"new lung findings\" in PACS for follow-up.  4. Centrilobular emphysema. Atelectasis in both lung bases.  5. Prior cholecystectomy. Air in the biliary tree. Common bile duct  measures up to 1.4 cm. There is a 2.9 cm renal cyst on the right.      I personally reviewed images of following exams, the following is my interpretation:    CT Chest:  Ascending aortic aneurysm with normal branching aortic arch, distal ascending/proximal arch measures 4.2 cm in maximum dimension, ascending aorta in maximum dimension measures about 4.8 cm, root appears less than 4 cm around 3.7      Assessment & Plan     Mr. Campos is a 61-year-old male who presents with incidental finding of ascending aortic aneurysm.  This was found incidentally when he was evaluated for rib fractures in the emergency department.  Today I discussed with him the natural history of incidental ascending aortic aneurysms and their subsequent risk of an acute aortic emergency.  We discussed treatment options and agreed that continued surveillance is the best treatment for him now.  I do not see that he has had an echocardiogram and given the morphology of isolated ascending dilation a bicuspid valve is possible.  We discussed that a bicuspid valve would prompt us to do surgery as a lower diameter more like 5.2 cm instead of 5.5 given its associated increased risk of dissection.    We discussed the risk of this aneurysm size considerations for repair and the risk of the operative procedure that would be required to treat the aneurysm.  Currently the risk of surgery outweigh the benefits of prevention of aortic problems.  My recommendation would be to continue to survey the aneurysm with CT scans.  We discussed the signs and " symptoms of an acute aortic emergency and the patient should immediately present to the emergency room if that were to happen.  We also discussed the importance of tight blood pressure control.  We discussed avoiding strenuous activity that would put undue stress on the aneurysm.    We spent a long time talking about smoking cessation, greater than 10 minutes.  He continues to smoke now and I discussed how this increases his risk of acute aortic emergency with his aneurysm as well as continued aneurysmal degeneration of his ascending aorta.  He understands.  He plans to talk with Dr. Allan about adjuncts to quit smoking.    Thank you for trusting me with the care of Mr. Whitten.  Please do not hesitate to call with questions or concerns.    Freddy Brasher MD  Cardiothoracic Surgeon

## 2022-07-01 ENCOUNTER — PATIENT ROUNDING (BHMG ONLY) (OUTPATIENT)
Dept: CARDIAC SURGERY | Facility: CLINIC | Age: 62
End: 2022-07-01

## 2022-07-01 NOTE — PROGRESS NOTES
A [employer] message has been sent to the patient for PATIENT ROUNDING with [facility].

## 2022-08-07 ENCOUNTER — HOSPITAL ENCOUNTER (EMERGENCY)
Facility: HOSPITAL | Age: 62
Discharge: HOME | End: 2022-08-07
Admitting: EMERGENCY MEDICINE

## 2022-08-07 VITALS
HEART RATE: 87 BPM | TEMPERATURE: 98.3 F | HEIGHT: 63 IN | RESPIRATION RATE: 18 BRPM | SYSTOLIC BLOOD PRESSURE: 132 MMHG | BODY MASS INDEX: 21.62 KG/M2 | DIASTOLIC BLOOD PRESSURE: 78 MMHG | WEIGHT: 122 LBS | OXYGEN SATURATION: 100 %

## 2022-08-07 DIAGNOSIS — M54.41 CHRONIC LOW BACK PAIN WITH BILATERAL SCIATICA, UNSPECIFIED BACK PAIN LATERALITY: Primary | ICD-10-CM

## 2022-08-07 DIAGNOSIS — M54.42 CHRONIC LOW BACK PAIN WITH BILATERAL SCIATICA, UNSPECIFIED BACK PAIN LATERALITY: Primary | ICD-10-CM

## 2022-08-07 DIAGNOSIS — G89.29 CHRONIC LOW BACK PAIN WITH BILATERAL SCIATICA, UNSPECIFIED BACK PAIN LATERALITY: Primary | ICD-10-CM

## 2022-08-07 PROCEDURE — 96372 THER/PROPH/DIAG INJ SC/IM: CPT

## 2022-08-07 PROCEDURE — 25010000002 DEXAMETHASONE PER 1 MG: Performed by: NURSE PRACTITIONER

## 2022-08-07 PROCEDURE — 25010000002 ORPHENADRINE CITRATE PER 60 MG: Performed by: NURSE PRACTITIONER

## 2022-08-07 PROCEDURE — 25010000002 KETOROLAC TROMETHAMINE PER 15 MG: Performed by: NURSE PRACTITIONER

## 2022-08-07 PROCEDURE — 99282 EMERGENCY DEPT VISIT SF MDM: CPT

## 2022-08-07 RX ORDER — PREDNISONE 10 MG/1
10 TABLET ORAL 2 TIMES DAILY
Qty: 10 TABLET | Refills: 0 | Status: SHIPPED | OUTPATIENT
Start: 2022-08-07 | End: 2022-08-12

## 2022-08-07 RX ORDER — DEXAMETHASONE SODIUM PHOSPHATE 10 MG/ML
10 INJECTION INTRAMUSCULAR; INTRAVENOUS ONCE
Status: COMPLETED | OUTPATIENT
Start: 2022-08-07 | End: 2022-08-07

## 2022-08-07 RX ORDER — CYCLOBENZAPRINE HCL 5 MG
5 TABLET ORAL 2 TIMES DAILY PRN
Qty: 20 TABLET | Refills: 0 | Status: SHIPPED | OUTPATIENT
Start: 2022-08-07 | End: 2022-08-17

## 2022-08-07 RX ORDER — ORPHENADRINE CITRATE 30 MG/ML
60 INJECTION INTRAMUSCULAR; INTRAVENOUS ONCE
Status: COMPLETED | OUTPATIENT
Start: 2022-08-07 | End: 2022-08-07

## 2022-08-07 RX ORDER — KETOROLAC TROMETHAMINE 30 MG/ML
60 INJECTION, SOLUTION INTRAMUSCULAR; INTRAVENOUS ONCE
Status: COMPLETED | OUTPATIENT
Start: 2022-08-07 | End: 2022-08-07

## 2022-08-07 RX ORDER — DICLOFENAC SODIUM 75 MG/1
75 TABLET, DELAYED RELEASE ORAL DAILY
Qty: 14 TABLET | Refills: 0 | Status: SHIPPED | OUTPATIENT
Start: 2022-08-07 | End: 2022-08-21

## 2022-08-07 RX ADMIN — DEXAMETHASONE SODIUM PHOSPHATE 10 MG: 10 INJECTION INTRAMUSCULAR; INTRAVENOUS at 11:40

## 2022-08-07 RX ADMIN — ORPHENADRINE CITRATE 60 MG: 30 INJECTION INTRAMUSCULAR; INTRAVENOUS at 11:40

## 2022-08-07 RX ADMIN — KETOROLAC TROMETHAMINE 60 MG: 30 INJECTION, SOLUTION INTRAMUSCULAR at 11:40

## 2022-08-07 NOTE — DISCHARGE INSTRUCTIONS
Drink plenty of fluid.  Medication as ordered. Follow up with PCP - call tomorrow for appointment. Return to ED if condition does not improve or worsens

## 2022-08-07 NOTE — ED PROVIDER NOTES
"Subjective   61 yom with PMH of lumbar DDD presents with c/o low back pain. He states he has chronic back pain and sees pain management. He is prescribed percocet.  He states he does not take muscle relaxers or anti inflammatory medications.  He reports he has moved the last couple weeks and his back pain is 'flared up.'  He denies new injury.  He believes he overexerted himself while moving.  He denies saddle anesthesia or bowel/bladder incontinence.            Review of Systems   Constitutional: Negative for activity change, appetite change, fatigue and fever.   HENT: Negative for congestion, ear pain, facial swelling and sore throat.    Eyes: Negative for discharge and visual disturbance.   Respiratory: Negative for apnea, chest tightness, shortness of breath, wheezing and stridor.    Cardiovascular: Negative for chest pain and palpitations.   Gastrointestinal: Negative for abdominal distention, abdominal pain, diarrhea, nausea and vomiting.   Genitourinary: Negative for difficulty urinating and dysuria.   Musculoskeletal: Positive for back pain. Negative for arthralgias and myalgias.   Skin: Negative for rash and wound.   Neurological: Negative for dizziness and seizures.   Psychiatric/Behavioral: Negative for agitation and confusion.       Past Medical History:   Diagnosis Date   • Back injury        Allergies   Allergen Reactions   • Dilaudid [Hydromorphone Hcl] Headache       Past Surgical History:   Procedure Laterality Date   • ABDOMINAL SURGERY     • EYE SURGERY     • KNEE SURGERY Left        History reviewed. No pertinent family history.    Social History     Socioeconomic History   • Marital status: Single   Tobacco Use   • Smoking status: Current Every Day Smoker     Packs/day: 1.00     Years: 33.00     Pack years: 33.00     Start date: 1989   • Smokeless tobacco: Never Used   • Tobacco comment: \"I've slowed down, I need to quit\"   Substance and Sexual Activity   • Alcohol use: Never   • Drug use: Defer "   • Sexual activity: Defer           Objective   Physical Exam  Vitals and nursing note reviewed.   Constitutional:       General: He is not in acute distress.     Appearance: He is well-developed. He is not ill-appearing or toxic-appearing.   HENT:      Head: Normocephalic.   Eyes:      Pupils: Pupils are equal, round, and reactive to light.   Cardiovascular:      Rate and Rhythm: Normal rate and regular rhythm.      Heart sounds: No murmur heard.  Pulmonary:      Effort: Pulmonary effort is normal.      Breath sounds: Normal breath sounds.   Abdominal:      General: Bowel sounds are normal.      Palpations: Abdomen is soft.   Musculoskeletal:      Cervical back: Normal range of motion and neck supple.      Lumbar back: No swelling, signs of trauma, spasms, tenderness or bony tenderness.      Comments: No tenderness, redness or swelling present to the lumbar spine.  No deformity present.  He has +PMS of all extremities   Skin:     General: Skin is warm and dry.   Neurological:      Mental Status: He is alert and oriented to person, place, and time.      GCS: GCS eye subscore is 4. GCS verbal subscore is 5. GCS motor subscore is 6.      Sensory: Sensation is intact.      Motor: No weakness.      Gait: Gait is intact.      Comments: He is neurologically intact         Procedures           ED Course  ED Course as of 08/07/22 1554   Sun Aug 07, 2022   1121 The patient and I discussed plan of care. He declines CT scan or other testing.  He states he only needs his pain controlled.  He is neurologically intact and denies new injury.  I will give him injections and d'c him home on po meds.  He voiced understanding of all instructions.  [KS]      ED Course User Index  [KS] Madison Suarez, APRN                                           MDM  Number of Diagnoses or Management Options  Chronic low back pain with bilateral sciatica, unspecified back pain laterality: established and worsening  Risk of Complications,  Morbidity, and/or Mortality  Presenting problems: minimal  Diagnostic procedures: minimal  Management options: minimal    Patient Progress  Patient progress: stable      Final diagnoses:   Chronic low back pain with bilateral sciatica, unspecified back pain laterality       ED Disposition  ED Disposition     ED Disposition   Discharge    Condition   Stable    Comment   --             Ian Allan MD  [address]  [facility] [address]  [phone]    Call in 1 day  Routine ED follow up         Medication List      New Prescriptions    predniSONE 10 MG tablet  Commonly known as: DELTASONE  Take 1 tablet by mouth 2 (Two) Times a Day for 5 days.        Changed    cyclobenzaprine 5 MG tablet  Commonly known as: FLEXERIL  Take 1 tablet by mouth 2 (Two) Times a Day As Needed for Muscle Spasms for up to 10 days.  What changed:   · when to take this  · reasons to take this           Where to Get Your Medications      These medications were sent to [email or web address], [facility] - [place] - [address]  - [phone] [facility] [phone]   [address], [facility] [address]    Phone: [phone]   · cyclobenzaprine 5 MG tablet  · diclofenac 75 MG EC tablet  · predniSONE 10 MG tablet          BarbarasMadison, APRN  08/07/22 6336

## 2022-08-08 ENCOUNTER — APPOINTMENT (OUTPATIENT)
Dept: GENERAL RADIOLOGY | Facility: HOSPITAL | Age: 62
End: 2022-08-08

## 2022-08-08 ENCOUNTER — APPOINTMENT (OUTPATIENT)
Dept: CT IMAGING | Facility: HOSPITAL | Age: 62
End: 2022-08-08

## 2022-08-08 ENCOUNTER — HOSPITAL ENCOUNTER (INPATIENT)
Facility: HOSPITAL | Age: 62
LOS: 4 days | Discharge: HOME | End: 2022-08-12
Attending: EMERGENCY MEDICINE | Admitting: FAMILY MEDICINE

## 2022-08-08 DIAGNOSIS — R07.9 CHEST PAIN, UNSPECIFIED TYPE: ICD-10-CM

## 2022-08-08 DIAGNOSIS — R91.1 PULMONARY NODULE: ICD-10-CM

## 2022-08-08 DIAGNOSIS — K56.609 SMALL BOWEL OBSTRUCTION: Primary | ICD-10-CM

## 2022-08-08 DIAGNOSIS — I71.20 THORACIC AORTIC ANEURYSM WITHOUT RUPTURE: ICD-10-CM

## 2022-08-08 LAB
ALBUMIN SERPL-MCNC: 5 G/DL (ref 3.5–5.2)
ALBUMIN/GLOB SERPL: 1.4 G/DL
ALP SERPL-CCNC: 204 U/L (ref 39–117)
ALT SERPL W P-5'-P-CCNC: 25 U/L (ref 1–41)
ANION GAP SERPL CALCULATED.3IONS-SCNC: 10 MMOL/L (ref 5–15)
AST SERPL-CCNC: 30 U/L (ref 1–40)
BASOPHILS # BLD AUTO: 0.05 10*3/MM3 (ref 0–0.2)
BASOPHILS NFR BLD AUTO: 0.2 % (ref 0–1.5)
BILIRUB SERPL-MCNC: 0.4 MG/DL (ref 0–1.2)
BILIRUB UR QL STRIP: NEGATIVE
BUN SERPL-MCNC: 27 MG/DL (ref 8–23)
BUN/CREAT SERPL: 37.5 (ref 7–25)
CALCIUM SPEC-SCNC: 11.2 MG/DL (ref 8.6–10.5)
CHLORIDE SERPL-SCNC: 104 MMOL/L (ref 98–107)
CLARITY UR: CLEAR
CO2 SERPL-SCNC: 28 MMOL/L (ref 22–29)
COLOR UR: YELLOW
CREAT SERPL-MCNC: 0.72 MG/DL (ref 0.76–1.27)
CRP SERPL-MCNC: <0.3 MG/DL (ref 0–0.5)
D DIMER PPP FEU-MCNC: 0.48 MCGFEU/ML (ref 0–0.5)
D-LACTATE SERPL-SCNC: 1.4 MMOL/L (ref 0.5–2)
DEPRECATED RDW RBC AUTO: 48.2 FL (ref 37–54)
EGFRCR SERPLBLD CKD-EPI 2021: 103.3 ML/MIN/1.73
EOSINOPHIL # BLD AUTO: 0.01 10*3/MM3 (ref 0–0.4)
EOSINOPHIL NFR BLD AUTO: 0 % (ref 0.3–6.2)
ERYTHROCYTE [DISTWIDTH] IN BLOOD BY AUTOMATED COUNT: 13.5 % (ref 12.3–15.4)
GLOBULIN UR ELPH-MCNC: 3.5 GM/DL
GLUCOSE SERPL-MCNC: 110 MG/DL (ref 65–99)
GLUCOSE UR STRIP-MCNC: NEGATIVE MG/DL
HCT VFR BLD AUTO: 49.2 % (ref 37.5–51)
HGB BLD-MCNC: 15.8 G/DL (ref 13–17.7)
HGB UR QL STRIP.AUTO: NEGATIVE
HOLD SPECIMEN: NORMAL
HOLD SPECIMEN: NORMAL
IMM GRANULOCYTES # BLD AUTO: 0.16 10*3/MM3 (ref 0–0.05)
IMM GRANULOCYTES NFR BLD AUTO: 0.7 % (ref 0–0.5)
KETONES UR QL STRIP: NEGATIVE
LEUKOCYTE ESTERASE UR QL STRIP.AUTO: NEGATIVE
LIPASE SERPL-CCNC: 21 U/L (ref 13–60)
LYMPHOCYTES # BLD AUTO: 2.34 10*3/MM3 (ref 0.7–3.1)
LYMPHOCYTES NFR BLD AUTO: 10.1 % (ref 19.6–45.3)
MCH RBC QN AUTO: 30.9 PG (ref 26.6–33)
MCHC RBC AUTO-ENTMCNC: 32.1 G/DL (ref 31.5–35.7)
MCV RBC AUTO: 96.3 FL (ref 79–97)
MONOCYTES # BLD AUTO: 1.82 10*3/MM3 (ref 0.1–0.9)
MONOCYTES NFR BLD AUTO: 7.9 % (ref 5–12)
NEUTROPHILS NFR BLD AUTO: 18.78 10*3/MM3 (ref 1.7–7)
NEUTROPHILS NFR BLD AUTO: 81.1 % (ref 42.7–76)
NITRITE UR QL STRIP: NEGATIVE
NRBC BLD AUTO-RTO: 0 /100 WBC (ref 0–0.2)
PH UR STRIP.AUTO: 5.5 [PH] (ref 5–8)
PLATELET # BLD AUTO: 320 10*3/MM3 (ref 140–450)
PMV BLD AUTO: 10.1 FL (ref 6–12)
POTASSIUM SERPL-SCNC: 4.5 MMOL/L (ref 3.5–5.2)
PROCALCITONIN SERPL-MCNC: 0.07 NG/ML (ref 0–0.25)
PROT SERPL-MCNC: 8.5 G/DL (ref 6–8.5)
PROT UR QL STRIP: ABNORMAL
RBC # BLD AUTO: 5.11 10*6/MM3 (ref 4.14–5.8)
SARS-COV-2 RNA PNL SPEC NAA+PROBE: NOT DETECTED
SODIUM SERPL-SCNC: 142 MMOL/L (ref 136–145)
SP GR UR STRIP: >=1.03 (ref 1–1.03)
TROPONIN T SERPL-MCNC: <0.01 NG/ML (ref 0–0.03)
TROPONIN T SERPL-MCNC: <0.01 NG/ML (ref 0–0.03)
UROBILINOGEN UR QL STRIP: ABNORMAL
WBC NRBC COR # BLD: 23.16 10*3/MM3 (ref 3.4–10.8)
WHOLE BLOOD HOLD COAG: NORMAL
WHOLE BLOOD HOLD SPECIMEN: NORMAL

## 2022-08-08 PROCEDURE — 0 HYDROMORPHONE 1 MG/ML SOLUTION: Performed by: NURSE PRACTITIONER

## 2022-08-08 PROCEDURE — 25010000002 ONDANSETRON PER 1 MG: Performed by: NURSE PRACTITIONER

## 2022-08-08 PROCEDURE — 81003 URINALYSIS AUTO W/O SCOPE: CPT | Performed by: EMERGENCY MEDICINE

## 2022-08-08 PROCEDURE — 71275 CT ANGIOGRAPHY CHEST: CPT

## 2022-08-08 PROCEDURE — 84484 ASSAY OF TROPONIN QUANT: CPT | Performed by: EMERGENCY MEDICINE

## 2022-08-08 PROCEDURE — 0 HYDROMORPHONE 1 MG/ML SOLUTION: Performed by: EMERGENCY MEDICINE

## 2022-08-08 PROCEDURE — 99285 EMERGENCY DEPT VISIT HI MDM: CPT

## 2022-08-08 PROCEDURE — 85025 COMPLETE CBC W/AUTO DIFF WBC: CPT | Performed by: STUDENT IN AN ORGANIZED HEALTH CARE EDUCATION/TRAINING PROGRAM

## 2022-08-08 PROCEDURE — 93005 ELECTROCARDIOGRAM TRACING: CPT | Performed by: STUDENT IN AN ORGANIZED HEALTH CARE EDUCATION/TRAINING PROGRAM

## 2022-08-08 PROCEDURE — 25010000002 ONDANSETRON PER 1 MG: Performed by: EMERGENCY MEDICINE

## 2022-08-08 PROCEDURE — 93005 ELECTROCARDIOGRAM TRACING: CPT | Performed by: EMERGENCY MEDICINE

## 2022-08-08 PROCEDURE — 93010 ELECTROCARDIOGRAM REPORT: CPT | Performed by: EMERGENCY MEDICINE

## 2022-08-08 PROCEDURE — 87635 SARS-COV-2 COVID-19 AMP PRB: CPT | Performed by: EMERGENCY MEDICINE

## 2022-08-08 PROCEDURE — 74018 RADEX ABDOMEN 1 VIEW: CPT

## 2022-08-08 PROCEDURE — 99221 1ST HOSP IP/OBS SF/LOW 40: CPT | Performed by: STUDENT IN AN ORGANIZED HEALTH CARE EDUCATION/TRAINING PROGRAM

## 2022-08-08 PROCEDURE — 86140 C-REACTIVE PROTEIN: CPT | Performed by: EMERGENCY MEDICINE

## 2022-08-08 PROCEDURE — 0 HYDROMORPHONE 1 MG/ML SOLUTION: Performed by: FAMILY MEDICINE

## 2022-08-08 PROCEDURE — 80053 COMPREHEN METABOLIC PANEL: CPT | Performed by: EMERGENCY MEDICINE

## 2022-08-08 PROCEDURE — 74177 CT ABD & PELVIS W/CONTRAST: CPT

## 2022-08-08 PROCEDURE — 83690 ASSAY OF LIPASE: CPT | Performed by: EMERGENCY MEDICINE

## 2022-08-08 PROCEDURE — 71045 X-RAY EXAM CHEST 1 VIEW: CPT

## 2022-08-08 PROCEDURE — 83605 ASSAY OF LACTIC ACID: CPT | Performed by: EMERGENCY MEDICINE

## 2022-08-08 PROCEDURE — 25010000002 MORPHINE PER 10 MG: Performed by: EMERGENCY MEDICINE

## 2022-08-08 PROCEDURE — 84145 PROCALCITONIN (PCT): CPT | Performed by: EMERGENCY MEDICINE

## 2022-08-08 PROCEDURE — 85379 FIBRIN DEGRADATION QUANT: CPT | Performed by: EMERGENCY MEDICINE

## 2022-08-08 PROCEDURE — 93010 ELECTROCARDIOGRAM REPORT: CPT | Performed by: INTERNAL MEDICINE

## 2022-08-08 PROCEDURE — 0 IOPAMIDOL PER 1 ML: Performed by: EMERGENCY MEDICINE

## 2022-08-08 RX ORDER — ONDANSETRON 2 MG/ML
4 INJECTION INTRAMUSCULAR; INTRAVENOUS ONCE
Status: COMPLETED | OUTPATIENT
Start: 2022-08-08 | End: 2022-08-08

## 2022-08-08 RX ORDER — SODIUM CHLORIDE 0.9 % (FLUSH) 0.9 %
10 SYRINGE (ML) INJECTION AS NEEDED
Status: DISCONTINUED | OUTPATIENT
Start: 2022-08-08 | End: 2022-08-12 | Stop reason: HOSPADM

## 2022-08-08 RX ORDER — ASPIRIN 81 MG/1
324 TABLET, CHEWABLE ORAL ONCE
Status: COMPLETED | OUTPATIENT
Start: 2022-08-08 | End: 2022-08-08

## 2022-08-08 RX ORDER — NALOXONE HCL 0.4 MG/ML
0.4 VIAL (ML) INJECTION
Status: DISCONTINUED | OUTPATIENT
Start: 2022-08-08 | End: 2022-08-12 | Stop reason: HOSPADM

## 2022-08-08 RX ORDER — LABETALOL HYDROCHLORIDE 5 MG/ML
10 INJECTION, SOLUTION INTRAVENOUS ONCE
Status: DISCONTINUED | OUTPATIENT
Start: 2022-08-08 | End: 2022-08-12 | Stop reason: HOSPADM

## 2022-08-08 RX ADMIN — HYDROMORPHONE HYDROCHLORIDE 1 MG: 1 INJECTION, SOLUTION INTRAMUSCULAR; INTRAVENOUS; SUBCUTANEOUS at 19:17

## 2022-08-08 RX ADMIN — ASPIRIN 324 MG: 81 TABLET, CHEWABLE ORAL at 06:38

## 2022-08-08 RX ADMIN — HYDROMORPHONE HYDROCHLORIDE 1 MG: 1 INJECTION, SOLUTION INTRAMUSCULAR; INTRAVENOUS; SUBCUTANEOUS at 21:55

## 2022-08-08 RX ADMIN — Medication 10 ML: at 10:42

## 2022-08-08 RX ADMIN — HYDROMORPHONE HYDROCHLORIDE 1 MG: 1 INJECTION, SOLUTION INTRAMUSCULAR; INTRAVENOUS; SUBCUTANEOUS at 10:41

## 2022-08-08 RX ADMIN — IOPAMIDOL 100 ML: 755 INJECTION, SOLUTION INTRAVENOUS at 08:21

## 2022-08-08 RX ADMIN — ONDANSETRON 4 MG: 2 INJECTION INTRAMUSCULAR; INTRAVENOUS at 07:30

## 2022-08-08 RX ADMIN — ONDANSETRON 4 MG: 2 INJECTION INTRAMUSCULAR; INTRAVENOUS at 10:40

## 2022-08-08 RX ADMIN — MORPHINE SULFATE 4 MG: 4 INJECTION, SOLUTION INTRAMUSCULAR; INTRAVENOUS at 07:31

## 2022-08-08 RX ADMIN — HYDROMORPHONE HYDROCHLORIDE 1 MG: 1 INJECTION, SOLUTION INTRAMUSCULAR; INTRAVENOUS; SUBCUTANEOUS at 14:24

## 2022-08-08 NOTE — ED PROVIDER NOTES
Subjective   This is a 61-year-old gentleman who came to the ER complaining of chest pain he was recently seen by me after a fall had a rib fracture but also was noted to have a abdominal aortic aneurysm today he comes into the ED with substernal discomfort epigastric pain and chest pain with hypertension and tachycardia.  And feeling nauseous vomited x1 in the ED.  He is got leukocytosis with a history of his aortic ectasia a CT of the chest without and with contrast.  I am also going to order a CT of the abdomen and pelvis at the same time to evaluate any further abdominal aortic aneurysms and also delineate any reason for him having abdominal epigastric pain and vomiting.  Especially with his leukocytosis      Chest Pain  Pain location:  Epigastric and substernal area  Pain quality: aching and burning    Pain radiates to:  Does not radiate  Pain severity:  Moderate  Onset quality:  Gradual  Timing:  Constant  Progression:  Waxing and waning  Chronicity:  New  Context: not breathing, not drug use, not eating, not lifting, not movement, not at rest and not trauma    Relieved by:  Nothing  Worsened by:  Nothing  Ineffective treatments:  None tried  Associated symptoms: nausea and vomiting    Associated symptoms: no abdominal pain, no altered mental status, no anorexia, no back pain, no cough, no diaphoresis, no dizziness, no dysphagia, no fatigue, no fever, no headache, no numbness, no orthopnea, no palpitations, no PND, no shortness of breath, no syncope and no weakness    Risk factors: aortic disease and hypertension    Risk factors: no coronary artery disease, no immobilization and not obese        Review of Systems   Constitutional: Negative.  Negative for diaphoresis, fatigue and fever.   HENT: Negative.  Negative for trouble swallowing.    Respiratory: Negative for cough and shortness of breath.    Cardiovascular: Positive for chest pain. Negative for palpitations, orthopnea, syncope and PND.  "  Gastrointestinal: Positive for nausea and vomiting. Negative for abdominal distention, abdominal pain and anorexia.   Endocrine: Negative.    Genitourinary: Negative.    Musculoskeletal: Negative.  Negative for back pain and neck pain.   Skin: Negative for color change and pallor.   Neurological: Negative.  Negative for dizziness, syncope, weakness, light-headedness, numbness and headaches.   Hematological: Negative.  Does not bruise/bleed easily.   All other systems reviewed and are negative.      Past Medical History:   Diagnosis Date   • Back injury        Allergies   Allergen Reactions   • Dilaudid [Hydromorphone Hcl] Headache       Past Surgical History:   Procedure Laterality Date   • ABDOMINAL SURGERY     • EYE SURGERY     • KNEE SURGERY Left        History reviewed. No pertinent family history.    Social History     Socioeconomic History   • Marital status: Single   Tobacco Use   • Smoking status: Current Every Day Smoker     Packs/day: 1.00     Years: 33.00     Pack years: 33.00     Start date: 1989   • Smokeless tobacco: Never Used   • Tobacco comment: \"I've slowed down, I need to quit\"   Substance and Sexual Activity   • Alcohol use: Never   • Drug use: Defer   • Sexual activity: Defer           Objective   Physical Exam  Vitals and nursing note reviewed. Exam conducted with a chaperone present.   Constitutional:       General: He is awake. He is not in acute distress.     Appearance: Normal appearance. He is well-developed. He is not toxic-appearing.   HENT:      Head: Normocephalic and atraumatic.      Nose: Nose normal.      Right Nostril: No epistaxis.      Left Nostril: No epistaxis.      Mouth/Throat:      Mouth: Mucous membranes are moist.      Pharynx: Uvula midline.   Eyes:      General: Lids are normal. Lids are everted, no foreign bodies appreciated. No scleral icterus.     Conjunctiva/sclera: Conjunctivae normal.      Pupils: Pupils are equal, round, and reactive to light.   Neck:      " Vascular: Normal carotid pulses. No carotid bruit, hepatojugular reflux or JVD.      Trachea: Trachea and phonation normal. No tracheal deviation.   Cardiovascular:      Rate and Rhythm: Normal rate and regular rhythm.      Chest Wall: PMI is not displaced.      Pulses: Normal pulses. No decreased pulses.      Heart sounds: Normal heart sounds. No murmur heard.   No systolic murmur is present.   No diastolic murmur is present.    No gallop.   Pulmonary:      Effort: Pulmonary effort is normal. No tachypnea, accessory muscle usage or respiratory distress.      Breath sounds: Normal breath sounds. No stridor. No decreased breath sounds, wheezing, rhonchi or rales.   Abdominal:      General: Abdomen is flat. Bowel sounds are normal. There is no distension or abdominal bruit.      Palpations: Abdomen is soft. There is no shifting dullness, fluid wave, mass or pulsatile mass.      Tenderness: There is abdominal tenderness in the epigastric area. There is no right CVA tenderness, left CVA tenderness, guarding or rebound.      Hernia: No hernia is present.   Musculoskeletal:         General: No swelling. Normal range of motion.      Cervical back: Full passive range of motion without pain, normal range of motion and neck supple. No rigidity.      Right lower leg: No edema.      Left lower leg: No edema.      Comments: Lower extremity exam bilaterally is unremarkable.  There is no right or left calf tenderness .  There is no palpable venous cord.  No obvious difference in the size of the legs.  No pitting edema.  The dorsalis pedis and posterior tibial femoral and popliteal pulses are palpable and +2 bilaterally.  Homans sign is negative   Skin:     General: Skin is warm and dry.      Capillary Refill: Capillary refill takes less than 2 seconds.      Coloration: Skin is not cyanotic, jaundiced, mottled or pale.      Nails: There is no clubbing.   Neurological:      General: No focal deficit present.      Mental Status: He  is alert and oriented to person, place, and time. Mental status is at baseline.      GCS: GCS eye subscore is 4. GCS verbal subscore is 5. GCS motor subscore is 6.      Cranial Nerves: Cranial nerves are intact. No cranial nerve deficit.      Sensory: Sensation is intact.      Motor: Motor function is intact.      Gait: Gait normal.      Deep Tendon Reflexes: Reflexes are normal and symmetric. Reflexes normal.   Psychiatric:         Speech: Speech normal.         Behavior: Behavior normal. Behavior is cooperative.         Procedures           ED Course  ED Course as of 08/08/22 1106   Mon Aug 08, 2022   0747 Wells score is 0 [TS]   0747 Years Algorithm for Pulmonary Embolus  To be used in hemodynamically stable patient >18 years old    No signs of DVT are present, there is no hemoptysis, PE is not the most likely diagnosis and the D-dimer is not greater or equal to 1000 ng/mL. Therefore PE is excluded . The Years algorithm rules out PE (0.43 % with symptomatic VTE during 3-month follow-up) [TS]   0747 Heart score 3 [TS]   1052 Dilated small bowel loops, including a loop of the jejunum in the  left abdomen that has a maximum diameter of 6.5 cm and associated  mucosal thickening. The appearance is concerning for mechanical small  bowel obstruction, no discrete transition point is identified. There are  distal small bowel loops which appear decompressed. No free fluid, free  air or abscess. Moderate distention of the stomach with fluid and air.  2. Pneumobilia compatible with previous sphincterotomy. Cholecystectomy  clips are present. There is a reservoir effect of the extrahepatic bile  ducts.  3. Moderate sigmoid diverticulosis without evidence of acute  diverticulitis. [TS]   1102 Case was discussed at length with the patient his work-up was discussed with him.  I have informed his primary MD and general surgery the patient will be admitted to medicine service with general surgical consultation. [TS]   1106 Heart  score 3 [TS]   1106 Patient was not given antibiotics he is not running any fever leukocytosis could be secondary to nausea and vomiting.  We will wait for the urine [TS]      ED Course User Index  [TS] Angel Draper MD                                           MDM  Number of Diagnoses or Management Options  Diagnosis management comments: DD   I considered gastric etiology, gastritis, gastroenteritis, peptic ulcer disease, gastroesophageal reflux disease, , gallbladder disease, liver disease, pancreatic disease, biliary colic, cholecystitis, cholelithiasis, hepatitis, pancreatitis, cholangitis, porphyria and diabetic ketoacidosis as a possible cause of abdominal pain in this patient. This is a partial list of diagnoses considered.    Differential Diagnosis:  I considered chest wall pain, muscle strain, costochondritis, pleurisy, rib fracture, herpes zoster, cardiovascular etiology, myocardial infarction, intermediate coronary syndrome, unstable angina, angina, aortic dissection, pericarditis, pulmonary etiology, pulmonary embolism, pneumonia, pneumothorax, lung cancer, gastroesophageal reflux disease, esophagitis, esophageal spasm and gastrointestinal etiology as a possible cause of chest pain in this patient. This is a partial list of diagnoses considered.         Amount and/or Complexity of Data Reviewed  Clinical lab tests: ordered and reviewed  Tests in the radiology section of CPT®: ordered and reviewed  Tests in the medicine section of CPT®: reviewed and ordered  Decide to obtain previous medical records or to obtain history from someone other than the patient: yes    Risk of Complications, Morbidity, and/or Mortality  Presenting problems: moderate  Diagnostic procedures: moderate  Management options: moderate        Final diagnoses:   Small bowel obstruction (HCC)   Chest pain, unspecified type   Thoracic aortic aneurysm without rupture (HCC)   Pulmonary nodule       ED Disposition  ED Disposition     ED  Disposition   Decision to Admit    Condition   --    Comment   Level of Care: Telemetry [5]   Diagnosis: Small bowel obstruction (HCC) [434992]   Admitting Physician: VALERIANO HESS [[id number]]   Attending Physician: VALERIANO HESS [[id number]]   Certification: I Certify That Inpatient Hospital Services Are Medically Necessary For Greater Than 2 Midnights               No follow-up provider specified.       Medication List      No changes were made to your prescriptions during this visit.          Angel Draper MD  08/08/22 0748       Angel Draper MD  08/08/22 1106       Angel Draper MD  08/08/22 1106

## 2022-08-08 NOTE — CONSULTS
"Kenisha Arana MD Consult Note - General Surgery     Referring Provider: Provider, No Known    Patient Care Team:  Ian Allan MD as PCP - General (Family Medicine)  Brasher, Freddy LÓPEZ MD as Consulting Physician (Cardiothoracic Surgery)    Chief complaint abdominal pain, chest pain     Subjective .     History of present illness:  Mr. Campos is a 61 year old mal who presents with abdominal pain that started yesterday. He endorses nausea and vomiting. He reports he was bloated this morning but this resolved with emesis. HE also endorses chest pain. He is a poor historian. Imaging in ER today showed a bowel obstruction. PSH on his abdomen when they removed a section of bowel due to a cyst and and an ex lap with cholecystectomy with \"re-routing\" of his bowel to his liver. On chart review it was noted that he had a hepaticojejunostomy which was found during ERCP. He has required two percutaneous removals of bile duct stones in [place] - in the 1990s and in 2017. His LFTs are all normal today.     He is not on blood thinners.BMI 21. He is a current every day smoker.    Review of Systems  All systems were reviewed and negative except for: abdominal pain, nasuea, vomiting, and chest pain.     Constitution:chills, fevers, night sweats and weight loss, weight gain  Eyes:  double vision, blurriness and loss of vision  ENT:  earaches, hearing loss and hoarseness  Respiratory:  cough, dry, cough, productive, hemoptysis and shortness of air  Cardiovascular:  chest pressure / pain, at rest, chest pressure / pain, on exertion, irregular pulse and palpitations  Gastrointestinal: bright red blood per rectum, change in bowel habits, constipation, diarrhea, heartburn, hematemesis, melena, nausea, pain and vomiting  Genitourinary:  difficulty / inability to void, pain, blood in urine and painful urination  Integument:  itching, rash, redness and swelling  Breast:  lump / mass, nipple discharge and pain  Hematologic / " "Lymphatic: easy bruising and lymphadenopathy  Musculoskeletal: joint pain, muscle pain and muscle weakness  Neurological: dizziness, loss of consciousness, numbness, vertigo and weakness  Behavioral/Psych: anxiety and depression  Endocrine: diabetes, thyroid disorder      History  Past Medical History:   Diagnosis Date   • Back injury    ,   Past Surgical History:   Procedure Laterality Date   • ABDOMINAL SURGERY     • EYE SURGERY     • KNEE SURGERY Left    , History reviewed. No pertinent family history.,   Social History     Tobacco Use   • Smoking status: Current Every Day Smoker     Packs/day: 1.00     Years: 33.00     Pack years: 33.00     Start date: 1989   • Smokeless tobacco: Never Used   • Tobacco comment: \"I've slowed down, I need to quit\"   Substance Use Topics   • Alcohol use: Never   • Drug use: Defer   , (Not in a hospital admission)   and Allergies:  Dilaudid [hydromorphone hcl]    Current Facility-Administered Medications:   •  labetalol (NORMODYNE,TRANDATE) injection 10 mg, 10 mg, Intravenous, Once, Angel Draper MD  •  sodium chloride 0.9 % flush 10 mL, 10 mL, Intravenous, PRN, Angel Draper MD, 10 mL at 08/08/22 1042    Current Outpatient Medications:   •  diphenhydrAMINE (BENADRYL) 25 mg capsule, As Needed., Disp: , Rfl:   •  Naldemedine Tosylate (Symproic) 0.2 MG tablet, Take 0.2 mg by mouth Daily. Stool Softener, Disp: , Rfl:   •  oxyCODONE-acetaminophen (PERCOCET) 7.5-325 MG per tablet, Take 1 tablet by mouth Every 6 (Six) Hours As Needed for Moderate Pain  for up to 6 doses., Disp: 6 tablet, Rfl: 0  •  rosuvastatin (CRESTOR) 10 MG tablet, Take 10 mg by mouth Daily., Disp: , Rfl:   •  traZODone (DESYREL) 50 MG tablet, Take 50 mg by mouth At Night As Needed for Sleep., Disp: , Rfl:   •  cyclobenzaprine (FLEXERIL) 5 MG tablet, Take 1 tablet by mouth 2 (Two) Times a Day As Needed for Muscle Spasms for up to 10 days., Disp: 20 tablet, Rfl: 0  •  diclofenac (VOLTAREN) 75 MG EC tablet, Take 1 " tablet by mouth Daily for 14 days., Disp: 14 tablet, Rfl: 0  •  predniSONE (DELTASONE) 10 MG tablet, Take 1 tablet by mouth 2 (Two) Times a Day for 5 days., Disp: 10 tablet, Rfl: 0    Objective     Vital Signs   Temp:  [98 °F (36.7 °C)-98.3 °F (36.8 °C)] 98 °F (36.7 °C)  Heart Rate:  [63-92] 63  Resp:  [18] 18  BP: (125-177)/(65-92) 157/73    Physical Exam:  General appearance - alert, well appearing, and in no distress  Mental status - alert, oriented to person, place, and time  Eyes - sclera anicteric  Neck - supple, no significant adenopathy  Chest - no tachypnea, retractions or cyanosis  Heart - regular rate   Abdomen - soft, non-distended, mild TTP with deep palpation   Neurological - alert, oriented, normal speech, no focal findings or movement disorder noted  Musculoskeletal - no joint tenderness, deformity or swelling    Results Review:    Lab Results (last 24 hours)     Procedure Component Value Units Date/Time    Troponin [[id number]]  (Normal) Collected: 08/08/22 0928    Specimen: Blood Updated: 08/08/22 0957     Troponin T <0.010 ng/mL     Narrative:      Troponin T Reference Range:  <= 0.03 ng/mL-   Negative for AMI  >0.03 ng/mL-     Abnormal for myocardial necrosis.  Clinicians would have to utilize clinical acumen, EKG, Troponin and serial changes to determine if it is an Acute Myocardial Infarction or myocardial injury due to an underlying chronic condition.       Results may be falsely decreased if patient taking Biotin.      Lactic Acid, Plasma [[id number]]  (Normal) Collected: 08/08/22 0730    Specimen: Blood Updated: 08/08/22 0759     Lactate 1.4 mmol/L     [place] Draw [[id number]] Collected: 08/08/22 0632    Specimen: Blood Updated: 08/08/22 0748    Narrative:      The following orders were created for panel order [place] Draw.  Procedure                               Abnormality         Status                     ---------                               -----------         ------                    "  Green Top (Gel)[[id number]]                                  Final result               Lavender Top[[id number]]                                     Final result               Red Top[[id number]]                                          Final result               Light Blue Top[[id number]]                                   Final result                 Please view results for these tests on the individual orders.    Green Top (Gel) [[id number]] Collected: 08/08/22 0632    Specimen: Blood Updated: 08/08/22 0748     Extra Tube Hold for add-ons.     Comment: Auto resulted.       Red Top [[id number]] Collected: 08/08/22 0632    Specimen: Blood Updated: 08/08/22 0748     Extra Tube Hold for add-ons.     Comment: Auto resulted.       Light Blue Top [[id number]] Collected: 08/08/22 0632    Specimen: Blood Updated: 08/08/22 0748     Extra Tube Hold for add-ons.     Comment: Auto resulted       Lavender Top [[id number]] Collected: 08/08/22 0632    Specimen: Blood Updated: 08/08/22 0748     Extra Tube hold for add-on     Comment: Auto resulted       Procalcitonin [[id number]]  (Normal) Collected: 08/08/22 0632    Specimen: Blood Updated: 08/08/22 0743     Procalcitonin 0.07 ng/mL     Narrative:      As a Marker for Sepsis (Non-Neonates):    1. <0.5 ng/mL represents a low risk of severe sepsis and/or septic shock.  2. >2 ng/mL represents a high risk of severe sepsis and/or septic shock.    As a Marker for Lower Respiratory Tract Infections that require antibiotic therapy:    PCT on Admission    Antibiotic Therapy       6-12 Hrs later    >0.5                Strongly Recommended  >0.25 - <0.5        Recommended   0.1 - 0.25          Discouraged              Remeasure/reassess PCT  <0.1                Strongly Discouraged     Remeasure/reassess PCT    As 28 day mortality risk marker: \"Change in Procalcitonin Result\" (>80% or <=80%) if Day 0 (or Day 1) and Day 4 values are available. Refer to http://www.[facility]-pct-calculator.com    Change " in PCT <=80%  A decrease of PCT levels below or equal to 80% defines a positive change in PCT test result representing a higher risk for 28-day all-cause mortality of patients diagnosed with severe sepsis for septic shock.    Change in PCT >80%  A decrease of PCT levels of more than 80% defines a negative change in PCT result representing a lower risk for 28-day all-cause mortality of patients diagnosed with severe sepsis or septic shock.       Lipase [[id number]]  (Normal) Collected: 08/08/22 0632    Specimen: Blood Updated: 08/08/22 0730     Lipase 21 U/L     C-reactive Protein [[id number]]  (Normal) Collected: 08/08/22 0632    Specimen: Blood Updated: 08/08/22 0712     C-Reactive Protein <0.30 mg/dL     D-dimer, Quantitative [[id number]]  (Normal) Collected: 08/08/22 0632    Specimen: Blood Updated: 08/08/22 0707     D-Dimer, Quantitative 0.48 MCGFEU/mL     Narrative:      Reference Range is 0-0.50 MCGFEU/mL. However, results <0.50 MCGFEU/mL tends to rule out DVT or PE. Results >0.50 MCGFEU/mL are not useful in predicting absence or presence of DVT or PE.      Comprehensive Metabolic Panel [[id number]]  (Abnormal) Collected: 08/08/22 0632    Specimen: Blood Updated: 08/08/22 0659     Glucose 110 mg/dL      BUN 27 mg/dL      Creatinine 0.72 mg/dL      Sodium 142 mmol/L      Potassium 4.5 mmol/L      Comment: Slight hemolysis detected by analyzer. Results may be affected.        Chloride 104 mmol/L      CO2 28.0 mmol/L      Calcium 11.2 mg/dL      Total Protein 8.5 g/dL      Albumin 5.00 g/dL      ALT (SGPT) 25 U/L      AST (SGOT) 30 U/L      Alkaline Phosphatase 204 U/L      Total Bilirubin 0.4 mg/dL      Globulin 3.5 gm/dL      A/G Ratio 1.4 g/dL      BUN/Creatinine Ratio 37.5     Anion Gap 10.0 mmol/L      eGFR 103.3 mL/min/1.73      Comment: National Kidney Foundation and American Society of Nephrology (ASN) Task Force recommended calculation based on the Chronic Kidney Disease Epidemiology Collaboration (CKD-EPI)  equation refit without adjustment for race.       Narrative:      GFR Normal >60  Chronic Kidney Disease <60  Kidney Failure <15      Troponin [[id number]]  (Normal) Collected: 08/08/22 0632    Specimen: Blood Updated: 08/08/22 0655     Troponin T <0.010 ng/mL     Narrative:      Troponin T Reference Range:  <= 0.03 ng/mL-   Negative for AMI  >0.03 ng/mL-     Abnormal for myocardial necrosis.  Clinicians would have to utilize clinical acumen, EKG, Troponin and serial changes to determine if it is an Acute Myocardial Infarction or myocardial injury due to an underlying chronic condition.       Results may be falsely decreased if patient taking Biotin.      CBC & Differential [[id number]]  (Abnormal) Collected: 08/08/22 0632    Specimen: Blood Updated: 08/08/22 0641    Narrative:      The following orders were created for panel order CBC & Differential.  Procedure                               Abnormality         Status                     ---------                               -----------         ------                     CBC Auto Differential[[id number]]        Abnormal            Final result                 Please view results for these tests on the individual orders.    CBC Auto Differential [[id number]]  (Abnormal) Collected: 08/08/22 0632    Specimen: Blood Updated: 08/08/22 0641     WBC 23.16 10*3/mm3      RBC 5.11 10*6/mm3      Hemoglobin 15.8 g/dL      Hematocrit 49.2 %      MCV 96.3 fL      MCH 30.9 pg      MCHC 32.1 g/dL      RDW 13.5 %      RDW-SD 48.2 fl      MPV 10.1 fL      Platelets 320 10*3/mm3      Neutrophil % 81.1 %      Lymphocyte % 10.1 %      Monocyte % 7.9 %      Eosinophil % 0.0 %      Basophil % 0.2 %      Immature Grans % 0.7 %      Neutrophils, Absolute 18.78 10*3/mm3      Lymphocytes, Absolute 2.34 10*3/mm3      Monocytes, Absolute 1.82 10*3/mm3      Eosinophils, Absolute 0.01 10*3/mm3      Basophils, Absolute 0.05 10*3/mm3      Immature Grans, Absolute 0.16 10*3/mm3      nRBC 0.0 /100 WBC          Imaging Results (Last 24 Hours)     Procedure Component Value Units Date/Time    XR Abdomen KUB [[id number]] Resulted: 08/08/22 1115     Updated: 08/08/22 1115    CT Angiogram Chest [[id number]] Collected: 08/08/22 0828     Updated: 08/08/22 0841    Narrative:      Exam: CT angiogram of the of the chest with intravenous contrast.     CLINICAL HISTORY:  HISTORY of abdominal aortic aneurysm. Patient  complaining of chest pain radiating into epigastric area.     DOSE:  211 mGycm. All CT scans are performed using dose optimization  techniques as appropriate to the performed exam and including at least  one of the following: Automated exposure control, adjustment of the mA  and/or kV according to size, and the use of the iterative reconstruction  technique.     TECHNIQUE: Contiguous axial images were obtained through the thorax  following intravenous contrast administration with reformatted images  obtained in the sagittal and coronal projections from the original data  set. Three-dimensional reconstructions are also obtained.     COMPARISON:  6/7/2022     FINDINGS:     Pulmonary arteries:  There is normal enhancement of the pulmonary  arteries with no evidence of pulmonary thromboembolic disease.     Aorta:  There is aneurysmal dilatation of the ascending thoracic aorta  with a transverse diameter of 4.7 cm just above the aortic root. The  aortic arch and proximal great vessels are also mildly ectatic. The T  were artery emanates directly from the aortic arch and emanates distal  to the origin of the left subclavian artery which is a somewhat unusual  configuration. No evidence of dissection. The descending thoracic aorta  is normal in caliber.     LUNGS:  There are moderate changes of centrilobular emphysema. There is  again a 6.5 mm soft tissue nodule in the right lower lobe warranting  follow-up per Fleischner criteria. It is stable from the previous CT  exam of the chest of 6/7/2022. Bibasilar atelectasis or  scarring is  present. Lungs are otherwise clear. No evidence of acute consolidative  pneumonia or effusion.     Pleural spaces: Unremarkable. No evidence of pleural effusion or  pneumothorax.     HEART: There is mild cardiomegaly. Mild coronary calcifications are  present. No evidence of right ventricular dysfunction or pericardial  effusion.     Bones: Multiple healing left posterolateral rib fractures are present.  No acute or suspicious bony abnormalities are observed.     CHEST WALL: No chest wall abnormalities are demonstrated.     Lymph nodes: No enlarged mediastinal or axillary lymphadenopathy is  present.     Upper abdomen: There is pneumobilia. With mild intrahepatic biliary  prominence. This is felt to be related to either previous sphincterotomy  or biliary diversion. The patient's undergone prior cholecystectomy.  Mild to moderate extrahepatic biliary dilatation is present.       Impression:      1. No evidence of pulmonary thromboembolic disease.  2. Stable aneurysmal dilatation of the ascending thoracic aorta. There  is also mild ectasia of the great vessels. No evidence of dissection.  The descending thoracic aorta is normal in caliber.  3. Pneumobilia presumably related to prior biliary diversion or  sphincterotomy. This is stable from the previous exam.  4. Stable pulmonary nodule in the right lower lobe warranting follow-up.  A prominent right hilar node measuring 9 mm in short axis is also  present. Follow-up is recommended per Fleischner criteria.  5. Centrilobular emphysema. Bibasilar atelectasis or scarring is  present.  6. Mild cardiomegaly with coronary calcifications.     Fleischner Society Recommendations for Management of SOLID Pulmonary  Nodules:     1.  If a nodule is less than or equal to 4 mm in size, low risk patients  require no follow up, and high risk patients require a follow-up CT of  the chest at 12 months.  2.  If a nodule is 5-6 mm in size, low risk patients require a  follow-up  CT at 12 months, and high risk patients require follow up CT scans at  6-12 months and 18-24 months.  3.  If a nodule is 7-8 mm in size, low risk patients requiring follow-up  at 6-12 months and 18-24 months, and high risk patients requiring  follow-up at 3-6 months, 9-12 months and 24 months.  4.  If a nodule is greater than 8 mm in size, ALL patients require  follow-up at 3, 9 and 24 months. PET/CT or biopsy should also be  considered.  This report was finalized on 08/08/2022 08:38 by Dr. Ralph Hicks MD.    CT Abdomen Pelvis With Contrast [[id number]] Collected: 08/08/22 0825     Updated: 08/08/22 0840    Narrative:      EXAMINATION: CT ABDOMEN PELVIS W CONTRAST- 8/8/2022 8:25 AM CDT     HISTORY: Nausea/vomiting left-sided chest pain, epigastric pain.     DOSE: 210 mGycm (Automatic exposure control technique was implemented in  an effort to keep the radiation dose as low as possible without  compromising image quality)     REPORT: Spiral CT of the abdomen and pelvis was performed after  administration of intravenous contrast from the lung bases through the  pubic symphysis. Reconstructed coronal and sagittal images are also  reviewed.     COMPARISON: CT abdomen pelvis without contrast 6/7/2022.     Review of lung windows demonstrates normal aeration of the lung bases.  Heart size is normal. Cholecystectomy clips are present. Pneumobilia is  also present as before, compatible with previous sphincterotomy. No  liver mass is identified. There is moderate distention of the stomach  with fluid and some gas. The spleen appears normal. The common bile duct  is dilated with maximum diameter 1.2 cm, stable. This probably  represents a reservoir effect. The pancreas and adrenal glands appear  within normal limits. The kidneys enhance normally, no renal mass or  hydronephrosis is identified. There are 2 benign-appearing cysts in the  right kidney, the largest measures 3 cm. The ureters are decompressed.  The  bladder is within normal limits. There is fluid retention within the  small intestine, with a moderately dilated loop of small jejunum is  noted in the left abdomen, with a diameter of 6.5 cm and there is mild  mucosal thickening involving this loop distally. There is a similar  appearance on the previous CT, the dilated small bowel loop had a  diameter of 5.0 cm. The appearance is concerning for mechanical small  bowel obstruction, though no discrete transition point is identified.  The conus decompressed. There is moderate sigmoid diverticulosis. No  free fluid, free air or abscess is identified. Review of bone windows is  unremarkable.       Impression:      1. Dilated small bowel loops, including a loop of the jejunum in the  left abdomen that has a maximum diameter of 6.5 cm and associated  mucosal thickening. The appearance is concerning for mechanical small  bowel obstruction, no discrete transition point is identified. There are  distal small bowel loops which appear decompressed. No free fluid, free  air or abscess. Moderate distention of the stomach with fluid and air.  2. Pneumobilia compatible with previous sphincterotomy. Cholecystectomy  clips are present. There is a reservoir effect of the extrahepatic bile  ducts.  3. Moderate sigmoid diverticulosis without evidence of acute  diverticulitis.        This report was finalized on 08/08/2022 08:37 by Dr. Jeet Dey MD.    XR Chest 1 View [[id number]] Collected: 08/08/22 0712     Updated: 08/08/22 0718    Narrative:      EXAM/TECHNIQUE: XR CHEST 1 VW-     INDICATION: Chest Pain Triage Protocol     COMPARISON: 6/7/2022     FINDINGS:     The cardiac silhouette is normal in size. No pleural effusion,  pneumothorax, or focal consolidation. No acute osseous finding. Subacute  LEFT ninth rib fracture. Ectatic ascending aorta again noted.       Impression:         No acute findings.  This report was finalized on 08/08/2022 07:15 by Dr. Adalberto Jones MD.                 Assessment & Plan     Small bowel obstruction     Mr. 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 He presents with abdominal pain, nausea and vomiting. CT showed concern for a small bowel obstruction with a dilated loop of jejunum in the left upper quadrant measuring 6.5cm. Prior CT scan on 6/7/22 showed the same loop of bowel dilated up to 5.1 cm in the left upper quadrant.  He also had dilated CBD to 1.2cm at that time as well. I am unsure if this is an acute small bowel obstruction or a chronic intermittent obstruction 2/2 narrowing at his anastomosis. I spoke with Dr. Allan who reports treating him for similar symptoms which they thought were related to constipation as an outpatient. I believe this likely a more chronic process. NGT placed. Will proceed with decompression and bowel rest. Will plan for small bowel follow through in the next 24-48 hours. I spoke with Dr. Allan who is admitting the patient and we are in agreement on the plan.     Kenisha Arana MD  08/08/22  11:19 CDT

## 2022-08-09 ENCOUNTER — APPOINTMENT (OUTPATIENT)
Dept: GENERAL RADIOLOGY | Facility: HOSPITAL | Age: 62
End: 2022-08-09

## 2022-08-09 PROBLEM — M51.36 DDD (DEGENERATIVE DISC DISEASE), LUMBAR: Status: ACTIVE | Noted: 2022-08-09

## 2022-08-09 PROBLEM — D72.829 LEUKOCYTOSIS: Status: ACTIVE | Noted: 2022-08-09

## 2022-08-09 LAB
ALBUMIN SERPL-MCNC: 4 G/DL (ref 3.5–5.2)
ALBUMIN SERPL-MCNC: 4.9 G/DL (ref 3.5–5.2)
ALBUMIN/GLOB SERPL: 1.5 G/DL
ALBUMIN/GLOB SERPL: 1.8 G/DL
ALP SERPL-CCNC: 162 U/L (ref 39–117)
ALP SERPL-CCNC: 208 U/L (ref 39–117)
ALT SERPL W P-5'-P-CCNC: 28 U/L (ref 1–41)
ALT SERPL W P-5'-P-CCNC: 43 U/L (ref 1–41)
ANION GAP SERPL CALCULATED.3IONS-SCNC: 10 MMOL/L (ref 5–15)
ANION GAP SERPL CALCULATED.3IONS-SCNC: 5 MMOL/L (ref 5–15)
AST SERPL-CCNC: 27 U/L (ref 1–40)
AST SERPL-CCNC: 41 U/L (ref 1–40)
BASOPHILS # BLD AUTO: 0.04 10*3/MM3 (ref 0–0.2)
BASOPHILS # BLD AUTO: 0.05 10*3/MM3 (ref 0–0.2)
BASOPHILS NFR BLD AUTO: 0.3 % (ref 0–1.5)
BASOPHILS NFR BLD AUTO: 0.4 % (ref 0–1.5)
BILIRUB SERPL-MCNC: 0.7 MG/DL (ref 0–1.2)
BILIRUB SERPL-MCNC: 0.8 MG/DL (ref 0–1.2)
BUN SERPL-MCNC: 28 MG/DL (ref 8–23)
BUN SERPL-MCNC: 31 MG/DL (ref 8–23)
BUN/CREAT SERPL: 43.7 (ref 7–25)
BUN/CREAT SERPL: 45.2 (ref 7–25)
CALCIUM SPEC-SCNC: 10.3 MG/DL (ref 8.6–10.5)
CALCIUM SPEC-SCNC: 9.3 MG/DL (ref 8.6–10.5)
CHLORIDE SERPL-SCNC: 106 MMOL/L (ref 98–107)
CHLORIDE SERPL-SCNC: 106 MMOL/L (ref 98–107)
CO2 SERPL-SCNC: 29 MMOL/L (ref 22–29)
CO2 SERPL-SCNC: 29 MMOL/L (ref 22–29)
CREAT SERPL-MCNC: 0.62 MG/DL (ref 0.76–1.27)
CREAT SERPL-MCNC: 0.71 MG/DL (ref 0.76–1.27)
DEPRECATED RDW RBC AUTO: 49.7 FL (ref 37–54)
DEPRECATED RDW RBC AUTO: 50.2 FL (ref 37–54)
EGFRCR SERPLBLD CKD-EPI 2021: 103.7 ML/MIN/1.73
EGFRCR SERPLBLD CKD-EPI 2021: 108.1 ML/MIN/1.73
EOSINOPHIL # BLD AUTO: 0.18 10*3/MM3 (ref 0–0.4)
EOSINOPHIL # BLD AUTO: 0.31 10*3/MM3 (ref 0–0.4)
EOSINOPHIL NFR BLD AUTO: 1.4 % (ref 0.3–6.2)
EOSINOPHIL NFR BLD AUTO: 2.6 % (ref 0.3–6.2)
ERYTHROCYTE [DISTWIDTH] IN BLOOD BY AUTOMATED COUNT: 13.9 % (ref 12.3–15.4)
ERYTHROCYTE [DISTWIDTH] IN BLOOD BY AUTOMATED COUNT: 14 % (ref 12.3–15.4)
GLOBULIN UR ELPH-MCNC: 2.6 GM/DL
GLOBULIN UR ELPH-MCNC: 2.7 GM/DL
GLUCOSE SERPL-MCNC: 101 MG/DL (ref 65–99)
GLUCOSE SERPL-MCNC: 121 MG/DL (ref 65–99)
HCT VFR BLD AUTO: 41.4 % (ref 37.5–51)
HCT VFR BLD AUTO: 48.7 % (ref 37.5–51)
HGB BLD-MCNC: 13.7 G/DL (ref 13–17.7)
HGB BLD-MCNC: 15.9 G/DL (ref 13–17.7)
IMM GRANULOCYTES # BLD AUTO: 0.05 10*3/MM3 (ref 0–0.05)
IMM GRANULOCYTES # BLD AUTO: 0.06 10*3/MM3 (ref 0–0.05)
IMM GRANULOCYTES NFR BLD AUTO: 0.4 % (ref 0–0.5)
IMM GRANULOCYTES NFR BLD AUTO: 0.5 % (ref 0–0.5)
LYMPHOCYTES # BLD AUTO: 1.66 10*3/MM3 (ref 0.7–3.1)
LYMPHOCYTES # BLD AUTO: 1.8 10*3/MM3 (ref 0.7–3.1)
LYMPHOCYTES NFR BLD AUTO: 13 % (ref 19.6–45.3)
LYMPHOCYTES NFR BLD AUTO: 14.9 % (ref 19.6–45.3)
MCH RBC QN AUTO: 31.6 PG (ref 26.6–33)
MCH RBC QN AUTO: 32 PG (ref 26.6–33)
MCHC RBC AUTO-ENTMCNC: 32.6 G/DL (ref 31.5–35.7)
MCHC RBC AUTO-ENTMCNC: 33.1 G/DL (ref 31.5–35.7)
MCV RBC AUTO: 96.7 FL (ref 79–97)
MCV RBC AUTO: 96.8 FL (ref 79–97)
MONOCYTES # BLD AUTO: 0.98 10*3/MM3 (ref 0.1–0.9)
MONOCYTES # BLD AUTO: 1.05 10*3/MM3 (ref 0.1–0.9)
MONOCYTES NFR BLD AUTO: 7.7 % (ref 5–12)
MONOCYTES NFR BLD AUTO: 8.7 % (ref 5–12)
NEUTROPHILS NFR BLD AUTO: 73.1 % (ref 42.7–76)
NEUTROPHILS NFR BLD AUTO: 77 % (ref 42.7–76)
NEUTROPHILS NFR BLD AUTO: 8.83 10*3/MM3 (ref 1.7–7)
NEUTROPHILS NFR BLD AUTO: 9.85 10*3/MM3 (ref 1.7–7)
NRBC BLD AUTO-RTO: 0 /100 WBC (ref 0–0.2)
NRBC BLD AUTO-RTO: 0 /100 WBC (ref 0–0.2)
PLATELET # BLD AUTO: 192 10*3/MM3 (ref 140–450)
PLATELET # BLD AUTO: 264 10*3/MM3 (ref 140–450)
PMV BLD AUTO: 10.1 FL (ref 6–12)
PMV BLD AUTO: 10.2 FL (ref 6–12)
POTASSIUM SERPL-SCNC: 4.1 MMOL/L (ref 3.5–5.2)
POTASSIUM SERPL-SCNC: 4.4 MMOL/L (ref 3.5–5.2)
PROT SERPL-MCNC: 6.6 G/DL (ref 6–8.5)
PROT SERPL-MCNC: 7.6 G/DL (ref 6–8.5)
QT INTERVAL: 370 MS
QT INTERVAL: 390 MS
QTC INTERVAL: 412 MS
QTC INTERVAL: 450 MS
RBC # BLD AUTO: 4.28 10*6/MM3 (ref 4.14–5.8)
RBC # BLD AUTO: 5.03 10*6/MM3 (ref 4.14–5.8)
SODIUM SERPL-SCNC: 140 MMOL/L (ref 136–145)
SODIUM SERPL-SCNC: 145 MMOL/L (ref 136–145)
WBC NRBC COR # BLD: 12.08 10*3/MM3 (ref 3.4–10.8)
WBC NRBC COR # BLD: 12.78 10*3/MM3 (ref 3.4–10.8)

## 2022-08-09 PROCEDURE — 74018 RADEX ABDOMEN 1 VIEW: CPT

## 2022-08-09 PROCEDURE — 0 HYDROMORPHONE 1 MG/ML SOLUTION: Performed by: FAMILY MEDICINE

## 2022-08-09 PROCEDURE — 74250 X-RAY XM SM INT 1CNTRST STD: CPT

## 2022-08-09 PROCEDURE — 0 DIATRIZOATE MEGLUMINE & SODIUM PER 1 ML: Performed by: STUDENT IN AN ORGANIZED HEALTH CARE EDUCATION/TRAINING PROGRAM

## 2022-08-09 PROCEDURE — 25010000002 ENOXAPARIN PER 10 MG: Performed by: FAMILY MEDICINE

## 2022-08-09 PROCEDURE — 80053 COMPREHEN METABOLIC PANEL: CPT | Performed by: FAMILY MEDICINE

## 2022-08-09 PROCEDURE — 99232 SBSQ HOSP IP/OBS MODERATE 35: CPT | Performed by: STUDENT IN AN ORGANIZED HEALTH CARE EDUCATION/TRAINING PROGRAM

## 2022-08-09 PROCEDURE — 85025 COMPLETE CBC W/AUTO DIFF WBC: CPT | Performed by: FAMILY MEDICINE

## 2022-08-09 RX ORDER — SODIUM CHLORIDE 0.9 % (FLUSH) 0.9 %
10 SYRINGE (ML) INJECTION AS NEEDED
Status: DISCONTINUED | OUTPATIENT
Start: 2022-08-09 | End: 2022-08-12 | Stop reason: HOSPADM

## 2022-08-09 RX ORDER — SODIUM CHLORIDE, SODIUM LACTATE, POTASSIUM CHLORIDE, CALCIUM CHLORIDE 600; 310; 30; 20 MG/100ML; MG/100ML; MG/100ML; MG/100ML
125 INJECTION, SOLUTION INTRAVENOUS CONTINUOUS
Status: DISCONTINUED | OUTPATIENT
Start: 2022-08-09 | End: 2022-08-12 | Stop reason: HOSPADM

## 2022-08-09 RX ORDER — SODIUM CHLORIDE 0.9 % (FLUSH) 0.9 %
10 SYRINGE (ML) INJECTION EVERY 12 HOURS SCHEDULED
Status: DISCONTINUED | OUTPATIENT
Start: 2022-08-09 | End: 2022-08-12 | Stop reason: HOSPADM

## 2022-08-09 RX ORDER — ENOXAPARIN SODIUM 100 MG/ML
40 INJECTION SUBCUTANEOUS DAILY
Status: DISCONTINUED | OUTPATIENT
Start: 2022-08-09 | End: 2022-08-12 | Stop reason: HOSPADM

## 2022-08-09 RX ADMIN — Medication 10 ML: at 08:52

## 2022-08-09 RX ADMIN — HYDROMORPHONE HYDROCHLORIDE 1 MG: 1 INJECTION, SOLUTION INTRAMUSCULAR; INTRAVENOUS; SUBCUTANEOUS at 22:25

## 2022-08-09 RX ADMIN — HYDROMORPHONE HYDROCHLORIDE 1 MG: 1 INJECTION, SOLUTION INTRAMUSCULAR; INTRAVENOUS; SUBCUTANEOUS at 19:34

## 2022-08-09 RX ADMIN — HYDROMORPHONE HYDROCHLORIDE 1 MG: 1 INJECTION, SOLUTION INTRAMUSCULAR; INTRAVENOUS; SUBCUTANEOUS at 05:56

## 2022-08-09 RX ADMIN — HYDROMORPHONE HYDROCHLORIDE 1 MG: 1 INJECTION, SOLUTION INTRAMUSCULAR; INTRAVENOUS; SUBCUTANEOUS at 12:31

## 2022-08-09 RX ADMIN — HYDROMORPHONE HYDROCHLORIDE 1 MG: 1 INJECTION, SOLUTION INTRAMUSCULAR; INTRAVENOUS; SUBCUTANEOUS at 15:03

## 2022-08-09 RX ADMIN — DIATRIZOATE MEGLUMINE AND DIATRIZOATE SODIUM 360 ML: 660; 100 LIQUID ORAL; RECTAL at 12:10

## 2022-08-09 RX ADMIN — SODIUM CHLORIDE, POTASSIUM CHLORIDE, SODIUM LACTATE AND CALCIUM CHLORIDE 125 ML/HR: 600; 310; 30; 20 INJECTION, SOLUTION INTRAVENOUS at 14:40

## 2022-08-09 RX ADMIN — HYDROMORPHONE HYDROCHLORIDE 1 MG: 1 INJECTION, SOLUTION INTRAMUSCULAR; INTRAVENOUS; SUBCUTANEOUS at 08:51

## 2022-08-09 RX ADMIN — ENOXAPARIN SODIUM 40 MG: 100 INJECTION SUBCUTANEOUS at 15:03

## 2022-08-09 RX ADMIN — HYDROMORPHONE HYDROCHLORIDE 1 MG: 1 INJECTION, SOLUTION INTRAMUSCULAR; INTRAVENOUS; SUBCUTANEOUS at 01:07

## 2022-08-09 RX ADMIN — HYDROMORPHONE HYDROCHLORIDE 1 MG: 1 INJECTION, SOLUTION INTRAMUSCULAR; INTRAVENOUS; SUBCUTANEOUS at 03:25

## 2022-08-09 NOTE — PAYOR COMM NOTE
"REF:  [id number]      Psychiatric  [facility]  [phone]  OR  FAX  [phone]    Christa Campos (61 y.o. Male)             Date of Birth   1960    Social Security Number       Address   [address] [place] [address]    Home Phone   [phone]    MRN   [id number]       Sabianist   Other    Marital Status   Single                            Admission Date   8/8/22    Admission Type   Emergency    Admitting Provider   Ian Allan MD    Attending Provider       Department, Room/Bed   [facility] Emergency Department, 23/23       Discharge Date       Discharge Disposition       Discharge Destination                               Attending Provider: (none)   Allergies: Dilaudid [Hydromorphone Hcl]    Isolation: None   Infection: None   Code Status: CPR   Advance Care Planning Activity    Ht: 160 cm (63\")   Wt: 55.3 kg (122 lb)    Admission Cmt: None   Principal Problem: None                Active Insurance as of 8/8/2022     Primary Coverage     Payor Plan Insurance Group Employer/Plan Group    [employer] PPO [id number]     Payor Plan Address Payor Plan Phone Number Payor Plan Fax Number Effective Dates    [address] [phone]  1/1/2020 - None Entered    [place] [address]       Subscriber Name Subscriber Birth Date Member ID       CHRISTA CAMPOS 1960 [id number]                 Emergency Contacts      (Rel.) Home Phone Work Phone Mobile Phone    Peace Guidry (Sister) [phone] -- [phone]    Gay Carrero (Relative) -- -- [phone]      Patient Care Timeline (8/8/2022 06:22 to 8/9/2022 07:49:23)    8/8/2022 8/8/2022 Event Details User   06:22 Patient arrival  Cinthia Burgos RN   06:22 Patient roomed in ED To room 23 Cinthia Burgos RN   06:22:47 Trigger for Triage Start  Cinthia Burgos RN   06:22:47 Triage Started  Cinthia Burgos RN   06:22:47 Chief Complaints Updated Chest " "Pain   Cinthia Burgos RN     06:27 HPI HPI (Adult)  Stated Reason for Visit: Pt reports Lt side CP that is radiating down to epigastric area. Hx of AAA. Denies previous heart problems. C/o SOB. CP started around 0500.  History Obtained From: patient Cinthia Burgos RN   06:27:41 ECG 12 Lead Resulted Collected: 2022 06:28   Last updated: 2022 06:27   Status: Preliminary result   QT Interval: 370 ms   QTC Interval: 450 ms  Interface, Ekg Results In   06:27:47 Imaging Preliminary Result  Interface, Ekg Results In   06:27:47 ED/UC IMAGING PRELIMINARY READ ECG 12 Lead Interface, Ekg Results In   06:28 Vital Signs  Vital Signs  Temp: 98 °F (36.7 °C)  Temp src: Oral  Heart Rate: 92  Resp: 18  BP: 155/92  BMI (Calculated): 21.6  Oxygen Therapy  SpO2: 100 %  Device (Oxygen Therapy): room air  Vitals Timer  Restart Vitals Timer: Yes  Height and Weight  Height: 160 cm (63\")  Weight: 55.3 kg (122 lb)  Other flowsheet entries  Ideal Body Weight k.9 Cinthia Burgos RN     06:30 Pain Pain (Adult)  (0-10) Pain Rating: Rest: 6  (0-10) Pain Rating: Activity: 6  Pain Location: chest  Pain Description: pressure; tightness Cinthia Burgos RN     06:38 Pain Medication Administered - Adult Given - aspirin chewable tablet 324 mg Franca Castaneda RN   06:38 Medication Given aspirin chewable tablet 324 mg - Dose: 324 mg ; Route: Oral ; Scheduled Time: 633 Franca Castaneda RN     06:46:13 Cardiac Cardiac (Adult)  Cardiac WDL: .WDL except; chest pain  Chest Pain Assessment  Chest Pain Location: anterior chest, left; shoulder, left  Chest Pain Radiation: arm  Character: pressure; tightness  Precipitating Factors: nothing Franca Castaneda RN     07:30 Medication Given ondansetron (ZOFRAN) injection 4 mg - Dose: 4 mg ; Route: Intravenous ; Line: Peripheral IV 22 0636 Right Antecubital ; Scheduled Time: 07 Elena Ring RN     07:31 Pain Medication Administered - Adult Given - morphine injection 4 mg Elena Ring RN "   07:31 Medication Given morphine injection 4 mg - Dose: 4 mg ; Route: Intravenous ; Line: Peripheral IV 08/08/22 0636 Right Antecubital ; Scheduled Time: 0707 Elena Ring RN   07:31 Data Pain  (0-10) Pain Rating: Rest: 6 Elena Ring RN     10:40 Medication Given ondansetron (ZOFRAN) injection 4 mg - Dose: 4 mg ; Route: Intravenous ; Line: Peripheral IV 08/08/22 0636 Right Antecubital ; Scheduled Time: 1039 Elena Ring RN   10:41 Pain Medication Administered - Adult Given - HYDROmorphone (DILAUDID) injection 1 mg Elena Ring RN   10:41 Medication Given HYDROmorphone (DILAUDID) injection 1 mg - Dose: 1 mg ; Route: Intravenous ; Line: Peripheral IV 08/08/22 0636 Right Antecubital ; Scheduled Time: 1039 Elena Ring RN   10:41 Data Pain  (0-10) Pain Rating: Rest: 7 Elena Ring RN   10:42 Medication Given sodium chloride 0.9 % flush 10 mL - Dose: 10 mL ; Route: Intravenous ; Line: Peripheral IV 08/08/22 0636 Right Antecubital Elena Ring RN     10:52 Free Text Dilated small bowel loops, including a loop of the jejunum in the   left abdomen that has a maximum diameter of 6.5 cm and associated   mucosal thickening. The appearance is concerning for mechanical small   bowel obstruction, no discrete transition point is identified. There are   distal small bowel loops which appear decompressed. No free fluid, free   air or abscess. Moderate distention of the stomach with fluid and air.   2. Pneumobilia compatible with previous sphincterotomy. Cholecystectomy   clips are present. There is a reservoir effect of the extrahepatic bile   ducts.   3. Moderate sigmoid diverticulosis without evidence of acute   diverticulitis. Angel Draper MD   11:00 NG/OG Tube Nasogastric 18 Fr Right nostril Placed Placement Date/Time: 08/08/22 1100   Hand Hygiene Completed: Yes  Tube Type: Nasogastric  NG/OG Tube Size: 18 Fr  Tube Location: Right nostril Elena Ring RN         14:24 Pain Medication Administered -  Adult Given - HYDROmorphone (DILAUDID) injection 1 mg Elena Ring RN   14:24 Medication Given HYDROmorphone (DILAUDID) injection 1 mg - Dose: 1 mg ; Route: Intravenous ; Line: Peripheral IV 08/08/22 0636 Right Antecubital ; Scheduled Time: 1417 Elena Ring RN   14:24 Data Pain  (0-10) Pain Rating: Rest: 7 Elena Ring RN     19:17 Pain Medication Administered - Adult Given - HYDROmorphone (DILAUDID) injection 1 mg Elena Ring RN   19:17 Medication Given HYDROmorphone (DILAUDID) injection 1 mg - Dose: 1 mg ; Route: Intravenous ; Line: Peripheral IV 08/08/22 0636 Right Antecubital Elena Ring RN   19:17 Data Pain  (0-10) Pain Rating: Rest: 7 Elena Ring RN     21:55 Pain Medication Administered - Adult Given - HYDROmorphone (DILAUDID) injection 1 mg Ronda Mancia RN   21:55 Medication Given HYDROmorphone (DILAUDID) injection 1 mg - Dose: 1 mg ; Route: Intravenous ; Line: Peripheral IV 08/08/22 0636 Right Antecubital Ronda Mancia RN   21:55 Data Pain  (0-10) Pain Rating: Rest: 7 Ronda Mancia RN     01:07 Pain Medication Administered - Adult Given - HYDROmorphone (DILAUDID) injection 1 mg Ronda Mancia RN   01:07 Medication Given HYDROmorphone (DILAUDID) injection 1 mg - Dose: 1 mg ; Route: Intravenous ; Line: Peripheral IV 08/08/22 0636 Right Antecubital Ronda Mancia RN   01:07 Data Pain  (0-10) Pain Rating: Rest: 5 Ronda Mancia RN       05:56 Pain Medication Administered - Adult Given - HYDROmorphone (DILAUDID) injection 1 mg Ronda Mancia RN   05:56 Medication Given HYDROmorphone (DILAUDID) injection 1 mg - Dose: 1 mg ; Route: Intravenous ; Line: Peripheral IV 08/08/22 0636 Right Antecubital Ronda Mancia RN   05:56 Data Pain  (0-10) Pain Rating: Rest: 5 Ronda Mancia RN                  History & Physical      Ian Allan MD at 08/09/22 0738            History and Physical    Patient:  Lyndon Campos  MRN: [id number]    CHIEF COMPLAINT: Abdominal  "pain, chest pain    History Obtained From: the patient   PCP: Ian Allan MD    HISTORY OF PRESENT ILLNESS:   The patient is a 61 y.o. male who presents with history of choledochojejunostomy, chronic pain on chronic opiates, prior SBO who presented with chest pain, nausea, vomiting, upper abdominal pain.  Reports that this gradually worsened over the last couple of days.  Describes pain as sharp and stabbing initially and was that stenotic.  Pain then became more constant and he developed nausea and vomiting.  In the ER he was found to have SBO and general surgery was consulted with us asked to admit.    REVIEW OF SYSTEMS:    Review of Systems   Constitutional: Negative for chills and fever.   HENT: Negative for congestion and sore throat.    Respiratory: Negative for choking and shortness of breath.    Cardiovascular: Positive for chest pain. Negative for leg swelling.   Gastrointestinal: Positive for abdominal pain, constipation, nausea and vomiting.   Genitourinary: Negative for dysuria and urgency.   Musculoskeletal: Negative for back pain and neck pain.   Skin: Negative for pallor and rash.   Neurological: Negative for dizziness and syncope.   Psychiatric/Behavioral: Negative for agitation and confusion.          Past Medical History:  Past Medical History:   Diagnosis Date   • Back injury        Past Surgical History:  Past Surgical History:   Procedure Laterality Date   • ABDOMINAL SURGERY     • EYE SURGERY     • KNEE SURGERY Left        Medications Prior to Admission:    (Not in a hospital admission)      Allergies:  Dilaudid [hydromorphone hcl]    Social History:   Social History     Socioeconomic History   • Marital status: Single   Tobacco Use   • Smoking status: Current Every Day Smoker     Packs/day: 1.00     Years: 33.00     Pack years: 33.00     Start date: 1989   • Smokeless tobacco: Never Used   • Tobacco comment: \"I've slowed down, I need to quit\"   Substance and Sexual Activity   • " "Alcohol use: Never   • Drug use: Defer   • Sexual activity: Defer       Family History:   History reviewed. No pertinent family history.        Physical Exam:    Vitals: /66   Pulse 52   Temp 98 °F (36.7 °C) (Oral)   Resp 18   Ht 160 cm (63\")   Wt 55.3 kg (122 lb)   SpO2 97%   BMI 21.61 kg/m²   Physical Exam  Constitutional:       Appearance: He is well-developed.   HENT:      Head: Normocephalic and atraumatic.   Eyes:      Conjunctiva/sclera: Conjunctivae normal.      Pupils: Pupils are equal, round, and reactive to light.   Cardiovascular:      Rate and Rhythm: Normal rate and regular rhythm.      Heart sounds: Normal heart sounds. No murmur heard.    No friction rub.   Pulmonary:      Effort: Pulmonary effort is normal. No respiratory distress.      Breath sounds: Normal breath sounds. No wheezing or rales.   Abdominal:      General: There is distension.      Palpations: Abdomen is soft.      Tenderness: There is abdominal tenderness.      Comments: Diminished bowel sounds   Musculoskeletal:         General: Normal range of motion.      Cervical back: Normal range of motion.   Skin:     Capillary Refill: Capillary refill takes less than 2 seconds.   Neurological:      Mental Status: He is alert and oriented to person, place, and time.      Cranial Nerves: No cranial nerve deficit.   Psychiatric:         Behavior: Behavior normal.           Lab Results (last 24 hours)     Procedure Component Value Units Date/Time    Comprehensive Metabolic Panel [[id number]]  (Abnormal) Collected: 08/09/22 0702    Specimen: Blood Updated: 08/09/22 0738     Glucose 101 mg/dL      BUN 28 mg/dL      Creatinine 0.62 mg/dL      Sodium 140 mmol/L      Potassium 4.4 mmol/L      Chloride 106 mmol/L      CO2 29.0 mmol/L      Calcium 9.3 mg/dL      Total Protein 6.6 g/dL      Albumin 4.00 g/dL      ALT (SGPT) 28 U/L      AST (SGOT) 27 U/L      Alkaline Phosphatase 162 U/L      Total Bilirubin 0.7 mg/dL      Globulin 2.6 gm/dL  "     A/G Ratio 1.5 g/dL      BUN/Creatinine Ratio 45.2     Anion Gap 5.0 mmol/L      eGFR 108.1 mL/min/1.73      Comment: National Kidney Foundation and American Society of Nephrology (ASN) Task Force recommended calculation based on the Chronic Kidney Disease Epidemiology Collaboration (CKD-EPI) equation refit without adjustment for race.       Narrative:      GFR Normal >60  Chronic Kidney Disease <60  Kidney Failure <15      CBC & Differential [[id number]]  (Abnormal) Collected: 08/09/22 0702    Specimen: Blood Updated: 08/09/22 0723    Narrative:      The following orders were created for panel order CBC & Differential.  Procedure                               Abnormality         Status                     ---------                               -----------         ------                     CBC Auto Differential[[id number]]        Abnormal            Final result                 Please view results for these tests on the individual orders.    CBC Auto Differential [[id number]]  (Abnormal) Collected: 08/09/22 0702    Specimen: Blood Updated: 08/09/22 0723     WBC 12.08 10*3/mm3      RBC 4.28 10*6/mm3      Hemoglobin 13.7 g/dL      Hematocrit 41.4 %      MCV 96.7 fL      MCH 32.0 pg      MCHC 33.1 g/dL      RDW 13.9 %      RDW-SD 49.7 fl      MPV 10.2 fL      Platelets 192 10*3/mm3      Neutrophil % 73.1 %      Lymphocyte % 14.9 %      Monocyte % 8.7 %      Eosinophil % 2.6 %      Basophil % 0.3 %      Immature Grans % 0.4 %      Neutrophils, Absolute 8.83 10*3/mm3      Lymphocytes, Absolute 1.80 10*3/mm3      Monocytes, Absolute 1.05 10*3/mm3      Eosinophils, Absolute 0.31 10*3/mm3      Basophils, Absolute 0.04 10*3/mm3      Immature Grans, Absolute 0.05 10*3/mm3      nRBC 0.0 /100 WBC     COVID PRE-OP / PRE-PROCEDURE SCREENING ORDER (NO ISOLATION) - Swab, Nasal Cavity [[id number]]  (Normal) Collected: 08/08/22 1314    Specimen: Swab from Nasal Cavity Updated: 08/08/22 9062    Narrative:      The following orders  were created for panel order COVID PRE-OP / PRE-PROCEDURE SCREENING ORDER (NO ISOLATION) - Swab, Nasal Cavity.  Procedure                               Abnormality         Status                     ---------                               -----------         ------                     COVID-19,[facility] IN-DANI[facility]...[[id number]]  Normal              Final result                 Please view results for these tests on the individual orders.    COVID-19,[facility] IN-HOUSE,Nasal Swab No Transport Media 3-4 HR TAT - Swab, Nasal Cavity [[id number]]  (Normal) Collected: 08/08/22 1314    Specimen: Swab from Nasal Cavity Updated: 08/08/22 1408     COVID19 Not Detected    Narrative:      Fact sheet for providers: https://www.fda.gov/media/315966/download     Fact sheet for patients: https://www.fda.gov/media/223780/download    Test performed by PCR.    Consider negative results in combination with clinical observations, patient history, and epidemiological information.    Urinalysis With Culture If Indicated - Urine, Clean Catch [[id number]]  (Abnormal) Collected: 08/08/22 1147    Specimen: Urine, Clean Catch Updated: 08/08/22 1157     Color, UA Yellow     Appearance, UA Clear     pH, UA 5.5     Specific Gravity, UA >=1.030     Glucose, UA Negative     Ketones, UA Negative     Bilirubin, UA Negative     Blood, UA Negative     Protein, UA Trace     Leuk Esterase, UA Negative     Nitrite, UA Negative     Urobilinogen, UA 1.0 E.U./dL    Narrative:      In absence of clinical symptoms, the presence of pyuria, bacteria, and/or nitrites on the urinalysis result does not correlate with infection.  Urine microscopic not indicated.    Troponin [[id number]]  (Normal) Collected: 08/08/22 0928    Specimen: Blood Updated: 08/08/22 0957     Troponin T <0.010 ng/mL     Narrative:      Troponin T Reference Range:  <= 0.03 ng/mL-   Negative for AMI  >0.03 ng/mL-     Abnormal for myocardial necrosis.  Clinicians would have to utilize clinical acumen,  EKG, Troponin and serial changes to determine if it is an Acute Myocardial Infarction or myocardial injury due to an underlying chronic condition.       Results may be falsely decreased if patient taking Biotin.      Lactic Acid, Plasma [[id number]]  (Normal) Collected: 08/08/22 0730    Specimen: Blood Updated: 08/08/22 0759     Lactate 1.4 mmol/L     [place] Draw [[id number]] Collected: 08/08/22 0632    Specimen: Blood Updated: 08/08/22 0748    Narrative:      The following orders were created for panel order [place] Draw.  Procedure                               Abnormality         Status                     ---------                               -----------         ------                     Green Top (Gel)[[id number]]                                  Final result               Lavender Top[[id number]]                                     Final result               Red Top[[id number]]                                          Final result               Light Blue Top[[id number]]                                   Final result                 Please view results for these tests on the individual orders.    Green Top (Gel) [[id number]] Collected: 08/08/22 0632    Specimen: Blood Updated: 08/08/22 0748     Extra Tube Hold for add-ons.     Comment: Auto resulted.       Red Top [[id number]] Collected: 08/08/22 0632    Specimen: Blood Updated: 08/08/22 0748     Extra Tube Hold for add-ons.     Comment: Auto resulted.       Light Blue Top [[id number]] Collected: 08/08/22 0632    Specimen: Blood Updated: 08/08/22 0748     Extra Tube Hold for add-ons.     Comment: Auto resulted       Lavender Top [[id number]] Collected: 08/08/22 0632    Specimen: Blood Updated: 08/08/22 0748     Extra Tube hold for add-on     Comment: Auto resulted       Procalcitonin [[id number]]  (Normal) Collected: 08/08/22 0632    Specimen: Blood Updated: 08/08/22 0743     Procalcitonin 0.07 ng/mL     Narrative:      As a Marker for Sepsis (Non-Neonates):    1.  "<0.5 ng/mL represents a low risk of severe sepsis and/or septic shock.  2. >2 ng/mL represents a high risk of severe sepsis and/or septic shock.    As a Marker for Lower Respiratory Tract Infections that require antibiotic therapy:    PCT on Admission    Antibiotic Therapy       6-12 Hrs later    >0.5                Strongly Recommended  >0.25 - <0.5        Recommended   0.1 - 0.25          Discouraged              Remeasure/reassess PCT  <0.1                Strongly Discouraged     Remeasure/reassess PCT    As 28 day mortality risk marker: \"Change in Procalcitonin Result\" (>80% or <=80%) if Day 0 (or Day 1) and Day 4 values are available. Refer to http://www.Shelfiepct-calculator.com    Change in PCT <=80%  A decrease of PCT levels below or equal to 80% defines a positive change in PCT test result representing a higher risk for 28-day all-cause mortality of patients diagnosed with severe sepsis for septic shock.    Change in PCT >80%  A decrease of PCT levels of more than 80% defines a negative change in PCT result representing a lower risk for 28-day all-cause mortality of patients diagnosed with severe sepsis or septic shock.              -----------------------------------------------------------------    Radiology:     XR Abdomen 1 View    Result Date: 8/9/2022  EXAMINATION: XR ABDOMEN 1 VW- 8/9/2022 7:23 AM CDT  HISTORY: eval bowel obstruction; K56.609-Unspecified intestinal obstruction, unspecified as to partial versus complete obstruction; R07.9-Chest pain, unspecified; I71.2-Thoracic aortic aneurysm, without rupture; R91.1-Solitary pulmonary nodule.  REPORT: A supine view of the abdomen was obtained.  COMPARISON: [facility] 8/8/2022. CT abdomen pelvis with contrast 8/8/2020.  The NG tube has been removed. There multiple surgical clips in the right upper quadrant. Contrast is noted within the bladder. Moderate stool volume is present. Gas is seen within the small intestine and colon in a nonspecific pattern, no " significant residual small bowel dilation is identified. No free air is identified. Mild degenerative changes are seen throughout the thoracic and lumbar spine.      Interval removal of the NG tube, compared with the previous CT there is decompression of dilated gas-filled small bowel loops, currently the gas pattern is nonobstructive. Moderate stool volume is present and constipation is likely. This report was finalized on 08/09/2022 07:26 by Dr. Jeet Dey MD.    CT Abdomen Pelvis With Contrast    Result Date: 8/8/2022  EXAMINATION: CT ABDOMEN PELVIS W CONTRAST- 8/8/2022 8:25 AM CDT  HISTORY: Nausea/vomiting left-sided chest pain, epigastric pain.  DOSE: 210 mGycm (Automatic exposure control technique was implemented in an effort to keep the radiation dose as low as possible without compromising image quality)  REPORT: Spiral CT of the abdomen and pelvis was performed after administration of intravenous contrast from the lung bases through the pubic symphysis. Reconstructed coronal and sagittal images are also reviewed.  COMPARISON: CT abdomen pelvis without contrast 6/7/2022.  Review of lung windows demonstrates normal aeration of the lung bases. Heart size is normal. Cholecystectomy clips are present. Pneumobilia is also present as before, compatible with previous sphincterotomy. No liver mass is identified. There is moderate distention of the stomach with fluid and some gas. The spleen appears normal. The common bile duct is dilated with maximum diameter 1.2 cm, stable. This probably represents a reservoir effect. The pancreas and adrenal glands appear within normal limits. The kidneys enhance normally, no renal mass or hydronephrosis is identified. There are 2 benign-appearing cysts in the right kidney, the largest measures 3 cm. The ureters are decompressed. The bladder is within normal limits. There is fluid retention within the small intestine, with a moderately dilated loop of small jejunum is noted  in the left abdomen, with a diameter of 6.5 cm and there is mild mucosal thickening involving this loop distally. There is a similar appearance on the previous CT, the dilated small bowel loop had a diameter of 5.0 cm. The appearance is concerning for mechanical small bowel obstruction, though no discrete transition point is identified. The conus decompressed. There is moderate sigmoid diverticulosis. No free fluid, free air or abscess is identified. Review of bone windows is unremarkable.      1. Dilated small bowel loops, including a loop of the jejunum in the left abdomen that has a maximum diameter of 6.5 cm and associated mucosal thickening. The appearance is concerning for mechanical small bowel obstruction, no discrete transition point is identified. There are distal small bowel loops which appear decompressed. No free fluid, free air or abscess. Moderate distention of the stomach with fluid and air. 2. Pneumobilia compatible with previous sphincterotomy. Cholecystectomy clips are present. There is a reservoir effect of the extrahepatic bile ducts. 3. Moderate sigmoid diverticulosis without evidence of acute diverticulitis.   This report was finalized on 08/08/2022 08:37 by Dr. Jeet Dey MD.    XR Chest 1 View    Result Date: 8/8/2022  EXAM/TECHNIQUE: XR CHEST 1 VW-  INDICATION: Chest Pain Triage Protocol  COMPARISON: 6/7/2022  FINDINGS:  The cardiac silhouette is normal in size. No pleural effusion, pneumothorax, or focal consolidation. No acute osseous finding. Subacute LEFT ninth rib fracture. Ectatic ascending aorta again noted.       No acute findings. This report was finalized on 08/08/2022 07:15 by Dr. Adalberto Jones MD.    CT Angiogram Chest    Result Date: 8/8/2022  Exam: CT angiogram of the of the chest with intravenous contrast.  CLINICAL HISTORY:  HISTORY of abdominal aortic aneurysm. Patient complaining of chest pain radiating into epigastric area.  DOSE:  211 mGycm. All CT scans are  performed using dose optimization techniques as appropriate to the performed exam and including at least one of the following: Automated exposure control, adjustment of the mA and/or kV according to size, and the use of the iterative reconstruction technique.  TECHNIQUE: Contiguous axial images were obtained through the thorax following intravenous contrast administration with reformatted images obtained in the sagittal and coronal projections from the original data set. Three-dimensional reconstructions are also obtained.  COMPARISON:  6/7/2022  FINDINGS:  Pulmonary arteries:  There is normal enhancement of the pulmonary arteries with no evidence of pulmonary thromboembolic disease.  Aorta:  There is aneurysmal dilatation of the ascending thoracic aorta with a transverse diameter of 4.7 cm just above the aortic root. The aortic arch and proximal great vessels are also mildly ectatic. The T were artery emanates directly from the aortic arch and emanates distal to the origin of the left subclavian artery which is a somewhat unusual configuration. No evidence of dissection. The descending thoracic aorta is normal in caliber.  LUNGS:  There are moderate changes of centrilobular emphysema. There is again a 6.5 mm soft tissue nodule in the right lower lobe warranting follow-up per Fleischner criteria. It is stable from the previous CT exam of the chest of 6/7/2022. Bibasilar atelectasis or scarring is present. Lungs are otherwise clear. No evidence of acute consolidative pneumonia or effusion.  Pleural spaces: Unremarkable. No evidence of pleural effusion or pneumothorax.  HEART: There is mild cardiomegaly. Mild coronary calcifications are present. No evidence of right ventricular dysfunction or pericardial effusion.  Bones: Multiple healing left posterolateral rib fractures are present. No acute or suspicious bony abnormalities are observed.  CHEST WALL: No chest wall abnormalities are demonstrated.  Lymph nodes: No  enlarged mediastinal or axillary lymphadenopathy is present.  Upper abdomen: There is pneumobilia. With mild intrahepatic biliary prominence. This is felt to be related to either previous sphincterotomy or biliary diversion. The patient's undergone prior cholecystectomy. Mild to moderate extrahepatic biliary dilatation is present.      1. No evidence of pulmonary thromboembolic disease. 2. Stable aneurysmal dilatation of the ascending thoracic aorta. There is also mild ectasia of the great vessels. No evidence of dissection. The descending thoracic aorta is normal in caliber. 3. Pneumobilia presumably related to prior biliary diversion or sphincterotomy. This is stable from the previous exam. 4. Stable pulmonary nodule in the right lower lobe warranting follow-up. A prominent right hilar node measuring 9 mm in short axis is also present. Follow-up is recommended per Fleischner criteria. 5. Centrilobular emphysema. Bibasilar atelectasis or scarring is present. 6. Mild cardiomegaly with coronary calcifications.  Fleischner Society Recommendations for Management of SOLID Pulmonary Nodules:  1.  If a nodule is less than or equal to 4 mm in size, low risk patients require no follow up, and high risk patients require a follow-up CT of the chest at 12 months. 2.  If a nodule is 5-6 mm in size, low risk patients require a follow-up CT at 12 months, and high risk patients require follow up CT scans at 6-12 months and 18-24 months. 3.  If a nodule is 7-8 mm in size, low risk patients requiring follow-up at 6-12 months and 18-24 months, and high risk patients requiring follow-up at 3-6 months, 9-12 months and 24 months. 4.  If a nodule is greater than 8 mm in size, ALL patients require follow-up at 3, 9 and 24 months. PET/CT or biopsy should also be considered. This report was finalized on 08/08/2022 08:38 by Dr. Ralph Hicks MD.    XR Abdomen KUB    Result Date: 8/8/2022  EXAMINATION: XR ABDOMEN KUB- 8/8/2022 11:27 AM CDT   HISTORY: NG tube placement; K56.609-Unspecified intestinal obstruction, unspecified as to partial versus complete obstruction; R07.9-Chest pain, unspecified; I71.2-Thoracic aortic aneurysm, without rupture; R91.1-Solitary pulmonary nodule.  REPORT: A supine view of the upper abdomen was performed.  COMPARISON: CT abdomen pelvis with contrast 8/8/2022.  The lung bases are clear. A nasogastric tube has been inserted, the side-port is at the GE junction with the tip in the proximal stomach. There multiple surgical clips in the right upper quadrant. There is mild gaseous distention of upper abdominal bowel loops. There is a nondisplaced partially healed fracture at the lateral aspect of the left eighth and 10th ribs.      1. Insertion of a NG tube, with the side-port at the GE junction, consider advancing the tube 4 to 5 cm. 2. Partially healed fractures of the lateral aspect of the left eighth and 10th ribs, with mild displacement. This report was finalized on 08/08/2022 11:28 by Dr. Jeet Dey MD.      Assessment and Plan   SBO  Discussed case with Dr. Arana.  NG placed to wall suction with improvement of symptoms.  Plans for small bowel follow-through with clamped tube today and then proceed from there per general surgery.  NG to wall suction for now.    Leukocytosis  Likely reactive as no clear sign of infection seen on any imaging.  Improving with IV fluids and treatment of SBO.  Continue to monitor daily.    Degenerative disc disease  Chronic low back pain on chronic opioids.  Opioid tolerance and will use Dilaudid for pain from his SBO.    Chronic idiopathic/drug-induced constipation  On Symproic at home.  Reports normal bowel movements of late.    Disposition: Inpatient    CODE STATUS: Full    DVT prophylaxis: Lovenox        Small bowel obstruction (HCC)    Chronic idiopathic constipation    DDD (degenerative disc disease), lumbar    Leukocytosis      Ian Allan MD 8/9/2022 07:39  CDT    Electronically signed by Ian Allan MD at 08/09/22 0743          Emergency Department Notes      Franca Castaneda RN at 08/08/22 0641        The following fall interventions were initiated:    [x] Patient and/or family given education   [x] Call light within reach and educated on how to use   [x] Bed rails up per protocol    [x] Bed locked and in the lowest position   [] Bed alarm set and on loudest setting   [x] Fall wrist band applied   [x] Non skid footwear applied   [x] Room free of clutter   [x] Patient items within reach   [x] Adequate lighting provided  [x] Falls sign present    [] Patient moved closer to nursing station   [] Restraints applied       Electronically signed by Franca Castaneda RN at 08/08/22 0641     Angel Draper MD at 08/08/22 0745          Subjective   This is a 61-year-old gentleman who came to the ER complaining of chest pain he was recently seen by me after a fall had a rib fracture but also was noted to have a abdominal aortic aneurysm today he comes into the ED with substernal discomfort epigastric pain and chest pain with hypertension and tachycardia.  And feeling nauseous vomited x1 in the ED.  He is got leukocytosis with a history of his aortic ectasia a CT of the chest without and with contrast.  I am also going to order a CT of the abdomen and pelvis at the same time to evaluate any further abdominal aortic aneurysms and also delineate any reason for him having abdominal epigastric pain and vomiting.  Especially with his leukocytosis      Chest Pain  Pain location:  Epigastric and substernal area  Pain quality: aching and burning    Pain radiates to:  Does not radiate  Pain severity:  Moderate  Onset quality:  Gradual  Timing:  Constant  Progression:  Waxing and waning  Chronicity:  New  Context: not breathing, not drug use, not eating, not lifting, not movement, not at rest and not trauma    Relieved by:  Nothing  Worsened by:  Nothing  Ineffective treatments:  None  "tried  Associated symptoms: nausea and vomiting    Associated symptoms: no abdominal pain, no altered mental status, no anorexia, no back pain, no cough, no diaphoresis, no dizziness, no dysphagia, no fatigue, no fever, no headache, no numbness, no orthopnea, no palpitations, no PND, no shortness of breath, no syncope and no weakness    Risk factors: aortic disease and hypertension    Risk factors: no coronary artery disease, no immobilization and not obese        Review of Systems   Constitutional: Negative.  Negative for diaphoresis, fatigue and fever.   HENT: Negative.  Negative for trouble swallowing.    Respiratory: Negative for cough and shortness of breath.    Cardiovascular: Positive for chest pain. Negative for palpitations, orthopnea, syncope and PND.   Gastrointestinal: Positive for nausea and vomiting. Negative for abdominal distention, abdominal pain and anorexia.   Endocrine: Negative.    Genitourinary: Negative.    Musculoskeletal: Negative.  Negative for back pain and neck pain.   Skin: Negative for color change and pallor.   Neurological: Negative.  Negative for dizziness, syncope, weakness, light-headedness, numbness and headaches.   Hematological: Negative.  Does not bruise/bleed easily.   All other systems reviewed and are negative.      Past Medical History:   Diagnosis Date   • Back injury        Allergies   Allergen Reactions   • Dilaudid [Hydromorphone Hcl] Headache       Past Surgical History:   Procedure Laterality Date   • ABDOMINAL SURGERY     • EYE SURGERY     • KNEE SURGERY Left        History reviewed. No pertinent family history.    Social History     Socioeconomic History   • Marital status: Single   Tobacco Use   • Smoking status: Current Every Day Smoker     Packs/day: 1.00     Years: 33.00     Pack years: 33.00     Start date: 1989   • Smokeless tobacco: Never Used   • Tobacco comment: \"I've slowed down, I need to quit\"   Substance and Sexual Activity   • Alcohol use: Never   • " Drug use: Defer   • Sexual activity: Defer           Objective   Physical Exam  Vitals and nursing note reviewed. Exam conducted with a chaperone present.   Constitutional:       General: He is awake. He is not in acute distress.     Appearance: Normal appearance. He is well-developed. He is not toxic-appearing.   HENT:      Head: Normocephalic and atraumatic.      Nose: Nose normal.      Right Nostril: No epistaxis.      Left Nostril: No epistaxis.      Mouth/Throat:      Mouth: Mucous membranes are moist.      Pharynx: Uvula midline.   Eyes:      General: Lids are normal. Lids are everted, no foreign bodies appreciated. No scleral icterus.     Conjunctiva/sclera: Conjunctivae normal.      Pupils: Pupils are equal, round, and reactive to light.   Neck:      Vascular: Normal carotid pulses. No carotid bruit, hepatojugular reflux or JVD.      Trachea: Trachea and phonation normal. No tracheal deviation.   Cardiovascular:      Rate and Rhythm: Normal rate and regular rhythm.      Chest Wall: PMI is not displaced.      Pulses: Normal pulses. No decreased pulses.      Heart sounds: Normal heart sounds. No murmur heard.   No systolic murmur is present.   No diastolic murmur is present.    No gallop.   Pulmonary:      Effort: Pulmonary effort is normal. No tachypnea, accessory muscle usage or respiratory distress.      Breath sounds: Normal breath sounds. No stridor. No decreased breath sounds, wheezing, rhonchi or rales.   Abdominal:      General: Abdomen is flat. Bowel sounds are normal. There is no distension or abdominal bruit.      Palpations: Abdomen is soft. There is no shifting dullness, fluid wave, mass or pulsatile mass.      Tenderness: There is abdominal tenderness in the epigastric area. There is no right CVA tenderness, left CVA tenderness, guarding or rebound.      Hernia: No hernia is present.   Musculoskeletal:         General: No swelling. Normal range of motion.      Cervical back: Full passive range of  motion without pain, normal range of motion and neck supple. No rigidity.      Right lower leg: No edema.      Left lower leg: No edema.      Comments: Lower extremity exam bilaterally is unremarkable.  There is no right or left calf tenderness .  There is no palpable venous cord.  No obvious difference in the size of the legs.  No pitting edema.  The dorsalis pedis and posterior tibial femoral and popliteal pulses are palpable and +2 bilaterally.  Homans sign is negative   Skin:     General: Skin is warm and dry.      Capillary Refill: Capillary refill takes less than 2 seconds.      Coloration: Skin is not cyanotic, jaundiced, mottled or pale.      Nails: There is no clubbing.   Neurological:      General: No focal deficit present.      Mental Status: He is alert and oriented to person, place, and time. Mental status is at baseline.      GCS: GCS eye subscore is 4. GCS verbal subscore is 5. GCS motor subscore is 6.      Cranial Nerves: Cranial nerves are intact. No cranial nerve deficit.      Sensory: Sensation is intact.      Motor: Motor function is intact.      Gait: Gait normal.      Deep Tendon Reflexes: Reflexes are normal and symmetric. Reflexes normal.   Psychiatric:         Speech: Speech normal.         Behavior: Behavior normal. Behavior is cooperative.         Procedures          ED Course  ED Course as of 08/08/22 1106   Mon Aug 08, 2022   0747 Wells score is 0 [TS]   0747 Years Algorithm for Pulmonary Embolus  To be used in hemodynamically stable patient >18 years old    No signs of DVT are present, there is no hemoptysis, PE is not the most likely diagnosis and the D-dimer is not greater or equal to 1000 ng/mL. Therefore PE is excluded . The Years algorithm rules out PE (0.43 % with symptomatic VTE during 3-month follow-up) [TS]   0747 Heart score 3 [TS]   1052 Dilated small bowel loops, including a loop of the jejunum in the  left abdomen that has a maximum diameter of 6.5 cm and  associated  mucosal thickening. The appearance is concerning for mechanical small  bowel obstruction, no discrete transition point is identified. There are  distal small bowel loops which appear decompressed. No free fluid, free  air or abscess. Moderate distention of the stomach with fluid and air.  2. Pneumobilia compatible with previous sphincterotomy. Cholecystectomy  clips are present. There is a reservoir effect of the extrahepatic bile  ducts.  3. Moderate sigmoid diverticulosis without evidence of acute  diverticulitis. [TS]   1102 Case was discussed at length with the patient his work-up was discussed with him.  I have informed his primary MD and general surgery the patient will be admitted to medicine service with general surgical consultation. [TS]   1106 Heart score 3 [TS]   1106 Patient was not given antibiotics he is not running any fever leukocytosis could be secondary to nausea and vomiting.  We will wait for the urine [TS]      ED Course User Index  [TS] Angel Draper MD                                           MDM  Number of Diagnoses or Management Options  Diagnosis management comments: DD   I considered gastric etiology, gastritis, gastroenteritis, peptic ulcer disease, gastroesophageal reflux disease, , gallbladder disease, liver disease, pancreatic disease, biliary colic, cholecystitis, cholelithiasis, hepatitis, pancreatitis, cholangitis, porphyria and diabetic ketoacidosis as a possible cause of abdominal pain in this patient. This is a partial list of diagnoses considered.    Differential Diagnosis:  I considered chest wall pain, muscle strain, costochondritis, pleurisy, rib fracture, herpes zoster, cardiovascular etiology, myocardial infarction, intermediate coronary syndrome, unstable angina, angina, aortic dissection, pericarditis, pulmonary etiology, pulmonary embolism, pneumonia, pneumothorax, lung cancer, gastroesophageal reflux disease, esophagitis, esophageal spasm and  gastrointestinal etiology as a possible cause of chest pain in this patient. This is a partial list of diagnoses considered.         Amount and/or Complexity of Data Reviewed  Clinical lab tests: ordered and reviewed  Tests in the radiology section of CPT®: ordered and reviewed  Tests in the medicine section of CPT®: reviewed and ordered  Decide to obtain previous medical records or to obtain history from someone other than the patient: yes    Risk of Complications, Morbidity, and/or Mortality  Presenting problems: moderate  Diagnostic procedures: moderate  Management options: moderate        Final diagnoses:   Small bowel obstruction (HCC)   Chest pain, unspecified type   Thoracic aortic aneurysm without rupture (HCC)   Pulmonary nodule       ED Disposition  ED Disposition     ED Disposition   Decision to Admit    Condition   --    Comment   Level of Care: Telemetry [5]   Diagnosis: Small bowel obstruction (HCC) [223197]   Admitting Physician: IAN HESS [[id number]]   Attending Physician: IAN HESS [[id number]]   Certification: I Certify That Inpatient Hospital Services Are Medically Necessary For Greater Than 2 Midnights               No follow-up provider specified.       Medication List      No changes were made to your prescriptions during this visit.          Angel Draper MD  08/08/22 0748       Angel Draper MD  08/08/22 1106       Angel Draper MD  08/08/22 1106      Electronically signed by Angel Draper MD at 08/08/22 1106         Facility-Administered Medications as of 8/8/2022   Medication Dose Route Frequency Provider Last Rate Last Admin   • [COMPLETED] aspirin chewable tablet 324 mg  324 mg Oral Once Angel Draper MD   324 mg at 08/08/22 0638   • [COMPLETED] HYDROmorphone (DILAUDID) injection 1 mg  1 mg Intravenous Once Suha Lennon APRN   1 mg at 08/08/22 1041   • HYDROmorphone (DILAUDID) injection 1 mg  1 mg Intravenous Q2H PRN Ian Hess MD   1 mg at 08/09/22 0556  "   And   • naloxone (NARCAN) injection 0.4 mg  0.4 mg Intravenous Q5 Min PRN Ian Allan MD       • [COMPLETED] HYDROmorphone (DILAUDID) injection 1 mg  1 mg Intravenous Once Angel Draper MD   1 mg at 08/08/22 1424   • [COMPLETED] iopamidol (ISOVUE-370) 76 % injection 100 mL  100 mL Intravenous Once in imaging Angel Draper MD   100 mL at 08/08/22 0821   • labetalol (NORMODYNE,TRANDATE) injection 10 mg  10 mg Intravenous Once Angel Draper MD       • [COMPLETED] morphine injection 4 mg  4 mg Intravenous Once Angel Draper MD   4 mg at 08/08/22 0731   • [COMPLETED] ondansetron (ZOFRAN) injection 4 mg  4 mg Intravenous Once Angel Draper MD   4 mg at 08/08/22 0730   • [COMPLETED] ondansetron (ZOFRAN) injection 4 mg  4 mg Intravenous Once Suha Lennon APRN   4 mg at 08/08/22 1040   • sodium chloride 0.9 % flush 10 mL  10 mL Intravenous PRN Angel Draper MD   10 mL at 08/08/22 1042       Orders (last 48 hrs)      Start     Ordered    08/09/22 0700  XR Abdomen 1 View  1 Time Imaging         08/08/22 2003 08/09/22 0558  CBC & Differential  Once         08/09/22 0557    08/09/22 0558  Comprehensive Metabolic Panel  Once         08/09/22 0557    08/09/22 0558  CBC Auto Differential  PROCEDURE ONCE         08/09/22 0557    08/08/22 1914  NPO Diet NPO Type: Strict NPO  Diet Effective Now         08/08/22 1913 08/08/22 1913  HYDROmorphone (DILAUDID) injection 1 mg  Every 2 Hours PRN        \"And\" Linked Group Details    08/08/22 1913 08/08/22 1913  naloxone (NARCAN) injection 0.4 mg  Every 5 Minutes PRN        \"And\" Linked Group Details    08/08/22 1913 08/08/22 1417  HYDROmorphone (DILAUDID) injection 1 mg  Once         08/08/22 1415    08/08/22 1314  COVID PRE-OP / PRE-PROCEDURE SCREENING ORDER (NO ISOLATION) - Swab, Nasal Cavity  Once         08/08/22 1313    08/08/22 1314  COVID-19,[employer] IN-HOUSE,Nasal Swab No Transport Media 3-4 HR TAT - Swab, Nasal Cavity  PROCEDURE ONCE         " 08/08/22 1313    08/08/22 1314  Code Status and Medical Interventions:  Continuous         08/08/22 1314    08/08/22 1108  Surgery (on-call MD unless specified)  Once        Specialty:  General Surgery  Provider:  Kenisha Arana MD    08/08/22 1108    08/08/22 1106  Inpatient Admission  Once         08/08/22 1106    08/08/22 1105  XR Abdomen KUB  1 Time Imaging         08/08/22 1104    08/08/22 1045  Urinalysis With Culture If Indicated - Urine, Clean Catch  Once         08/08/22 1044    08/08/22 1045  Nasogastric tube insertion  Once         08/08/22 1044    08/08/22 1039  ondansetron (ZOFRAN) injection 4 mg  Once         08/08/22 1037    08/08/22 1039  HYDROmorphone (DILAUDID) injection 1 mg  Once         08/08/22 1037    08/08/22 0808  iopamidol (ISOVUE-370) 76 % injection 100 mL  Once in Imaging         08/08/22 0806    08/08/22 0721  Procalcitonin  Once         08/08/22 0721    08/08/22 0721  Lactic Acid, Plasma  Once         08/08/22 0721    08/08/22 0721  Lipase  Once         08/08/22 0721    08/08/22 0721  CT Abdomen Pelvis With Contrast  1 Time Imaging        Comments: IV CONTRAST ONLY      08/08/22 0721    08/08/22 0707  labetalol (NORMODYNE,TRANDATE) injection 10 mg  Once         08/08/22 0705    08/08/22 0707  morphine injection 4 mg  Once         08/08/22 0705    08/08/22 0707  ondansetron (ZOFRAN) injection 4 mg  Once         08/08/22 0705    08/08/22 0705  CT Angiogram Chest  1 Time Imaging         08/08/22 0705    08/08/22 0650  D-dimer, Quantitative  Once         08/08/22 0649    08/08/22 0650  C-reactive Protein  Once         08/08/22 0649    08/08/22 0633  aspirin chewable tablet 324 mg  Once         08/08/22 0631    08/08/22 0632  NPO Diet NPO Type: Sips with Meds  Diet Effective Now,   Status:  Canceled         08/08/22 0631    08/08/22 0632  Undress & Gown  Once         08/08/22 0631    08/08/22 0632  Cardiac Monitoring  Continuous         08/08/22 0631    08/08/22 0632  Continuous Pulse  Oximetry, Add Oxygen if Sa02 is Below 90%  Continuous        Comments: Add Oxygen if SaO2 is Less Than 90%.    08/08/22 0631    08/08/22 0632  ECG 12 Lead  Now & in 2 Hours       08/08/22 0631    08/08/22 0632  XR Chest 1 View  1 Time Imaging         08/08/22 0631    08/08/22 0632  Insert Peripheral IV  Once         08/08/22 0631    08/08/22 0632  [place] Draw  Once         08/08/22 0631    08/08/22 0632  CBC & Differential  Once         08/08/22 0631    08/08/22 0632  Comprehensive Metabolic Panel  Once         08/08/22 0631    08/08/22 0632  Troponin  Now Then Every 2 Hours       08/08/22 0631    08/08/22 0632  Green Top (Gel)  PROCEDURE ONCE         08/08/22 0631    08/08/22 0632  Lavender Top  PROCEDURE ONCE         08/08/22 0631    08/08/22 0632  Red Top  PROCEDURE ONCE         08/08/22 0631    08/08/22 0632  Light Blue Top  PROCEDURE ONCE         08/08/22 0631    08/08/22 0632  CBC Auto Differential  PROCEDURE ONCE         08/08/22 0631    08/08/22 0631  sodium chloride 0.9 % flush 10 mL  As Needed         08/08/22 0631    08/08/22 0623  ECG 12 Lead  Once         08/08/22 0623    Unscheduled  Oxygen Therapy- Nasal Cannula; 2 LPM; Titrate for SPO2: equal to or greater than, 90%  Continuous PRN       08/08/22 0631    Unscheduled  ECG 12 Lead  As Needed       08/08/22 0631    Unscheduled  ECG 12 Lead  As Needed       08/08/22 0631    Signed and Held  Vital Signs  Every 4 Hours       Signed and Held    Signed and Held  Intake & Output  Every Shift       Signed and Held    Signed and Held  Weigh Patient  Once         Signed and Held    Signed and Held  Oral Care  2 Times Daily       Signed and Held    Signed and Held  Oxygen Therapy- Nasal Cannula; Titrate for SPO2: 90% - 95%  Continuous         Signed and Held    Signed and Held  Insert Peripheral IV  Once         Signed and Held    Signed and Held  Saline Lock & Maintain IV Access  Continuous         Signed and Held    Signed and Held  sodium chloride 0.9 % flush  "10 mL  Every 12 Hours Scheduled         Signed and Held    Signed and Held  sodium chloride 0.9 % flush 10 mL  As Needed         Signed and Held    Signed and Held  Enoxaparin Sodium (LOVENOX) syringe 40 mg  Daily         Signed and Held    Signed and Held  CBC Auto Differential  Morning Draw         Signed and Held    Signed and Held  Comprehensive Metabolic Panel  Morning Draw         Signed and Held    Signed and Held  lactated ringers infusion  Continuous         Signed and Held                 Consult Notes (last 48 hours)      Kenisha Arana MD at 08/08/22 1119      Consult Orders    1. Surgery (on-call MD unless specified) [[id number]] ordered by Angel Draper MD at 08/08/22 1108               Kenisha Arana MD Consult Note - General Surgery     Referring Provider: Provider, No Known    Patient Care Team:  Ian Allan MD as PCP - General (Family Medicine)  Brasher, Freddy LÓPEZ MD as Consulting Physician (Cardiothoracic Surgery)    Chief complaint abdominal pain, chest pain     Subjective .     History of present illness:  Mr. Campos is a 61 year old mal who presents with abdominal pain that started yesterday. He endorses nausea and vomiting. He reports he was bloated this morning but this resolved with emesis. HE also endorses chest pain. He is a poor historian. Imaging in ER today showed a bowel obstruction. PSH on his abdomen when they removed a section of bowel due to a cyst and and an ex lap with cholecystectomy with \"re-routing\" of his bowel to his liver. On chart review it was noted that he had a hepaticojejunostomy which was found during ERCP. He has required two percutaneous removals of bile duct stones in [place] - in the 1990s and in 2017. His LFTs are all normal today.     He is not on blood thinners.BMI 21. He is a current every day smoker.    Review of Systems  All systems were reviewed and negative except for: abdominal pain, nasuea, vomiting, and chest pain. " "    Constitution:chills, fevers, night sweats and weight loss, weight gain  Eyes:  double vision, blurriness and loss of vision  ENT:  earaches, hearing loss and hoarseness  Respiratory:  cough, dry, cough, productive, hemoptysis and shortness of air  Cardiovascular:  chest pressure / pain, at rest, chest pressure / pain, on exertion, irregular pulse and palpitations  Gastrointestinal: bright red blood per rectum, change in bowel habits, constipation, diarrhea, heartburn, hematemesis, melena, nausea, pain and vomiting  Genitourinary:  difficulty / inability to void, pain, blood in urine and painful urination  Integument:  itching, rash, redness and swelling  Breast:  lump / mass, nipple discharge and pain  Hematologic / Lymphatic: easy bruising and lymphadenopathy  Musculoskeletal: joint pain, muscle pain and muscle weakness  Neurological: dizziness, loss of consciousness, numbness, vertigo and weakness  Behavioral/Psych: anxiety and depression  Endocrine: diabetes, thyroid disorder      History  Past Medical History:   Diagnosis Date   • Back injury    ,   Past Surgical History:   Procedure Laterality Date   • ABDOMINAL SURGERY     • EYE SURGERY     • KNEE SURGERY Left    , History reviewed. No pertinent family history.,   Social History     Tobacco Use   • Smoking status: Current Every Day Smoker     Packs/day: 1.00     Years: 33.00     Pack years: 33.00     Start date: 1989   • Smokeless tobacco: Never Used   • Tobacco comment: \"I've slowed down, I need to quit\"   Substance Use Topics   • Alcohol use: Never   • Drug use: Defer   , (Not in a hospital admission)   and Allergies:  Dilaudid [hydromorphone hcl]    Current Facility-Administered Medications:   •  labetalol (NORMODYNE,TRANDATE) injection 10 mg, 10 mg, Intravenous, Once, Angel Draper MD  •  sodium chloride 0.9 % flush 10 mL, 10 mL, Intravenous, PRN, Angel Draper MD, 10 mL at 08/08/22 1042    Current Outpatient Medications:   •  diphenhydrAMINE " (BENADRYL) 25 mg capsule, As Needed., Disp: , Rfl:   •  Naldemedine Tosylate (Symproic) 0.2 MG tablet, Take 0.2 mg by mouth Daily. Stool Softener, Disp: , Rfl:   •  oxyCODONE-acetaminophen (PERCOCET) 7.5-325 MG per tablet, Take 1 tablet by mouth Every 6 (Six) Hours As Needed for Moderate Pain  for up to 6 doses., Disp: 6 tablet, Rfl: 0  •  rosuvastatin (CRESTOR) 10 MG tablet, Take 10 mg by mouth Daily., Disp: , Rfl:   •  traZODone (DESYREL) 50 MG tablet, Take 50 mg by mouth At Night As Needed for Sleep., Disp: , Rfl:   •  cyclobenzaprine (FLEXERIL) 5 MG tablet, Take 1 tablet by mouth 2 (Two) Times a Day As Needed for Muscle Spasms for up to 10 days., Disp: 20 tablet, Rfl: 0  •  diclofenac (VOLTAREN) 75 MG EC tablet, Take 1 tablet by mouth Daily for 14 days., Disp: 14 tablet, Rfl: 0  •  predniSONE (DELTASONE) 10 MG tablet, Take 1 tablet by mouth 2 (Two) Times a Day for 5 days., Disp: 10 tablet, Rfl: 0    Objective     Vital Signs   Temp:  [98 °F (36.7 °C)-98.3 °F (36.8 °C)] 98 °F (36.7 °C)  Heart Rate:  [63-92] 63  Resp:  [18] 18  BP: (125-177)/(65-92) 157/73    Physical Exam:  General appearance - alert, well appearing, and in no distress  Mental status - alert, oriented to person, place, and time  Eyes - sclera anicteric  Neck - supple, no significant adenopathy  Chest - no tachypnea, retractions or cyanosis  Heart - regular rate   Abdomen - soft, non-distended, mild TTP with deep palpation   Neurological - alert, oriented, normal speech, no focal findings or movement disorder noted  Musculoskeletal - no joint tenderness, deformity or swelling    Results Review:    Lab Results (last 24 hours)     Procedure Component Value Units Date/Time    Troponin [[id number]]  (Normal) Collected: 08/08/22 0928    Specimen: Blood Updated: 08/08/22 0957     Troponin T <0.010 ng/mL     Narrative:      Troponin T Reference Range:  <= 0.03 ng/mL-   Negative for AMI  >0.03 ng/mL-     Abnormal for myocardial necrosis.  Clinicians would  have to utilize clinical acumen, EKG, Troponin and serial changes to determine if it is an Acute Myocardial Infarction or myocardial injury due to an underlying chronic condition.       Results may be falsely decreased if patient taking Biotin.      Lactic Acid, Plasma [[id number]]  (Normal) Collected: 08/08/22 0730    Specimen: Blood Updated: 08/08/22 0759     Lactate 1.4 mmol/L     [place] Draw [[id number]] Collected: 08/08/22 0632    Specimen: Blood Updated: 08/08/22 0748    Narrative:      The following orders were created for panel order [place] Draw.  Procedure                               Abnormality         Status                     ---------                               -----------         ------                     Green Top (Gel)[[id number]]                                  Final result               Lavender Top[[id number]]                                     Final result               Red Top[[id number]]                                          Final result               Light Blue Top[[id number]]                                   Final result                 Please view results for these tests on the individual orders.    Green Top (Gel) [[id number]] Collected: 08/08/22 0632    Specimen: Blood Updated: 08/08/22 0748     Extra Tube Hold for add-ons.     Comment: Auto resulted.       Red Top [[id number]] Collected: 08/08/22 0632    Specimen: Blood Updated: 08/08/22 0748     Extra Tube Hold for add-ons.     Comment: Auto resulted.       Light Blue Top [[id number]] Collected: 08/08/22 0632    Specimen: Blood Updated: 08/08/22 0748     Extra Tube Hold for add-ons.     Comment: Auto resulted       Lavender Top [[id number]] Collected: 08/08/22 0632    Specimen: Blood Updated: 08/08/22 0748     Extra Tube hold for add-on     Comment: Auto resulted       Procalcitonin [[id number]]  (Normal) Collected: 08/08/22 0632    Specimen: Blood Updated: 08/08/22 0743     Procalcitonin 0.07 ng/mL     Narrative:      As a Marker  "for Sepsis (Non-Neonates):    1. <0.5 ng/mL represents a low risk of severe sepsis and/or septic shock.  2. >2 ng/mL represents a high risk of severe sepsis and/or septic shock.    As a Marker for Lower Respiratory Tract Infections that require antibiotic therapy:    PCT on Admission    Antibiotic Therapy       6-12 Hrs later    >0.5                Strongly Recommended  >0.25 - <0.5        Recommended   0.1 - 0.25          Discouraged              Remeasure/reassess PCT  <0.1                Strongly Discouraged     Remeasure/reassess PCT    As 28 day mortality risk marker: \"Change in Procalcitonin Result\" (>80% or <=80%) if Day 0 (or Day 1) and Day 4 values are available. Refer to [email or web address] [email or web address]    Change in PCT <=80%  A decrease of PCT levels below or equal to 80% defines a positive change in PCT test result representing a higher risk for 28-day all-cause mortality of patients diagnosed with severe sepsis for septic shock.    Change in PCT >80%  A decrease of PCT levels of more than 80% defines a negative change in PCT result representing a lower risk for 28-day all-cause mortality of patients diagnosed with severe sepsis or septic shock.       Lipase [[id number]]  (Normal) Collected: 08/08/22 0632    Specimen: Blood Updated: 08/08/22 0730     Lipase 21 U/L     C-reactive Protein [[id number]]  (Normal) Collected: 08/08/22 0632    Specimen: Blood Updated: 08/08/22 0712     C-Reactive Protein <0.30 mg/dL     D-dimer, Quantitative [[id number]]  (Normal) Collected: 08/08/22 0632    Specimen: Blood Updated: 08/08/22 0707     D-Dimer, Quantitative 0.48 MCGFEU/mL     Narrative:      Reference Range is 0-0.50 MCGFEU/mL. However, results <0.50 MCGFEU/mL tends to rule out DVT or PE. Results >0.50 MCGFEU/mL are not useful in predicting absence or presence of DVT or PE.      Comprehensive Metabolic Panel [[id number]]  (Abnormal) Collected: 08/08/22 0632    Specimen: Blood Updated: 08/08/22 0659     Glucose " 110 mg/dL      BUN 27 mg/dL      Creatinine 0.72 mg/dL      Sodium 142 mmol/L      Potassium 4.5 mmol/L      Comment: Slight hemolysis detected by analyzer. Results may be affected.        Chloride 104 mmol/L      CO2 28.0 mmol/L      Calcium 11.2 mg/dL      Total Protein 8.5 g/dL      Albumin 5.00 g/dL      ALT (SGPT) 25 U/L      AST (SGOT) 30 U/L      Alkaline Phosphatase 204 U/L      Total Bilirubin 0.4 mg/dL      Globulin 3.5 gm/dL      A/G Ratio 1.4 g/dL      BUN/Creatinine Ratio 37.5     Anion Gap 10.0 mmol/L      eGFR 103.3 mL/min/1.73      Comment: National Kidney Foundation and American Society of Nephrology (ASN) Task Force recommended calculation based on the Chronic Kidney Disease Epidemiology Collaboration (CKD-EPI) equation refit without adjustment for race.       Narrative:      GFR Normal >60  Chronic Kidney Disease <60  Kidney Failure <15      Troponin [[id number]]  (Normal) Collected: 08/08/22 0632    Specimen: Blood Updated: 08/08/22 0655     Troponin T <0.010 ng/mL     Narrative:      Troponin T Reference Range:  <= 0.03 ng/mL-   Negative for AMI  >0.03 ng/mL-     Abnormal for myocardial necrosis.  Clinicians would have to utilize clinical acumen, EKG, Troponin and serial changes to determine if it is an Acute Myocardial Infarction or myocardial injury due to an underlying chronic condition.       Results may be falsely decreased if patient taking Biotin.      CBC & Differential [[id number]]  (Abnormal) Collected: 08/08/22 0632    Specimen: Blood Updated: 08/08/22 0641    Narrative:      The following orders were created for panel order CBC & Differential.  Procedure                               Abnormality         Status                     ---------                               -----------         ------                     CBC Auto Differential[[id number]]        Abnormal            Final result                 Please view results for these tests on the individual orders.    CBC Auto  Differential [[id number]]  (Abnormal) Collected: 08/08/22 0632    Specimen: Blood Updated: 08/08/22 0641     WBC 23.16 10*3/mm3      RBC 5.11 10*6/mm3      Hemoglobin 15.8 g/dL      Hematocrit 49.2 %      MCV 96.3 fL      MCH 30.9 pg      MCHC 32.1 g/dL      RDW 13.5 %      RDW-SD 48.2 fl      MPV 10.1 fL      Platelets 320 10*3/mm3      Neutrophil % 81.1 %      Lymphocyte % 10.1 %      Monocyte % 7.9 %      Eosinophil % 0.0 %      Basophil % 0.2 %      Immature Grans % 0.7 %      Neutrophils, Absolute 18.78 10*3/mm3      Lymphocytes, Absolute 2.34 10*3/mm3      Monocytes, Absolute 1.82 10*3/mm3      Eosinophils, Absolute 0.01 10*3/mm3      Basophils, Absolute 0.05 10*3/mm3      Immature Grans, Absolute 0.16 10*3/mm3      nRBC 0.0 /100 WBC         Imaging Results (Last 24 Hours)     Procedure Component Value Units Date/Time    XR Abdomen KUB [[id number]] Resulted: 08/08/22 1115     Updated: 08/08/22 1115    CT Angiogram Chest [[id number]] Collected: 08/08/22 0828     Updated: 08/08/22 0841    Narrative:      Exam: CT angiogram of the of the chest with intravenous contrast.     CLINICAL HISTORY:  HISTORY of abdominal aortic aneurysm. Patient  complaining of chest pain radiating into epigastric area.     DOSE:  211 mGycm. All CT scans are performed using dose optimization  techniques as appropriate to the performed exam and including at least  one of the following: Automated exposure control, adjustment of the mA  and/or kV according to size, and the use of the iterative reconstruction  technique.     TECHNIQUE: Contiguous axial images were obtained through the thorax  following intravenous contrast administration with reformatted images  obtained in the sagittal and coronal projections from the original data  set. Three-dimensional reconstructions are also obtained.     COMPARISON:  6/7/2022     FINDINGS:     Pulmonary arteries:  There is normal enhancement of the pulmonary  arteries with no evidence of pulmonary  thromboembolic disease.     Aorta:  There is aneurysmal dilatation of the ascending thoracic aorta  with a transverse diameter of 4.7 cm just above the aortic root. The  aortic arch and proximal great vessels are also mildly ectatic. The T  were artery emanates directly from the aortic arch and emanates distal  to the origin of the left subclavian artery which is a somewhat unusual  configuration. No evidence of dissection. The descending thoracic aorta  is normal in caliber.     LUNGS:  There are moderate changes of centrilobular emphysema. There is  again a 6.5 mm soft tissue nodule in the right lower lobe warranting  follow-up per Fleischner criteria. It is stable from the previous CT  exam of the chest of 6/7/2022. Bibasilar atelectasis or scarring is  present. Lungs are otherwise clear. No evidence of acute consolidative  pneumonia or effusion.     Pleural spaces: Unremarkable. No evidence of pleural effusion or  pneumothorax.     HEART: There is mild cardiomegaly. Mild coronary calcifications are  present. No evidence of right ventricular dysfunction or pericardial  effusion.     Bones: Multiple healing left posterolateral rib fractures are present.  No acute or suspicious bony abnormalities are observed.     CHEST WALL: No chest wall abnormalities are demonstrated.     Lymph nodes: No enlarged mediastinal or axillary lymphadenopathy is  present.     Upper abdomen: There is pneumobilia. With mild intrahepatic biliary  prominence. This is felt to be related to either previous sphincterotomy  or biliary diversion. The patient's undergone prior cholecystectomy.  Mild to moderate extrahepatic biliary dilatation is present.       Impression:      1. No evidence of pulmonary thromboembolic disease.  2. Stable aneurysmal dilatation of the ascending thoracic aorta. There  is also mild ectasia of the great vessels. No evidence of dissection.  The descending thoracic aorta is normal in caliber.  3. Pneumobilia  presumably related to prior biliary diversion or  sphincterotomy. This is stable from the previous exam.  4. Stable pulmonary nodule in the right lower lobe warranting follow-up.  A prominent right hilar node measuring 9 mm in short axis is also  present. Follow-up is recommended per Fleischner criteria.  5. Centrilobular emphysema. Bibasilar atelectasis or scarring is  present.  6. Mild cardiomegaly with coronary calcifications.     Fleischner Society Recommendations for Management of SOLID Pulmonary  Nodules:     1.  If a nodule is less than or equal to 4 mm in size, low risk patients  require no follow up, and high risk patients require a follow-up CT of  the chest at 12 months.  2.  If a nodule is 5-6 mm in size, low risk patients require a follow-up  CT at 12 months, and high risk patients require follow up CT scans at  6-12 months and 18-24 months.  3.  If a nodule is 7-8 mm in size, low risk patients requiring follow-up  at 6-12 months and 18-24 months, and high risk patients requiring  follow-up at 3-6 months, 9-12 months and 24 months.  4.  If a nodule is greater than 8 mm in size, ALL patients require  follow-up at 3, 9 and 24 months. PET/CT or biopsy should also be  considered.  This report was finalized on 08/08/2022 08:38 by Dr. Ralph Hicks MD.    CT Abdomen Pelvis With Contrast [[id number]] Collected: 08/08/22 0825     Updated: 08/08/22 0840    Narrative:      EXAMINATION: CT ABDOMEN PELVIS W CONTRAST- 8/8/2022 8:25 AM CDT     HISTORY: Nausea/vomiting left-sided chest pain, epigastric pain.     DOSE: 210 mGycm (Automatic exposure control technique was implemented in  an effort to keep the radiation dose as low as possible without  compromising image quality)     REPORT: Spiral CT of the abdomen and pelvis was performed after  administration of intravenous contrast from the lung bases through the  pubic symphysis. Reconstructed coronal and sagittal images are also  reviewed.     COMPARISON: CT  abdomen pelvis without contrast 6/7/2022.     Review of lung windows demonstrates normal aeration of the lung bases.  Heart size is normal. Cholecystectomy clips are present. Pneumobilia is  also present as before, compatible with previous sphincterotomy. No  liver mass is identified. There is moderate distention of the stomach  with fluid and some gas. The spleen appears normal. The common bile duct  is dilated with maximum diameter 1.2 cm, stable. This probably  represents a reservoir effect. The pancreas and adrenal glands appear  within normal limits. The kidneys enhance normally, no renal mass or  hydronephrosis is identified. There are 2 benign-appearing cysts in the  right kidney, the largest measures 3 cm. The ureters are decompressed.  The bladder is within normal limits. There is fluid retention within the  small intestine, with a moderately dilated loop of small jejunum is  noted in the left abdomen, with a diameter of 6.5 cm and there is mild  mucosal thickening involving this loop distally. There is a similar  appearance on the previous CT, the dilated small bowel loop had a  diameter of 5.0 cm. The appearance is concerning for mechanical small  bowel obstruction, though no discrete transition point is identified.  The conus decompressed. There is moderate sigmoid diverticulosis. No  free fluid, free air or abscess is identified. Review of bone windows is  unremarkable.       Impression:      1. Dilated small bowel loops, including a loop of the jejunum in the  left abdomen that has a maximum diameter of 6.5 cm and associated  mucosal thickening. The appearance is concerning for mechanical small  bowel obstruction, no discrete transition point is identified. There are  distal small bowel loops which appear decompressed. No free fluid, free  air or abscess. Moderate distention of the stomach with fluid and air.  2. Pneumobilia compatible with previous sphincterotomy. Cholecystectomy  clips are present.  There is a reservoir effect of the extrahepatic bile  ducts.  3. Moderate sigmoid diverticulosis without evidence of acute  diverticulitis.        This report was finalized on 08/08/2022 08:37 by Dr. Jeet Dey MD.    XR Chest 1 View [[id number]] Collected: 08/08/22 0712     Updated: 08/08/22 0718    Narrative:      EXAM/TECHNIQUE: XR CHEST 1 VW-     INDICATION: Chest Pain Triage Protocol     COMPARISON: 6/7/2022     FINDINGS:     The cardiac silhouette is normal in size. No pleural effusion,  pneumothorax, or focal consolidation. No acute osseous finding. Subacute  LEFT ninth rib fracture. Ectatic ascending aorta again noted.       Impression:         No acute findings.  This report was finalized on 08/08/2022 07:15 by Dr. Adalberto Jonse MD.                Assessment & Plan     Small bowel obstruction     Mr. 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 He presents with abdominal pain, nausea and vomiting. CT showed concern for a small bowel obstruction with a dilated loop of jejunum in the left upper quadrant measuring 6.5cm. Prior CT scan on 6/7/22 showed the same loop of bowel dilated up to 5.1 cm in the left upper quadrant.  He also had dilated CBD to 1.2cm at that time as well. I am unsure if this is an acute small bowel obstruction or a chronic intermittent obstruction 2/2 narrowing at his anastomosis. I spoke with Dr. Allan who reports treating him for similar symptoms which they thought were related to constipation as an outpatient. I believe this likely a more chronic process. NGT placed. Will proceed with decompression and bowel rest. Will plan for small bowel follow through in the next 24-48 hours. I spoke with Dr. Allan who is admitting the patient and we are in agreement on the plan.     Kenisha Arana MD  08/08/22  11:19 CDT              Electronically signed by Sumit  Kenisha LÓPEZ MD at 08/08/22 5326

## 2022-08-09 NOTE — NURSING NOTE
Unsuccessful iv access attempt x2 right wrist per dickson lucia/  Unsuccessful iv access attempt x1 per meryl jennings.

## 2022-08-09 NOTE — NURSING NOTE
Report called to Brenda on [facility]. Awaiting lift team to transport to [phone]. Spoouse already waiting there for patient.

## 2022-08-09 NOTE — PROGRESS NOTES
Kenisha Arana MD - General Surgery  Progress Note     LOS: 1 day   Patient Care Team:  Ian Allan MD as PCP - General (Family Medicine)  Brasher, Freddy LÓPEZ MD as Consulting Physician (Cardiothoracic Surgery)      Subjective     Interval History:      Mr. Campos feels improved today. His pain and bloating are improved with NGT.     Objective     Vital Signs  Heart Rate:  [51-84] 52  BP: (114-157)/(64-87) 114/66    Physical Exam:  General appearance - alert, well appearing, and in no distress  Mental status - alert, oriented to person, place, and time  Eyes - sclera anicteric  Heart - regular rate   Abdomen - soft, nontender, nondistended, no masses or organomegaly  Neurological - alert, oriented, normal speech, no focal findings or movement disorder noted      Results Review:    Lab Results (last 24 hours)     Procedure Component Value Units Date/Time    Comprehensive Metabolic Panel [[id number]]  (Abnormal) Collected: 08/09/22 0702    Specimen: Blood Updated: 08/09/22 0738     Glucose 101 mg/dL      BUN 28 mg/dL      Creatinine 0.62 mg/dL      Sodium 140 mmol/L      Potassium 4.4 mmol/L      Chloride 106 mmol/L      CO2 29.0 mmol/L      Calcium 9.3 mg/dL      Total Protein 6.6 g/dL      Albumin 4.00 g/dL      ALT (SGPT) 28 U/L      AST (SGOT) 27 U/L      Alkaline Phosphatase 162 U/L      Total Bilirubin 0.7 mg/dL      Globulin 2.6 gm/dL      A/G Ratio 1.5 g/dL      BUN/Creatinine Ratio 45.2     Anion Gap 5.0 mmol/L      eGFR 108.1 mL/min/1.73      Comment: National Kidney Foundation and American Society of Nephrology (ASN) Task Force recommended calculation based on the Chronic Kidney Disease Epidemiology Collaboration (CKD-EPI) equation refit without adjustment for race.       Narrative:      GFR Normal >60  Chronic Kidney Disease <60  Kidney Failure <15      CBC & Differential [[id number]]  (Abnormal) Collected: 08/09/22 0702    Specimen: Blood Updated: 08/09/22 0723    Narrative:      The following  orders were created for panel order CBC & Differential.  Procedure                               Abnormality         Status                     ---------                               -----------         ------                     CBC Auto Differential[[id number]]        Abnormal            Final result                 Please view results for these tests on the individual orders.    CBC Auto Differential [[id number]]  (Abnormal) Collected: 08/09/22 0702    Specimen: Blood Updated: 08/09/22 0723     WBC 12.08 10*3/mm3      RBC 4.28 10*6/mm3      Hemoglobin 13.7 g/dL      Hematocrit 41.4 %      MCV 96.7 fL      MCH 32.0 pg      MCHC 33.1 g/dL      RDW 13.9 %      RDW-SD 49.7 fl      MPV 10.2 fL      Platelets 192 10*3/mm3      Neutrophil % 73.1 %      Lymphocyte % 14.9 %      Monocyte % 8.7 %      Eosinophil % 2.6 %      Basophil % 0.3 %      Immature Grans % 0.4 %      Neutrophils, Absolute 8.83 10*3/mm3      Lymphocytes, Absolute 1.80 10*3/mm3      Monocytes, Absolute 1.05 10*3/mm3      Eosinophils, Absolute 0.31 10*3/mm3      Basophils, Absolute 0.04 10*3/mm3      Immature Grans, Absolute 0.05 10*3/mm3      nRBC 0.0 /100 WBC     COVID PRE-OP / PRE-PROCEDURE SCREENING ORDER (NO ISOLATION) - Swab, Nasal Cavity [[id number]]  (Normal) Collected: 08/08/22 1314    Specimen: Swab from Nasal Cavity Updated: 08/08/22 1408    Narrative:      The following orders were created for panel order COVID PRE-OP / PRE-PROCEDURE SCREENING ORDER (NO ISOLATION) - Swab, Nasal Cavity.  Procedure                               Abnormality         Status                     ---------                               -----------         ------                     COVID-19,[facility]...[[id number]]  Normal              Final result                 Please view results for these tests on the individual orders.    COVID-19,[facility] IN-HOUSE,Nasal Swab No Transport Media 3-4 HR TAT - Swab, Nasal Cavity [[id number]]  (Normal) Collected: 08/08/22 1314     Specimen: Swab from Nasal Cavity Updated: 08/08/22 1408     COVID19 Not Detected    Narrative:      Fact sheet for providers: https://www.fda.gov/media/427545/download     Fact sheet for patients: https://www.fda.gov/media/433449/download    Test performed by PCR.    Consider negative results in combination with clinical observations, patient history, and epidemiological information.    Urinalysis With Culture If Indicated - Urine, Clean Catch [[id number]]  (Abnormal) Collected: 08/08/22 1147    Specimen: Urine, Clean Catch Updated: 08/08/22 1157     Color, UA Yellow     Appearance, UA Clear     pH, UA 5.5     Specific Gravity, UA >=1.030     Glucose, UA Negative     Ketones, UA Negative     Bilirubin, UA Negative     Blood, UA Negative     Protein, UA Trace     Leuk Esterase, UA Negative     Nitrite, UA Negative     Urobilinogen, UA 1.0 E.U./dL    Narrative:      In absence of clinical symptoms, the presence of pyuria, bacteria, and/or nitrites on the urinalysis result does not correlate with infection.  Urine microscopic not indicated.    Troponin [[id number]]  (Normal) Collected: 08/08/22 0928    Specimen: Blood Updated: 08/08/22 0957     Troponin T <0.010 ng/mL     Narrative:      Troponin T Reference Range:  <= 0.03 ng/mL-   Negative for AMI  >0.03 ng/mL-     Abnormal for myocardial necrosis.  Clinicians would have to utilize clinical acumen, EKG, Troponin and serial changes to determine if it is an Acute Myocardial Infarction or myocardial injury due to an underlying chronic condition.       Results may be falsely decreased if patient taking Biotin.      Lactic Acid, Plasma [[id number]]  (Normal) Collected: 08/08/22 0730    Specimen: Blood Updated: 08/08/22 0759     Lactate 1.4 mmol/L         Imaging Results (Last 24 Hours)     Procedure Component Value Units Date/Time    XR Abdomen 1 View [[id number]] Collected: 08/09/22 0723     Updated: 08/09/22 0729    Narrative:      EXAMINATION: XR ABDOMEN 1 VW- 8/9/2022  7:23 AM CDT     HISTORY: eval bowel obstruction; K56.609-Unspecified intestinal  obstruction, unspecified as to partial versus complete obstruction;  R07.9-Chest pain, unspecified; I71.2-Thoracic aortic aneurysm, without  rupture; R91.1-Solitary pulmonary nodule.     REPORT: A supine view of the abdomen was obtained.     COMPARISON: [facility] 8/8/2022. CT abdomen pelvis with contrast 8/8/2020.     The NG tube has been removed. There multiple surgical clips in the right  upper quadrant. Contrast is noted within the bladder. Moderate stool  volume is present. Gas is seen within the small intestine and colon in a  nonspecific pattern, no significant residual small bowel dilation is  identified. No free air is identified. Mild degenerative changes are  seen throughout the thoracic and lumbar spine.       Impression:      Interval removal of the NG tube, compared with the previous  CT there is decompression of dilated gas-filled small bowel loops,  currently the gas pattern is nonobstructive. Moderate stool volume is  present and constipation is likely.  This report was finalized on 08/09/2022 07:26 by Dr. Jeet Dey MD.    XR Abdomen KUB [[id number]] Collected: 08/08/22 1127     Updated: 08/08/22 1131    Narrative:      EXAMINATION: XR ABDOMEN KUB- 8/8/2022 11:27 AM CDT     HISTORY: NG tube placement; K56.609-Unspecified intestinal obstruction,  unspecified as to partial versus complete obstruction; R07.9-Chest pain,  unspecified; I71.2-Thoracic aortic aneurysm, without rupture;  R91.1-Solitary pulmonary nodule.     REPORT: A supine view of the upper abdomen was performed.     COMPARISON: CT abdomen pelvis with contrast 8/8/2022.     The lung bases are clear. A nasogastric tube has been inserted, the  side-port is at the GE junction with the tip in the proximal stomach.  There multiple surgical clips in the right upper quadrant. There is mild  gaseous distention of upper abdominal bowel loops. There is a  nondisplaced  partially healed fracture at the lateral aspect of the left  eighth and 10th ribs.       Impression:      1. Insertion of a NG tube, with the side-port at the GE junction,  consider advancing the tube 4 to 5 cm.  2. Partially healed fractures of the lateral aspect of the left eighth  and 10th ribs, with mild displacement.  This report was finalized on 08/08/2022 11:28 by Dr. Jeet Dey MD.    CT Angiogram Chest [[id number]] Collected: 08/08/22 0828     Updated: 08/08/22 0841    Narrative:      Exam: CT angiogram of the of the chest with intravenous contrast.     CLINICAL HISTORY:  HISTORY of abdominal aortic aneurysm. Patient  complaining of chest pain radiating into epigastric area.     DOSE:  211 mGycm. All CT scans are performed using dose optimization  techniques as appropriate to the performed exam and including at least  one of the following: Automated exposure control, adjustment of the mA  and/or kV according to size, and the use of the iterative reconstruction  technique.     TECHNIQUE: Contiguous axial images were obtained through the thorax  following intravenous contrast administration with reformatted images  obtained in the sagittal and coronal projections from the original data  set. Three-dimensional reconstructions are also obtained.     COMPARISON:  6/7/2022     FINDINGS:     Pulmonary arteries:  There is normal enhancement of the pulmonary  arteries with no evidence of pulmonary thromboembolic disease.     Aorta:  There is aneurysmal dilatation of the ascending thoracic aorta  with a transverse diameter of 4.7 cm just above the aortic root. The  aortic arch and proximal great vessels are also mildly ectatic. The T  were artery emanates directly from the aortic arch and emanates distal  to the origin of the left subclavian artery which is a somewhat unusual  configuration. No evidence of dissection. The descending thoracic aorta  is normal in caliber.     LUNGS:  There are moderate changes of  centrilobular emphysema. There is  again a 6.5 mm soft tissue nodule in the right lower lobe warranting  follow-up per Fleischner criteria. It is stable from the previous CT  exam of the chest of 6/7/2022. Bibasilar atelectasis or scarring is  present. Lungs are otherwise clear. No evidence of acute consolidative  pneumonia or effusion.     Pleural spaces: Unremarkable. No evidence of pleural effusion or  pneumothorax.     HEART: There is mild cardiomegaly. Mild coronary calcifications are  present. No evidence of right ventricular dysfunction or pericardial  effusion.     Bones: Multiple healing left posterolateral rib fractures are present.  No acute or suspicious bony abnormalities are observed.     CHEST WALL: No chest wall abnormalities are demonstrated.     Lymph nodes: No enlarged mediastinal or axillary lymphadenopathy is  present.     Upper abdomen: There is pneumobilia. With mild intrahepatic biliary  prominence. This is felt to be related to either previous sphincterotomy  or biliary diversion. The patient's undergone prior cholecystectomy.  Mild to moderate extrahepatic biliary dilatation is present.       Impression:      1. No evidence of pulmonary thromboembolic disease.  2. Stable aneurysmal dilatation of the ascending thoracic aorta. There  is also mild ectasia of the great vessels. No evidence of dissection.  The descending thoracic aorta is normal in caliber.  3. Pneumobilia presumably related to prior biliary diversion or  sphincterotomy. This is stable from the previous exam.  4. Stable pulmonary nodule in the right lower lobe warranting follow-up.  A prominent right hilar node measuring 9 mm in short axis is also  present. Follow-up is recommended per Fleischner criteria.  5. Centrilobular emphysema. Bibasilar atelectasis or scarring is  present.  6. Mild cardiomegaly with coronary calcifications.     Fleischner Society Recommendations for Management of SOLID Pulmonary  Nodules:     1.  If a  nodule is less than or equal to 4 mm in size, low risk patients  require no follow up, and high risk patients require a follow-up CT of  the chest at 12 months.  2.  If a nodule is 5-6 mm in size, low risk patients require a follow-up  CT at 12 months, and high risk patients require follow up CT scans at  6-12 months and 18-24 months.  3.  If a nodule is 7-8 mm in size, low risk patients requiring follow-up  at 6-12 months and 18-24 months, and high risk patients requiring  follow-up at 3-6 months, 9-12 months and 24 months.  4.  If a nodule is greater than 8 mm in size, ALL patients require  follow-up at 3, 9 and 24 months. PET/CT or biopsy should also be  considered.  This report was finalized on 08/08/2022 08:38 by Dr. Ralph Hicks MD.    CT Abdomen Pelvis With Contrast [[id number]] Collected: 08/08/22 0825     Updated: 08/08/22 0840    Narrative:      EXAMINATION: CT ABDOMEN PELVIS W CONTRAST- 8/8/2022 8:25 AM CDT     HISTORY: Nausea/vomiting left-sided chest pain, epigastric pain.     DOSE: 210 mGycm (Automatic exposure control technique was implemented in  an effort to keep the radiation dose as low as possible without  compromising image quality)     REPORT: Spiral CT of the abdomen and pelvis was performed after  administration of intravenous contrast from the lung bases through the  pubic symphysis. Reconstructed coronal and sagittal images are also  reviewed.     COMPARISON: CT abdomen pelvis without contrast 6/7/2022.     Review of lung windows demonstrates normal aeration of the lung bases.  Heart size is normal. Cholecystectomy clips are present. Pneumobilia is  also present as before, compatible with previous sphincterotomy. No  liver mass is identified. There is moderate distention of the stomach  with fluid and some gas. The spleen appears normal. The common bile duct  is dilated with maximum diameter 1.2 cm, stable. This probably  represents a reservoir effect. The pancreas and adrenal glands  appear  within normal limits. The kidneys enhance normally, no renal mass or  hydronephrosis is identified. There are 2 benign-appearing cysts in the  right kidney, the largest measures 3 cm. The ureters are decompressed.  The bladder is within normal limits. There is fluid retention within the  small intestine, with a moderately dilated loop of small jejunum is  noted in the left abdomen, with a diameter of 6.5 cm and there is mild  mucosal thickening involving this loop distally. There is a similar  appearance on the previous CT, the dilated small bowel loop had a  diameter of 5.0 cm. The appearance is concerning for mechanical small  bowel obstruction, though no discrete transition point is identified.  The conus decompressed. There is moderate sigmoid diverticulosis. No  free fluid, free air or abscess is identified. Review of bone windows is  unremarkable.       Impression:      1. Dilated small bowel loops, including a loop of the jejunum in the  left abdomen that has a maximum diameter of 6.5 cm and associated  mucosal thickening. The appearance is concerning for mechanical small  bowel obstruction, no discrete transition point is identified. There are  distal small bowel loops which appear decompressed. No free fluid, free  air or abscess. Moderate distention of the stomach with fluid and air.  2. Pneumobilia compatible with previous sphincterotomy. Cholecystectomy  clips are present. There is a reservoir effect of the extrahepatic bile  ducts.  3. Moderate sigmoid diverticulosis without evidence of acute  diverticulitis.        This report was finalized on 08/08/2022 08:37 by Dr. Jeet Dey MD.            Assessment & Plan       Mr. 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 He presents with abdominal pain, nausea and vomiting. CT showed concern for a small bowel obstruction with a dilated  loop of jejunum in the left upper quadrant measuring 6.5cm. Prior CT scan on 6/7/22 showed the same loop of bowel dilated up to 5.1 cm in the left upper quadrant.  He also had dilated CBD to 1.2cm at that time as well. I believe this likely a more chronic process. NGT placed - output light and <1000mL. He feels much improved. AXR from this AM improved - nonobstructive bowel gas pattern. WBC count down to 12.     - Plan for small bowel follow through today via NGT, ordered       Kenisha Arana MD  08/09/22  07:52 CDT

## 2022-08-09 NOTE — H&P
History and Physical    Patient:  Lyndon Campos  MRN: [id number]    CHIEF COMPLAINT: Abdominal pain, chest pain    History Obtained From: the patient   PCP: Ian Allan MD    HISTORY OF PRESENT ILLNESS:   The patient is a 61 y.o. male who presents with history of choledochojejunostomy, chronic pain on chronic opiates, prior SBO who presented with chest pain, nausea, vomiting, upper abdominal pain.  Reports that this gradually worsened over the last couple of days.  Describes pain as sharp and stabbing initially and was that stenotic.  Pain then became more constant and he developed nausea and vomiting.  In the ER he was found to have SBO and general surgery was consulted with us asked to admit.    REVIEW OF SYSTEMS:    Review of Systems   Constitutional: Negative for chills and fever.   HENT: Negative for congestion and sore throat.    Respiratory: Negative for choking and shortness of breath.    Cardiovascular: Positive for chest pain. Negative for leg swelling.   Gastrointestinal: Positive for abdominal pain, constipation, nausea and vomiting.   Genitourinary: Negative for dysuria and urgency.   Musculoskeletal: Negative for back pain and neck pain.   Skin: Negative for pallor and rash.   Neurological: Negative for dizziness and syncope.   Psychiatric/Behavioral: Negative for agitation and confusion.          Past Medical History:  Past Medical History:   Diagnosis Date   • Back injury        Past Surgical History:  Past Surgical History:   Procedure Laterality Date   • ABDOMINAL SURGERY     • EYE SURGERY     • KNEE SURGERY Left        Medications Prior to Admission:    (Not in a hospital admission)      Allergies:  Dilaudid [hydromorphone hcl]    Social History:   Social History     Socioeconomic History   • Marital status: Single   Tobacco Use   • Smoking status: Current Every Day Smoker     Packs/day: 1.00     Years: 33.00     Pack years: 33.00     Start date: 1989   • Smokeless tobacco: Never  "Used   • Tobacco comment: \"I've slowed down, I need to quit\"   Substance and Sexual Activity   • Alcohol use: Never   • Drug use: Defer   • Sexual activity: Defer       Family History:   History reviewed. No pertinent family history.        Physical Exam:    Vitals: /66   Pulse 52   Temp 98 °F (36.7 °C) (Oral)   Resp 18   Ht 160 cm (63\")   Wt 55.3 kg (122 lb)   SpO2 97%   BMI 21.61 kg/m²   Physical Exam  Constitutional:       Appearance: He is well-developed.   HENT:      Head: Normocephalic and atraumatic.   Eyes:      Conjunctiva/sclera: Conjunctivae normal.      Pupils: Pupils are equal, round, and reactive to light.   Cardiovascular:      Rate and Rhythm: Normal rate and regular rhythm.      Heart sounds: Normal heart sounds. No murmur heard.    No friction rub.   Pulmonary:      Effort: Pulmonary effort is normal. No respiratory distress.      Breath sounds: Normal breath sounds. No wheezing or rales.   Abdominal:      General: There is distension.      Palpations: Abdomen is soft.      Tenderness: There is abdominal tenderness.      Comments: Diminished bowel sounds   Musculoskeletal:         General: Normal range of motion.      Cervical back: Normal range of motion.   Skin:     Capillary Refill: Capillary refill takes less than 2 seconds.   Neurological:      Mental Status: He is alert and oriented to person, place, and time.      Cranial Nerves: No cranial nerve deficit.   Psychiatric:         Behavior: Behavior normal.           Lab Results (last 24 hours)     Procedure Component Value Units Date/Time    Comprehensive Metabolic Panel [[id number]]  (Abnormal) Collected: 08/09/22 0702    Specimen: Blood Updated: 08/09/22 0738     Glucose 101 mg/dL      BUN 28 mg/dL      Creatinine 0.62 mg/dL      Sodium 140 mmol/L      Potassium 4.4 mmol/L      Chloride 106 mmol/L      CO2 29.0 mmol/L      Calcium 9.3 mg/dL      Total Protein 6.6 g/dL      Albumin 4.00 g/dL      ALT (SGPT) 28 U/L      AST (SGOT) " 27 U/L      Alkaline Phosphatase 162 U/L      Total Bilirubin 0.7 mg/dL      Globulin 2.6 gm/dL      A/G Ratio 1.5 g/dL      BUN/Creatinine Ratio 45.2     Anion Gap 5.0 mmol/L      eGFR 108.1 mL/min/1.73      Comment: National Kidney Foundation and American Society of Nephrology (ASN) Task Force recommended calculation based on the Chronic Kidney Disease Epidemiology Collaboration (CKD-EPI) equation refit without adjustment for race.       Narrative:      GFR Normal >60  Chronic Kidney Disease <60  Kidney Failure <15      CBC & Differential [[id number]]  (Abnormal) Collected: 08/09/22 0702    Specimen: Blood Updated: 08/09/22 0723    Narrative:      The following orders were created for panel order CBC & Differential.  Procedure                               Abnormality         Status                     ---------                               -----------         ------                     CBC Auto Differential[[id number]]        Abnormal            Final result                 Please view results for these tests on the individual orders.    CBC Auto Differential [[id number]]  (Abnormal) Collected: 08/09/22 0702    Specimen: Blood Updated: 08/09/22 0723     WBC 12.08 10*3/mm3      RBC 4.28 10*6/mm3      Hemoglobin 13.7 g/dL      Hematocrit 41.4 %      MCV 96.7 fL      MCH 32.0 pg      MCHC 33.1 g/dL      RDW 13.9 %      RDW-SD 49.7 fl      MPV 10.2 fL      Platelets 192 10*3/mm3      Neutrophil % 73.1 %      Lymphocyte % 14.9 %      Monocyte % 8.7 %      Eosinophil % 2.6 %      Basophil % 0.3 %      Immature Grans % 0.4 %      Neutrophils, Absolute 8.83 10*3/mm3      Lymphocytes, Absolute 1.80 10*3/mm3      Monocytes, Absolute 1.05 10*3/mm3      Eosinophils, Absolute 0.31 10*3/mm3      Basophils, Absolute 0.04 10*3/mm3      Immature Grans, Absolute 0.05 10*3/mm3      nRBC 0.0 /100 WBC     COVID PRE-OP / PRE-PROCEDURE SCREENING ORDER (NO ISOLATION) - Swab, Nasal Cavity [[id number]]  (Normal) Collected: 08/08/22 1315     Specimen: Swab from Nasal Cavity Updated: 08/08/22 1408    Narrative:      The following orders were created for panel order COVID PRE-OP / PRE-PROCEDURE SCREENING ORDER (NO ISOLATION) - Swab, Nasal Cavity.  Procedure                               Abnormality         Status                     ---------                               -----------         ------                     COVID-19,[facility]...[[id number]]  Normal              Final result                 Please view results for these tests on the individual orders.    COVID-19,[facility] IN-HOUSE,Nasal Swab No Transport Media 3-4 HR TAT - Swab, Nasal Cavity [[id number]]  (Normal) Collected: 08/08/22 1314    Specimen: Swab from Nasal Cavity Updated: 08/08/22 1408     COVID19 Not Detected    Narrative:      Fact sheet for providers: https://www.fda.gov/media/232493/download     Fact sheet for patients: https://www.fda.gov/media/345487/download    Test performed by PCR.    Consider negative results in combination with clinical observations, patient history, and epidemiological information.    Urinalysis With Culture If Indicated - Urine, Clean Catch [[id number]]  (Abnormal) Collected: 08/08/22 1147    Specimen: Urine, Clean Catch Updated: 08/08/22 1157     Color, UA Yellow     Appearance, UA Clear     pH, UA 5.5     Specific Gravity, UA >=1.030     Glucose, UA Negative     Ketones, UA Negative     Bilirubin, UA Negative     Blood, UA Negative     Protein, UA Trace     Leuk Esterase, UA Negative     Nitrite, UA Negative     Urobilinogen, UA 1.0 E.U./dL    Narrative:      In absence of clinical symptoms, the presence of pyuria, bacteria, and/or nitrites on the urinalysis result does not correlate with infection.  Urine microscopic not indicated.    Troponin [[id number]]  (Normal) Collected: 08/08/22 0928    Specimen: Blood Updated: 08/08/22 0957     Troponin T <0.010 ng/mL     Narrative:      Troponin T Reference Range:  <= 0.03 ng/mL-   Negative for AMI  >0.03  ng/mL-     Abnormal for myocardial necrosis.  Clinicians would have to utilize clinical acumen, EKG, Troponin and serial changes to determine if it is an Acute Myocardial Infarction or myocardial injury due to an underlying chronic condition.       Results may be falsely decreased if patient taking Biotin.      Lactic Acid, Plasma [[id number]]  (Normal) Collected: 08/08/22 0730    Specimen: Blood Updated: 08/08/22 0759     Lactate 1.4 mmol/L     [place] [facility] [[id number]] Collected: 08/08/22 0632    Specimen: Blood Updated: 08/08/22 0748    Narrative:      The following orders were created for panel order [place] Draw.  Procedure                               Abnormality         Status                     ---------                               -----------         ------                     Green Top (Gel)[[id number]]                                  Final result               Lavender Top[[id number]]                                     Final result               Red Top[[id number]]                                          Final result               Light Blue Top[[id number]]                                   Final result                 Please view results for these tests on the individual orders.    Green Top (Gel) [[id number]] Collected: 08/08/22 0632    Specimen: Blood Updated: 08/08/22 0748     Extra Tube Hold for add-ons.     Comment: Auto resulted.       Red Top [[id number]] Collected: 08/08/22 0632    Specimen: Blood Updated: 08/08/22 0748     Extra Tube Hold for add-ons.     Comment: Auto resulted.       Light Blue Top [[id number]] Collected: 08/08/22 0632    Specimen: Blood Updated: 08/08/22 0748     Extra Tube Hold for add-ons.     Comment: Auto resulted       Lavender Top [[id number]] Collected: 08/08/22 0632    Specimen: Blood Updated: 08/08/22 0748     Extra Tube hold for add-on     Comment: Auto resulted       Procalcitonin [[id number]]  (Normal) Collected: 08/08/22 0632    Specimen: Blood Updated: 08/08/22 0743  "    Procalcitonin 0.07 ng/mL     Narrative:      As a Marker for Sepsis (Non-Neonates):    1. <0.5 ng/mL represents a low risk of severe sepsis and/or septic shock.  2. >2 ng/mL represents a high risk of severe sepsis and/or septic shock.    As a Marker for Lower Respiratory Tract Infections that require antibiotic therapy:    PCT on Admission    Antibiotic Therapy       6-12 Hrs later    >0.5                Strongly Recommended  >0.25 - <0.5        Recommended   0.1 - 0.25          Discouraged              Remeasure/reassess PCT  <0.1                Strongly Discouraged     Remeasure/reassess PCT    As 28 day mortality risk marker: \"Change in Procalcitonin Result\" (>80% or <=80%) if Day 0 (or Day 1) and Day 4 values are available. Refer to [email or web address] Chat Communicationspct-[email or web address]    Change in PCT <=80%  A decrease of PCT levels below or equal to 80% defines a positive change in PCT test result representing a higher risk for 28-day all-cause mortality of patients diagnosed with severe sepsis for septic shock.    Change in PCT >80%  A decrease of PCT levels of more than 80% defines a negative change in PCT result representing a lower risk for 28-day all-cause mortality of patients diagnosed with severe sepsis or septic shock.              -----------------------------------------------------------------    Radiology:     XR Abdomen 1 View    Result Date: 8/9/2022  EXAMINATION: XR ABDOMEN 1 VW- 8/9/2022 7:23 AM CDT  HISTORY: eval bowel obstruction; K56.609-Unspecified intestinal obstruction, unspecified as to partial versus complete obstruction; R07.9-Chest pain, unspecified; I71.2-Thoracic aortic aneurysm, without rupture; R91.1-Solitary pulmonary nodule.  REPORT: A supine view of the abdomen was obtained.  COMPARISON: KUB 8/8/2022. CT abdomen pelvis with contrast 8/8/2020.  The NG tube has been removed. There multiple surgical clips in the right upper quadrant. Contrast is noted within the bladder. Moderate stool volume " is present. Gas is seen within the small intestine and colon in a nonspecific pattern, no significant residual small bowel dilation is identified. No free air is identified. Mild degenerative changes are seen throughout the thoracic and lumbar spine.      Interval removal of the NG tube, compared with the previous CT there is decompression of dilated gas-filled small bowel loops, currently the gas pattern is nonobstructive. Moderate stool volume is present and constipation is likely. This report was finalized on 08/09/2022 07:26 by Dr. Jeet Dey MD.    CT Abdomen Pelvis With Contrast    Result Date: 8/8/2022  EXAMINATION: CT ABDOMEN PELVIS W CONTRAST- 8/8/2022 8:25 AM CDT  HISTORY: Nausea/vomiting left-sided chest pain, epigastric pain.  DOSE: 210 mGycm (Automatic exposure control technique was implemented in an effort to keep the radiation dose as low as possible without compromising image quality)  REPORT: Spiral CT of the abdomen and pelvis was performed after administration of intravenous contrast from the lung bases through the pubic symphysis. Reconstructed coronal and sagittal images are also reviewed.  COMPARISON: CT abdomen pelvis without contrast 6/7/2022.  Review of lung windows demonstrates normal aeration of the lung bases. Heart size is normal. Cholecystectomy clips are present. Pneumobilia is also present as before, compatible with previous sphincterotomy. No liver mass is identified. There is moderate distention of the stomach with fluid and some gas. The spleen appears normal. The common bile duct is dilated with maximum diameter 1.2 cm, stable. This probably represents a reservoir effect. The pancreas and adrenal glands appear within normal limits. The kidneys enhance normally, no renal mass or hydronephrosis is identified. There are 2 benign-appearing cysts in the right kidney, the largest measures 3 cm. The ureters are decompressed. The bladder is within normal limits. There is fluid  retention within the small intestine, with a moderately dilated loop of small jejunum is noted in the left abdomen, with a diameter of 6.5 cm and there is mild mucosal thickening involving this loop distally. There is a similar appearance on the previous CT, the dilated small bowel loop had a diameter of 5.0 cm. The appearance is concerning for mechanical small bowel obstruction, though no discrete transition point is identified. The conus decompressed. There is moderate sigmoid diverticulosis. No free fluid, free air or abscess is identified. Review of bone windows is unremarkable.      1. Dilated small bowel loops, including a loop of the jejunum in the left abdomen that has a maximum diameter of 6.5 cm and associated mucosal thickening. The appearance is concerning for mechanical small bowel obstruction, no discrete transition point is identified. There are distal small bowel loops which appear decompressed. No free fluid, free air or abscess. Moderate distention of the stomach with fluid and air. 2. Pneumobilia compatible with previous sphincterotomy. Cholecystectomy clips are present. There is a reservoir effect of the extrahepatic bile ducts. 3. Moderate sigmoid diverticulosis without evidence of acute diverticulitis.   This report was finalized on 08/08/2022 08:37 by Dr. Jeet Dey MD.    XR Chest 1 View    Result Date: 8/8/2022  EXAM/TECHNIQUE: XR CHEST 1 VW-  INDICATION: Chest Pain Triage Protocol  COMPARISON: 6/7/2022  FINDINGS:  The cardiac silhouette is normal in size. No pleural effusion, pneumothorax, or focal consolidation. No acute osseous finding. Subacute LEFT ninth rib fracture. Ectatic ascending aorta again noted.       No acute findings. This report was finalized on 08/08/2022 07:15 by Dr. Adalberto Jones MD.    CT Angiogram Chest    Result Date: 8/8/2022  Exam: CT angiogram of the of the chest with intravenous contrast.  CLINICAL HISTORY:  HISTORY of abdominal aortic aneurysm. Patient  complaining of chest pain radiating into epigastric area.  DOSE:  211 mGycm. All CT scans are performed using dose optimization techniques as appropriate to the performed exam and including at least one of the following: Automated exposure control, adjustment of the mA and/or kV according to size, and the use of the iterative reconstruction technique.  TECHNIQUE: Contiguous axial images were obtained through the thorax following intravenous contrast administration with reformatted images obtained in the sagittal and coronal projections from the original data set. Three-dimensional reconstructions are also obtained.  COMPARISON:  6/7/2022  FINDINGS:  Pulmonary arteries:  There is normal enhancement of the pulmonary arteries with no evidence of pulmonary thromboembolic disease.  Aorta:  There is aneurysmal dilatation of the ascending thoracic aorta with a transverse diameter of 4.7 cm just above the aortic root. The aortic arch and proximal great vessels are also mildly ectatic. The T were artery emanates directly from the aortic arch and emanates distal to the origin of the left subclavian artery which is a somewhat unusual configuration. No evidence of dissection. The descending thoracic aorta is normal in caliber.  LUNGS:  There are moderate changes of centrilobular emphysema. There is again a 6.5 mm soft tissue nodule in the right lower lobe warranting follow-up per Fleischner criteria. It is stable from the previous CT exam of the chest of 6/7/2022. Bibasilar atelectasis or scarring is present. Lungs are otherwise clear. No evidence of acute consolidative pneumonia or effusion.  Pleural spaces: Unremarkable. No evidence of pleural effusion or pneumothorax.  HEART: There is mild cardiomegaly. Mild coronary calcifications are present. No evidence of right ventricular dysfunction or pericardial effusion.  Bones: Multiple healing left posterolateral rib fractures are present. No acute or suspicious bony abnormalities  are observed.  CHEST WALL: No chest wall abnormalities are demonstrated.  Lymph nodes: No enlarged mediastinal or axillary lymphadenopathy is present.  Upper abdomen: There is pneumobilia. With mild intrahepatic biliary prominence. This is felt to be related to either previous sphincterotomy or biliary diversion. The patient's undergone prior cholecystectomy. Mild to moderate extrahepatic biliary dilatation is present.      1. No evidence of pulmonary thromboembolic disease. 2. Stable aneurysmal dilatation of the ascending thoracic aorta. There is also mild ectasia of the great vessels. No evidence of dissection. The descending thoracic aorta is normal in caliber. 3. Pneumobilia presumably related to prior biliary diversion or sphincterotomy. This is stable from the previous exam. 4. Stable pulmonary nodule in the right lower lobe warranting follow-up. A prominent right hilar node measuring 9 mm in short axis is also present. Follow-up is recommended per Fleischner criteria. 5. Centrilobular emphysema. Bibasilar atelectasis or scarring is present. 6. Mild cardiomegaly with coronary calcifications.  Fleischner Society Recommendations for Management of SOLID Pulmonary Nodules:  1.  If a nodule is less than or equal to 4 mm in size, low risk patients require no follow up, and high risk patients require a follow-up CT of the chest at 12 months. 2.  If a nodule is 5-6 mm in size, low risk patients require a follow-up CT at 12 months, and high risk patients require follow up CT scans at 6-12 months and 18-24 months. 3.  If a nodule is 7-8 mm in size, low risk patients requiring follow-up at 6-12 months and 18-24 months, and high risk patients requiring follow-up at 3-6 months, 9-12 months and 24 months. 4.  If a nodule is greater than 8 mm in size, ALL patients require follow-up at 3, 9 and 24 months. PET/CT or biopsy should also be considered. This report was finalized on 08/08/2022 08:38 by Dr. Ralph Hicks,  MD.    XR Abdomen KUB    Result Date: 8/8/2022  EXAMINATION: XR ABDOMEN KUB- 8/8/2022 11:27 AM CDT  HISTORY: NG tube placement; K56.609-Unspecified intestinal obstruction, unspecified as to partial versus complete obstruction; R07.9-Chest pain, unspecified; I71.2-Thoracic aortic aneurysm, without rupture; R91.1-Solitary pulmonary nodule.  REPORT: A supine view of the upper abdomen was performed.  COMPARISON: CT abdomen pelvis with contrast 8/8/2022.  The lung bases are clear. A nasogastric tube has been inserted, the side-port is at the GE junction with the tip in the proximal stomach. There multiple surgical clips in the right upper quadrant. There is mild gaseous distention of upper abdominal bowel loops. There is a nondisplaced partially healed fracture at the lateral aspect of the left eighth and 10th ribs.      1. Insertion of a NG tube, with the side-port at the GE junction, consider advancing the tube 4 to 5 cm. 2. Partially healed fractures of the lateral aspect of the left eighth and 10th ribs, with mild displacement. This report was finalized on 08/08/2022 11:28 by Dr. Jeet Dey MD.      Assessment and Plan   SBO  Discussed case with Dr. Arana.  NG placed to wall suction with improvement of symptoms.  Plans for small bowel follow-through with clamped tube today and then proceed from there per general surgery.  NG to wall suction for now.    Leukocytosis  Likely reactive as no clear sign of infection seen on any imaging.  Improving with IV fluids and treatment of SBO.  Continue to monitor daily.    Degenerative disc disease  Chronic low back pain on chronic opioids.  Opioid tolerance and will use Dilaudid for pain from his SBO.    Chronic idiopathic/drug-induced constipation  On Symproic at home.  Reports normal bowel movements of late.    Disposition: Inpatient    CODE STATUS: Full    DVT prophylaxis: Lovenox        Small bowel obstruction (HCC)    Chronic idiopathic constipation    DDD  (degenerative disc disease), lumbar    Leukocytosis      Ian Allan MD 8/9/2022 07:39 CDT

## 2022-08-10 PROCEDURE — 99232 SBSQ HOSP IP/OBS MODERATE 35: CPT | Performed by: STUDENT IN AN ORGANIZED HEALTH CARE EDUCATION/TRAINING PROGRAM

## 2022-08-10 PROCEDURE — 0 HYDROMORPHONE 1 MG/ML SOLUTION: Performed by: FAMILY MEDICINE

## 2022-08-10 PROCEDURE — 25010000002 ENOXAPARIN PER 10 MG: Performed by: FAMILY MEDICINE

## 2022-08-10 RX ADMIN — HYDROMORPHONE HYDROCHLORIDE 1 MG: 1 INJECTION, SOLUTION INTRAMUSCULAR; INTRAVENOUS; SUBCUTANEOUS at 18:09

## 2022-08-10 RX ADMIN — ENOXAPARIN SODIUM 40 MG: 100 INJECTION SUBCUTANEOUS at 09:18

## 2022-08-10 RX ADMIN — HYDROMORPHONE HYDROCHLORIDE 1 MG: 1 INJECTION, SOLUTION INTRAMUSCULAR; INTRAVENOUS; SUBCUTANEOUS at 20:46

## 2022-08-10 RX ADMIN — HYDROMORPHONE HYDROCHLORIDE 1 MG: 1 INJECTION, SOLUTION INTRAMUSCULAR; INTRAVENOUS; SUBCUTANEOUS at 23:45

## 2022-08-10 RX ADMIN — HYDROMORPHONE HYDROCHLORIDE 1 MG: 1 INJECTION, SOLUTION INTRAMUSCULAR; INTRAVENOUS; SUBCUTANEOUS at 09:14

## 2022-08-10 RX ADMIN — SODIUM CHLORIDE, POTASSIUM CHLORIDE, SODIUM LACTATE AND CALCIUM CHLORIDE 125 ML/HR: 600; 310; 30; 20 INJECTION, SOLUTION INTRAVENOUS at 00:54

## 2022-08-10 RX ADMIN — HYDROMORPHONE HYDROCHLORIDE 1 MG: 1 INJECTION, SOLUTION INTRAMUSCULAR; INTRAVENOUS; SUBCUTANEOUS at 03:18

## 2022-08-10 RX ADMIN — HYDROMORPHONE HYDROCHLORIDE 1 MG: 1 INJECTION, SOLUTION INTRAMUSCULAR; INTRAVENOUS; SUBCUTANEOUS at 00:54

## 2022-08-10 RX ADMIN — HYDROMORPHONE HYDROCHLORIDE 1 MG: 1 INJECTION, SOLUTION INTRAMUSCULAR; INTRAVENOUS; SUBCUTANEOUS at 16:27

## 2022-08-10 RX ADMIN — Medication 10 ML: at 09:15

## 2022-08-10 RX ADMIN — HYDROMORPHONE HYDROCHLORIDE 1 MG: 1 INJECTION, SOLUTION INTRAMUSCULAR; INTRAVENOUS; SUBCUTANEOUS at 13:06

## 2022-08-10 RX ADMIN — HYDROMORPHONE HYDROCHLORIDE 1 MG: 1 INJECTION, SOLUTION INTRAMUSCULAR; INTRAVENOUS; SUBCUTANEOUS at 06:06

## 2022-08-10 NOTE — PLAN OF CARE
Problem: Adult Inpatient Plan of Care  Goal: Plan of Care Review  Outcome: Ongoing, Progressing  Flowsheets (Taken 8/10/2022 0440)  Progress: no change  Plan of Care Reviewed With: patient  Outcome Evaluation: Pt complains of pain, see MAR. Up and ambulating with stand by assistance. NG to LIWS, minimal output. Having BMs. Voiding. Safety maintained.

## 2022-08-10 NOTE — PROGRESS NOTES
"  Daily Progress Note  Lyndon Campos  MRN: [id number] LOS: 2    Admit Date: 8/8/2022   8/10/2022 06:46 CDT    Subjective:          Chief Complaint:  Chief Complaint   Patient presents with   • Chest Pain       Interval History:    Reviewed overnight events and nursing notes.   Patient resting more comfortably this morning.  NG remains to low wall suction with fairly low output.    Review of Systems   Constitutional: Negative for chills and fever.   Respiratory: Negative for shortness of breath.    Cardiovascular: Negative for chest pain.       DIET:  NPO Diet NPO Type: Strict NPO    Medications:   lactated ringers, 125 mL/hr, Last Rate: 125 mL/hr (08/10/22 0054)      enoxaparin, 40 mg, Subcutaneous, Daily  labetalol, 10 mg, Intravenous, Once  sodium chloride, 10 mL, Intravenous, Q12H        Data:     Code Status:   Code Status and Medical Interventions:   Ordered at: 08/08/22 1314     Code Status (Patient has no pulse and is not breathing):    CPR (Attempt to Resuscitate)     Medical Interventions (Patient has pulse or is breathing):    Full Support       History reviewed. No pertinent family history.  Social History     Socioeconomic History   • Marital status: Single   Tobacco Use   • Smoking status: Current Every Day Smoker     Packs/day: 1.00     Years: 33.00     Pack years: 33.00     Start date: 1989   • Smokeless tobacco: Never Used   • Tobacco comment: \"I've slowed down, I need to quit\"   Substance and Sexual Activity   • Alcohol use: Never   • Drug use: Defer   • Sexual activity: Defer       Labs:    Lab Results (last 72 hours)     Procedure Component Value Units Date/Time    Comprehensive Metabolic Panel [[id number]]  (Abnormal) Collected: 08/09/22 1455    Specimen: Blood Updated: 08/09/22 1532     Glucose 121 mg/dL      BUN 31 mg/dL      Creatinine 0.71 mg/dL      Sodium 145 mmol/L      Potassium 4.1 mmol/L      Chloride 106 mmol/L      CO2 29.0 mmol/L      Calcium 10.3 mg/dL      Total Protein 7.6 " g/dL      Albumin 4.90 g/dL      ALT (SGPT) 43 U/L      AST (SGOT) 41 U/L      Alkaline Phosphatase 208 U/L      Total Bilirubin 0.8 mg/dL      Globulin 2.7 gm/dL      A/G Ratio 1.8 g/dL      BUN/Creatinine Ratio 43.7     Anion Gap 10.0 mmol/L      eGFR 103.7 mL/min/1.73      Comment: National Kidney Foundation and American Society of Nephrology (ASN) Task Force recommended calculation based on the Chronic Kidney Disease Epidemiology Collaboration (CKD-EPI) equation refit without adjustment for race.       Narrative:      GFR Normal >60  Chronic Kidney Disease <60  Kidney Failure <15      CBC Auto Differential [[id number]]  (Abnormal) Collected: 08/09/22 1455    Specimen: Blood Updated: 08/09/22 1517     WBC 12.78 10*3/mm3      RBC 5.03 10*6/mm3      Hemoglobin 15.9 g/dL      Hematocrit 48.7 %      MCV 96.8 fL      MCH 31.6 pg      MCHC 32.6 g/dL      RDW 14.0 %      RDW-SD 50.2 fl      MPV 10.1 fL      Platelets 264 10*3/mm3      Neutrophil % 77.0 %      Lymphocyte % 13.0 %      Monocyte % 7.7 %      Eosinophil % 1.4 %      Basophil % 0.4 %      Immature Grans % 0.5 %      Neutrophils, Absolute 9.85 10*3/mm3      Lymphocytes, Absolute 1.66 10*3/mm3      Monocytes, Absolute 0.98 10*3/mm3      Eosinophils, Absolute 0.18 10*3/mm3      Basophils, Absolute 0.05 10*3/mm3      Immature Grans, Absolute 0.06 10*3/mm3      nRBC 0.0 /100 WBC     Comprehensive Metabolic Panel [[id number]]  (Abnormal) Collected: 08/09/22 0702    Specimen: Blood Updated: 08/09/22 0738     Glucose 101 mg/dL      BUN 28 mg/dL      Creatinine 0.62 mg/dL      Sodium 140 mmol/L      Potassium 4.4 mmol/L      Chloride 106 mmol/L      CO2 29.0 mmol/L      Calcium 9.3 mg/dL      Total Protein 6.6 g/dL      Albumin 4.00 g/dL      ALT (SGPT) 28 U/L      AST (SGOT) 27 U/L      Alkaline Phosphatase 162 U/L      Total Bilirubin 0.7 mg/dL      Globulin 2.6 gm/dL      A/G Ratio 1.5 g/dL      BUN/Creatinine Ratio 45.2     Anion Gap 5.0 mmol/L      eGFR 108.1  mL/min/1.73      Comment: National Kidney Foundation and American Society of Nephrology (ASN) Task Force recommended calculation based on the Chronic Kidney Disease Epidemiology Collaboration (CKD-EPI) equation refit without adjustment for race.       Narrative:      GFR Normal >60  Chronic Kidney Disease <60  Kidney Failure <15      CBC & Differential [[id number]]  (Abnormal) Collected: 08/09/22 0702    Specimen: Blood Updated: 08/09/22 0723    Narrative:      The following orders were created for panel order CBC & Differential.  Procedure                               Abnormality         Status                     ---------                               -----------         ------                     CBC Auto Differential[[id number]]        Abnormal            Final result                 Please view results for these tests on the individual orders.    CBC Auto Differential [[id number]]  (Abnormal) Collected: 08/09/22 0702    Specimen: Blood Updated: 08/09/22 0723     WBC 12.08 10*3/mm3      RBC 4.28 10*6/mm3      Hemoglobin 13.7 g/dL      Hematocrit 41.4 %      MCV 96.7 fL      MCH 32.0 pg      MCHC 33.1 g/dL      RDW 13.9 %      RDW-SD 49.7 fl      MPV 10.2 fL      Platelets 192 10*3/mm3      Neutrophil % 73.1 %      Lymphocyte % 14.9 %      Monocyte % 8.7 %      Eosinophil % 2.6 %      Basophil % 0.3 %      Immature Grans % 0.4 %      Neutrophils, Absolute 8.83 10*3/mm3      Lymphocytes, Absolute 1.80 10*3/mm3      Monocytes, Absolute 1.05 10*3/mm3      Eosinophils, Absolute 0.31 10*3/mm3      Basophils, Absolute 0.04 10*3/mm3      Immature Grans, Absolute 0.05 10*3/mm3      nRBC 0.0 /100 WBC     COVID PRE-OP / PRE-PROCEDURE SCREENING ORDER (NO ISOLATION) - Swab, Nasal Cavity [[id number]]  (Normal) Collected: 08/08/22 1314    Specimen: Swab from Nasal Cavity Updated: 08/08/22 1408    Narrative:      The following orders were created for panel order COVID PRE-OP / PRE-PROCEDURE SCREENING ORDER (NO ISOLATION) - Swab,  Nasal Cavity.  Procedure                               Abnormality         Status                     ---------                               -----------         ------                     COVID-19,[facility]...[[id number]]  Normal              Final result                 Please view results for these tests on the individual orders.    COVID-19,[facility] IN-HOUSE,Nasal Swab No Transport Media 3-4 HR TAT - Swab, Nasal Cavity [[id number]]  (Normal) Collected: 08/08/22 1314    Specimen: Swab from Nasal Cavity Updated: 08/08/22 1408     COVID19 Not Detected    Narrative:      Fact sheet for providers: https://www.fda.gov/media/203855/download     Fact sheet for patients: https://www.fda.gov/media/351966/download    Test performed by PCR.    Consider negative results in combination with clinical observations, patient history, and epidemiological information.    Urinalysis With Culture If Indicated - Urine, Clean Catch [[id number]]  (Abnormal) Collected: 08/08/22 1147    Specimen: Urine, Clean Catch Updated: 08/08/22 1157     Color, UA Yellow     Appearance, UA Clear     pH, UA 5.5     Specific Gravity, UA >=1.030     Glucose, UA Negative     Ketones, UA Negative     Bilirubin, UA Negative     Blood, UA Negative     Protein, UA Trace     Leuk Esterase, UA Negative     Nitrite, UA Negative     Urobilinogen, UA 1.0 E.U./dL    Narrative:      In absence of clinical symptoms, the presence of pyuria, bacteria, and/or nitrites on the urinalysis result does not correlate with infection.  Urine microscopic not indicated.    Troponin [[id number]]  (Normal) Collected: 08/08/22 0928    Specimen: Blood Updated: 08/08/22 0957     Troponin T <0.010 ng/mL     Narrative:      Troponin T Reference Range:  <= 0.03 ng/mL-   Negative for AMI  >0.03 ng/mL-     Abnormal for myocardial necrosis.  Clinicians would have to utilize clinical acumen, EKG, Troponin and serial changes to determine if it is an Acute Myocardial Infarction or myocardial  injury due to an underlying chronic condition.       Results may be falsely decreased if patient taking Biotin.      Lactic Acid, Plasma [[id number]]  (Normal) Collected: 08/08/22 0730    Specimen: Blood Updated: 08/08/22 0759     Lactate 1.4 mmol/L     Diagonal Draw [[id number]] Collected: 08/08/22 0632    Specimen: Blood Updated: 08/08/22 0748    Narrative:      The following orders were created for panel order Diagonal Draw.  Procedure                               Abnormality         Status                     ---------                               -----------         ------                     Green Top (Gel)[[id number]]                                  Final result               Lavender Top[[id number]]                                     Final result               Red Top[[id number]]                                          Final result               Light Blue Top[[id number]]                                   Final result                 Please view results for these tests on the individual orders.    Green Top (Gel) [[id number]] Collected: 08/08/22 0632    Specimen: Blood Updated: 08/08/22 0748     Extra Tube Hold for add-ons.     Comment: Auto resulted.       Red Top [[id number]] Collected: 08/08/22 0632    Specimen: Blood Updated: 08/08/22 0748     Extra Tube Hold for add-ons.     Comment: Auto resulted.       Light Blue Top [[id number]] Collected: 08/08/22 0632    Specimen: Blood Updated: 08/08/22 0748     Extra Tube Hold for add-ons.     Comment: Auto resulted       Lavender Top [[id number]] Collected: 08/08/22 0632    Specimen: Blood Updated: 08/08/22 0748     Extra Tube hold for add-on     Comment: Auto resulted       Procalcitonin [[id number]]  (Normal) Collected: 08/08/22 0632    Specimen: Blood Updated: 08/08/22 0743     Procalcitonin 0.07 ng/mL     Narrative:      As a Marker for Sepsis (Non-Neonates):    1. <0.5 ng/mL represents a low risk of severe sepsis and/or septic shock.  2. >2 ng/mL represents a high  "risk of severe sepsis and/or septic shock.    As a Marker for Lower Respiratory Tract Infections that require antibiotic therapy:    PCT on Admission    Antibiotic Therapy       6-12 Hrs later    >0.5                Strongly Recommended  >0.25 - <0.5        Recommended   0.1 - 0.25          Discouraged              Remeasure/reassess PCT  <0.1                Strongly Discouraged     Remeasure/reassess PCT    As 28 day mortality risk marker: \"Change in Procalcitonin Result\" (>80% or <=80%) if Day 0 (or Day 1) and Day 4 values are available. Refer to http://www.[facility]-pct-calculator.com    Change in PCT <=80%  A decrease of PCT levels below or equal to 80% defines a positive change in PCT test result representing a higher risk for 28-day all-cause mortality of patients diagnosed with severe sepsis for septic shock.    Change in PCT >80%  A decrease of PCT levels of more than 80% defines a negative change in PCT result representing a lower risk for 28-day all-cause mortality of patients diagnosed with severe sepsis or septic shock.       Lipase [[id number]]  (Normal) Collected: 08/08/22 0632    Specimen: Blood Updated: 08/08/22 0730     Lipase 21 U/L     C-reactive Protein [[id number]]  (Normal) Collected: 08/08/22 0632    Specimen: Blood Updated: 08/08/22 0712     C-Reactive Protein <0.30 mg/dL     D-dimer, Quantitative [[id number]]  (Normal) Collected: 08/08/22 0632    Specimen: Blood Updated: 08/08/22 0707     D-Dimer, Quantitative 0.48 MCGFEU/mL     Narrative:      Reference Range is 0-0.50 MCGFEU/mL. However, results <0.50 MCGFEU/mL tends to rule out DVT or PE. Results >0.50 MCGFEU/mL are not useful in predicting absence or presence of DVT or PE.      Comprehensive Metabolic Panel [[id number]]  (Abnormal) Collected: 08/08/22 0632    Specimen: Blood Updated: 08/08/22 0659     Glucose 110 mg/dL      BUN 27 mg/dL      Creatinine 0.72 mg/dL      Sodium 142 mmol/L      Potassium 4.5 mmol/L      Comment: Slight hemolysis " detected by analyzer. Results may be affected.        Chloride 104 mmol/L      CO2 28.0 mmol/L      Calcium 11.2 mg/dL      Total Protein 8.5 g/dL      Albumin 5.00 g/dL      ALT (SGPT) 25 U/L      AST (SGOT) 30 U/L      Alkaline Phosphatase 204 U/L      Total Bilirubin 0.4 mg/dL      Globulin 3.5 gm/dL      A/G Ratio 1.4 g/dL      BUN/Creatinine Ratio 37.5     Anion Gap 10.0 mmol/L      eGFR 103.3 mL/min/1.73      Comment: National Kidney Foundation and American Society of Nephrology (ASN) Task Force recommended calculation based on the Chronic Kidney Disease Epidemiology Collaboration (CKD-EPI) equation refit without adjustment for race.       Narrative:      GFR Normal >60  Chronic Kidney Disease <60  Kidney Failure <15      Troponin [[id number]]  (Normal) Collected: 08/08/22 0632    Specimen: Blood Updated: 08/08/22 0655     Troponin T <0.010 ng/mL     Narrative:      Troponin T Reference Range:  <= 0.03 ng/mL-   Negative for AMI  >0.03 ng/mL-     Abnormal for myocardial necrosis.  Clinicians would have to utilize clinical acumen, EKG, Troponin and serial changes to determine if it is an Acute Myocardial Infarction or myocardial injury due to an underlying chronic condition.       Results may be falsely decreased if patient taking Biotin.      CBC & Differential [[id number]]  (Abnormal) Collected: 08/08/22 0632    Specimen: Blood Updated: 08/08/22 0641    Narrative:      The following orders were created for panel order CBC & Differential.  Procedure                               Abnormality         Status                     ---------                               -----------         ------                     CBC Auto Differential[[id number]]        Abnormal            Final result                 Please view results for these tests on the individual orders.    CBC Auto Differential [[id number]]  (Abnormal) Collected: 08/08/22 0632    Specimen: Blood Updated: 08/08/22 0641     WBC 23.16 10*3/mm3      RBC 5.11  "10*6/mm3      Hemoglobin 15.8 g/dL      Hematocrit 49.2 %      MCV 96.3 fL      MCH 30.9 pg      MCHC 32.1 g/dL      RDW 13.5 %      RDW-SD 48.2 fl      MPV 10.1 fL      Platelets 320 10*3/mm3      Neutrophil % 81.1 %      Lymphocyte % 10.1 %      Monocyte % 7.9 %      Eosinophil % 0.0 %      Basophil % 0.2 %      Immature Grans % 0.7 %      Neutrophils, Absolute 18.78 10*3/mm3      Lymphocytes, Absolute 2.34 10*3/mm3      Monocytes, Absolute 1.82 10*3/mm3      Eosinophils, Absolute 0.01 10*3/mm3      Basophils, Absolute 0.05 10*3/mm3      Immature Grans, Absolute 0.16 10*3/mm3      nRBC 0.0 /100 WBC             Objective:     Vitals: /64 (BP Location: Right arm, Patient Position: Lying)   Pulse 56   Temp 97.5 °F (36.4 °C) (Oral)   Resp 16   Ht 160 cm (63\")   Wt 55.3 kg (122 lb)   SpO2 98%   BMI 21.61 kg/m²      Intake/Output Summary (Last 24 hours) at 8/10/2022 0646  Last data filed at 8/10/2022 0321  Gross per 24 hour   Intake 1060 ml   Output 350 ml   Net 710 ml    Temp (24hrs), Av.9 °F (36.6 °C), Min:97.5 °F (36.4 °C), Max:98.2 °F (36.8 °C)      Physical Exam  Constitutional:       Appearance: He is well-developed.   HENT:      Head: Normocephalic and atraumatic.   Eyes:      Conjunctiva/sclera: Conjunctivae normal.   Cardiovascular:      Rate and Rhythm: Normal rate and regular rhythm.      Heart sounds: Normal heart sounds. No murmur heard.    No friction rub.   Pulmonary:      Effort: Pulmonary effort is normal. No respiratory distress.      Breath sounds: Normal breath sounds. No wheezing or rales.   Abdominal:      General: Bowel sounds are normal. There is no distension.      Palpations: Abdomen is soft.      Tenderness: There is abdominal tenderness (diffuse).   Musculoskeletal:         General: Normal range of motion.      Cervical back: Normal range of motion.   Skin:     Capillary Refill: Capillary refill takes less than 2 seconds.   Neurological:      Mental Status: He is alert and " oriented to person, place, and time.      Cranial Nerves: No cranial nerve deficit.   Psychiatric:         Behavior: Behavior normal.             Assessment and Plan:     Primary Problem:  Small bowel obstruction (HCC)    Hospital Problem list:    Small bowel obstruction (HCC)    Chronic idiopathic constipation    DDD (degenerative disc disease), lumbar    Leukocytosis      PMH:  Past Medical History:   Diagnosis Date   • Back injury        Treatment Plan:  Small bowel obstruction  Appears to be resolving with small bowel follow-through 8/9/2022 showing transit but some dilated loops.  Defer to surgery for NG management but would expect that he can likely have a trial of clamping off suction today.    Leukocytosis  Improving.  Likely reactive.  Recheck in a.m.    Degenerative disc disease  Continue Dilaudid for pain while NPO.  Will restart home Fisher when taking p.o.    Chronic constipation  Multifactorial and drug-induced.  Disposition: Continue inpatient care    Reviewed treatment plans with the patient and/or family.   30 minutes spent in face to face interaction and coordination of care.     Electronically signed by Ian Allan MD on 8/10/2022 at 06:46 CDT

## 2022-08-10 NOTE — PROGRESS NOTES
Kenisha Arana MD - General Surgery  Progress Note     LOS: 2 days   Patient Care Team:  Ian Allan MD as PCP - General (Family Medicine)  Brasher, Freddy LÓPEZ MD as Consulting Physician (Cardiothoracic Surgery)      Subjective     Interval History:      Mr. Campos feels improved today. He denies abdominal pain. He denies nausea and vomiting.     Objective     Vital Signs  Temp:  [97.5 °F (36.4 °C)-98.4 °F (36.9 °C)] 98.4 °F (36.9 °C)  Heart Rate:  [56-63] 60  Resp:  [14-16] 16  BP: (120-151)/(58-66) 133/64    Physical Exam:  General appearance - alert, well appearing, and in no distress  Mental status - alert, oriented to person, place, and time  Eyes - sclera anicteric  Heart - regular rate   Abdomen - soft, nontender, nondistended, no masses or organomegaly  Neurological - alert, oriented, normal speech, no focal findings or movement disorder noted      Results Review:    Lab Results (last 24 hours)     Procedure Component Value Units Date/Time    Comprehensive Metabolic Panel [[id number]]  (Abnormal) Collected: 08/09/22 1455    Specimen: Blood Updated: 08/09/22 1532     Glucose 121 mg/dL      BUN 31 mg/dL      Creatinine 0.71 mg/dL      Sodium 145 mmol/L      Potassium 4.1 mmol/L      Chloride 106 mmol/L      CO2 29.0 mmol/L      Calcium 10.3 mg/dL      Total Protein 7.6 g/dL      Albumin 4.90 g/dL      ALT (SGPT) 43 U/L      AST (SGOT) 41 U/L      Alkaline Phosphatase 208 U/L      Total Bilirubin 0.8 mg/dL      Globulin 2.7 gm/dL      A/G Ratio 1.8 g/dL      BUN/Creatinine Ratio 43.7     Anion Gap 10.0 mmol/L      eGFR 103.7 mL/min/1.73      Comment: National Kidney Foundation and American Society of Nephrology (ASN) Task Force recommended calculation based on the Chronic Kidney Disease Epidemiology Collaboration (CKD-EPI) equation refit without adjustment for race.       Narrative:      GFR Normal >60  Chronic Kidney Disease <60  Kidney Failure <15      CBC Auto Differential [[id number]]  (Abnormal)  Collected: 08/09/22 1455    Specimen: Blood Updated: 08/09/22 1517     WBC 12.78 10*3/mm3      RBC 5.03 10*6/mm3      Hemoglobin 15.9 g/dL      Hematocrit 48.7 %      MCV 96.8 fL      MCH 31.6 pg      MCHC 32.6 g/dL      RDW 14.0 %      RDW-SD 50.2 fl      MPV 10.1 fL      Platelets 264 10*3/mm3      Neutrophil % 77.0 %      Lymphocyte % 13.0 %      Monocyte % 7.7 %      Eosinophil % 1.4 %      Basophil % 0.4 %      Immature Grans % 0.5 %      Neutrophils, Absolute 9.85 10*3/mm3      Lymphocytes, Absolute 1.66 10*3/mm3      Monocytes, Absolute 0.98 10*3/mm3      Eosinophils, Absolute 0.18 10*3/mm3      Basophils, Absolute 0.05 10*3/mm3      Immature Grans, Absolute 0.06 10*3/mm3      nRBC 0.0 /100 WBC         Imaging Results (Last 24 Hours)     Procedure Component Value Units Date/Time    FL Small Bowel Follow Through Single-Contrast [[id number]] Collected: 08/09/22 1220     Updated: 08/09/22 1226    Narrative:      EXAMINATION: FL SMALL BOWEL FOLLOW THROUGH SINGLE-CONTRAST- 8/9/2022  12:20 PM CDT     HISTORY: eval SBO; K56.609-Unspecified intestinal obstruction,  unspecified as to partial versus complete obstruction; R07.9-Chest pain,  unspecified; I71.2-Thoracic aortic aneurysm, without rupture;  R91.1-Solitary pulmonary nodule.     REPORT: The preliminary view of the abdomen was obtained, demonstrating  multiple surgical clips in the right upper quadrant, a nasogastric tube  is present with the tip in the proximal stomach. The lung bases are  clear. Moderate stool volume is seen within the colon. There is  pre-existing contrast in the urinary bladder. The patient had CT of the  chest, abdomen and pelvis one day earlier. Gastrografin was administered  through the existing NG tube and images were obtained 15 minutes, 30  minutes and 1 hour.     COMPARISON: CT abdomen pelvis with contrast 8/8/2022.     On the images obtained at 15 and 30 minutes, there are 2 slightly  dilated small bowel loops in the left abdomen,  without associated mass  effect or obvious mucosal thickening. Contrast is seen in the right  colon by one hour. The other small bowel loops appear normal caliber and  unremarkable.       Impression:      Conscious reaches the colon by 60 minutes. There are 2  slightly dilated small bowel loops in the left mid abdomen, low-grade  partial small bowel obstruction remains in the differential.  This report was finalized on 08/09/2022 12:23 by Dr. Jeet Dey MD.    XR Abdomen KUB [[id number]] Collected: 08/09/22 0927     Updated: 08/09/22 0931    Narrative:      EXAMINATION: XR ABDOMEN KUB- 8/9/2022 9:27 AM CDT     HISTORY: readvancement of NG tube; K56.609-Unspecified intestinal  obstruction, unspecified as to partial versus complete obstruction;  R07.9-Chest pain, unspecified; I71.2-Thoracic aortic aneurysm, without  rupture; R91.1-Solitary pulmonary nodule.     REPORT: A supine view of the upper abdomen was obtained.     COMPARISON: KUB 8/9/2022 0702 hours.     An NG tube has been inserted, the tip projects over the gastric fundus,  with the side-port at the GE junction. The lung bases are clear.  Moderate stool volume is present. There multiple surgical clips in the  right upper quadrant. No bowel dilation is identified.       Impression:      Insertion of NG tube, with the side-port at the GE junction  and tip in the gastric fundus. Consider advancing the tube 4 to 5 cm.  This report was finalized on 08/09/2022 09:28 by Dr. Jeet Dey MD.            Assessment & Plan       Mr. 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 He presents with abdominal pain, nausea and vomiting. CT showed concern for a small bowel obstruction with a dilated loop of jejunum in the left upper quadrant measuring 6.5cm. Prior CT scan on 6/7/22 showed the same loop of bowel dilated up to 5.1 cm in the left upper quadrant.   He also had dilated CBD to 1.2cm at that time as well. I believe this likely a more chronic process. SBFT yesterday with contrast promptly in the colon but some dilated small bowel. This appears consistent with a chronic partial obstruction.     - DC NGT   - Advance to clear liquids.       Kenisha Arana MD  08/10/22  09:01 CDT

## 2022-08-11 LAB
ALBUMIN SERPL-MCNC: 3.6 G/DL (ref 3.5–5.2)
ALBUMIN/GLOB SERPL: 1.6 G/DL
ALP SERPL-CCNC: 151 U/L (ref 39–117)
ALT SERPL W P-5'-P-CCNC: 24 U/L (ref 1–41)
ANION GAP SERPL CALCULATED.3IONS-SCNC: 4 MMOL/L (ref 5–15)
AST SERPL-CCNC: 21 U/L (ref 1–40)
BASOPHILS # BLD AUTO: 0.02 10*3/MM3 (ref 0–0.2)
BASOPHILS NFR BLD AUTO: 0.3 % (ref 0–1.5)
BILIRUB SERPL-MCNC: 0.9 MG/DL (ref 0–1.2)
BUN SERPL-MCNC: 15 MG/DL (ref 8–23)
BUN/CREAT SERPL: 34.1 (ref 7–25)
CALCIUM SPEC-SCNC: 8.7 MG/DL (ref 8.6–10.5)
CHLORIDE SERPL-SCNC: 108 MMOL/L (ref 98–107)
CO2 SERPL-SCNC: 28 MMOL/L (ref 22–29)
CREAT SERPL-MCNC: 0.44 MG/DL (ref 0.76–1.27)
DEPRECATED RDW RBC AUTO: 46.5 FL (ref 37–54)
EGFRCR SERPLBLD CKD-EPI 2021: 119.9 ML/MIN/1.73
EOSINOPHIL # BLD AUTO: 0.35 10*3/MM3 (ref 0–0.4)
EOSINOPHIL NFR BLD AUTO: 5 % (ref 0.3–6.2)
ERYTHROCYTE [DISTWIDTH] IN BLOOD BY AUTOMATED COUNT: 13 % (ref 12.3–15.4)
GLOBULIN UR ELPH-MCNC: 2.2 GM/DL
GLUCOSE SERPL-MCNC: 94 MG/DL (ref 65–99)
HCT VFR BLD AUTO: 35.9 % (ref 37.5–51)
HGB BLD-MCNC: 11.8 G/DL (ref 13–17.7)
IMM GRANULOCYTES # BLD AUTO: 0.03 10*3/MM3 (ref 0–0.05)
IMM GRANULOCYTES NFR BLD AUTO: 0.4 % (ref 0–0.5)
LYMPHOCYTES # BLD AUTO: 1.45 10*3/MM3 (ref 0.7–3.1)
LYMPHOCYTES NFR BLD AUTO: 20.8 % (ref 19.6–45.3)
MCH RBC QN AUTO: 31.9 PG (ref 26.6–33)
MCHC RBC AUTO-ENTMCNC: 32.9 G/DL (ref 31.5–35.7)
MCV RBC AUTO: 97 FL (ref 79–97)
MONOCYTES # BLD AUTO: 0.65 10*3/MM3 (ref 0.1–0.9)
MONOCYTES NFR BLD AUTO: 9.3 % (ref 5–12)
NEUTROPHILS NFR BLD AUTO: 4.48 10*3/MM3 (ref 1.7–7)
NEUTROPHILS NFR BLD AUTO: 64.2 % (ref 42.7–76)
NRBC BLD AUTO-RTO: 0 /100 WBC (ref 0–0.2)
PLATELET # BLD AUTO: 160 10*3/MM3 (ref 140–450)
PMV BLD AUTO: 10.7 FL (ref 6–12)
POTASSIUM SERPL-SCNC: 4.1 MMOL/L (ref 3.5–5.2)
PROT SERPL-MCNC: 5.8 G/DL (ref 6–8.5)
RBC # BLD AUTO: 3.7 10*6/MM3 (ref 4.14–5.8)
SODIUM SERPL-SCNC: 140 MMOL/L (ref 136–145)
WBC NRBC COR # BLD: 6.98 10*3/MM3 (ref 3.4–10.8)

## 2022-08-11 PROCEDURE — 99231 SBSQ HOSP IP/OBS SF/LOW 25: CPT | Performed by: STUDENT IN AN ORGANIZED HEALTH CARE EDUCATION/TRAINING PROGRAM

## 2022-08-11 PROCEDURE — 85025 COMPLETE CBC W/AUTO DIFF WBC: CPT | Performed by: FAMILY MEDICINE

## 2022-08-11 PROCEDURE — 80053 COMPREHEN METABOLIC PANEL: CPT | Performed by: FAMILY MEDICINE

## 2022-08-11 PROCEDURE — 0 HYDROMORPHONE 1 MG/ML SOLUTION: Performed by: FAMILY MEDICINE

## 2022-08-11 PROCEDURE — 25010000002 ENOXAPARIN PER 10 MG: Performed by: FAMILY MEDICINE

## 2022-08-11 RX ORDER — TRAZODONE HYDROCHLORIDE 50 MG/1
50 TABLET ORAL NIGHTLY PRN
Status: DISCONTINUED | OUTPATIENT
Start: 2022-08-11 | End: 2022-08-12 | Stop reason: HOSPADM

## 2022-08-11 RX ORDER — OXYCODONE AND ACETAMINOPHEN 7.5; 325 MG/1; MG/1
1 TABLET ORAL EVERY 8 HOURS PRN
Status: DISCONTINUED | OUTPATIENT
Start: 2022-08-11 | End: 2022-08-12 | Stop reason: HOSPADM

## 2022-08-11 RX ADMIN — SODIUM CHLORIDE, POTASSIUM CHLORIDE, SODIUM LACTATE AND CALCIUM CHLORIDE 125 ML/HR: 600; 310; 30; 20 INJECTION, SOLUTION INTRAVENOUS at 02:08

## 2022-08-11 RX ADMIN — OXYCODONE HYDROCHLORIDE AND ACETAMINOPHEN 1 TABLET: 7.5; 325 TABLET ORAL at 19:43

## 2022-08-11 RX ADMIN — HYDROMORPHONE HYDROCHLORIDE 1 MG: 1 INJECTION, SOLUTION INTRAMUSCULAR; INTRAVENOUS; SUBCUTANEOUS at 05:34

## 2022-08-11 RX ADMIN — HYDROMORPHONE HYDROCHLORIDE 1 MG: 1 INJECTION, SOLUTION INTRAMUSCULAR; INTRAVENOUS; SUBCUTANEOUS at 02:28

## 2022-08-11 RX ADMIN — OXYCODONE HYDROCHLORIDE AND ACETAMINOPHEN 1 TABLET: 7.5; 325 TABLET ORAL at 08:32

## 2022-08-11 RX ADMIN — ENOXAPARIN SODIUM 40 MG: 100 INJECTION SUBCUTANEOUS at 08:32

## 2022-08-11 RX ADMIN — Medication 10 ML: at 23:47

## 2022-08-11 NOTE — PLAN OF CARE
Goal Outcome Evaluation:  Plan of Care Reviewed With: patient        Progress: improving  Outcome Evaluation: PRN pain meds as ordered with good results. VSS. Pt ambulated in dominguez this am. Safety maintained. IVF as ordered. Tolerated clear liquid, advanced to full liquid this am.

## 2022-08-11 NOTE — PROGRESS NOTES
"  Daily Progress Note  Lyndon Campos  MRN: [id number] LOS: 3    Admit Date: 8/8/2022 8/11/2022 06:52 CDT    Subjective:          Chief Complaint:  Chief Complaint   Patient presents with   • Chest Pain       Interval History:    Reviewed overnight events and nursing notes.   Doing much better this morning.  NG discontinued yesterday and patient tolerating clears.    Review of Systems   Constitutional: Negative for chills and fever.   Respiratory: Negative for shortness of breath.    Cardiovascular: Negative for chest pain.   Gastrointestinal: Negative for abdominal distention and abdominal pain.       DIET:  Diet Clear Liquid    Medications:   lactated ringers, 125 mL/hr, Last Rate: 125 mL/hr (08/11/22 0208)      enoxaparin, 40 mg, Subcutaneous, Daily  labetalol, 10 mg, Intravenous, Once  sodium chloride, 10 mL, Intravenous, Q12H        Data:     Code Status:   Code Status and Medical Interventions:   Ordered at: 08/08/22 1314     Code Status (Patient has no pulse and is not breathing):    CPR (Attempt to Resuscitate)     Medical Interventions (Patient has pulse or is breathing):    Full Support       History reviewed. No pertinent family history.  Social History     Socioeconomic History   • Marital status: Single   Tobacco Use   • Smoking status: Current Every Day Smoker     Packs/day: 1.00     Years: 33.00     Pack years: 33.00     Start date: 1989   • Smokeless tobacco: Never Used   • Tobacco comment: \"I've slowed down, I need to quit\"   Substance and Sexual Activity   • Alcohol use: Never   • Drug use: Defer   • Sexual activity: Defer       Labs:    Lab Results (last 72 hours)     Procedure Component Value Units Date/Time    Comprehensive Metabolic Panel [[id number]]  (Abnormal) Collected: 08/09/22 1455    Specimen: Blood Updated: 08/09/22 1532     Glucose 121 mg/dL      BUN 31 mg/dL      Creatinine 0.71 mg/dL      Sodium 145 mmol/L      Potassium 4.1 mmol/L      Chloride 106 mmol/L      CO2 29.0 mmol/L  "     Calcium 10.3 mg/dL      Total Protein 7.6 g/dL      Albumin 4.90 g/dL      ALT (SGPT) 43 U/L      AST (SGOT) 41 U/L      Alkaline Phosphatase 208 U/L      Total Bilirubin 0.8 mg/dL      Globulin 2.7 gm/dL      A/G Ratio 1.8 g/dL      BUN/Creatinine Ratio 43.7     Anion Gap 10.0 mmol/L      eGFR 103.7 mL/min/1.73      Comment: National Kidney Foundation and American Society of Nephrology (ASN) Task Force recommended calculation based on the Chronic Kidney Disease Epidemiology Collaboration (CKD-EPI) equation refit without adjustment for race.       Narrative:      GFR Normal >60  Chronic Kidney Disease <60  Kidney Failure <15      CBC Auto Differential [[id number]]  (Abnormal) Collected: 08/09/22 1455    Specimen: Blood Updated: 08/09/22 1517     WBC 12.78 10*3/mm3      RBC 5.03 10*6/mm3      Hemoglobin 15.9 g/dL      Hematocrit 48.7 %      MCV 96.8 fL      MCH 31.6 pg      MCHC 32.6 g/dL      RDW 14.0 %      RDW-SD 50.2 fl      MPV 10.1 fL      Platelets 264 10*3/mm3      Neutrophil % 77.0 %      Lymphocyte % 13.0 %      Monocyte % 7.7 %      Eosinophil % 1.4 %      Basophil % 0.4 %      Immature Grans % 0.5 %      Neutrophils, Absolute 9.85 10*3/mm3      Lymphocytes, Absolute 1.66 10*3/mm3      Monocytes, Absolute 0.98 10*3/mm3      Eosinophils, Absolute 0.18 10*3/mm3      Basophils, Absolute 0.05 10*3/mm3      Immature Grans, Absolute 0.06 10*3/mm3      nRBC 0.0 /100 WBC     Comprehensive Metabolic Panel [[id number]]  (Abnormal) Collected: 08/09/22 0702    Specimen: Blood Updated: 08/09/22 0738     Glucose 101 mg/dL      BUN 28 mg/dL      Creatinine 0.62 mg/dL      Sodium 140 mmol/L      Potassium 4.4 mmol/L      Chloride 106 mmol/L      CO2 29.0 mmol/L      Calcium 9.3 mg/dL      Total Protein 6.6 g/dL      Albumin 4.00 g/dL      ALT (SGPT) 28 U/L      AST (SGOT) 27 U/L      Alkaline Phosphatase 162 U/L      Total Bilirubin 0.7 mg/dL      Globulin 2.6 gm/dL      A/G Ratio 1.5 g/dL      BUN/Creatinine Ratio 45.2      Anion Gap 5.0 mmol/L      eGFR 108.1 mL/min/1.73      Comment: National Kidney Foundation and American Society of Nephrology (ASN) Task Force recommended calculation based on the Chronic Kidney Disease Epidemiology Collaboration (CKD-EPI) equation refit without adjustment for race.       Narrative:      GFR Normal >60  Chronic Kidney Disease <60  Kidney Failure <15      CBC & Differential [[id number]]  (Abnormal) Collected: 08/09/22 0702    Specimen: Blood Updated: 08/09/22 0723    Narrative:      The following orders were created for panel order CBC & Differential.  Procedure                               Abnormality         Status                     ---------                               -----------         ------                     CBC Auto Differential[[id number]]        Abnormal            Final result                 Please view results for these tests on the individual orders.    CBC Auto Differential [[id number]]  (Abnormal) Collected: 08/09/22 0702    Specimen: Blood Updated: 08/09/22 0723     WBC 12.08 10*3/mm3      RBC 4.28 10*6/mm3      Hemoglobin 13.7 g/dL      Hematocrit 41.4 %      MCV 96.7 fL      MCH 32.0 pg      MCHC 33.1 g/dL      RDW 13.9 %      RDW-SD 49.7 fl      MPV 10.2 fL      Platelets 192 10*3/mm3      Neutrophil % 73.1 %      Lymphocyte % 14.9 %      Monocyte % 8.7 %      Eosinophil % 2.6 %      Basophil % 0.3 %      Immature Grans % 0.4 %      Neutrophils, Absolute 8.83 10*3/mm3      Lymphocytes, Absolute 1.80 10*3/mm3      Monocytes, Absolute 1.05 10*3/mm3      Eosinophils, Absolute 0.31 10*3/mm3      Basophils, Absolute 0.04 10*3/mm3      Immature Grans, Absolute 0.05 10*3/mm3      nRBC 0.0 /100 WBC     COVID PRE-OP / PRE-PROCEDURE SCREENING ORDER (NO ISOLATION) - Swab, Nasal Cavity [[id number]]  (Normal) Collected: 08/08/22 1314    Specimen: Swab from Nasal Cavity Updated: 08/08/22 1408    Narrative:      The following orders were created for panel order COVID PRE-OP /  PRE-PROCEDURE SCREENING ORDER (NO ISOLATION) - Swab, Nasal Cavity.  Procedure                               Abnormality         Status                     ---------                               -----------         ------                     COVID-19,[facility]...[[id number]]  Normal              Final result                 Please view results for these tests on the individual orders.    COVID-19,[facility] IN-HOUSE,Nasal Swab No Transport Media 3-4 HR TAT - Swab, Nasal Cavity [[id number]]  (Normal) Collected: 08/08/22 1314    Specimen: Swab from Nasal Cavity Updated: 08/08/22 1408     COVID19 Not Detected    Narrative:      Fact sheet for providers: https://www.fda.gov/media/375550/download     Fact sheet for patients: https://www.fda.gov/media/557529/download    Test performed by PCR.    Consider negative results in combination with clinical observations, patient history, and epidemiological information.    Urinalysis With Culture If Indicated - Urine, Clean Catch [[id number]]  (Abnormal) Collected: 08/08/22 1147    Specimen: Urine, Clean Catch Updated: 08/08/22 1157     Color, UA Yellow     Appearance, UA Clear     pH, UA 5.5     Specific Gravity, UA >=1.030     Glucose, UA Negative     Ketones, UA Negative     Bilirubin, UA Negative     Blood, UA Negative     Protein, UA Trace     Leuk Esterase, UA Negative     Nitrite, UA Negative     Urobilinogen, UA 1.0 E.U./dL    Narrative:      In absence of clinical symptoms, the presence of pyuria, bacteria, and/or nitrites on the urinalysis result does not correlate with infection.  Urine microscopic not indicated.    Troponin [[id number]]  (Normal) Collected: 08/08/22 0928    Specimen: Blood Updated: 08/08/22 0957     Troponin T <0.010 ng/mL     Narrative:      Troponin T Reference Range:  <= 0.03 ng/mL-   Negative for AMI  >0.03 ng/mL-     Abnormal for myocardial necrosis.  Clinicians would have to utilize clinical acumen, EKG, Troponin and serial changes to determine  if it is an Acute Myocardial Infarction or myocardial injury due to an underlying chronic condition.       Results may be falsely decreased if patient taking Biotin.      Lactic Acid, Plasma [[id number]]  (Normal) Collected: 08/08/22 0730    Specimen: Blood Updated: 08/08/22 0759     Lactate 1.4 mmol/L     [place] Draw [[id number]] Collected: 08/08/22 0632    Specimen: Blood Updated: 08/08/22 0748    Narrative:      The following orders were created for panel order [place] Draw.  Procedure                               Abnormality         Status                     ---------                               -----------         ------                     Green Top (Gel)[[id number]]                                  Final result               Lavender Top[[id number]]                                     Final result               Red Top[[id number]]                                          Final result               Light Blue Top[[id number]]                                   Final result                 Please view results for these tests on the individual orders.    Green Top (Gel) [[id number]] Collected: 08/08/22 0632    Specimen: Blood Updated: 08/08/22 0748     Extra Tube Hold for add-ons.     Comment: Auto resulted.       Red Top [[id number]] Collected: 08/08/22 0632    Specimen: Blood Updated: 08/08/22 0748     Extra Tube Hold for add-ons.     Comment: Auto resulted.       Light Blue Top [[id number]] Collected: 08/08/22 0632    Specimen: Blood Updated: 08/08/22 0748     Extra Tube Hold for add-ons.     Comment: Auto resulted       Lavender Top [[id number]] Collected: 08/08/22 0632    Specimen: Blood Updated: 08/08/22 0748     Extra Tube hold for add-on     Comment: Auto resulted       Procalcitonin [[id number]]  (Normal) Collected: 08/08/22 0632    Specimen: Blood Updated: 08/08/22 0743     Procalcitonin 0.07 ng/mL     Narrative:      As a Marker for Sepsis (Non-Neonates):    1. <0.5 ng/mL represents a low risk of severe  "sepsis and/or septic shock.  2. >2 ng/mL represents a high risk of severe sepsis and/or septic shock.    As a Marker for Lower Respiratory Tract Infections that require antibiotic therapy:    PCT on Admission    Antibiotic Therapy       6-12 Hrs later    >0.5                Strongly Recommended  >0.25 - <0.5        Recommended   0.1 - 0.25          Discouraged              Remeasure/reassess PCT  <0.1                Strongly Discouraged     Remeasure/reassess PCT    As 28 day mortality risk marker: \"Change in Procalcitonin Result\" (>80% or <=80%) if Day 0 (or Day 1) and Day 4 values are available. Refer to http://www.[facility]-pct-calculator.com    Change in PCT <=80%  A decrease of PCT levels below or equal to 80% defines a positive change in PCT test result representing a higher risk for 28-day all-cause mortality of patients diagnosed with severe sepsis for septic shock.    Change in PCT >80%  A decrease of PCT levels of more than 80% defines a negative change in PCT result representing a lower risk for 28-day all-cause mortality of patients diagnosed with severe sepsis or septic shock.       Lipase [[id number]]  (Normal) Collected: 08/08/22 0632    Specimen: Blood Updated: 08/08/22 0730     Lipase 21 U/L     C-reactive Protein [[id number]]  (Normal) Collected: 08/08/22 0632    Specimen: Blood Updated: 08/08/22 0712     C-Reactive Protein <0.30 mg/dL     D-dimer, Quantitative [[id number]]  (Normal) Collected: 08/08/22 0632    Specimen: Blood Updated: 08/08/22 0707     D-Dimer, Quantitative 0.48 MCGFEU/mL     Narrative:      Reference Range is 0-0.50 MCGFEU/mL. However, results <0.50 MCGFEU/mL tends to rule out DVT or PE. Results >0.50 MCGFEU/mL are not useful in predicting absence or presence of DVT or PE.      Comprehensive Metabolic Panel [[id number]]  (Abnormal) Collected: 08/08/22 0632    Specimen: Blood Updated: 08/08/22 0659     Glucose 110 mg/dL      BUN 27 mg/dL      Creatinine 0.72 mg/dL      Sodium 142 mmol/L  "     Potassium 4.5 mmol/L      Comment: Slight hemolysis detected by analyzer. Results may be affected.        Chloride 104 mmol/L      CO2 28.0 mmol/L      Calcium 11.2 mg/dL      Total Protein 8.5 g/dL      Albumin 5.00 g/dL      ALT (SGPT) 25 U/L      AST (SGOT) 30 U/L      Alkaline Phosphatase 204 U/L      Total Bilirubin 0.4 mg/dL      Globulin 3.5 gm/dL      A/G Ratio 1.4 g/dL      BUN/Creatinine Ratio 37.5     Anion Gap 10.0 mmol/L      eGFR 103.3 mL/min/1.73      Comment: National Kidney Foundation and American Society of Nephrology (ASN) Task Force recommended calculation based on the Chronic Kidney Disease Epidemiology Collaboration (CKD-EPI) equation refit without adjustment for race.       Narrative:      GFR Normal >60  Chronic Kidney Disease <60  Kidney Failure <15      Troponin [[id number]]  (Normal) Collected: 08/08/22 0632    Specimen: Blood Updated: 08/08/22 0655     Troponin T <0.010 ng/mL     Narrative:      Troponin T Reference Range:  <= 0.03 ng/mL-   Negative for AMI  >0.03 ng/mL-     Abnormal for myocardial necrosis.  Clinicians would have to utilize clinical acumen, EKG, Troponin and serial changes to determine if it is an Acute Myocardial Infarction or myocardial injury due to an underlying chronic condition.       Results may be falsely decreased if patient taking Biotin.      CBC & Differential [[id number]]  (Abnormal) Collected: 08/08/22 0632    Specimen: Blood Updated: 08/08/22 0641    Narrative:      The following orders were created for panel order CBC & Differential.  Procedure                               Abnormality         Status                     ---------                               -----------         ------                     CBC Auto Differential[[id number]]        Abnormal            Final result                 Please view results for these tests on the individual orders.    CBC Auto Differential [[id number]]  (Abnormal) Collected: 08/08/22 0632    Specimen: Blood Updated:  "22 0641     WBC 23.16 10*3/mm3      RBC 5.11 10*6/mm3      Hemoglobin 15.8 g/dL      Hematocrit 49.2 %      MCV 96.3 fL      MCH 30.9 pg      MCHC 32.1 g/dL      RDW 13.5 %      RDW-SD 48.2 fl      MPV 10.1 fL      Platelets 320 10*3/mm3      Neutrophil % 81.1 %      Lymphocyte % 10.1 %      Monocyte % 7.9 %      Eosinophil % 0.0 %      Basophil % 0.2 %      Immature Grans % 0.7 %      Neutrophils, Absolute 18.78 10*3/mm3      Lymphocytes, Absolute 2.34 10*3/mm3      Monocytes, Absolute 1.82 10*3/mm3      Eosinophils, Absolute 0.01 10*3/mm3      Basophils, Absolute 0.05 10*3/mm3      Immature Grans, Absolute 0.16 10*3/mm3      nRBC 0.0 /100 WBC             Objective:     Vitals: /58 (BP Location: Right arm, Patient Position: Sitting)   Pulse 57   Temp 98 °F (36.7 °C) (Oral)   Resp 16   Ht 160 cm (63\")   Wt 55.3 kg (122 lb)   SpO2 100%   BMI 21.61 kg/m²    No intake or output data in the 24 hours ending 22 0652 Temp (24hrs), Av.3 °F (36.8 °C), Min:98 °F (36.7 °C), Max:98.7 °F (37.1 °C)      Physical Exam  Constitutional:       Appearance: He is well-developed.   HENT:      Head: Normocephalic and atraumatic.   Eyes:      Conjunctiva/sclera: Conjunctivae normal.   Cardiovascular:      Rate and Rhythm: Normal rate and regular rhythm.      Heart sounds: Normal heart sounds. No murmur heard.    No friction rub.   Pulmonary:      Effort: Pulmonary effort is normal. No respiratory distress.      Breath sounds: Normal breath sounds. No wheezing or rales.   Abdominal:      General: Bowel sounds are normal. There is no distension.      Palpations: Abdomen is soft.      Tenderness: There is no abdominal tenderness (resolved).   Musculoskeletal:         General: Normal range of motion.      Cervical back: Normal range of motion.   Skin:     Capillary Refill: Capillary refill takes less than 2 seconds.   Neurological:      Mental Status: He is alert and oriented to person, place, and time.      " Cranial Nerves: No cranial nerve deficit.   Psychiatric:         Behavior: Behavior normal.             Assessment and Plan:     Primary Problem:  Small bowel obstruction (HCC)    Hospital Problem list:    Small bowel obstruction (HCC)    Chronic idiopathic constipation    DDD (degenerative disc disease), lumbar    Leukocytosis      PMH:  Past Medical History:   Diagnosis Date   • Back injury        Treatment Plan:  Small bowel obstruction  Appears to be resolving with small bowel follow-through 8/9/2022 showing transit but some dilated loops.  Doing well with NG removed and tolerating clears.  Plan to advance diet as tolerated if approved by general surgery.    Leukocytosis  Improving.  Likely reactive.  Recheck in a.m. resolved, likely reactive    Degenerative disc disease  Restart home Norco    Chronic constipation  Multifactorial and drug-induced.  Has been having bowel movements in the last 24 hours    Disposition: Continue inpatient care, okay for discharge home once diet advance per general surgery.    Reviewed treatment plans with the patient and/or family.   30 minutes spent in face to face interaction and coordination of care.     Electronically signed by Ian Allan MD on 8/11/2022 at 06:52 CDT

## 2022-08-11 NOTE — PAYOR COMM NOTE
"Christa Campos (61 y.o. Male) [id number]  Cont stay  [facility]   sarah phone    Fax                Date of Birth   1960    Social Security Number       Address   [address] [place] [address]    Home Phone   [phone]    MRN   [id number]       Baptism   Other    Marital Status   Single                            Admission Date   8/8/22    Admission Type   Emergency    Admitting Provider   Ian Allan MD    Attending Provider   Ian Allan MD    Department, Room/Bed   [facility] 3C, 361/1       Discharge Date       Discharge Disposition       Discharge Destination                               Attending Provider: Ian Allan MD    Allergies: Dilaudid [Hydromorphone Hcl]    Isolation: None   Infection: None   Code Status: CPR   Advance Care Planning Activity    Ht: 160 cm (63\")   Wt: 55.3 kg (122 lb)    Admission Cmt: None   Principal Problem: Small bowel obstruction (HCC) [K56.609]                 Active Insurance as of 8/8/2022     Primary Coverage     Payor Plan Insurance Group Employer/Plan Group    [employer] PPO [id number]     Payor Plan Address Payor Plan Phone Number Payor Plan Fax Number Effective Dates    [address] [phone]  1/1/2020 - None Entered    [facility] [id number]       Subscriber Name Subscriber Birth Date Member ID       CHRISTA CAMPOS 1960 [id number]                 Emergency Contacts      (Rel.) Home Phone Work Phone Mobile Phone    ChaoPeace (Sister) [phone] -- [phone]    Gay Carrero (Relative) -- -- [phone]              Current Facility-Administered Medications   Medication Dose Route Frequency Provider Last Rate Last Admin   • Enoxaparin Sodium (LOVENOX) syringe 40 mg  40 mg Subcutaneous Daily Ian Allan MD   40 mg at 08/10/22 0918   • HYDROmorphone (DILAUDID) injection 1 mg  1 mg Intravenous " Q2H PRN Ian Allan MD   1 mg at 08/11/22 0534    And   • naloxone (NARCAN) injection 0.4 mg  0.4 mg Intravenous Q5 Min PRN Ian Alaln MD       • labetalol (NORMODYNE,TRANDATE) injection 10 mg  10 mg Intravenous Once Ian Allan MD       • lactated ringers infusion  125 mL/hr Intravenous Continuous Ian Allan  mL/hr at 08/11/22 0208 125 mL/hr at 08/11/22 0208   • oxyCODONE-acetaminophen (PERCOCET) 7.5-325 MG per tablet 1 tablet  1 tablet Oral Q8H PRN Ian Allan MD       • sodium chloride 0.9 % flush 10 mL  10 mL Intravenous PRN Ian Allan MD   10 mL at 08/09/22 0852   • sodium chloride 0.9 % flush 10 mL  10 mL Intravenous Q12H aIn Allan MD   10 mL at 08/10/22 0915   • sodium chloride 0.9 % flush 10 mL  10 mL Intravenous PRN Ian Allan MD       • traZODone (DESYREL) tablet 50 mg  50 mg Oral Nightly PRN Ian Allan MD            Physician Progress Notes (last 48 hours)      Ian Allan MD at 08/11/22 0652            Daily Progress Note  Lyndon Campos  MRN: [id number] LOS: 3    Admit Date: 8/8/2022 8/11/2022 06:52 CDT    Subjective:          Chief Complaint:  Chief Complaint   Patient presents with   • Chest Pain       Interval History:    Reviewed overnight events and nursing notes.   Doing much better this morning.  NG discontinued yesterday and patient tolerating clears.    Review of Systems   Constitutional: Negative for chills and fever.   Respiratory: Negative for shortness of breath.    Cardiovascular: Negative for chest pain.   Gastrointestinal: Negative for abdominal distention and abdominal pain.       DIET:  Diet Clear Liquid    Medications:   lactated ringers, 125 mL/hr, Last Rate: 125 mL/hr (08/11/22 0208)      enoxaparin, 40 mg, Subcutaneous, Daily  labetalol, 10 mg, Intravenous, Once  sodium chloride, 10 mL, Intravenous, Q12H        Data:     Code Status:   Code Status and Medical Interventions:  "  Ordered at: 08/08/22 1314     Code Status (Patient has no pulse and is not breathing):    CPR (Attempt to Resuscitate)     Medical Interventions (Patient has pulse or is breathing):    Full Support       History reviewed. No pertinent family history.  Social History     Socioeconomic History   • Marital status: Single   Tobacco Use   • Smoking status: Current Every Day Smoker     Packs/day: 1.00     Years: 33.00     Pack years: 33.00     Start date: 1989   • Smokeless tobacco: Never Used   • Tobacco comment: \"I've slowed down, I need to quit\"   Substance and Sexual Activity   • Alcohol use: Never   • Drug use: Defer   • Sexual activity: Defer       Labs:    Lab Results (last 72 hours)     Procedure Component Value Units Date/Time    Comprehensive Metabolic Panel [[id number]]  (Abnormal) Collected: 08/09/22 1455    Specimen: Blood Updated: 08/09/22 1532     Glucose 121 mg/dL      BUN 31 mg/dL      Creatinine 0.71 mg/dL      Sodium 145 mmol/L      Potassium 4.1 mmol/L      Chloride 106 mmol/L      CO2 29.0 mmol/L      Calcium 10.3 mg/dL      Total Protein 7.6 g/dL      Albumin 4.90 g/dL      ALT (SGPT) 43 U/L      AST (SGOT) 41 U/L      Alkaline Phosphatase 208 U/L      Total Bilirubin 0.8 mg/dL      Globulin 2.7 gm/dL      A/G Ratio 1.8 g/dL      BUN/Creatinine Ratio 43.7     Anion Gap 10.0 mmol/L      eGFR 103.7 mL/min/1.73      Comment: National Kidney Foundation and American Society of Nephrology (ASN) Task Force recommended calculation based on the Chronic Kidney Disease Epidemiology Collaboration (CKD-EPI) equation refit without adjustment for race.       Narrative:      GFR Normal >60  Chronic Kidney Disease <60  Kidney Failure <15      CBC Auto Differential [[id number]]  (Abnormal) Collected: 08/09/22 1455    Specimen: Blood Updated: 08/09/22 1517     WBC 12.78 10*3/mm3      RBC 5.03 10*6/mm3      Hemoglobin 15.9 g/dL      Hematocrit 48.7 %      MCV 96.8 fL      MCH 31.6 pg      MCHC 32.6 g/dL      RDW 14.0 %  "     RDW-SD 50.2 fl      MPV 10.1 fL      Platelets 264 10*3/mm3      Neutrophil % 77.0 %      Lymphocyte % 13.0 %      Monocyte % 7.7 %      Eosinophil % 1.4 %      Basophil % 0.4 %      Immature Grans % 0.5 %      Neutrophils, Absolute 9.85 10*3/mm3      Lymphocytes, Absolute 1.66 10*3/mm3      Monocytes, Absolute 0.98 10*3/mm3      Eosinophils, Absolute 0.18 10*3/mm3      Basophils, Absolute 0.05 10*3/mm3      Immature Grans, Absolute 0.06 10*3/mm3      nRBC 0.0 /100 WBC     Comprehensive Metabolic Panel [[id number]]  (Abnormal) Collected: 08/09/22 0702    Specimen: Blood Updated: 08/09/22 0738     Glucose 101 mg/dL      BUN 28 mg/dL      Creatinine 0.62 mg/dL      Sodium 140 mmol/L      Potassium 4.4 mmol/L      Chloride 106 mmol/L      CO2 29.0 mmol/L      Calcium 9.3 mg/dL      Total Protein 6.6 g/dL      Albumin 4.00 g/dL      ALT (SGPT) 28 U/L      AST (SGOT) 27 U/L      Alkaline Phosphatase 162 U/L      Total Bilirubin 0.7 mg/dL      Globulin 2.6 gm/dL      A/G Ratio 1.5 g/dL      BUN/Creatinine Ratio 45.2     Anion Gap 5.0 mmol/L      eGFR 108.1 mL/min/1.73      Comment: National Kidney Foundation and American Society of Nephrology (ASN) Task Force recommended calculation based on the Chronic Kidney Disease Epidemiology Collaboration (CKD-EPI) equation refit without adjustment for race.       Narrative:      GFR Normal >60  Chronic Kidney Disease <60  Kidney Failure <15      CBC & Differential [[id number]]  (Abnormal) Collected: 08/09/22 0702    Specimen: Blood Updated: 08/09/22 0723    Narrative:      The following orders were created for panel order CBC & Differential.  Procedure                               Abnormality         Status                     ---------                               -----------         ------                     CBC Auto Differential[[id number]]        Abnormal            Final result                 Please view results for these tests on the individual orders.    CBC Auto  Differential [[id number]]  (Abnormal) Collected: 08/09/22 0702    Specimen: Blood Updated: 08/09/22 0723     WBC 12.08 10*3/mm3      RBC 4.28 10*6/mm3      Hemoglobin 13.7 g/dL      Hematocrit 41.4 %      MCV 96.7 fL      MCH 32.0 pg      MCHC 33.1 g/dL      RDW 13.9 %      RDW-SD 49.7 fl      MPV 10.2 fL      Platelets 192 10*3/mm3      Neutrophil % 73.1 %      Lymphocyte % 14.9 %      Monocyte % 8.7 %      Eosinophil % 2.6 %      Basophil % 0.3 %      Immature Grans % 0.4 %      Neutrophils, Absolute 8.83 10*3/mm3      Lymphocytes, Absolute 1.80 10*3/mm3      Monocytes, Absolute 1.05 10*3/mm3      Eosinophils, Absolute 0.31 10*3/mm3      Basophils, Absolute 0.04 10*3/mm3      Immature Grans, Absolute 0.05 10*3/mm3      nRBC 0.0 /100 WBC     COVID PRE-OP / PRE-PROCEDURE SCREENING ORDER (NO ISOLATION) - Swab, Nasal Cavity [[id number]]  (Normal) Collected: 08/08/22 1314    Specimen: Swab from Nasal Cavity Updated: 08/08/22 1408    Narrative:      The following orders were created for panel order COVID PRE-OP / PRE-PROCEDURE SCREENING ORDER (NO ISOLATION) - Swab, Nasal Cavity.  Procedure                               Abnormality         Status                     ---------                               -----------         ------                     COVID-19,[facility] IN-DANIELLE...[[id number]]  Normal              Final result                 Please view results for these tests on the individual orders.    COVID-19,[facility] IN-HOUSE,Nasal Swab No Transport Media 3-4 HR TAT - Swab, Nasal Cavity [[id number]]  (Normal) Collected: 08/08/22 1314    Specimen: Swab from Nasal Cavity Updated: 08/08/22 1408     COVID19 Not Detected    Narrative:      Fact sheet for providers: https://www.fda.gov/media/244367/download     Fact sheet for patients: https://www.fda.gov/media/878441/download    Test performed by PCR.    Consider negative results in combination with clinical observations, patient history, and epidemiological information.     Urinalysis With Culture If Indicated - Urine, Clean Catch [[id number]]  (Abnormal) Collected: 08/08/22 1147    Specimen: Urine, Clean Catch Updated: 08/08/22 1157     Color, UA Yellow     Appearance, UA Clear     pH, UA 5.5     Specific Gravity, UA >=1.030     Glucose, UA Negative     Ketones, UA Negative     Bilirubin, UA Negative     Blood, UA Negative     Protein, UA Trace     Leuk Esterase, UA Negative     Nitrite, UA Negative     Urobilinogen, UA 1.0 E.U./dL    Narrative:      In absence of clinical symptoms, the presence of pyuria, bacteria, and/or nitrites on the urinalysis result does not correlate with infection.  Urine microscopic not indicated.    Troponin [[id number]]  (Normal) Collected: 08/08/22 0928    Specimen: Blood Updated: 08/08/22 0957     Troponin T <0.010 ng/mL     Narrative:      Troponin T Reference Range:  <= 0.03 ng/mL-   Negative for AMI  >0.03 ng/mL-     Abnormal for myocardial necrosis.  Clinicians would have to utilize clinical acumen, EKG, Troponin and serial changes to determine if it is an Acute Myocardial Infarction or myocardial injury due to an underlying chronic condition.       Results may be falsely decreased if patient taking Biotin.      Lactic Acid, Plasma [[id number]]  (Normal) Collected: 08/08/22 0730    Specimen: Blood Updated: 08/08/22 0759     Lactate 1.4 mmol/L     [place] [facility] [[id number]] Collected: 08/08/22 0632    Specimen: Blood Updated: 08/08/22 0748    Narrative:      The following orders were created for panel order [place] [facility].  Procedure                               Abnormality         Status                     ---------                               -----------         ------                     Green Top (Gel)[[id number]]                                  Final result               Lavender Top[[id number]]                                     Final result               Red Top[[id number]]                                          Final result               Light  "Blue Top[[id number]]                                   Final result                 Please view results for these tests on the individual orders.    Green Top (Gel) [[id number]] Collected: 08/08/22 0632    Specimen: Blood Updated: 08/08/22 0748     Extra Tube Hold for add-ons.     Comment: Auto resulted.       Red Top [[id number]] Collected: 08/08/22 0632    Specimen: Blood Updated: 08/08/22 0748     Extra Tube Hold for add-ons.     Comment: Auto resulted.       Light Blue Top [[id number]] Collected: 08/08/22 0632    Specimen: Blood Updated: 08/08/22 0748     Extra Tube Hold for add-ons.     Comment: Auto resulted       Lavender Top [[id number]] Collected: 08/08/22 0632    Specimen: Blood Updated: 08/08/22 0748     Extra Tube hold for add-on     Comment: Auto resulted       Procalcitonin [[id number]]  (Normal) Collected: 08/08/22 0632    Specimen: Blood Updated: 08/08/22 0743     Procalcitonin 0.07 ng/mL     Narrative:      As a Marker for Sepsis (Non-Neonates):    1. <0.5 ng/mL represents a low risk of severe sepsis and/or septic shock.  2. >2 ng/mL represents a high risk of severe sepsis and/or septic shock.    As a Marker for Lower Respiratory Tract Infections that require antibiotic therapy:    PCT on Admission    Antibiotic Therapy       6-12 Hrs later    >0.5                Strongly Recommended  >0.25 - <0.5        Recommended   0.1 - 0.25          Discouraged              Remeasure/reassess PCT  <0.1                Strongly Discouraged     Remeasure/reassess PCT    As 28 day mortality risk marker: \"Change in Procalcitonin Result\" (>80% or <=80%) if Day 0 (or Day 1) and Day 4 values are available. Refer to http://www.[facility]-pct-calculator.com    Change in PCT <=80%  A decrease of PCT levels below or equal to 80% defines a positive change in PCT test result representing a higher risk for 28-day all-cause mortality of patients diagnosed with severe sepsis for septic shock.    Change in PCT >80%  A decrease of PCT " levels of more than 80% defines a negative change in PCT result representing a lower risk for 28-day all-cause mortality of patients diagnosed with severe sepsis or septic shock.       Lipase [[id number]]  (Normal) Collected: 08/08/22 0632    Specimen: Blood Updated: 08/08/22 0730     Lipase 21 U/L     C-reactive Protein [[id number]]  (Normal) Collected: 08/08/22 0632    Specimen: Blood Updated: 08/08/22 0712     C-Reactive Protein <0.30 mg/dL     D-dimer, Quantitative [[id number]]  (Normal) Collected: 08/08/22 0632    Specimen: Blood Updated: 08/08/22 0707     D-Dimer, Quantitative 0.48 MCGFEU/mL     Narrative:      Reference Range is 0-0.50 MCGFEU/mL. However, results <0.50 MCGFEU/mL tends to rule out DVT or PE. Results >0.50 MCGFEU/mL are not useful in predicting absence or presence of DVT or PE.      Comprehensive Metabolic Panel [[id number]]  (Abnormal) Collected: 08/08/22 0632    Specimen: Blood Updated: 08/08/22 0659     Glucose 110 mg/dL      BUN 27 mg/dL      Creatinine 0.72 mg/dL      Sodium 142 mmol/L      Potassium 4.5 mmol/L      Comment: Slight hemolysis detected by analyzer. Results may be affected.        Chloride 104 mmol/L      CO2 28.0 mmol/L      Calcium 11.2 mg/dL      Total Protein 8.5 g/dL      Albumin 5.00 g/dL      ALT (SGPT) 25 U/L      AST (SGOT) 30 U/L      Alkaline Phosphatase 204 U/L      Total Bilirubin 0.4 mg/dL      Globulin 3.5 gm/dL      A/G Ratio 1.4 g/dL      BUN/Creatinine Ratio 37.5     Anion Gap 10.0 mmol/L      eGFR 103.3 mL/min/1.73      Comment: National Kidney Foundation and American Society of Nephrology (ASN) Task Force recommended calculation based on the Chronic Kidney Disease Epidemiology Collaboration (CKD-EPI) equation refit without adjustment for race.       Narrative:      GFR Normal >60  Chronic Kidney Disease <60  Kidney Failure <15      Troponin [[id number]]  (Normal) Collected: 08/08/22 0632    Specimen: Blood Updated: 08/08/22 0655     Troponin T <0.010 ng/mL      "Narrative:      Troponin T Reference Range:  <= 0.03 ng/mL-   Negative for AMI  >0.03 ng/mL-     Abnormal for myocardial necrosis.  Clinicians would have to utilize clinical acumen, EKG, Troponin and serial changes to determine if it is an Acute Myocardial Infarction or myocardial injury due to an underlying chronic condition.       Results may be falsely decreased if patient taking Biotin.      CBC & Differential [[id number]]  (Abnormal) Collected: 08/08/22 0632    Specimen: Blood Updated: 08/08/22 0641    Narrative:      The following orders were created for panel order CBC & Differential.  Procedure                               Abnormality         Status                     ---------                               -----------         ------                     CBC Auto Differential[[id number]]        Abnormal            Final result                 Please view results for these tests on the individual orders.    CBC Auto Differential [[id number]]  (Abnormal) Collected: 08/08/22 0632    Specimen: Blood Updated: 08/08/22 0641     WBC 23.16 10*3/mm3      RBC 5.11 10*6/mm3      Hemoglobin 15.8 g/dL      Hematocrit 49.2 %      MCV 96.3 fL      MCH 30.9 pg      MCHC 32.1 g/dL      RDW 13.5 %      RDW-SD 48.2 fl      MPV 10.1 fL      Platelets 320 10*3/mm3      Neutrophil % 81.1 %      Lymphocyte % 10.1 %      Monocyte % 7.9 %      Eosinophil % 0.0 %      Basophil % 0.2 %      Immature Grans % 0.7 %      Neutrophils, Absolute 18.78 10*3/mm3      Lymphocytes, Absolute 2.34 10*3/mm3      Monocytes, Absolute 1.82 10*3/mm3      Eosinophils, Absolute 0.01 10*3/mm3      Basophils, Absolute 0.05 10*3/mm3      Immature Grans, Absolute 0.16 10*3/mm3      nRBC 0.0 /100 WBC             Objective:     Vitals: /58 (BP Location: Right arm, Patient Position: Sitting)   Pulse 57   Temp 98 °F (36.7 °C) (Oral)   Resp 16   Ht 160 cm (63\")   Wt 55.3 kg (122 lb)   SpO2 100%   BMI 21.61 kg/m²    No intake or output data in the 24 " hours ending 22 0652 Temp (24hrs), Av.3 °F (36.8 °C), Min:98 °F (36.7 °C), Max:98.7 °F (37.1 °C)      Physical Exam  Constitutional:       Appearance: He is well-developed.   HENT:      Head: Normocephalic and atraumatic.   Eyes:      Conjunctiva/sclera: Conjunctivae normal.   Cardiovascular:      Rate and Rhythm: Normal rate and regular rhythm.      Heart sounds: Normal heart sounds. No murmur heard.    No friction rub.   Pulmonary:      Effort: Pulmonary effort is normal. No respiratory distress.      Breath sounds: Normal breath sounds. No wheezing or rales.   Abdominal:      General: Bowel sounds are normal. There is no distension.      Palpations: Abdomen is soft.      Tenderness: There is no abdominal tenderness (resolved).   Musculoskeletal:         General: Normal range of motion.      Cervical back: Normal range of motion.   Skin:     Capillary Refill: Capillary refill takes less than 2 seconds.   Neurological:      Mental Status: He is alert and oriented to person, place, and time.      Cranial Nerves: No cranial nerve deficit.   Psychiatric:         Behavior: Behavior normal.             Assessment and Plan:     Primary Problem:  Small bowel obstruction (HCC)    Hospital Problem list:    Small bowel obstruction (HCC)    Chronic idiopathic constipation    DDD (degenerative disc disease), lumbar    Leukocytosis      PMH:  Past Medical History:   Diagnosis Date   • Back injury        Treatment Plan:  Small bowel obstruction  Appears to be resolving with small bowel follow-through 2022 showing transit but some dilated loops.  Doing well with NG removed and tolerating clears.  Plan to advance diet as tolerated if approved by general surgery.    Leukocytosis  Improving.  Likely reactive.  Recheck in a.m. resolved, likely reactive    Degenerative disc disease  Restart home Norco    Chronic constipation  Multifactorial and drug-induced.  Has been having bowel movements in the last 24  hours    Disposition: Continue inpatient care, okay for discharge home once diet advance per general surgery.    Reviewed treatment plans with the patient and/or family.   30 minutes spent in face to face interaction and coordination of care.     Electronically signed by Ian Allan MD on 8/11/2022 at 06:52 CDT    Electronically signed by Ian Allan MD at 08/11/22 0653     Kenisha Arana MD at 08/10/22 0901          Kenisha Arana MD - General Surgery  Progress Note     LOS: 2 days   Patient Care Team:  Ian Allan MD as PCP - General (Family Medicine)  Brasher, Freddy LÓPEZ MD as Consulting Physician (Cardiothoracic Surgery)      Subjective     Interval History:      Mr. Campos feels improved today. He denies abdominal pain. He denies nausea and vomiting.     Objective     Vital Signs  Temp:  [97.5 °F (36.4 °C)-98.4 °F (36.9 °C)] 98.4 °F (36.9 °C)  Heart Rate:  [56-63] 60  Resp:  [14-16] 16  BP: (120-151)/(58-66) 133/64    Physical Exam:  General appearance - alert, well appearing, and in no distress  Mental status - alert, oriented to person, place, and time  Eyes - sclera anicteric  Heart - regular rate   Abdomen - soft, nontender, nondistended, no masses or organomegaly  Neurological - alert, oriented, normal speech, no focal findings or movement disorder noted      Results Review:    Lab Results (last 24 hours)     Procedure Component Value Units Date/Time    Comprehensive Metabolic Panel [[id number]]  (Abnormal) Collected: 08/09/22 1455    Specimen: Blood Updated: 08/09/22 1532     Glucose 121 mg/dL      BUN 31 mg/dL      Creatinine 0.71 mg/dL      Sodium 145 mmol/L      Potassium 4.1 mmol/L      Chloride 106 mmol/L      CO2 29.0 mmol/L      Calcium 10.3 mg/dL      Total Protein 7.6 g/dL      Albumin 4.90 g/dL      ALT (SGPT) 43 U/L      AST (SGOT) 41 U/L      Alkaline Phosphatase 208 U/L      Total Bilirubin 0.8 mg/dL      Globulin 2.7 gm/dL      A/G Ratio 1.8 g/dL       BUN/Creatinine Ratio 43.7     Anion Gap 10.0 mmol/L      eGFR 103.7 mL/min/1.73      Comment: National Kidney Foundation and American Society of Nephrology (ASN) Task Force recommended calculation based on the Chronic Kidney Disease Epidemiology Collaboration (CKD-EPI) equation refit without adjustment for race.       Narrative:      GFR Normal >60  Chronic Kidney Disease <60  Kidney Failure <15      CBC Auto Differential [[id number]]  (Abnormal) Collected: 08/09/22 1455    Specimen: Blood Updated: 08/09/22 1517     WBC 12.78 10*3/mm3      RBC 5.03 10*6/mm3      Hemoglobin 15.9 g/dL      Hematocrit 48.7 %      MCV 96.8 fL      MCH 31.6 pg      MCHC 32.6 g/dL      RDW 14.0 %      RDW-SD 50.2 fl      MPV 10.1 fL      Platelets 264 10*3/mm3      Neutrophil % 77.0 %      Lymphocyte % 13.0 %      Monocyte % 7.7 %      Eosinophil % 1.4 %      Basophil % 0.4 %      Immature Grans % 0.5 %      Neutrophils, Absolute 9.85 10*3/mm3      Lymphocytes, Absolute 1.66 10*3/mm3      Monocytes, Absolute 0.98 10*3/mm3      Eosinophils, Absolute 0.18 10*3/mm3      Basophils, Absolute 0.05 10*3/mm3      Immature Grans, Absolute 0.06 10*3/mm3      nRBC 0.0 /100 WBC         Imaging Results (Last 24 Hours)     Procedure Component Value Units Date/Time    FL Small Bowel Follow Through Single-Contrast [[id number]] Collected: 08/09/22 1220     Updated: 08/09/22 1226    Narrative:      EXAMINATION: FL SMALL BOWEL FOLLOW THROUGH SINGLE-CONTRAST- 8/9/2022  12:20 PM CDT     HISTORY: eval SBO; K56.609-Unspecified intestinal obstruction,  unspecified as to partial versus complete obstruction; R07.9-Chest pain,  unspecified; I71.2-Thoracic aortic aneurysm, without rupture;  R91.1-Solitary pulmonary nodule.     REPORT: The preliminary view of the abdomen was obtained, demonstrating  multiple surgical clips in the right upper quadrant, a nasogastric tube  is present with the tip in the proximal stomach. The lung bases are  clear. Moderate stool volume  is seen within the colon. There is  pre-existing contrast in the urinary bladder. The patient had CT of the  chest, abdomen and pelvis one day earlier. Gastrografin was administered  through the existing NG tube and images were obtained 15 minutes, 30  minutes and 1 hour.     COMPARISON: CT abdomen pelvis with contrast 8/8/2022.     On the images obtained at 15 and 30 minutes, there are 2 slightly  dilated small bowel loops in the left abdomen, without associated mass  effect or obvious mucosal thickening. Contrast is seen in the right  colon by one hour. The other small bowel loops appear normal caliber and  unremarkable.       Impression:      Conscious reaches the colon by 60 minutes. There are 2  slightly dilated small bowel loops in the left mid abdomen, low-grade  partial small bowel obstruction remains in the differential.  This report was finalized on 08/09/2022 12:23 by Dr. Jeet Dey MD.    XR Abdomen KUB [[id number]] Collected: 08/09/22 0927     Updated: 08/09/22 0931    Narrative:      EXAMINATION: XR ABDOMEN KUB- 8/9/2022 9:27 AM CDT     HISTORY: readvancement of NG tube; K56.609-Unspecified intestinal  obstruction, unspecified as to partial versus complete obstruction;  R07.9-Chest pain, unspecified; I71.2-Thoracic aortic aneurysm, without  rupture; R91.1-Solitary pulmonary nodule.     REPORT: A supine view of the upper abdomen was obtained.     COMPARISON: KUB 8/9/2022 0702 hours.     An NG tube has been inserted, the tip projects over the gastric fundus,  with the side-port at the GE junction. The lung bases are clear.  Moderate stool volume is present. There multiple surgical clips in the  right upper quadrant. No bowel dilation is identified.       Impression:      Insertion of NG tube, with the side-port at the GE junction  and tip in the gastric fundus. Consider advancing the tube 4 to 5 cm.  This report was finalized on 08/09/2022 09:28 by Dr. Jeet Dey MD.            Assessment & Plan        Mr. 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 He presents with abdominal pain, nausea and vomiting. CT showed concern for a small bowel obstruction with a dilated loop of jejunum in the left upper quadrant measuring 6.5cm. Prior CT scan on 6/7/22 showed the same loop of bowel dilated up to 5.1 cm in the left upper quadrant.  He also had dilated CBD to 1.2cm at that time as well. I believe this likely a more chronic process. SBFT yesterday with contrast promptly in the colon but some dilated small bowel. This appears consistent with a chronic partial obstruction.     - DC NGT   - Advance to clear liquids.       Kenisha Arana MD  08/10/22  09:01 CDT          Electronically signed by Kenisha Arana MD at 08/10/22 0903     Ian Allan MD at 08/10/22 0646            Daily Progress Note  Lyndon Campos  MRN: [id number] LOS: 2    Admit Date: 8/8/2022   8/10/2022 06:46 CDT    Subjective:          Chief Complaint:  Chief Complaint   Patient presents with   • Chest Pain       Interval History:    Reviewed overnight events and nursing notes.   Patient resting more comfortably this morning.  NG remains to low wall suction with fairly low output.    Review of Systems   Constitutional: Negative for chills and fever.   Respiratory: Negative for shortness of breath.    Cardiovascular: Negative for chest pain.       DIET:  NPO Diet NPO Type: Strict NPO    Medications:   lactated ringers, 125 mL/hr, Last Rate: 125 mL/hr (08/10/22 0054)      enoxaparin, 40 mg, Subcutaneous, Daily  labetalol, 10 mg, Intravenous, Once  sodium chloride, 10 mL, Intravenous, Q12H        Data:     Code Status:   Code Status and Medical Interventions:   Ordered at: 08/08/22 1314     Code Status (Patient has no pulse and is not breathing):    CPR (Attempt to Resuscitate)     Medical Interventions (Patient has pulse  "or is breathing):    Full Support       History reviewed. No pertinent family history.  Social History     Socioeconomic History   • Marital status: Single   Tobacco Use   • Smoking status: Current Every Day Smoker     Packs/day: 1.00     Years: 33.00     Pack years: 33.00     Start date: 1989   • Smokeless tobacco: Never Used   • Tobacco comment: \"I've slowed down, I need to quit\"   Substance and Sexual Activity   • Alcohol use: Never   • Drug use: Defer   • Sexual activity: Defer       Labs:    Lab Results (last 72 hours)     Procedure Component Value Units Date/Time    Comprehensive Metabolic Panel [[id number]]  (Abnormal) Collected: 08/09/22 1455    Specimen: Blood Updated: 08/09/22 1532     Glucose 121 mg/dL      BUN 31 mg/dL      Creatinine 0.71 mg/dL      Sodium 145 mmol/L      Potassium 4.1 mmol/L      Chloride 106 mmol/L      CO2 29.0 mmol/L      Calcium 10.3 mg/dL      Total Protein 7.6 g/dL      Albumin 4.90 g/dL      ALT (SGPT) 43 U/L      AST (SGOT) 41 U/L      Alkaline Phosphatase 208 U/L      Total Bilirubin 0.8 mg/dL      Globulin 2.7 gm/dL      A/G Ratio 1.8 g/dL      BUN/Creatinine Ratio 43.7     Anion Gap 10.0 mmol/L      eGFR 103.7 mL/min/1.73      Comment: National Kidney Foundation and American Society of Nephrology (ASN) Task Force recommended calculation based on the Chronic Kidney Disease Epidemiology Collaboration (CKD-EPI) equation refit without adjustment for race.       Narrative:      GFR Normal >60  Chronic Kidney Disease <60  Kidney Failure <15      CBC Auto Differential [[id number]]  (Abnormal) Collected: 08/09/22 1455    Specimen: Blood Updated: 08/09/22 1517     WBC 12.78 10*3/mm3      RBC 5.03 10*6/mm3      Hemoglobin 15.9 g/dL      Hematocrit 48.7 %      MCV 96.8 fL      MCH 31.6 pg      MCHC 32.6 g/dL      RDW 14.0 %      RDW-SD 50.2 fl      MPV 10.1 fL      Platelets 264 10*3/mm3      Neutrophil % 77.0 %      Lymphocyte % 13.0 %      Monocyte % 7.7 %      Eosinophil % 1.4 %      " Basophil % 0.4 %      Immature Grans % 0.5 %      Neutrophils, Absolute 9.85 10*3/mm3      Lymphocytes, Absolute 1.66 10*3/mm3      Monocytes, Absolute 0.98 10*3/mm3      Eosinophils, Absolute 0.18 10*3/mm3      Basophils, Absolute 0.05 10*3/mm3      Immature Grans, Absolute 0.06 10*3/mm3      nRBC 0.0 /100 WBC     Comprehensive Metabolic Panel [[id number]]  (Abnormal) Collected: 08/09/22 0702    Specimen: Blood Updated: 08/09/22 0738     Glucose 101 mg/dL      BUN 28 mg/dL      Creatinine 0.62 mg/dL      Sodium 140 mmol/L      Potassium 4.4 mmol/L      Chloride 106 mmol/L      CO2 29.0 mmol/L      Calcium 9.3 mg/dL      Total Protein 6.6 g/dL      Albumin 4.00 g/dL      ALT (SGPT) 28 U/L      AST (SGOT) 27 U/L      Alkaline Phosphatase 162 U/L      Total Bilirubin 0.7 mg/dL      Globulin 2.6 gm/dL      A/G Ratio 1.5 g/dL      BUN/Creatinine Ratio 45.2     Anion Gap 5.0 mmol/L      eGFR 108.1 mL/min/1.73      Comment: National Kidney Foundation and American Society of Nephrology (ASN) Task Force recommended calculation based on the Chronic Kidney Disease Epidemiology Collaboration (CKD-EPI) equation refit without adjustment for race.       Narrative:      GFR Normal >60  Chronic Kidney Disease <60  Kidney Failure <15      CBC & Differential [[id number]]  (Abnormal) Collected: 08/09/22 0702    Specimen: Blood Updated: 08/09/22 0723    Narrative:      The following orders were created for panel order CBC & Differential.  Procedure                               Abnormality         Status                     ---------                               -----------         ------                     CBC Auto Differential[[id number]]        Abnormal            Final result                 Please view results for these tests on the individual orders.    CBC Auto Differential [[id number]]  (Abnormal) Collected: 08/09/22 0702    Specimen: Blood Updated: 08/09/22 0723     WBC 12.08 10*3/mm3      RBC 4.28 10*6/mm3      Hemoglobin 13.7  g/dL      Hematocrit 41.4 %      MCV 96.7 fL      MCH 32.0 pg      MCHC 33.1 g/dL      RDW 13.9 %      RDW-SD 49.7 fl      MPV 10.2 fL      Platelets 192 10*3/mm3      Neutrophil % 73.1 %      Lymphocyte % 14.9 %      Monocyte % 8.7 %      Eosinophil % 2.6 %      Basophil % 0.3 %      Immature Grans % 0.4 %      Neutrophils, Absolute 8.83 10*3/mm3      Lymphocytes, Absolute 1.80 10*3/mm3      Monocytes, Absolute 1.05 10*3/mm3      Eosinophils, Absolute 0.31 10*3/mm3      Basophils, Absolute 0.04 10*3/mm3      Immature Grans, Absolute 0.05 10*3/mm3      nRBC 0.0 /100 WBC     COVID PRE-OP / PRE-PROCEDURE SCREENING ORDER (NO ISOLATION) - Swab, Nasal Cavity [[id number]]  (Normal) Collected: 08/08/22 1314    Specimen: Swab from Nasal Cavity Updated: 08/08/22 1408    Narrative:      The following orders were created for panel order COVID PRE-OP / PRE-PROCEDURE SCREENING ORDER (NO ISOLATION) - Swab, Nasal Cavity.  Procedure                               Abnormality         Status                     ---------                               -----------         ------                     COVID-19,[facility]...[[id number]]  Normal              Final result                 Please view results for these tests on the individual orders.    COVID-19,[facility] IN-HOUSE,Nasal Swab No Transport Media 3-4 HR TAT - Swab, Nasal Cavity [[id number]]  (Normal) Collected: 08/08/22 1314    Specimen: Swab from Nasal Cavity Updated: 08/08/22 1408     COVID19 Not Detected    Narrative:      Fact sheet for providers: https://www.fda.gov/media/818085/download     Fact sheet for patients: https://www.fda.gov/media/960887/download    Test performed by PCR.    Consider negative results in combination with clinical observations, patient history, and epidemiological information.    Urinalysis With Culture If Indicated - Urine, Clean Catch [[id number]]  (Abnormal) Collected: 08/08/22 1147    Specimen: Urine, Clean Catch Updated: 08/08/22 1157     Color,  UA Yellow     Appearance, UA Clear     pH, UA 5.5     Specific Gravity, UA >=1.030     Glucose, UA Negative     Ketones, UA Negative     Bilirubin, UA Negative     Blood, UA Negative     Protein, UA Trace     Leuk Esterase, UA Negative     Nitrite, UA Negative     Urobilinogen, UA 1.0 E.U./dL    Narrative:      In absence of clinical symptoms, the presence of pyuria, bacteria, and/or nitrites on the urinalysis result does not correlate with infection.  Urine microscopic not indicated.    Troponin [[id number]]  (Normal) Collected: 08/08/22 0928    Specimen: Blood Updated: 08/08/22 0957     Troponin T <0.010 ng/mL     Narrative:      Troponin T Reference Range:  <= 0.03 ng/mL-   Negative for AMI  >0.03 ng/mL-     Abnormal for myocardial necrosis.  Clinicians would have to utilize clinical acumen, EKG, Troponin and serial changes to determine if it is an Acute Myocardial Infarction or myocardial injury due to an underlying chronic condition.       Results may be falsely decreased if patient taking Biotin.      Lactic Acid, Plasma [[id number]]  (Normal) Collected: 08/08/22 0730    Specimen: Blood Updated: 08/08/22 0759     Lactate 1.4 mmol/L     [place] Draw [[id number]] Collected: 08/08/22 0632    Specimen: Blood Updated: 08/08/22 0748    Narrative:      The following orders were created for panel order [place] Draw.  Procedure                               Abnormality         Status                     ---------                               -----------         ------                     Green Top (Gel)[[id number]]                                  Final result               Lavender Top[[id number]]                                     Final result               Red Top[[id number]]                                          Final result               Light Blue Top[[id number]]                                   Final result                 Please view results for these tests on the individual orders.    Green Top (Gel)  "[[id number]] Collected: 08/08/22 0632    Specimen: Blood Updated: 08/08/22 0748     Extra Tube Hold for add-ons.     Comment: Auto resulted.       Red Top [[id number]] Collected: 08/08/22 0632    Specimen: Blood Updated: 08/08/22 0748     Extra Tube Hold for add-ons.     Comment: Auto resulted.       Light Blue Top [[id number]] Collected: 08/08/22 0632    Specimen: Blood Updated: 08/08/22 0748     Extra Tube Hold for add-ons.     Comment: Auto resulted       Lavender Top [[id number]] Collected: 08/08/22 0632    Specimen: Blood Updated: 08/08/22 0748     Extra Tube hold for add-on     Comment: Auto resulted       Procalcitonin [[id number]]  (Normal) Collected: 08/08/22 0632    Specimen: Blood Updated: 08/08/22 0743     Procalcitonin 0.07 ng/mL     Narrative:      As a Marker for Sepsis (Non-Neonates):    1. <0.5 ng/mL represents a low risk of severe sepsis and/or septic shock.  2. >2 ng/mL represents a high risk of severe sepsis and/or septic shock.    As a Marker for Lower Respiratory Tract Infections that require antibiotic therapy:    PCT on Admission    Antibiotic Therapy       6-12 Hrs later    >0.5                Strongly Recommended  >0.25 - <0.5        Recommended   0.1 - 0.25          Discouraged              Remeasure/reassess PCT  <0.1                Strongly Discouraged     Remeasure/reassess PCT    As 28 day mortality risk marker: \"Change in Procalcitonin Result\" (>80% or <=80%) if Day 0 (or Day 1) and Day 4 values are available. Refer to http://www.[employer]-pct-calculator.com    Change in PCT <=80%  A decrease of PCT levels below or equal to 80% defines a positive change in PCT test result representing a higher risk for 28-day all-cause mortality of patients diagnosed with severe sepsis for septic shock.    Change in PCT >80%  A decrease of PCT levels of more than 80% defines a negative change in PCT result representing a lower risk for 28-day all-cause mortality of patients diagnosed with severe sepsis or " septic shock.       Lipase [[id number]]  (Normal) Collected: 08/08/22 0632    Specimen: Blood Updated: 08/08/22 0730     Lipase 21 U/L     C-reactive Protein [[id number]]  (Normal) Collected: 08/08/22 0632    Specimen: Blood Updated: 08/08/22 0712     C-Reactive Protein <0.30 mg/dL     D-dimer, Quantitative [[id number]]  (Normal) Collected: 08/08/22 0632    Specimen: Blood Updated: 08/08/22 0707     D-Dimer, Quantitative 0.48 MCGFEU/mL     Narrative:      Reference Range is 0-0.50 MCGFEU/mL. However, results <0.50 MCGFEU/mL tends to rule out DVT or PE. Results >0.50 MCGFEU/mL are not useful in predicting absence or presence of DVT or PE.      Comprehensive Metabolic Panel [[id number]]  (Abnormal) Collected: 08/08/22 0632    Specimen: Blood Updated: 08/08/22 0659     Glucose 110 mg/dL      BUN 27 mg/dL      Creatinine 0.72 mg/dL      Sodium 142 mmol/L      Potassium 4.5 mmol/L      Comment: Slight hemolysis detected by analyzer. Results may be affected.        Chloride 104 mmol/L      CO2 28.0 mmol/L      Calcium 11.2 mg/dL      Total Protein 8.5 g/dL      Albumin 5.00 g/dL      ALT (SGPT) 25 U/L      AST (SGOT) 30 U/L      Alkaline Phosphatase 204 U/L      Total Bilirubin 0.4 mg/dL      Globulin 3.5 gm/dL      A/G Ratio 1.4 g/dL      BUN/Creatinine Ratio 37.5     Anion Gap 10.0 mmol/L      eGFR 103.3 mL/min/1.73      Comment: National Kidney Foundation and American Society of Nephrology (ASN) Task Force recommended calculation based on the Chronic Kidney Disease Epidemiology Collaboration (CKD-EPI) equation refit without adjustment for race.       Narrative:      GFR Normal >60  Chronic Kidney Disease <60  Kidney Failure <15      Troponin [[id number]]  (Normal) Collected: 08/08/22 0632    Specimen: Blood Updated: 08/08/22 0655     Troponin T <0.010 ng/mL     Narrative:      Troponin T Reference Range:  <= 0.03 ng/mL-   Negative for AMI  >0.03 ng/mL-     Abnormal for myocardial necrosis.  Clinicians would have to utilize  "clinical acumen, EKG, Troponin and serial changes to determine if it is an Acute Myocardial Infarction or myocardial injury due to an underlying chronic condition.       Results may be falsely decreased if patient taking Biotin.      CBC & Differential [[id number]]  (Abnormal) Collected: 22    Specimen: Blood Updated: 22    Narrative:      The following orders were created for panel order CBC & Differential.  Procedure                               Abnormality         Status                     ---------                               -----------         ------                     CBC Auto Differential[[id number]]        Abnormal            Final result                 Please view results for these tests on the individual orders.    CBC Auto Differential [[id number]]  (Abnormal) Collected: 22    Specimen: Blood Updated: 22     WBC 23.16 10*3/mm3      RBC 5.11 10*6/mm3      Hemoglobin 15.8 g/dL      Hematocrit 49.2 %      MCV 96.3 fL      MCH 30.9 pg      MCHC 32.1 g/dL      RDW 13.5 %      RDW-SD 48.2 fl      MPV 10.1 fL      Platelets 320 10*3/mm3      Neutrophil % 81.1 %      Lymphocyte % 10.1 %      Monocyte % 7.9 %      Eosinophil % 0.0 %      Basophil % 0.2 %      Immature Grans % 0.7 %      Neutrophils, Absolute 18.78 10*3/mm3      Lymphocytes, Absolute 2.34 10*3/mm3      Monocytes, Absolute 1.82 10*3/mm3      Eosinophils, Absolute 0.01 10*3/mm3      Basophils, Absolute 0.05 10*3/mm3      Immature Grans, Absolute 0.16 10*3/mm3      nRBC 0.0 /100 WBC             Objective:     Vitals: /64 (BP Location: Right arm, Patient Position: Lying)   Pulse 56   Temp 97.5 °F (36.4 °C) (Oral)   Resp 16   Ht 160 cm (63\")   Wt 55.3 kg (122 lb)   SpO2 98%   BMI 21.61 kg/m²      Intake/Output Summary (Last 24 hours) at 8/10/2022 0646  Last data filed at 8/10/2022 0321  Gross per 24 hour   Intake 1060 ml   Output 350 ml   Net 710 ml    Temp (24hrs), Av.9 °F (36.6 °C), " Min:97.5 °F (36.4 °C), Max:98.2 °F (36.8 °C)      Physical Exam  Constitutional:       Appearance: He is well-developed.   HENT:      Head: Normocephalic and atraumatic.   Eyes:      Conjunctiva/sclera: Conjunctivae normal.   Cardiovascular:      Rate and Rhythm: Normal rate and regular rhythm.      Heart sounds: Normal heart sounds. No murmur heard.    No friction rub.   Pulmonary:      Effort: Pulmonary effort is normal. No respiratory distress.      Breath sounds: Normal breath sounds. No wheezing or rales.   Abdominal:      General: Bowel sounds are normal. There is no distension.      Palpations: Abdomen is soft.      Tenderness: There is abdominal tenderness (diffuse).   Musculoskeletal:         General: Normal range of motion.      Cervical back: Normal range of motion.   Skin:     Capillary Refill: Capillary refill takes less than 2 seconds.   Neurological:      Mental Status: He is alert and oriented to person, place, and time.      Cranial Nerves: No cranial nerve deficit.   Psychiatric:         Behavior: Behavior normal.             Assessment and Plan:     Primary Problem:  Small bowel obstruction (HCC)    Hospital Problem list:    Small bowel obstruction (HCC)    Chronic idiopathic constipation    DDD (degenerative disc disease), lumbar    Leukocytosis      PMH:  Past Medical History:   Diagnosis Date   • Back injury        Treatment Plan:  Small bowel obstruction  Appears to be resolving with small bowel follow-through 8/9/2022 showing transit but some dilated loops.  Defer to surgery for NG management but would expect that he can likely have a trial of clamping off suction today.    Leukocytosis  Improving.  Likely reactive.  Recheck in a.m.    Degenerative disc disease  Continue Dilaudid for pain while NPO.  Will restart home [place] when taking p.o.    Chronic constipation  Multifactorial and drug-induced.  Disposition: Continue inpatient care    Reviewed treatment plans with the patient and/or  family.   30 minutes spent in face to face interaction and coordination of care.     Electronically signed by Ian Allan MD on 8/10/2022 at 06:46 CDT    Electronically signed by Ian Allan MD at 08/10/22 0648     Kenisha Arana MD at 08/09/22 0752          Kenisha Arana MD - General Surgery  Progress Note     LOS: 1 day   Patient Care Team:  Ian Allan MD as PCP - General (Family Medicine)  Brasher, Freddy LÓPEZ MD as Consulting Physician (Cardiothoracic Surgery)      Subjective     Interval History:      Mr. Campos feels improved today. His pain and bloating are improved with NGT.     Objective     Vital Signs  Heart Rate:  [51-84] 52  BP: (114-157)/(64-87) 114/66    Physical Exam:  General appearance - alert, well appearing, and in no distress  Mental status - alert, oriented to person, place, and time  Eyes - sclera anicteric  Heart - regular rate   Abdomen - soft, nontender, nondistended, no masses or organomegaly  Neurological - alert, oriented, normal speech, no focal findings or movement disorder noted      Results Review:    Lab Results (last 24 hours)     Procedure Component Value Units Date/Time    Comprehensive Metabolic Panel [[id number]]  (Abnormal) Collected: 08/09/22 0702    Specimen: Blood Updated: 08/09/22 0738     Glucose 101 mg/dL      BUN 28 mg/dL      Creatinine 0.62 mg/dL      Sodium 140 mmol/L      Potassium 4.4 mmol/L      Chloride 106 mmol/L      CO2 29.0 mmol/L      Calcium 9.3 mg/dL      Total Protein 6.6 g/dL      Albumin 4.00 g/dL      ALT (SGPT) 28 U/L      AST (SGOT) 27 U/L      Alkaline Phosphatase 162 U/L      Total Bilirubin 0.7 mg/dL      Globulin 2.6 gm/dL      A/G Ratio 1.5 g/dL      BUN/Creatinine Ratio 45.2     Anion Gap 5.0 mmol/L      eGFR 108.1 mL/min/1.73      Comment: National Kidney Foundation and American Society of Nephrology (ASN) Task Force recommended calculation based on the Chronic Kidney Disease Epidemiology Collaboration  (CKD-EPI) equation refit without adjustment for race.       Narrative:      GFR Normal >60  Chronic Kidney Disease <60  Kidney Failure <15      CBC & Differential [[id number]]  (Abnormal) Collected: 08/09/22 0702    Specimen: Blood Updated: 08/09/22 0723    Narrative:      The following orders were created for panel order CBC & Differential.  Procedure                               Abnormality         Status                     ---------                               -----------         ------                     CBC Auto Differential[[id number]]        Abnormal            Final result                 Please view results for these tests on the individual orders.    CBC Auto Differential [[id number]]  (Abnormal) Collected: 08/09/22 0702    Specimen: Blood Updated: 08/09/22 0723     WBC 12.08 10*3/mm3      RBC 4.28 10*6/mm3      Hemoglobin 13.7 g/dL      Hematocrit 41.4 %      MCV 96.7 fL      MCH 32.0 pg      MCHC 33.1 g/dL      RDW 13.9 %      RDW-SD 49.7 fl      MPV 10.2 fL      Platelets 192 10*3/mm3      Neutrophil % 73.1 %      Lymphocyte % 14.9 %      Monocyte % 8.7 %      Eosinophil % 2.6 %      Basophil % 0.3 %      Immature Grans % 0.4 %      Neutrophils, Absolute 8.83 10*3/mm3      Lymphocytes, Absolute 1.80 10*3/mm3      Monocytes, Absolute 1.05 10*3/mm3      Eosinophils, Absolute 0.31 10*3/mm3      Basophils, Absolute 0.04 10*3/mm3      Immature Grans, Absolute 0.05 10*3/mm3      nRBC 0.0 /100 WBC     COVID PRE-OP / PRE-PROCEDURE SCREENING ORDER (NO ISOLATION) - Swab, Nasal Cavity [[id number]]  (Normal) Collected: 08/08/22 1314    Specimen: Swab from Nasal Cavity Updated: 08/08/22 1408    Narrative:      The following orders were created for panel order COVID PRE-OP / PRE-PROCEDURE SCREENING ORDER (NO ISOLATION) - Swab, Nasal Cavity.  Procedure                               Abnormality         Status                     ---------                               -----------         ------                      COVID-19,[facility]...[[id number]]  Normal              Final result                 Please view results for these tests on the individual orders.    COVID-19,[facility] IN-HOUSE,Nasal Swab No Transport Media 3-4 HR TAT - Swab, Nasal Cavity [[id number]]  (Normal) Collected: 08/08/22 1314    Specimen: Swab from Nasal Cavity Updated: 08/08/22 1408     COVID19 Not Detected    Narrative:      Fact sheet for providers: https://www.fda.gov/media/314838/download     Fact sheet for patients: https://www.fda.gov/media/402801/download    Test performed by PCR.    Consider negative results in combination with clinical observations, patient history, and epidemiological information.    Urinalysis With Culture If Indicated - Urine, Clean Catch [[id number]]  (Abnormal) Collected: 08/08/22 1147    Specimen: Urine, Clean Catch Updated: 08/08/22 1157     Color, UA Yellow     Appearance, UA Clear     pH, UA 5.5     Specific Gravity, UA >=1.030     Glucose, UA Negative     Ketones, UA Negative     Bilirubin, UA Negative     Blood, UA Negative     Protein, UA Trace     Leuk Esterase, UA Negative     Nitrite, UA Negative     Urobilinogen, UA 1.0 E.U./dL    Narrative:      In absence of clinical symptoms, the presence of pyuria, bacteria, and/or nitrites on the urinalysis result does not correlate with infection.  Urine microscopic not indicated.    Troponin [[id number]]  (Normal) Collected: 08/08/22 0928    Specimen: Blood Updated: 08/08/22 0957     Troponin T <0.010 ng/mL     Narrative:      Troponin T Reference Range:  <= 0.03 ng/mL-   Negative for AMI  >0.03 ng/mL-     Abnormal for myocardial necrosis.  Clinicians would have to utilize clinical acumen, EKG, Troponin and serial changes to determine if it is an Acute Myocardial Infarction or myocardial injury due to an underlying chronic condition.       Results may be falsely decreased if patient taking Biotin.      Lactic Acid, Plasma [[id number]]  (Normal) Collected: 08/08/22 4465     Specimen: Blood Updated: 08/08/22 0759     Lactate 1.4 mmol/L         Imaging Results (Last 24 Hours)     Procedure Component Value Units Date/Time    XR Abdomen 1 View [[id number]] Collected: 08/09/22 0723     Updated: 08/09/22 0729    Narrative:      EXAMINATION: XR ABDOMEN 1 VW- 8/9/2022 7:23 AM CDT     HISTORY: eval bowel obstruction; K56.609-Unspecified intestinal  obstruction, unspecified as to partial versus complete obstruction;  R07.9-Chest pain, unspecified; I71.2-Thoracic aortic aneurysm, without  rupture; R91.1-Solitary pulmonary nodule.     REPORT: A supine view of the abdomen was obtained.     COMPARISON: KUB 8/8/2022. CT abdomen pelvis with contrast 8/8/2020.     The NG tube has been removed. There multiple surgical clips in the right  upper quadrant. Contrast is noted within the bladder. Moderate stool  volume is present. Gas is seen within the small intestine and colon in a  nonspecific pattern, no significant residual small bowel dilation is  identified. No free air is identified. Mild degenerative changes are  seen throughout the thoracic and lumbar spine.       Impression:      Interval removal of the NG tube, compared with the previous  CT there is decompression of dilated gas-filled small bowel loops,  currently the gas pattern is nonobstructive. Moderate stool volume is  present and constipation is likely.  This report was finalized on 08/09/2022 07:26 by Dr. Jeet Dey MD.    XR Abdomen KUB [[id number]] Collected: 08/08/22 1127     Updated: 08/08/22 1131    Narrative:      EXAMINATION: XR ABDOMEN KUB- 8/8/2022 11:27 AM CDT     HISTORY: NG tube placement; K56.609-Unspecified intestinal obstruction,  unspecified as to partial versus complete obstruction; R07.9-Chest pain,  unspecified; I71.2-Thoracic aortic aneurysm, without rupture;  R91.1-Solitary pulmonary nodule.     REPORT: A supine view of the upper abdomen was performed.     COMPARISON: CT abdomen pelvis with contrast 8/8/2022.      The lung bases are clear. A nasogastric tube has been inserted, the  side-port is at the GE junction with the tip in the proximal stomach.  There multiple surgical clips in the right upper quadrant. There is mild  gaseous distention of upper abdominal bowel loops. There is a  nondisplaced partially healed fracture at the lateral aspect of the left  eighth and 10th ribs.       Impression:      1. Insertion of a NG tube, with the side-port at the GE junction,  consider advancing the tube 4 to 5 cm.  2. Partially healed fractures of the lateral aspect of the left eighth  and 10th ribs, with mild displacement.  This report was finalized on 08/08/2022 11:28 by Dr. Jeet Dey MD.    CT Angiogram Chest [[id number]] Collected: 08/08/22 0828     Updated: 08/08/22 0841    Narrative:      Exam: CT angiogram of the of the chest with intravenous contrast.     CLINICAL HISTORY:  HISTORY of abdominal aortic aneurysm. Patient  complaining of chest pain radiating into epigastric area.     DOSE:  211 mGycm. All CT scans are performed using dose optimization  techniques as appropriate to the performed exam and including at least  one of the following: Automated exposure control, adjustment of the mA  and/or kV according to size, and the use of the iterative reconstruction  technique.     TECHNIQUE: Contiguous axial images were obtained through the thorax  following intravenous contrast administration with reformatted images  obtained in the sagittal and coronal projections from the original data  set. Three-dimensional reconstructions are also obtained.     COMPARISON:  6/7/2022     FINDINGS:     Pulmonary arteries:  There is normal enhancement of the pulmonary  arteries with no evidence of pulmonary thromboembolic disease.     Aorta:  There is aneurysmal dilatation of the ascending thoracic aorta  with a transverse diameter of 4.7 cm just above the aortic root. The  aortic arch and proximal great vessels are also mildly  ectatic. The T  were artery emanates directly from the aortic arch and emanates distal  to the origin of the left subclavian artery which is a somewhat unusual  configuration. No evidence of dissection. The descending thoracic aorta  is normal in caliber.     LUNGS:  There are moderate changes of centrilobular emphysema. There is  again a 6.5 mm soft tissue nodule in the right lower lobe warranting  follow-up per Fleischner criteria. It is stable from the previous CT  exam of the chest of 6/7/2022. Bibasilar atelectasis or scarring is  present. Lungs are otherwise clear. No evidence of acute consolidative  pneumonia or effusion.     Pleural spaces: Unremarkable. No evidence of pleural effusion or  pneumothorax.     HEART: There is mild cardiomegaly. Mild coronary calcifications are  present. No evidence of right ventricular dysfunction or pericardial  effusion.     Bones: Multiple healing left posterolateral rib fractures are present.  No acute or suspicious bony abnormalities are observed.     CHEST WALL: No chest wall abnormalities are demonstrated.     Lymph nodes: No enlarged mediastinal or axillary lymphadenopathy is  present.     Upper abdomen: There is pneumobilia. With mild intrahepatic biliary  prominence. This is felt to be related to either previous sphincterotomy  or biliary diversion. The patient's undergone prior cholecystectomy.  Mild to moderate extrahepatic biliary dilatation is present.       Impression:      1. No evidence of pulmonary thromboembolic disease.  2. Stable aneurysmal dilatation of the ascending thoracic aorta. There  is also mild ectasia of the great vessels. No evidence of dissection.  The descending thoracic aorta is normal in caliber.  3. Pneumobilia presumably related to prior biliary diversion or  sphincterotomy. This is stable from the previous exam.  4. Stable pulmonary nodule in the right lower lobe warranting follow-up.  A prominent right hilar node measuring 9 mm in short  axis is also  present. Follow-up is recommended per Fleischner criteria.  5. Centrilobular emphysema. Bibasilar atelectasis or scarring is  present.  6. Mild cardiomegaly with coronary calcifications.     Fleischner Society Recommendations for Management of SOLID Pulmonary  Nodules:     1.  If a nodule is less than or equal to 4 mm in size, low risk patients  require no follow up, and high risk patients require a follow-up CT of  the chest at 12 months.  2.  If a nodule is 5-6 mm in size, low risk patients require a follow-up  CT at 12 months, and high risk patients require follow up CT scans at  6-12 months and 18-24 months.  3.  If a nodule is 7-8 mm in size, low risk patients requiring follow-up  at 6-12 months and 18-24 months, and high risk patients requiring  follow-up at 3-6 months, 9-12 months and 24 months.  4.  If a nodule is greater than 8 mm in size, ALL patients require  follow-up at 3, 9 and 24 months. PET/CT or biopsy should also be  considered.  This report was finalized on 08/08/2022 08:38 by Dr. Ralph Hicks MD.    CT Abdomen Pelvis With Contrast [[id number]] Collected: 08/08/22 0825     Updated: 08/08/22 0840    Narrative:      EXAMINATION: CT ABDOMEN PELVIS W CONTRAST- 8/8/2022 8:25 AM CDT     HISTORY: Nausea/vomiting left-sided chest pain, epigastric pain.     DOSE: 210 mGycm (Automatic exposure control technique was implemented in  an effort to keep the radiation dose as low as possible without  compromising image quality)     REPORT: Spiral CT of the abdomen and pelvis was performed after  administration of intravenous contrast from the lung bases through the  pubic symphysis. Reconstructed coronal and sagittal images are also  reviewed.     COMPARISON: CT abdomen pelvis without contrast 6/7/2022.     Review of lung windows demonstrates normal aeration of the lung bases.  Heart size is normal. Cholecystectomy clips are present. Pneumobilia is  also present as before, compatible with  previous sphincterotomy. No  liver mass is identified. There is moderate distention of the stomach  with fluid and some gas. The spleen appears normal. The common bile duct  is dilated with maximum diameter 1.2 cm, stable. This probably  represents a reservoir effect. The pancreas and adrenal glands appear  within normal limits. The kidneys enhance normally, no renal mass or  hydronephrosis is identified. There are 2 benign-appearing cysts in the  right kidney, the largest measures 3 cm. The ureters are decompressed.  The bladder is within normal limits. There is fluid retention within the  small intestine, with a moderately dilated loop of small jejunum is  noted in the left abdomen, with a diameter of 6.5 cm and there is mild  mucosal thickening involving this loop distally. There is a similar  appearance on the previous CT, the dilated small bowel loop had a  diameter of 5.0 cm. The appearance is concerning for mechanical small  bowel obstruction, though no discrete transition point is identified.  The conus decompressed. There is moderate sigmoid diverticulosis. No  free fluid, free air or abscess is identified. Review of bone windows is  unremarkable.       Impression:      1. Dilated small bowel loops, including a loop of the jejunum in the  left abdomen that has a maximum diameter of 6.5 cm and associated  mucosal thickening. The appearance is concerning for mechanical small  bowel obstruction, no discrete transition point is identified. There are  distal small bowel loops which appear decompressed. No free fluid, free  air or abscess. Moderate distention of the stomach with fluid and air.  2. Pneumobilia compatible with previous sphincterotomy. Cholecystectomy  clips are present. There is a reservoir effect of the extrahepatic bile  ducts.  3. Moderate sigmoid diverticulosis without evidence of acute  diverticulitis.        This report was finalized on 08/08/2022 08:37 by Dr. Jeet Dey MD.             Assessment & Plan       Mr. 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 He presents with abdominal pain, nausea and vomiting. CT showed concern for a small bowel obstruction with a dilated loop of jejunum in the left upper quadrant measuring 6.5cm. Prior CT scan on 6/7/22 showed the same loop of bowel dilated up to 5.1 cm in the left upper quadrant.  He also had dilated CBD to 1.2cm at that time as well. I believe this likely a more chronic process. NGT placed - output light and <1000mL. He feels much improved. AXR from this AM improved - nonobstructive bowel gas pattern. WBC count down to 12.     - Plan for small bowel follow through today via NGT, ordered       Kenisha Arana MD  08/09/22  07:52 CDT          Electronically signed by Kenisha Arana MD at 08/09/22 0919          Prn   Iv dilaudid on 8/9  8 dosages   8/10  Iv dilaudid x9     8/11  So far iv dilaudid x2      ivfl 125 hr

## 2022-08-11 NOTE — PROGRESS NOTES
Kenisha Arana MD - General Surgery  Progress Note     LOS: 3 days   Patient Care Team:  Ian Allan MD as PCP - General (Family Medicine)  Brasher, Freddy LÓPEZ MD as Consulting Physician (Cardiothoracic Surgery)      Subjective     Interval History:     Mr. Campos continues to improve. NGT removed and he is tolerating clears. Denies nausea, vomiting, and bloating.     Objective     Vital Signs  Temp:  [97 °F (36.1 °C)-98.7 °F (37.1 °C)] 97 °F (36.1 °C)  Heart Rate:  [50-64] 54  Resp:  [16-18] 18  BP: (117-127)/(58-70) 117/60    Physical Exam:  General appearance - alert, well appearing, and in no distress  Mental status - alert, oriented to person, place, and time  Eyes - sclera anicteric  Heart - regular rate   Abdomen - soft, nontender, nondistended, no masses or organomegaly  Neurological - alert, oriented, normal speech, no focal findings or movement disorder noted      Results Review:    Lab Results (last 24 hours)     Procedure Component Value Units Date/Time    Comprehensive Metabolic Panel [[id number]]  (Abnormal) Collected: 08/11/22 0549    Specimen: Blood Updated: 08/11/22 0645     Glucose 94 mg/dL      BUN 15 mg/dL      Creatinine 0.44 mg/dL      Sodium 140 mmol/L      Potassium 4.1 mmol/L      Comment: Slight hemolysis detected by analyzer. Results may be affected.        Chloride 108 mmol/L      CO2 28.0 mmol/L      Calcium 8.7 mg/dL      Total Protein 5.8 g/dL      Albumin 3.60 g/dL      ALT (SGPT) 24 U/L      AST (SGOT) 21 U/L      Alkaline Phosphatase 151 U/L      Total Bilirubin 0.9 mg/dL      Globulin 2.2 gm/dL      A/G Ratio 1.6 g/dL      BUN/Creatinine Ratio 34.1     Anion Gap 4.0 mmol/L      eGFR 119.9 mL/min/1.73      Comment: National Kidney Foundation and American Society of Nephrology (ASN) Task Force recommended calculation based on the Chronic Kidney Disease Epidemiology Collaboration (CKD-EPI) equation refit without adjustment for race.       Narrative:      GFR Normal  >60  Chronic Kidney Disease <60  Kidney Failure <15      CBC & Differential [[id number]]  (Abnormal) Collected: 08/11/22 0549    Specimen: Blood Updated: 08/11/22 0629    Narrative:      The following orders were created for panel order CBC & Differential.  Procedure                               Abnormality         Status                     ---------                               -----------         ------                     CBC Auto Differential[[id number]]        Abnormal            Final result                 Please view results for these tests on the individual orders.    CBC Auto Differential [[id number]]  (Abnormal) Collected: 08/11/22 0549    Specimen: Blood Updated: 08/11/22 0629     WBC 6.98 10*3/mm3      RBC 3.70 10*6/mm3      Hemoglobin 11.8 g/dL      Hematocrit 35.9 %      MCV 97.0 fL      MCH 31.9 pg      MCHC 32.9 g/dL      RDW 13.0 %      RDW-SD 46.5 fl      MPV 10.7 fL      Platelets 160 10*3/mm3      Neutrophil % 64.2 %      Lymphocyte % 20.8 %      Monocyte % 9.3 %      Eosinophil % 5.0 %      Basophil % 0.3 %      Immature Grans % 0.4 %      Neutrophils, Absolute 4.48 10*3/mm3      Lymphocytes, Absolute 1.45 10*3/mm3      Monocytes, Absolute 0.65 10*3/mm3      Eosinophils, Absolute 0.35 10*3/mm3      Basophils, Absolute 0.02 10*3/mm3      Immature Grans, Absolute 0.03 10*3/mm3      nRBC 0.0 /100 WBC         Imaging Results (Last 24 Hours)     ** No results found for the last 24 hours. **            Assessment & Plan       Mr. 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 He presents with abdominal pain, nausea and vomiting. CT showed concern for a small bowel obstruction with a dilated loop of jejunum in the left upper quadrant measuring 6.5cm. Prior CT scan on 6/7/22 showed the same loop of bowel dilated up to 5.1 cm in the left upper quadrant.  He also had dilated CBD to 1.2cm at that  time as well. I believe this likely a more chronic process. SBFT 8/9with contrast promptly in the colon but some dilated small bowel. This appears consistent with a chronic partial obstruction.     - Advance to full liquids   - Will need follow up with an hepatobiliary surgeon at the tertiary care center of his choice in an elective manner.       Kenisha Arana MD  08/11/22  12:36 CDT

## 2022-08-12 ENCOUNTER — READMISSION MANAGEMENT (OUTPATIENT)
Dept: CALL CENTER | Facility: HOSPITAL | Age: 62
End: 2022-08-12

## 2022-08-12 VITALS
OXYGEN SATURATION: 99 % | WEIGHT: 122 LBS | TEMPERATURE: 98.1 F | DIASTOLIC BLOOD PRESSURE: 54 MMHG | HEART RATE: 40 BPM | BODY MASS INDEX: 21.62 KG/M2 | SYSTOLIC BLOOD PRESSURE: 129 MMHG | RESPIRATION RATE: 16 BRPM | HEIGHT: 63 IN

## 2022-08-12 RX ADMIN — OXYCODONE HYDROCHLORIDE AND ACETAMINOPHEN 1 TABLET: 7.5; 325 TABLET ORAL at 04:51

## 2022-08-12 NOTE — PAYOR COMM NOTE
"REF: [id number]  FARAZ ZAMORA  [phone]  OR  FAX  [phone]      Christa Campos (61 y.o. Male)             Date of Birth   1960    Social Security Number       Address   [address] [address]    Home Phone   [phone]    MRN   [id number]       Quaker   Other    Marital Status   Single                            Admission Date   22    Admission Type   Emergency    Admitting Provider   Ian Allan MD    Attending Provider       Department, Room/Bed   [facility] 3C, 361/1       Discharge Date   2022    Discharge Disposition   Home or Self Care    Discharge Destination                               Attending Provider: (none)   Allergies: Dilaudid [Hydromorphone Hcl]    Isolation: None   Infection: None   Code Status: CPR   Advance Care Planning Activity    Ht: 160 cm (63\")   Wt: 55.3 kg (122 lb)    Admission Cmt: None   Principal Problem: Small bowel obstruction (HCC) [K56.609]                 Active Insurance as of 2022     Primary Coverage     Payor Plan Insurance Group Employer/Plan Group    [employer] PPO [id number]     Payor Plan Address Payor Plan Phone Number Payor Plan Fax Number Effective Dates    [address] [phone]  2020 - None Entered    [place] [address]       Subscriber Name Subscriber Birth Date Member ID       CHRISTA CAMPOS 1960 [id number]                 Emergency Contacts      (Rel.) Home Phone Work Phone Mobile Phone    Peace Guidry (Sister) [phone] -- [phone]    Gay Carrero (Relative) -- -- [phone]               Discharge Summary      Ian Allan MD at 22 0711            Hospital Discharge Summary    Christa Campos  :  1960  MRN:  [id number]    Admit date:  2022  Discharge date: 2020    Admitting Physician:    Ian Allan MD    Discharge Diagnoses:      Small bowel obstruction (HCC)    Chronic " idiopathic constipation    DDD (degenerative disc disease), lumbar    Leukocytosis      Hospital Course:   61-year-old gentleman with extensive history of prior abdominal surgery who was admitted with nausea, vomiting, epigastric/chest pain.  He was found to have a mild small bowel obstruction and NG tube was placed.  He was managed medically and NG tube was able to be removed after small bowel follow-through showed transit of contrast into the colon.  He was seen in concert with Dr. Arana of general surgery who recommended that he follow-up with a hepatobiliary physician after discharge at tertiary care based on extensive prior surgery.  On day of discharge, he was sitting up in bed comfortably and anxious to go home.  Has been tolerating full liquids.  Will advance to regular diet for breakfast and then sent home if tolerates.    The patient was admitted for the above noted medical/surgical issues. Please see daily progress note for further details concerning their stay. The patient improved throughout their stay and reached maximum medical improvement on the day of discharge. The patient/family agree with the treatment plan as outlined above. All questions concerning their stay were answered prior to discharge. They understand the importance of follow up concerning any abnormal test results.     Physical Exam  Constitutional:       Appearance: He is well-developed.   HENT:      Head: Normocephalic and atraumatic.   Eyes:      Conjunctiva/sclera: Conjunctivae normal.   Cardiovascular:      Rate and Rhythm: Normal rate and regular rhythm.      Heart sounds: Normal heart sounds. No murmur heard.    No friction rub.   Pulmonary:      Effort: Pulmonary effort is normal. No respiratory distress.      Breath sounds: Normal breath sounds. No wheezing or rales.   Abdominal:      General: Bowel sounds are normal. There is no distension.      Palpations: Abdomen is soft.      Tenderness: There is no abdominal tenderness.    Musculoskeletal:         General: Normal range of motion.      Cervical back: Normal range of motion.   Skin:     Capillary Refill: Capillary refill takes less than 2 seconds.   Neurological:      Mental Status: He is alert and oriented to person, place, and time.      Cranial Nerves: No cranial nerve deficit.   Psychiatric:         Behavior: Behavior normal.         Discharge Medications:         Discharge Medications      Changes to Medications      Instructions Start Date   oxyCODONE-acetaminophen 7.5-325 MG per tablet  Commonly known as: PERCOCET  What changed: when to take this   1 tablet, Oral, Every 6 Hours PRN         Continue These Medications      Instructions Start Date   cyclobenzaprine 5 MG tablet  Commonly known as: FLEXERIL   5 mg, Oral, 2 Times Daily PRN      diclofenac 75 MG EC tablet  Commonly known as: VOLTAREN   75 mg, Oral, Daily      diphenhydrAMINE 25 mg capsule  Commonly known as: BENADRYL   25 mg, Oral, Every 6 Hours PRN      predniSONE 10 MG tablet  Commonly known as: DELTASONE   10 mg, Oral, 2 Times Daily      rosuvastatin 10 MG tablet  Commonly known as: CRESTOR   10 mg, Oral, Daily      Symproic 0.2 MG tablet  Generic drug: Naldemedine Tosylate   0.2 mg, Oral, Daily, Stool Softener      traZODone 50 MG tablet  Commonly known as: DESYREL   50 mg, Oral, Nightly PRN             Activity: Ad shabnam.    Diet: Ad shabnam.    Consults: General surgery    Significant Diagnostic Studies:    XR Abdomen 1 View    Result Date: 8/9/2022  Interval removal of the NG tube, compared with the previous CT there is decompression of dilated gas-filled small bowel loops, currently the gas pattern is nonobstructive. Moderate stool volume is present and constipation is likely. This report was finalized on 08/09/2022 07:26 by Dr. Jeet Dey MD.    CT Abdomen Pelvis With Contrast    Result Date: 8/8/2022  1. Dilated small bowel loops, including a loop of the jejunum in the left abdomen that has a maximum diameter  of 6.5 cm and associated mucosal thickening. The appearance is concerning for mechanical small bowel obstruction, no discrete transition point is identified. There are distal small bowel loops which appear decompressed. No free fluid, free air or abscess. Moderate distention of the stomach with fluid and air. 2. Pneumobilia compatible with previous sphincterotomy. Cholecystectomy clips are present. There is a reservoir effect of the extrahepatic bile ducts. 3. Moderate sigmoid diverticulosis without evidence of acute diverticulitis.   This report was finalized on 08/08/2022 08:37 by Dr. Jeet Dey MD.    XR Chest 1 View    Result Date: 8/8/2022   No acute findings. This report was finalized on 08/08/2022 07:15 by Dr. Adalberto Jones MD.    FL Small Bowel Follow Through Single-Contrast    Result Date: 8/9/2022  Conscious reaches the colon by 60 minutes. There are 2 slightly dilated small bowel loops in the left mid abdomen, low-grade partial small bowel obstruction remains in the differential. This report was finalized on 08/09/2022 12:23 by Dr. Jeet Dey MD.    CT Angiogram Chest    Result Date: 8/8/2022  1. No evidence of pulmonary thromboembolic disease. 2. Stable aneurysmal dilatation of the ascending thoracic aorta. There is also mild ectasia of the great vessels. No evidence of dissection. The descending thoracic aorta is normal in caliber. 3. Pneumobilia presumably related to prior biliary diversion or sphincterotomy. This is stable from the previous exam. 4. Stable pulmonary nodule in the right lower lobe warranting follow-up. A prominent right hilar node measuring 9 mm in short axis is also present. Follow-up is recommended per Fleischner criteria. 5. Centrilobular emphysema. Bibasilar atelectasis or scarring is present. 6. Mild cardiomegaly with coronary calcifications.  Fleischner Society Recommendations for Management of SOLID Pulmonary Nodules:  1.  If a nodule is less than or equal to 4 mm in  size, low risk patients require no follow up, and high risk patients require a follow-up CT of the chest at 12 months. 2.  If a nodule is 5-6 mm in size, low risk patients require a follow-up CT at 12 months, and high risk patients require follow up CT scans at 6-12 months and 18-24 months. 3.  If a nodule is 7-8 mm in size, low risk patients requiring follow-up at 6-12 months and 18-24 months, and high risk patients requiring follow-up at 3-6 months, 9-12 months and 24 months. 4.  If a nodule is greater than 8 mm in size, ALL patients require follow-up at 3, 9 and 24 months. PET/CT or biopsy should also be considered. This report was finalized on 08/08/2022 08:38 by Dr. Ralph Hicks MD.    XR Abdomen KUB    Result Date: 8/9/2022  Insertion of NG tube, with the side-port at the GE junction and tip in the gastric fundus. Consider advancing the tube 4 to 5 cm. This report was finalized on 08/09/2022 09:28 by Dr. Jeet Dey MD.    XR Abdomen KUB    Result Date: 8/8/2022  1. Insertion of a NG tube, with the side-port at the GE junction, consider advancing the tube 4 to 5 cm. 2. Partially healed fractures of the lateral aspect of the left eighth and 10th ribs, with mild displacement. This report was finalized on 08/08/2022 11:28 by Dr. Jeet Dey MD.           Treatments:   NG suction    Disposition:   Home in stable condition    25 minutes time spent on discharge including discussion with patient/family, SW, and coordination of care.     Follow up with Ian Allan MD in 1 weeks.    Signed:  Ian Allan MD   8/12/2022, 07:11 CDT    Electronically signed by Ian Allan MD at 08/12/22 0713       Discharge Order (From admission, onward)     Start     Ordered    08/12/22 0711  Discharge patient  Once        Expected Discharge Date: 08/12/22    Expected Discharge Time: Morning    Discharge Disposition: Home or Self Care    Physician of Record for Attribution - Please select from  Treatment Team: VALERIANO HESS [[id number]]    Review needed by CMO to determine Physician of Record: No       Question Answer Comment   Physician of Record for Attribution - Please select from Treatment Team VALERIANO HESS    Review needed by CMO to determine Physician of Record No        08/12/22 0711

## 2022-08-12 NOTE — DISCHARGE SUMMARY
Hospital Discharge Summary    Lyndon Campos  :  1960  MRN:  [id number]    Admit date:  2022  Discharge date: 2020    Admitting Physician:    Ian Allan MD    Discharge Diagnoses:      Small bowel obstruction (HCC)    Chronic idiopathic constipation    DDD (degenerative disc disease), lumbar    Leukocytosis      Hospital Course:   61-year-old gentleman with extensive history of prior abdominal surgery who was admitted with nausea, vomiting, epigastric/chest pain.  He was found to have a mild small bowel obstruction and NG tube was placed.  He was managed medically and NG tube was able to be removed after small bowel follow-through showed transit of contrast into the colon.  He was seen in concert with Dr. Arana of general surgery who recommended that he follow-up with a hepatobiliary physician after discharge at tertiary care based on extensive prior surgery.  On day of discharge, he was sitting up in bed comfortably and anxious to go home.  Has been tolerating full liquids.  Will advance to regular diet for breakfast and then sent home if tolerates.    The patient was admitted for the above noted medical/surgical issues. Please see daily progress note for further details concerning their stay. The patient improved throughout their stay and reached maximum medical improvement on the day of discharge. The patient/family agree with the treatment plan as outlined above. All questions concerning their stay were answered prior to discharge. They understand the importance of follow up concerning any abnormal test results.     Physical Exam  Constitutional:       Appearance: He is well-developed.   HENT:      Head: Normocephalic and atraumatic.   Eyes:      Conjunctiva/sclera: Conjunctivae normal.   Cardiovascular:      Rate and Rhythm: Normal rate and regular rhythm.      Heart sounds: Normal heart sounds. No murmur heard.    No friction rub.   Pulmonary:      Effort: Pulmonary effort  is normal. No respiratory distress.      Breath sounds: Normal breath sounds. No wheezing or rales.   Abdominal:      General: Bowel sounds are normal. There is no distension.      Palpations: Abdomen is soft.      Tenderness: There is no abdominal tenderness.   Musculoskeletal:         General: Normal range of motion.      Cervical back: Normal range of motion.   Skin:     Capillary Refill: Capillary refill takes less than 2 seconds.   Neurological:      Mental Status: He is alert and oriented to person, place, and time.      Cranial Nerves: No cranial nerve deficit.   Psychiatric:         Behavior: Behavior normal.         Discharge Medications:         Discharge Medications      Changes to Medications      Instructions Start Date   oxyCODONE-acetaminophen 7.5-325 MG per tablet  Commonly known as: PERCOCET  What changed: when to take this   1 tablet, Oral, Every 6 Hours PRN         Continue These Medications      Instructions Start Date   cyclobenzaprine 5 MG tablet  Commonly known as: FLEXERIL   5 mg, Oral, 2 Times Daily PRN      diclofenac 75 MG EC tablet  Commonly known as: VOLTAREN   75 mg, Oral, Daily      diphenhydrAMINE 25 mg capsule  Commonly known as: BENADRYL   25 mg, Oral, Every 6 Hours PRN      predniSONE 10 MG tablet  Commonly known as: DELTASONE   10 mg, Oral, 2 Times Daily      rosuvastatin 10 MG tablet  Commonly known as: CRESTOR   10 mg, Oral, Daily      Symproic 0.2 MG tablet  Generic drug: Naldemedine Tosylate   0.2 mg, Oral, Daily, Stool Softener      traZODone 50 MG tablet  Commonly known as: DESYREL   50 mg, Oral, Nightly PRN             Activity: Ad shabnam.    Diet: Ad shabnam.    Consults: General surgery    Significant Diagnostic Studies:    XR Abdomen 1 View    Result Date: 8/9/2022  Interval removal of the NG tube, compared with the previous CT there is decompression of dilated gas-filled small bowel loops, currently the gas pattern is nonobstructive. Moderate stool volume is present and  constipation is likely. This report was finalized on 08/09/2022 07:26 by Dr. Jeet Dey MD.    CT Abdomen Pelvis With Contrast    Result Date: 8/8/2022  1. Dilated small bowel loops, including a loop of the jejunum in the left abdomen that has a maximum diameter of 6.5 cm and associated mucosal thickening. The appearance is concerning for mechanical small bowel obstruction, no discrete transition point is identified. There are distal small bowel loops which appear decompressed. No free fluid, free air or abscess. Moderate distention of the stomach with fluid and air. 2. Pneumobilia compatible with previous sphincterotomy. Cholecystectomy clips are present. There is a reservoir effect of the extrahepatic bile ducts. 3. Moderate sigmoid diverticulosis without evidence of acute diverticulitis.   This report was finalized on 08/08/2022 08:37 by Dr. Jeet Dey MD.    XR Chest 1 View    Result Date: 8/8/2022   No acute findings. This report was finalized on 08/08/2022 07:15 by Dr. Adalberto Jones MD.    FL Small Bowel Follow Through Single-Contrast    Result Date: 8/9/2022  Conscious reaches the colon by 60 minutes. There are 2 slightly dilated small bowel loops in the left mid abdomen, low-grade partial small bowel obstruction remains in the differential. This report was finalized on 08/09/2022 12:23 by Dr. Jeet Dey MD.    CT Angiogram Chest    Result Date: 8/8/2022  1. No evidence of pulmonary thromboembolic disease. 2. Stable aneurysmal dilatation of the ascending thoracic aorta. There is also mild ectasia of the great vessels. No evidence of dissection. The descending thoracic aorta is normal in caliber. 3. Pneumobilia presumably related to prior biliary diversion or sphincterotomy. This is stable from the previous exam. 4. Stable pulmonary nodule in the right lower lobe warranting follow-up. A prominent right hilar node measuring 9 mm in short axis is also present. Follow-up is recommended per  Fleischner criteria. 5. Centrilobular emphysema. Bibasilar atelectasis or scarring is present. 6. Mild cardiomegaly with coronary calcifications.  Fleischner Society Recommendations for Management of SOLID Pulmonary Nodules:  1.  If a nodule is less than or equal to 4 mm in size, low risk patients require no follow up, and high risk patients require a follow-up CT of the chest at 12 months. 2.  If a nodule is 5-6 mm in size, low risk patients require a follow-up CT at 12 months, and high risk patients require follow up CT scans at 6-12 months and 18-24 months. 3.  If a nodule is 7-8 mm in size, low risk patients requiring follow-up at 6-12 months and 18-24 months, and high risk patients requiring follow-up at 3-6 months, 9-12 months and 24 months. 4.  If a nodule is greater than 8 mm in size, ALL patients require follow-up at 3, 9 and 24 months. PET/CT or biopsy should also be considered. This report was finalized on 08/08/2022 08:38 by Dr. Ralph Hicks MD.    XR Abdomen KUB    Result Date: 8/9/2022  Insertion of NG tube, with the side-port at the GE junction and tip in the gastric fundus. Consider advancing the tube 4 to 5 cm. This report was finalized on 08/09/2022 09:28 by Dr. Jeet Dey MD.    XR Abdomen KUB    Result Date: 8/8/2022  1. Insertion of a NG tube, with the side-port at the GE junction, consider advancing the tube 4 to 5 cm. 2. Partially healed fractures of the lateral aspect of the left eighth and 10th ribs, with mild displacement. This report was finalized on 08/08/2022 11:28 by Dr. Jeet Dey MD.           Treatments:   NG suction    Disposition:   Home in stable condition    25 minutes time spent on discharge including discussion with patient/family, SW, and coordination of care.     Follow up with Ian Allan MD in 1 weeks.    Signed:  Ian Allan MD   8/12/2022, 07:11 CDT

## 2022-08-12 NOTE — PLAN OF CARE
Goal Outcome Evaluation:  Plan of Care Reviewed With: patient        Progress: improving  Outcome Evaluation: pt hoping to go home 8-12-22;  percocet given for back pain;  BM on 8-11-22;  up ad shabnam;  full liquids tolerating;  safety maintained

## 2022-08-13 NOTE — OUTREACH NOTE
Prep Survey    Flowsheet Row Responses   [facility] patient discharged from? [place]   Is LACE score < 7 ? No   Emergency Room discharge w/ pulse ox? No   Eligibility Readm Mgmt   Discharge diagnosis Small bowel obstruction    Does the patient have one of the following disease processes/diagnoses(primary or secondary)? Other   Does the patient have Home health ordered? No   Is there a DME ordered? No   Prep survey completed? Yes          NAVDEEP AMADOR - Registered Nurse

## 2022-08-16 ENCOUNTER — READMISSION MANAGEMENT (OUTPATIENT)
Dept: CALL CENTER | Facility: HOSPITAL | Age: 62
End: 2022-08-16

## 2022-08-16 NOTE — OUTREACH NOTE
Medical Week 1 Survey    Flowsheet Row Responses   [facility] facility patient discharged from? [place]   Does the patient have one of the following disease processes/diagnoses(primary or secondary)? Other   Week 1 attempt successful? No   Unsuccessful attempts Attempt 1          GILBERT ALBA - Registered Nurse

## 2022-08-19 ENCOUNTER — READMISSION MANAGEMENT (OUTPATIENT)
Dept: CALL CENTER | Facility: HOSPITAL | Age: 62
End: 2022-08-19

## 2022-08-19 NOTE — OUTREACH NOTE
Medical Week 1 Survey    Flowsheet Row Responses   [facility] [place] patient discharged from? [place]   Does the patient have one of the following disease processes/diagnoses(primary or secondary)? Other   Week 1 attempt successful? Yes   Call start time 1315   Call end time 1316   Meds reviewed with patient/caregiver? Yes   Is the patient having any side effects they believe may be caused by any medication additions or changes? No   Does the patient have all medications ordered at discharge? N/A   Is the patient taking all medications as directed (includes completed medication regime)? Yes   Does the patient have a primary care provider?  Yes   Does the patient have an appointment with their PCP within 7 days of discharge? Yes   Has the patient kept scheduled appointments due by today? Yes   Has home health visited the patient within 72 hours of discharge? N/A   Psychosocial issues? No   Did the patient receive a copy of their discharge instructions? Yes   Nursing interventions Reviewed instructions with patient   What is the patient's perception of their health status since discharge? Improving   Is the patient/caregiver able to teach back signs and symptoms related to disease process for when to call PCP? Yes   Is the patient/caregiver able to teach back signs and symptoms related to disease process for when to call 911? Yes   Is the patient/caregiver able to teach back the hierarchy of who to call/visit for symptoms/problems? PCP, Specialist, Home health nurse, Urgent Care, ED, 911 Yes   Week 1 call completed? Yes   Graduated Yes   Is the patient interested in additional calls from an ambulatory ?  NOTE:  applies to high risk patients requiring additional follow-up. No   Graduated/Revoked comments States is doing well, and will return to work 08/22/22. Denies any needs.          TOÑO WILSON - Registered Nurse

## 2022-09-28 DIAGNOSIS — K56.609 SMALL BOWEL OBSTRUCTION: Primary | ICD-10-CM

## 2022-12-02 ENCOUNTER — TELEPHONE (OUTPATIENT)
Dept: CARDIAC SURGERY | Facility: CLINIC | Age: 62
End: 2022-12-02

## 2022-12-02 NOTE — TELEPHONE ENCOUNTER
Caller: Lyndon Campos    Relationship: Self    Best call back number: [phone]    What does billing need from the patient: THE PATIENT IS WANTING TO KNOW IF HE WILL HAVE A COPAY FOR HIS TESTING ON 1/5/2023.

## 2023-01-05 ENCOUNTER — APPOINTMENT (OUTPATIENT)
Dept: CT IMAGING | Facility: HOSPITAL | Age: 63
End: 2023-01-05

## 2023-01-05 ENCOUNTER — APPOINTMENT (OUTPATIENT)
Dept: CARDIOLOGY | Facility: HOSPITAL | Age: 63
End: 2023-01-05

## 2023-01-06 ENCOUNTER — APPOINTMENT (OUTPATIENT)
Dept: CT IMAGING | Facility: HOSPITAL | Age: 63
End: 2023-01-06
Payer: COMMERCIAL

## 2023-01-06 ENCOUNTER — HOSPITAL ENCOUNTER (EMERGENCY)
Facility: HOSPITAL | Age: 63
Discharge: HOME | End: 2023-01-06
Attending: EMERGENCY MEDICINE | Admitting: EMERGENCY MEDICINE
Payer: COMMERCIAL

## 2023-01-06 VITALS
HEIGHT: 63 IN | WEIGHT: 125 LBS | DIASTOLIC BLOOD PRESSURE: 67 MMHG | RESPIRATION RATE: 16 BRPM | BODY MASS INDEX: 22.15 KG/M2 | OXYGEN SATURATION: 94 % | SYSTOLIC BLOOD PRESSURE: 110 MMHG | TEMPERATURE: 98 F | HEART RATE: 73 BPM

## 2023-01-06 DIAGNOSIS — R10.84 GENERALIZED ABDOMINAL PAIN: Primary | ICD-10-CM

## 2023-01-06 LAB
ALBUMIN SERPL-MCNC: 4.3 G/DL (ref 3.5–5.2)
ALBUMIN/GLOB SERPL: 1.6 G/DL
ALP SERPL-CCNC: 133 U/L (ref 39–117)
ALT SERPL W P-5'-P-CCNC: 12 U/L (ref 1–41)
ANION GAP SERPL CALCULATED.3IONS-SCNC: 9 MMOL/L (ref 5–15)
AST SERPL-CCNC: 19 U/L (ref 1–40)
BASOPHILS # BLD AUTO: 0.03 10*3/MM3 (ref 0–0.2)
BASOPHILS NFR BLD AUTO: 0.2 % (ref 0–1.5)
BILIRUB SERPL-MCNC: 0.5 MG/DL (ref 0–1.2)
BILIRUB UR QL STRIP: NEGATIVE
BUN SERPL-MCNC: 22 MG/DL (ref 8–23)
BUN/CREAT SERPL: 36.1 (ref 7–25)
CALCIUM SPEC-SCNC: 9.8 MG/DL (ref 8.6–10.5)
CHLORIDE SERPL-SCNC: 104 MMOL/L (ref 98–107)
CLARITY UR: CLEAR
CO2 SERPL-SCNC: 23 MMOL/L (ref 22–29)
COLOR UR: ABNORMAL
CREAT SERPL-MCNC: 0.61 MG/DL (ref 0.76–1.27)
DEPRECATED RDW RBC AUTO: 48.4 FL (ref 37–54)
EGFRCR SERPLBLD CKD-EPI 2021: 107.9 ML/MIN/1.73
EOSINOPHIL # BLD AUTO: 0.05 10*3/MM3 (ref 0–0.4)
EOSINOPHIL NFR BLD AUTO: 0.3 % (ref 0.3–6.2)
ERYTHROCYTE [DISTWIDTH] IN BLOOD BY AUTOMATED COUNT: 13.7 % (ref 12.3–15.4)
GLOBULIN UR ELPH-MCNC: 2.7 GM/DL
GLUCOSE SERPL-MCNC: 136 MG/DL (ref 65–99)
GLUCOSE UR STRIP-MCNC: NEGATIVE MG/DL
HCT VFR BLD AUTO: 45.9 % (ref 37.5–51)
HGB BLD-MCNC: 14.8 G/DL (ref 13–17.7)
HGB UR QL STRIP.AUTO: NEGATIVE
IMM GRANULOCYTES # BLD AUTO: 0.08 10*3/MM3 (ref 0–0.05)
IMM GRANULOCYTES NFR BLD AUTO: 0.5 % (ref 0–0.5)
KETONES UR QL STRIP: ABNORMAL
LEUKOCYTE ESTERASE UR QL STRIP.AUTO: NEGATIVE
LIPASE SERPL-CCNC: 20 U/L (ref 13–60)
LYMPHOCYTES # BLD AUTO: 1 10*3/MM3 (ref 0.7–3.1)
LYMPHOCYTES NFR BLD AUTO: 6 % (ref 19.6–45.3)
MCH RBC QN AUTO: 31.2 PG (ref 26.6–33)
MCHC RBC AUTO-ENTMCNC: 32.2 G/DL (ref 31.5–35.7)
MCV RBC AUTO: 96.6 FL (ref 79–97)
MONOCYTES # BLD AUTO: 1.09 10*3/MM3 (ref 0.1–0.9)
MONOCYTES NFR BLD AUTO: 6.5 % (ref 5–12)
NEUTROPHILS NFR BLD AUTO: 14.42 10*3/MM3 (ref 1.7–7)
NEUTROPHILS NFR BLD AUTO: 86.5 % (ref 42.7–76)
NITRITE UR QL STRIP: NEGATIVE
NRBC BLD AUTO-RTO: 0 /100 WBC (ref 0–0.2)
PH UR STRIP.AUTO: 5.5 [PH] (ref 5–8)
PLATELET # BLD AUTO: 228 10*3/MM3 (ref 140–450)
PMV BLD AUTO: 9.9 FL (ref 6–12)
POTASSIUM SERPL-SCNC: 4.6 MMOL/L (ref 3.5–5.2)
PROT SERPL-MCNC: 7 G/DL (ref 6–8.5)
PROT UR QL STRIP: ABNORMAL
RBC # BLD AUTO: 4.75 10*6/MM3 (ref 4.14–5.8)
SODIUM SERPL-SCNC: 136 MMOL/L (ref 136–145)
SP GR UR STRIP: >1.03 (ref 1–1.03)
UROBILINOGEN UR QL STRIP: ABNORMAL
WBC NRBC COR # BLD: 16.67 10*3/MM3 (ref 3.4–10.8)

## 2023-01-06 PROCEDURE — 96374 THER/PROPH/DIAG INJ IV PUSH: CPT

## 2023-01-06 PROCEDURE — 99283 EMERGENCY DEPT VISIT LOW MDM: CPT

## 2023-01-06 PROCEDURE — 85025 COMPLETE CBC W/AUTO DIFF WBC: CPT | Performed by: EMERGENCY MEDICINE

## 2023-01-06 PROCEDURE — 83690 ASSAY OF LIPASE: CPT | Performed by: EMERGENCY MEDICINE

## 2023-01-06 PROCEDURE — 25010000002 ONDANSETRON PER 1 MG: Performed by: EMERGENCY MEDICINE

## 2023-01-06 PROCEDURE — 96376 TX/PRO/DX INJ SAME DRUG ADON: CPT

## 2023-01-06 PROCEDURE — 25010000002 MORPHINE PER 10 MG: Performed by: EMERGENCY MEDICINE

## 2023-01-06 PROCEDURE — 74177 CT ABD & PELVIS W/CONTRAST: CPT

## 2023-01-06 PROCEDURE — 25010000002 IOPAMIDOL 61 % SOLUTION: Performed by: STUDENT IN AN ORGANIZED HEALTH CARE EDUCATION/TRAINING PROGRAM

## 2023-01-06 PROCEDURE — 81003 URINALYSIS AUTO W/O SCOPE: CPT | Performed by: EMERGENCY MEDICINE

## 2023-01-06 PROCEDURE — 80053 COMPREHEN METABOLIC PANEL: CPT | Performed by: EMERGENCY MEDICINE

## 2023-01-06 PROCEDURE — 96375 TX/PRO/DX INJ NEW DRUG ADDON: CPT

## 2023-01-06 RX ORDER — ONDANSETRON 2 MG/ML
4 INJECTION INTRAMUSCULAR; INTRAVENOUS ONCE
Status: COMPLETED | OUTPATIENT
Start: 2023-01-06 | End: 2023-01-06

## 2023-01-06 RX ORDER — SODIUM CHLORIDE 0.9 % (FLUSH) 0.9 %
10 SYRINGE (ML) INJECTION AS NEEDED
Status: DISCONTINUED | OUTPATIENT
Start: 2023-01-06 | End: 2023-01-06 | Stop reason: HOSPADM

## 2023-01-06 RX ORDER — ONDANSETRON 4 MG/1
4 TABLET, ORALLY DISINTEGRATING ORAL EVERY 8 HOURS PRN
Qty: 9 TABLET | Refills: 0 | Status: SHIPPED | OUTPATIENT
Start: 2023-01-06 | End: 2023-03-25 | Stop reason: HOSPADM

## 2023-01-06 RX ADMIN — MORPHINE SULFATE 4 MG: 4 INJECTION, SOLUTION INTRAMUSCULAR; INTRAVENOUS at 18:02

## 2023-01-06 RX ADMIN — SODIUM CHLORIDE 500 ML: 9 INJECTION, SOLUTION INTRAVENOUS at 16:50

## 2023-01-06 RX ADMIN — MORPHINE SULFATE 4 MG: 4 INJECTION, SOLUTION INTRAMUSCULAR; INTRAVENOUS at 16:49

## 2023-01-06 RX ADMIN — ONDANSETRON 4 MG: 2 INJECTION INTRAMUSCULAR; INTRAVENOUS at 16:49

## 2023-01-06 RX ADMIN — IOPAMIDOL 100 ML: 612 INJECTION, SOLUTION INTRAVENOUS at 17:36

## 2023-01-06 NOTE — ED PROVIDER NOTES
"Subjective   History of Present Illness  Patient is a 62-year-old male who presents to the ER with abdominal pain.  Patient states he has had sharp diffuse abdominal pain since last night, progressively worsening.  Patient states he has a history of small bowel obstruction and this feels very similar.  He has had associated nausea vomiting and diarrhea.  He denies any fever, chest pain, shortness of air, urinary changes, neurologic changes.        Review of Systems   Constitutional: Negative.    HENT: Negative.    Eyes: Negative.    Respiratory: Negative.    Cardiovascular: Negative.    Gastrointestinal: Positive for abdominal pain, diarrhea, nausea and vomiting.   Endocrine: Negative.    Genitourinary: Negative.    Musculoskeletal: Negative.    Skin: Negative.    Allergic/Immunologic: Negative.    Neurological: Negative.    Hematological: Negative.    Psychiatric/Behavioral: Negative.    All other systems reviewed and are negative.      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 No pertinent family history.    Social History     Socioeconomic History   • Marital status: Single   Tobacco Use   • Smoking status: Every Day     Packs/day: 1.00     Years: 33.00     Pack years: 33.00     Types: Cigarettes     Start date: 1989   • Smokeless tobacco: Never   • Tobacco comments:     \"I've slowed down, I need to quit\"   Substance and Sexual Activity   • Alcohol use: Never   • Drug use: Defer   • Sexual activity: Defer           Objective   Physical Exam  Vitals and nursing note reviewed.   Constitutional:       Appearance: He is well-developed.   HENT:      Head: Normocephalic " and atraumatic.   Eyes:      Conjunctiva/sclera: Conjunctivae normal.      Pupils: Pupils are equal, round, and reactive to light.   Cardiovascular:      Rate and Rhythm: Normal rate and regular rhythm.      Heart sounds: Normal heart sounds.   Pulmonary:      Effort: Pulmonary effort is normal.      Breath sounds: Normal breath sounds.   Abdominal:      Palpations: Abdomen is soft.      Tenderness: There is generalized abdominal tenderness. There is no right CVA tenderness, left CVA tenderness, guarding or rebound.   Musculoskeletal:         General: No deformity. Normal range of motion.      Cervical back: Normal range of motion.   Skin:     General: Skin is warm.   Neurological:      Mental Status: He is alert and oriented to person, place, and time.   Psychiatric:         Behavior: Behavior normal.         Procedures           ED Course      Patient was given IV fluids, morphine and Zofran.                                     Medical Decision Making  Patient is a 62-year-old male who presents to the ER with abdominal pain.  Patient states he has had sharp diffuse abdominal pain since last night, progressively worsening.  Patient states he has a history of small bowel obstruction and this feels very similar.  He has had associated nausea vomiting and diarrhea.  He denies any fever, chest pain, shortness of air, urinary changes, neurologic changes.      Differential diagnosis: small bowel obstruction, diverticulitis, appendicitis, gastroenteritis, AAA    Patient was given IV fluids, morphine and Zofran.  Labs and imaging were ordered and results pending at shift change.  Care transferred to Dr. Esposito.    Generalized abdominal pain: acute illness or injury  Amount and/or Complexity of Data Reviewed  Labs: ordered.  Radiology: ordered.      Risk  Prescription drug management.          Final diagnoses:   Generalized abdominal pain       ED Disposition  ED Disposition     None          No follow-up provider  specified.       Medication List      No changes were made to your prescriptions during this visit.         Silvina Diaz MD  01/06/23 3526

## 2023-01-06 NOTE — ED PROVIDER NOTES
Care of patient Lyndon Campos assumed from the previous healthcare provider.  See their note for further details.  Briefly, 62 y.o. 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  CT shows enteritis, fluid levels, no transition point or evidence of SBO  Pt reports feeling much better after medication management. Discussed full H&P including all CT findings and lab findings. I offered admission for IV hydration and monitoring or trial PO and d/c. He decided to trial tolerating PO, he took crackers and soda with no emesis. On repeat assessment he requested discharge home. This is reasonable gien improvement. No further workup indicated at this time. Patient is stable and appropriate for discharge. Patient received strict return precautions per discharge instructions and was instructed to follow-up with their primary care provider regarding their ER visit.     Jluis Esposito MD  01/07/23 0015

## 2023-01-07 NOTE — DISCHARGE INSTRUCTIONS
Please return if you have worsening abdominal pain, lightheadedness, passing out, inability to tolerate food or water, or other emergent concerns. Otherwise please follow-up with your primary care doctor regarding your ER visit today.

## 2023-02-01 ENCOUNTER — HOSPITAL ENCOUNTER (OUTPATIENT)
Dept: CARDIOLOGY | Facility: HOSPITAL | Age: 63
Discharge: HOME | End: 2023-02-01
Admitting: SURGERY
Payer: COMMERCIAL

## 2023-02-01 ENCOUNTER — APPOINTMENT (OUTPATIENT)
Dept: CT IMAGING | Facility: HOSPITAL | Age: 63
End: 2023-02-01
Payer: COMMERCIAL

## 2023-02-01 VITALS
BODY MASS INDEX: 22.15 KG/M2 | DIASTOLIC BLOOD PRESSURE: 67 MMHG | WEIGHT: 125 LBS | HEIGHT: 63 IN | SYSTOLIC BLOOD PRESSURE: 110 MMHG

## 2023-02-01 DIAGNOSIS — I71.20 THORACIC AORTIC ANEURYSM WITHOUT RUPTURE: ICD-10-CM

## 2023-02-01 LAB
ASCENDING AORTA: 4 CM
BH CV ECHO MEAS - AI P1/2T: 367.8 MSEC
BH CV ECHO MEAS - AO MAX PG: 13.8 MMHG
BH CV ECHO MEAS - AO MEAN PG: 6 MMHG
BH CV ECHO MEAS - AO ROOT DIAM: 3.5 CM
BH CV ECHO MEAS - AO V2 MAX: 186 CM/SEC
BH CV ECHO MEAS - AO V2 VTI: 36 CM
BH CV ECHO MEAS - AVA(I,D): 3.7 CM2
BH CV ECHO MEAS - EDV(CUBED): 166.4 ML
BH CV ECHO MEAS - EDV(MOD-SP4): 122 ML
BH CV ECHO MEAS - EF(MOD-SP4): 62.3 %
BH CV ECHO MEAS - ESV(CUBED): 32.8 ML
BH CV ECHO MEAS - ESV(MOD-SP4): 46 ML
BH CV ECHO MEAS - FS: 41.8 %
BH CV ECHO MEAS - IVS/LVPW: 1.5 CM
BH CV ECHO MEAS - IVSD: 1.2 CM
BH CV ECHO MEAS - LA DIMENSION: 2.8 CM
BH CV ECHO MEAS - LAT PEAK E' VEL: 10.4 CM/SEC
BH CV ECHO MEAS - LV DIASTOLIC VOL/BSA (35-75): 77 CM2
BH CV ECHO MEAS - LV MASS(C)D: 213.2 GRAMS
BH CV ECHO MEAS - LV MAX PG: 11.4 MMHG
BH CV ECHO MEAS - LV MEAN PG: 5 MMHG
BH CV ECHO MEAS - LV SYSTOLIC VOL/BSA (12-30): 29 CM2
BH CV ECHO MEAS - LV V1 MAX: 169 CM/SEC
BH CV ECHO MEAS - LV V1 VTI: 38.8 CM
BH CV ECHO MEAS - LVIDD: 5.5 CM
BH CV ECHO MEAS - LVIDS: 3.2 CM
BH CV ECHO MEAS - LVOT AREA: 3.5 CM2
BH CV ECHO MEAS - LVOT DIAM: 2.1 CM
BH CV ECHO MEAS - LVPWD: 0.8 CM
BH CV ECHO MEAS - MV A MAX VEL: 83.9 CM/SEC
BH CV ECHO MEAS - MV DEC TIME: 0.14 MSEC
BH CV ECHO MEAS - MV E MAX VEL: 83.9 CM/SEC
BH CV ECHO MEAS - MV E/A: 1
BH CV ECHO MEAS - RAP SYSTOLE: 5 MMHG
BH CV ECHO MEAS - RVSP: 15.2 MMHG
BH CV ECHO MEAS - SI(MOD-SP4): 48 ML/M2
BH CV ECHO MEAS - SV(LVOT): 134.4 ML
BH CV ECHO MEAS - SV(MOD-SP4): 76 ML
BH CV ECHO MEAS - TR MAX PG: 10.2 MMHG
BH CV ECHO MEAS - TR MAX VEL: 160 CM/SEC
LEFT ATRIUM VOLUME INDEX: 20.9 ML/M2
LEFT ATRIUM VOLUME: 33 ML
MAXIMAL PREDICTED HEART RATE: 157 BPM
STRESS TARGET HR: 133 BPM

## 2023-02-01 PROCEDURE — 93306 TTE W/DOPPLER COMPLETE: CPT

## 2023-02-01 PROCEDURE — 93306 TTE W/DOPPLER COMPLETE: CPT | Performed by: INTERNAL MEDICINE

## 2023-02-02 ENCOUNTER — HOSPITAL ENCOUNTER (OUTPATIENT)
Dept: CT IMAGING | Facility: HOSPITAL | Age: 63
Discharge: HOME | End: 2023-02-02
Admitting: SURGERY
Payer: COMMERCIAL

## 2023-02-02 DIAGNOSIS — I71.20 THORACIC AORTIC ANEURYSM WITHOUT RUPTURE: ICD-10-CM

## 2023-02-02 LAB — CREAT BLDA-MCNC: 0.8 MG/DL (ref 0.6–1.3)

## 2023-02-02 PROCEDURE — 71275 CT ANGIOGRAPHY CHEST: CPT

## 2023-02-02 PROCEDURE — 82565 ASSAY OF CREATININE: CPT

## 2023-02-02 PROCEDURE — 0 IOPAMIDOL PER 1 ML: Performed by: SURGERY

## 2023-02-02 RX ADMIN — IOPAMIDOL 100 ML: 755 INJECTION, SOLUTION INTRAVENOUS at 10:28

## 2023-02-06 ENCOUNTER — OFFICE VISIT (OUTPATIENT)
Dept: CARDIAC SURGERY | Facility: CLINIC | Age: 63
End: 2023-02-06
Payer: COMMERCIAL

## 2023-02-06 VITALS
HEIGHT: 63 IN | OXYGEN SATURATION: 97 % | HEART RATE: 91 BPM | BODY MASS INDEX: 22.04 KG/M2 | SYSTOLIC BLOOD PRESSURE: 116 MMHG | DIASTOLIC BLOOD PRESSURE: 62 MMHG | WEIGHT: 124.4 LBS

## 2023-02-06 DIAGNOSIS — I35.1 NONRHEUMATIC AORTIC VALVE INSUFFICIENCY: ICD-10-CM

## 2023-02-06 DIAGNOSIS — I71.20 THORACIC AORTIC ANEURYSM WITHOUT RUPTURE, UNSPECIFIED PART: Primary | ICD-10-CM

## 2023-02-06 DIAGNOSIS — Q23.1 BICUSPID AORTIC VALVE: ICD-10-CM

## 2023-02-06 DIAGNOSIS — I71.21 ANEURYSM OF ASCENDING AORTA WITHOUT RUPTURE: Primary | ICD-10-CM

## 2023-02-06 PROCEDURE — 99214 OFFICE O/P EST MOD 30 MIN: CPT | Performed by: SURGERY

## 2023-02-06 RX ORDER — TIZANIDINE 4 MG/1
1 TABLET ORAL EVERY 12 HOURS SCHEDULED
COMMUNITY
Start: 2023-01-10

## 2023-02-06 NOTE — PROGRESS NOTES
"    [facility] Cardiothoracic Surgery  PROGRESS NOTE   CC: Bicuspid Aortic Valve, Aortic Root and Ascending Aortic Aneurysm    Subjective:  Mr. Campos returns today in follow-up 6 months after last visit.  He presented to me with incidental finding of ascending aortic aneurysm after being evaluated for rib fractures.  Since last visit he has had an echocardiogram showing bicuspid aortic valve with moderate aortic insufficiency.  He had a repeat CT scan today as well.  He currently denies any chest pain.  Blood pressures been well controlled.  He does continue to smoke but less than a pack a day.  His blood pressure is well controlled and usually under 110 systolic.  He has been doing well overall and all the things he wants to without problems.    ROS: No recent illnesses, no fevers or chills, no changes in health status    Objective:      /62 (BP Location: Left arm, Patient Position: Sitting, Cuff Size: Adult)   Pulse 91   Ht 160 cm (63\")   Wt 56.4 kg (124 lb 6.4 oz)   SpO2 97%   BMI 22.04 kg/m²       PE:  Vitals:    02/06/23 1527   BP: 116/62   Pulse: 91   SpO2: 97%     GENERAL: NAD, resting comfortably, normal color  CARDIOVASCULAR: regular, regular rate, sinus  PULMONARY: Normal bilateral breath sounds, no labored breathing  ABDOMEN: soft, nontender/nondistended  EXTREMITIES: mild peripheral edema, normal pulses, normal ROM        Lab Results (last 72 hours)     ** No results found for the last 72 hours. **        CT Chest:  IMPRESSION:  1. No evidence of pulmonary embolism.  2. Persistent aneurysmal dilatation of the ascending thoracic aorta. No  change. No dissection.  3. Persistent and unchanged right lung nodules. Another follow-up in 12  months is recommended to ensure stability.  4. Chronic emphysematous lung changes.     This report was finalized on 02/02/2023 11:04 by Dr. Joellen Diaz MD.    ECHO:  Interpretation Summary       •  Left ventricular systolic function is " normal. Left ventricular ejection fraction appears to be 61 - 65%.  •  Left ventricular diastolic function was normal.  •  Normal right ventricular cavity size and systolic function noted.  •  The aortic valve appears to be bicuspid.  •  Moderate aortic valve regurgitation is present.  •  Mild dilation of the ascending aorta is present, measuring 4 cm on the visualized portion of the ascending aorta.      I personally reviewed CT scan chest and the following is my interpretation:  Ascending aorta is dilated and stable from last visit measures 4.8 cm in maximum dimension, the distal ascending aorta/proximal arch measures 3.8 cm in maximum dimension, he has a bovine arch and pattern    Assessment & Plan     Mr. Campos is a 62-year-old male who presents to me with incidental ascending aortic aneurysm and bicuspid aortic valve with moderate aortic insufficiency.  I current I currently measured his ascending aorta to be 4.8 cm in maximum dimension which is stable from last visit.  The finding of bicuspid aortic valve is new.    I again today discussed with Mr. Campos the natural history of ascending aortic aneurysms especially in the setting of bicuspid aortic valve.  He has very small stature, is 160 cm tall and when adjusting for this the aortic area to height ratio is greater than 10 cm2/m (17/1.6).  Given this he meets a class IIa recommendation for treatment of his aneurysmal disease.  My recommendation would be aortic root replacement and ascending aorta replacement with hemiarch.  Mr. Campos is okay with this plan and agrees with it.    We discussed at length preoperative operative and postoperative expectations.  We discussed valve options in accordance with ACC AHA guidelines he agrees with biologic valve conduit.  We discussed the risk of surgery including but not limited to bleeding, infection, injury to major organs or vessels, chronic heart failure, arrhythmias, need for pacemaker, prolonged ventilation,  renal failure, stroke, risk of anesthesia, risk of sternal wound or bone healing problems, reoperation, and/or death.  He understands these risk and agrees to proceed.    I am going to refer him to the structural heart clinic for meeting our cardiologist and having coronary angiography.  We will complete his work-up and then work on a surgery date.  Thank you for trusting me with the care of Mr. Campos.  Please do not hesitate to call with questions or concerns.      Freddy Brasher MD  Cardiothoracic Surgeon

## 2023-02-07 ENCOUNTER — TELEPHONE (OUTPATIENT)
Dept: CARDIAC SURGERY | Facility: CLINIC | Age: 63
End: 2023-02-07
Payer: COMMERCIAL

## 2023-02-07 PROBLEM — Q23.1 BICUSPID AORTIC VALVE: Status: ACTIVE | Noted: 2023-02-07

## 2023-02-07 PROBLEM — I35.1 NONRHEUMATIC AORTIC VALVE INSUFFICIENCY: Status: ACTIVE | Noted: 2023-02-07

## 2023-02-10 ENCOUNTER — TELEPHONE (OUTPATIENT)
Dept: CARDIAC SURGERY | Facility: CLINIC | Age: 63
End: 2023-02-10

## 2023-02-10 NOTE — TELEPHONE ENCOUNTER
Caller: Lyndon Campos    Relationship: Self     Best call back number: [phone]    What is the best time to reach you: ASAP    Who are you requesting to speak with (clinical staff, provider,  specific staff member): CLINICAL    Do you know the name of the person who called: PT    What was the call regarding:   PT CALLED STATING THAT HE HAD SPOKEN WITH DR. WEEKS ON 2.6.23 ABOUT AN POSSIBLE PROCEDURE IN THE FUTURE. PT WOULD LIKE MORE INFORMATION ON WHAT THE NEXT STEPS CONSIST OF.    Do you require a callback: YES

## 2023-02-10 NOTE — TELEPHONE ENCOUNTER
Notified patient that he has been referred to cardiology for heart cath prior to proceeding with surgery.  Referral has been placed.  Will follow up on this appointment on Monday.

## 2023-02-13 ENCOUNTER — OFFICE VISIT (OUTPATIENT)
Dept: CARDIOLOGY | Facility: CLINIC | Age: 63
End: 2023-02-13
Payer: COMMERCIAL

## 2023-02-13 VITALS
SYSTOLIC BLOOD PRESSURE: 110 MMHG | OXYGEN SATURATION: 98 % | HEART RATE: 91 BPM | BODY MASS INDEX: 21.97 KG/M2 | HEIGHT: 63 IN | DIASTOLIC BLOOD PRESSURE: 58 MMHG | WEIGHT: 124 LBS

## 2023-02-13 DIAGNOSIS — Z72.0 TOBACCO ABUSE: ICD-10-CM

## 2023-02-13 DIAGNOSIS — I71.21 ANEURYSM OF ASCENDING AORTA WITHOUT RUPTURE: Primary | ICD-10-CM

## 2023-02-13 DIAGNOSIS — I35.1 NONRHEUMATIC AORTIC VALVE INSUFFICIENCY: ICD-10-CM

## 2023-02-13 PROCEDURE — 93000 ELECTROCARDIOGRAM COMPLETE: CPT | Performed by: INTERNAL MEDICINE

## 2023-02-13 PROCEDURE — 99204 OFFICE O/P NEW MOD 45 MIN: CPT | Performed by: INTERNAL MEDICINE

## 2023-02-13 RX ORDER — ASPIRIN 81 MG/1
81 TABLET ORAL DAILY
Status: CANCELLED | OUTPATIENT
Start: 2023-02-14

## 2023-02-13 RX ORDER — SODIUM CHLORIDE 9 MG/ML
1-3 INJECTION, SOLUTION INTRAVENOUS CONTINUOUS
Status: CANCELLED | OUTPATIENT
Start: 2023-02-13

## 2023-02-13 RX ORDER — SODIUM CHLORIDE 0.9 % (FLUSH) 0.9 %
10 SYRINGE (ML) INJECTION EVERY 12 HOURS SCHEDULED
Status: CANCELLED | OUTPATIENT
Start: 2023-02-13

## 2023-02-13 RX ORDER — SODIUM CHLORIDE 0.9 % (FLUSH) 0.9 %
10 SYRINGE (ML) INJECTION AS NEEDED
Status: CANCELLED | OUTPATIENT
Start: 2023-02-13

## 2023-02-13 RX ORDER — ASPIRIN 81 MG/1
324 TABLET, CHEWABLE ORAL ONCE
Status: CANCELLED | OUTPATIENT
Start: 2023-02-13 | End: 2023-02-13

## 2023-02-13 RX ORDER — SODIUM CHLORIDE 9 MG/ML
40 INJECTION, SOLUTION INTRAVENOUS AS NEEDED
Status: DISCONTINUED | OUTPATIENT
Start: 2023-02-13 | End: 2023-03-22

## 2023-02-13 NOTE — PROGRESS NOTES
Subjective:     Encounter Date:02/13/2023      Patient ID: Lyndon Campos is a 62 y.o. male with ascending aortic aneurysm, bicuspid aortic valve with moderate aortic valve regurgitation, tobacco abuse, presenting today for consideration of cardiac catheterization prior to aortic surgery.    Referring Provider: Freddy Brasher MD; JUDD Fraser  Reason for Referral: Aortic aneurysm    Chief Complaint: Here today to discuss possible cardiac catheterization prior to aortic aneurysm surgery    History of Present Illness    This is a 62-year-old male who presents today for consideration of elective cardiac catheterization prior to aortic surgery.  The patient is known to have an aortic aneurysm and will likely undergo aortic surgery in the near future with Dr. Brasher.  The patient denies having any significant cardiac issues in the past in terms of no heart disease, arrhythmias, heart failure.  He does have some mild shortness of breath and dyspnea on exertion.  However, the symptoms are not significant.  He denies having any significant chest pain.  No palpitations, lightheadedness, dizziness or syncope.  He did have previous rib fractures.  These have healed at this time and he no longer has any significant symptoms related to his rib fractures.  The patient is a smoker but has cut back.  He is not ready to quit at this time.    The following portions of the patient's history were reviewed and updated as appropriate: allergies, current medications, past family history, past medical history, past social history, past surgical history and problem list.     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)    • Hx SBO      Past Surgical History:   Procedure Laterality Date   • ABDOMINAL SURGERY     • CHOLECYSTECTOMY     • EYE SURGERY     • KNEE SURGERY Left        Current Outpatient Medications:   •  diphenhydrAMINE (BENADRYL)  "25 mg capsule, Take 25 mg by mouth Every 6 (Six) Hours As Needed., Disp: , Rfl:   •  ondansetron ODT (ZOFRAN-ODT) 4 MG disintegrating tablet, Place 1 tablet on the tongue Every 8 (Eight) Hours As Needed for Nausea or Vomiting., Disp: 9 tablet, Rfl: 0  •  oxyCODONE-acetaminophen (PERCOCET) 7.5-325 MG per tablet, Take 1 tablet by mouth Every 6 (Six) Hours As Needed for Moderate Pain  for up to 6 doses. (Patient taking differently: Take 1 tablet by mouth Every 8 (Eight) Hours As Needed for Moderate Pain.), Disp: 6 tablet, Rfl: 0  •  rosuvastatin (CRESTOR) 10 MG tablet, Take 10 mg by mouth Daily., Disp: , Rfl:   •  Sertraline HCl (ZOLOFT PO), Take  by mouth., Disp: , Rfl:   •  tiZANidine (ZANAFLEX) 4 MG tablet, Take 1 tablet by mouth Every 12 (Twelve) Hours., Disp: , Rfl:   •  traZODone (DESYREL) 50 MG tablet, Take 50 mg by mouth At Night As Needed for Sleep., Disp: , Rfl:     Current Facility-Administered Medications:   •  sodium chloride 0.9 % infusion 40 mL, 40 mL, Intravenous, PRN, German Maria MD    Allergies   Allergen Reactions   • Dilaudid [Hydromorphone Hcl] Headache     Social History     Tobacco Use   • Smoking status: Every Day     Packs/day: 1.00     Years: 34.00     Pack years: 34.00     Types: Cigarettes     Start date: 1989   • Smokeless tobacco: Never   • Tobacco comments:     \"I've cut down from 1 ppd to 0.5 ppd\"   Substance Use Topics   • Alcohol use: Never     Comment: 0     History reviewed. No pertinent family history.     Review of Systems   Constitutional: Negative for chills, fever and weight loss.   HENT: Negative for congestion and hearing loss.    Eyes: Negative for blurred vision and pain.   Cardiovascular: Positive for dyspnea on exertion. Negative for chest pain, leg swelling, orthopnea, palpitations, paroxysmal nocturnal dyspnea and syncope.   Respiratory: Positive for shortness of breath. Negative for cough and wheezing.    Endocrine: Negative for cold intolerance and heat " intolerance.   Hematologic/Lymphatic: Negative for adenopathy and bleeding problem.   Skin: Negative for color change, poor wound healing and rash.   Musculoskeletal: Negative for myalgias and neck pain.   Gastrointestinal: Negative for abdominal pain, nausea and vomiting.   Genitourinary: Negative for dysuria and frequency.   Neurological: Negative for dizziness, headaches, light-headedness, loss of balance and numbness.   Psychiatric/Behavioral: Negative for altered mental status and memory loss.   Allergic/Immunologic: Negative for hives and persistent infections.         ECG 12 Lead    Date/Time: 2/13/2023 1:55 PM  Performed by: German Maria MD  Authorized by: German Maria MD   Comparison: compared with previous ECG from 8/8/2022  Similar to previous ECG  Rhythm: sinus rhythm  Rate: normal  BPM: 75  Conduction: conduction normal  ST Segments: ST segments normal  T Waves: T waves normal  QRS axis: left    Clinical impression: non-specific ECG               Objective:     Vitals reviewed.   Constitutional:       General: Not in acute distress.     Appearance: Normal appearance. Well-developed and not in distress. Not toxic-appearing or diaphoretic.   Eyes:      General: Lids are normal.      Extraocular Movements: Extraocular movements intact.      Pupils: Pupils are equal, round, and reactive to light.   HENT:      Head: Normocephalic and atraumatic.      Right Ear: External ear normal.      Left Ear: External ear normal.      Nose: Nose normal.    Mouth/Throat:      Mouth: Mucous membranes are not pale, not dry and not cyanotic.   Neck:      Thyroid: No thyromegaly.      Vascular: No carotid bruit or JVD.      Lymphadenopathy: No cervical adenopathy.   Pulmonary:      Effort: Pulmonary effort is normal. No accessory muscle usage or respiratory distress.      Breath sounds: Normal breath sounds. No wheezing. No rhonchi. No rales.   Chest:      Chest wall: Not tender to palpatation.  "  Cardiovascular:      Normal rate. Regular rhythm.      Murmurs: There is no murmur.      No gallop.   Pulses:     Intact distal pulses.   Edema:     Peripheral edema absent.   Abdominal:      General: Bowel sounds are normal. There is no distension or abdominal bruit.      Palpations: Abdomen is soft.      Tenderness: There is no abdominal tenderness.   Musculoskeletal: Normal range of motion.         General: No tenderness or deformity.      Extremities: No clubbing present.     Cervical back: Normal range of motion and neck supple. Skin:     General: Skin is warm and dry. There is no cyanosis.      Findings: No erythema or rash.   Neurological:      General: No focal deficit present.      Mental Status: Oriented to person, place, and time and oriented to person, place and time.      Cranial Nerves: No cranial nerve deficit.   Psychiatric:         Attention and Perception: Attention normal.         Mood and Affect: Mood normal.         Speech: Speech normal.         Behavior: Behavior normal. Behavior is cooperative.         Thought Content: Thought content normal.       /58   Pulse 91   Ht 160 cm (63\")   Wt 56.2 kg (124 lb)   SpO2 98%   BMI 21.97 kg/m²     Data/Lab Review:     Results for orders placed during the hospital encounter of 02/01/23    Adult Transthoracic Echo Complete w/ Color, Spectral and Contrast if necessary per protocol    Interpretation Summary  •  Left ventricular systolic function is normal. Left ventricular ejection fraction appears to be 61 - 65%.  •  Left ventricular diastolic function was normal.  •  Normal right ventricular cavity size and systolic function noted.  •  The aortic valve appears to be bicuspid.  •  Moderate aortic valve regurgitation is present.  •  Mild dilation of the ascending aorta is present, measuring 4 cm on the visualized portion of the ascending aorta.    CTA chest on 8/8/2022:  1. No evidence of pulmonary thromboembolic disease.  2. Stable aneurysmal " dilatation of the ascending thoracic aorta. There  is also mild ectasia of the great vessels. No evidence of dissection.  The descending thoracic aorta is normal in caliber.  3. Pneumobilia presumably related to prior biliary diversion or  sphincterotomy. This is stable from the previous exam.  4. Stable pulmonary nodule in the right lower lobe warranting follow-up.  A prominent right hilar node measuring 9 mm in short axis is also  present. Follow-up is recommended per Fleischner criteria.  5. Centrilobular emphysema. Bibasilar atelectasis or scarring is  present.  6. Mild cardiomegaly with coronary calcifications.      Assessment:          Diagnosis Plan   1. Aneurysm of ascending aorta without rupture  Case Request Cath Lab: Coronary angiography    Clip bilateral groin.    Clip bilateral arms.    Samir Test prior to procedure.    Notify MD if patient is taking the following medications or has a contrast allergy.    CBC (No Diff)    Basic Metabolic Panel    sodium chloride 0.9 % infusion    Insert Peripheral IV    Saline Lock & Maintain IV Access    sodium chloride 0.9 % flush 10 mL    sodium chloride 0.9 % flush 10 mL    sodium chloride 0.9 % infusion 40 mL    aspirin chewable tablet 324 mg    aspirin EC tablet 81 mg    ECG 12 Lead      2. Nonrheumatic aortic valve insufficiency  Case Request Cath Lab: Coronary angiography      3. Tobacco abuse               Plan:       This patient presents for consideration of coronary angiography prior to aortic root replacement and ascending aortic replacement with hemiarch.  Surgery will be scheduled pending the results of the patient's cardiac catheterization.  We will plan a cardiac catheterization in the near future for the patient.  I have discussed the procedure, the perceived risks, benefits and alternatives.  He understands these risks and agrees to proceed as planned.  We will then contact CT surgery with the results so that further scheduling of the patient's aortic  surgery can be arranged.    Lyndon Campos  reports that he has been smoking cigarettes. He started smoking about 34 years ago. He has a 34.00 pack-year smoking history. He has never used smokeless tobacco.. I have educated him on the risk of diseases from using tobacco products such as cancer, COPD and heart disease.     I advised him to quit and he is not willing to quit.    I spent 3  minutes counseling the patient.    Follow-up will be pending the results of the patient's upcoming cardiac catheterization

## 2023-02-22 ENCOUNTER — HOSPITAL ENCOUNTER (OUTPATIENT)
Facility: HOSPITAL | Age: 63
Setting detail: HOSPITAL OUTPATIENT SURGERY
Discharge: HOME | End: 2023-02-22
Attending: INTERNAL MEDICINE | Admitting: INTERNAL MEDICINE
Payer: COMMERCIAL

## 2023-02-22 VITALS
WEIGHT: 124.4 LBS | DIASTOLIC BLOOD PRESSURE: 58 MMHG | HEIGHT: 63 IN | SYSTOLIC BLOOD PRESSURE: 113 MMHG | RESPIRATION RATE: 16 BRPM | HEART RATE: 44 BPM | TEMPERATURE: 98.6 F | OXYGEN SATURATION: 100 % | BODY MASS INDEX: 22.04 KG/M2

## 2023-02-22 DIAGNOSIS — I35.1 NONRHEUMATIC AORTIC VALVE INSUFFICIENCY: ICD-10-CM

## 2023-02-22 DIAGNOSIS — I71.21 ANEURYSM OF ASCENDING AORTA WITHOUT RUPTURE: ICD-10-CM

## 2023-02-22 LAB
ANION GAP SERPL CALCULATED.3IONS-SCNC: 7 MMOL/L (ref 5–15)
BUN SERPL-MCNC: 12 MG/DL (ref 8–23)
BUN/CREAT SERPL: 26.1 (ref 7–25)
CALCIUM SPEC-SCNC: 8.2 MG/DL (ref 8.6–10.5)
CHLORIDE SERPL-SCNC: 104 MMOL/L (ref 98–107)
CO2 SERPL-SCNC: 25 MMOL/L (ref 22–29)
CREAT SERPL-MCNC: 0.46 MG/DL (ref 0.76–1.27)
DEPRECATED RDW RBC AUTO: 46.5 FL (ref 37–54)
EGFRCR SERPLBLD CKD-EPI 2021: 117.5 ML/MIN/1.73
ERYTHROCYTE [DISTWIDTH] IN BLOOD BY AUTOMATED COUNT: 13 % (ref 12.3–15.4)
GLUCOSE SERPL-MCNC: 96 MG/DL (ref 65–99)
HCT VFR BLD AUTO: 40 % (ref 37.5–51)
HGB BLD-MCNC: 13 G/DL (ref 13–17.7)
MCH RBC QN AUTO: 31.2 PG (ref 26.6–33)
MCHC RBC AUTO-ENTMCNC: 32.5 G/DL (ref 31.5–35.7)
MCV RBC AUTO: 95.9 FL (ref 79–97)
PLATELET # BLD AUTO: 249 10*3/MM3 (ref 140–450)
PMV BLD AUTO: 10.4 FL (ref 6–12)
POTASSIUM SERPL-SCNC: 4.2 MMOL/L (ref 3.5–5.2)
RBC # BLD AUTO: 4.17 10*6/MM3 (ref 4.14–5.8)
SODIUM SERPL-SCNC: 136 MMOL/L (ref 136–145)
WBC NRBC COR # BLD: 10.75 10*3/MM3 (ref 3.4–10.8)

## 2023-02-22 PROCEDURE — 93454 CORONARY ARTERY ANGIO S&I: CPT | Performed by: INTERNAL MEDICINE

## 2023-02-22 PROCEDURE — C1894 INTRO/SHEATH, NON-LASER: HCPCS | Performed by: INTERNAL MEDICINE

## 2023-02-22 PROCEDURE — 0 IOPAMIDOL PER 1 ML: Performed by: INTERNAL MEDICINE

## 2023-02-22 PROCEDURE — 25010000002 DIPHENHYDRAMINE PER 50 MG: Performed by: INTERNAL MEDICINE

## 2023-02-22 PROCEDURE — 85027 COMPLETE CBC AUTOMATED: CPT | Performed by: INTERNAL MEDICINE

## 2023-02-22 PROCEDURE — 25010000002 FENTANYL CITRATE (PF) 50 MCG/ML SOLUTION: Performed by: INTERNAL MEDICINE

## 2023-02-22 PROCEDURE — 80048 BASIC METABOLIC PNL TOTAL CA: CPT | Performed by: INTERNAL MEDICINE

## 2023-02-22 PROCEDURE — 25010000002 MIDAZOLAM PER 1 MG: Performed by: INTERNAL MEDICINE

## 2023-02-22 PROCEDURE — 99152 MOD SED SAME PHYS/QHP 5/>YRS: CPT | Performed by: INTERNAL MEDICINE

## 2023-02-22 RX ORDER — MIDAZOLAM HYDROCHLORIDE 1 MG/ML
INJECTION INTRAMUSCULAR; INTRAVENOUS
Status: DISCONTINUED | OUTPATIENT
Start: 2023-02-22 | End: 2023-02-22 | Stop reason: HOSPADM

## 2023-02-22 RX ORDER — SODIUM CHLORIDE 9 MG/ML
1-3 INJECTION, SOLUTION INTRAVENOUS CONTINUOUS
Status: DISCONTINUED | OUTPATIENT
Start: 2023-02-22 | End: 2023-02-22 | Stop reason: HOSPADM

## 2023-02-22 RX ORDER — DIPHENHYDRAMINE HYDROCHLORIDE 50 MG/ML
INJECTION INTRAMUSCULAR; INTRAVENOUS
Status: DISCONTINUED | OUTPATIENT
Start: 2023-02-22 | End: 2023-02-22 | Stop reason: HOSPADM

## 2023-02-22 RX ORDER — ASPIRIN 81 MG/1
81 TABLET ORAL DAILY
Status: DISCONTINUED | OUTPATIENT
Start: 2023-02-23 | End: 2023-02-22 | Stop reason: HOSPADM

## 2023-02-22 RX ORDER — SODIUM CHLORIDE 0.9 % (FLUSH) 0.9 %
10 SYRINGE (ML) INJECTION EVERY 12 HOURS SCHEDULED
Status: DISCONTINUED | OUTPATIENT
Start: 2023-02-22 | End: 2023-02-22 | Stop reason: HOSPADM

## 2023-02-22 RX ORDER — ASPIRIN 81 MG/1
324 TABLET, CHEWABLE ORAL ONCE
Status: COMPLETED | OUTPATIENT
Start: 2023-02-22 | End: 2023-02-22

## 2023-02-22 RX ORDER — SODIUM CHLORIDE 0.9 % (FLUSH) 0.9 %
10 SYRINGE (ML) INJECTION AS NEEDED
Status: DISCONTINUED | OUTPATIENT
Start: 2023-02-22 | End: 2023-02-22 | Stop reason: HOSPADM

## 2023-02-22 RX ORDER — FENTANYL CITRATE 50 UG/ML
INJECTION, SOLUTION INTRAMUSCULAR; INTRAVENOUS
Status: DISCONTINUED | OUTPATIENT
Start: 2023-02-22 | End: 2023-02-22 | Stop reason: HOSPADM

## 2023-02-22 RX ORDER — ASPIRIN 81 MG/1
TABLET, CHEWABLE ORAL
Status: DISCONTINUED
Start: 2023-02-22 | End: 2023-02-22 | Stop reason: HOSPADM

## 2023-02-22 RX ORDER — LIDOCAINE HYDROCHLORIDE 20 MG/ML
INJECTION, SOLUTION INFILTRATION; PERINEURAL
Status: DISCONTINUED | OUTPATIENT
Start: 2023-02-22 | End: 2023-02-22 | Stop reason: HOSPADM

## 2023-02-22 RX ADMIN — ASPIRIN 324 MG: 81 TABLET, CHEWABLE ORAL at 07:48

## 2023-02-27 ENCOUNTER — TELEPHONE (OUTPATIENT)
Dept: CARDIAC SURGERY | Facility: CLINIC | Age: 63
End: 2023-02-27
Payer: COMMERCIAL

## 2023-02-27 NOTE — TELEPHONE ENCOUNTER
Please advise patient he will need dental clearance prior to surgery unless he has a full set of dentures.  Surgery date will be arranged pending dental clearance.  He will also need bilateral carotid US and bedside PFT

## 2023-02-28 DIAGNOSIS — I71.20 THORACIC AORTIC ANEURYSM WITHOUT RUPTURE, UNSPECIFIED PART: Primary | ICD-10-CM

## 2023-02-28 NOTE — TELEPHONE ENCOUNTER
Called pt re: this.  Pt states he has a full set of dentures.  Pt would like to see Dr Brasher in the office again before surgery to make sure he understands everything and doesn't have any questions.  Can let me know a tentative surgery date and I will find appt for pt with Dr Brasher./sugey

## 2023-03-01 ENCOUNTER — HOSPITAL ENCOUNTER (OUTPATIENT)
Dept: ULTRASOUND IMAGING | Facility: HOSPITAL | Age: 63
Discharge: HOME | End: 2023-03-01
Payer: COMMERCIAL

## 2023-03-01 ENCOUNTER — HOSPITAL ENCOUNTER (OUTPATIENT)
Dept: PULMONOLOGY | Facility: HOSPITAL | Age: 63
Discharge: HOME | End: 2023-03-01
Payer: COMMERCIAL

## 2023-03-01 DIAGNOSIS — I71.20 THORACIC AORTIC ANEURYSM WITHOUT RUPTURE, UNSPECIFIED PART: ICD-10-CM

## 2023-03-01 LAB
ARTERIAL PATENCY WRIST A: POSITIVE
ATMOSPHERIC PRESS: 744 MMHG
BASE EXCESS BLDA CALC-SCNC: 0 MMOL/L (ref 0–2)
BDY SITE: NORMAL
BODY TEMPERATURE: 37 C
HCO3 BLDA-SCNC: 24.9 MMOL/L (ref 20–26)
Lab: NORMAL
MODALITY: NORMAL
PCO2 BLDA: 40.5 MM HG (ref 35–45)
PCO2 TEMP ADJ BLD: 40.5 MM HG (ref 35–45)
PH BLDA: 7.4 PH UNITS (ref 7.35–7.45)
PH, TEMP CORRECTED: 7.4 PH UNITS (ref 7.35–7.45)
PO2 BLDA: 87.1 MM HG (ref 83–108)
PO2 TEMP ADJ BLD: 87.1 MM HG (ref 83–108)
SAO2 % BLDCOA: 97.7 % (ref 94–99)
VENTILATOR MODE: NORMAL

## 2023-03-01 PROCEDURE — 36600 WITHDRAWAL OF ARTERIAL BLOOD: CPT

## 2023-03-01 PROCEDURE — 94010 BREATHING CAPACITY TEST: CPT

## 2023-03-01 PROCEDURE — 82803 BLOOD GASES ANY COMBINATION: CPT

## 2023-03-01 PROCEDURE — 94010 BREATHING CAPACITY TEST: CPT | Performed by: INTERNAL MEDICINE

## 2023-03-01 PROCEDURE — 93880 EXTRACRANIAL BILAT STUDY: CPT

## 2023-03-01 PROCEDURE — 93880 EXTRACRANIAL BILAT STUDY: CPT | Performed by: SURGERY

## 2023-03-02 ENCOUNTER — OFFICE VISIT (OUTPATIENT)
Dept: CARDIAC SURGERY | Facility: CLINIC | Age: 63
End: 2023-03-02
Payer: COMMERCIAL

## 2023-03-02 VITALS
DIASTOLIC BLOOD PRESSURE: 74 MMHG | WEIGHT: 124 LBS | BODY MASS INDEX: 21.97 KG/M2 | SYSTOLIC BLOOD PRESSURE: 128 MMHG | HEART RATE: 77 BPM | HEIGHT: 63 IN | OXYGEN SATURATION: 98 %

## 2023-03-02 DIAGNOSIS — Q23.1 BICUSPID AORTIC VALVE: Primary | ICD-10-CM

## 2023-03-02 DIAGNOSIS — I71.21 ANEURYSM OF ASCENDING AORTA WITHOUT RUPTURE: ICD-10-CM

## 2023-03-02 PROCEDURE — 99214 OFFICE O/P EST MOD 30 MIN: CPT | Performed by: SURGERY

## 2023-03-02 NOTE — PROGRESS NOTES
"    [facility] Cardiothoracic Surgery  PROGRESS NOTE   CC: Ascending aortic aneurysm    Subjective:     Mr. Campos is a 62-year-old man who I followed for a ascending aortic aneurysm, bicuspid aortic valve and moderate aortic insufficiency. At last visit, him and I discussed that he meets indication for aneurysm repair based on new AHA ACC guidelines that were published in December 2022. He has undergone heart cath since then that shows normal coronaries. He is asymptomatic from his aneurysmal disease. He returns today to further discuss surgical intervention on his aneurysm. The patient is accompanied by his sister.    In summary The patient followed up today to discuss surgical treatment for his ascending aortic aneurysm and has elected to have surgery. He has expressed his approval of his current care and maintains he would like to do the best thing he can for his health. The patient has confirmed he has dentures. The patient and his sister were advised on the length of time needed to complete the procedure after her inquiry. The patient inquired on what medication to take prior and was advised to continue taking baby aspirin. The patient notes he was supposed to move residences and maintains he is going to wait until his recovery is complete. The patient states he continues to smoke and maintains it is hard for him to quit.    ROS:   No fevers, chills or recent illness.    Objective:      /74   Pulse 77   Ht 160 cm (62.99\")   Wt 56.2 kg (124 lb)   SpO2 98%   BMI 21.97 kg/m²       PE:  Vitals:    03/02/23 0808   BP: 128/74   Pulse: 77   SpO2: 98%     GENERAL: NAD, resting comfortably, normal color  CARDIOVASCULAR: regular, regular rate, sinus  PULMONARY: Normal bilateral breath sounds, no labored breathing  ABDOMEN: soft, nontender/nondistended  EXTREMITIES: mild peripheral edema, normal pulses, normal ROM        Lab Results (last 72 hours)     ** No results found for the last 72 " hours. **            Assessment & Plan     Mr. Campos is a 62-year-old male who presented with an ascending aortic aneurysm and moderate aortic insufficiency of a bicuspid aortic valve. He is overall asymptomatic. His aortic area to height ratio is greater than 10 cm/m². Given this, he meets a class II a recommendation for aneurysmal disease and we'll treat his aortic valve at the time of surgery. He returns today to further discuss the surgical plan with his sister.     We discussed at length preoperative, operative and postoperative expectations of aortic root and hemiarch replacement with biologic valve conduit. We discussed the risk and surgery at length, including but not limited to bleeding, infection, injury to major organs or vessels, chronic heart failure, arrhythmias, need for pacemaker, prolonged ventilation, renal failure, stroke, risk of anesthesia, and risk of sternal wound or bone healing problems and or death. He understands the risks and agrees to proceed. We will plan on his surgery sometime in mid March. He has dentures and will not need dental clearance. I have reviewed his other preoperative testing. He is a good candidate for surgery.    Thank you for trust me his care of Mr. Campos. Please do not hesitate to call with questions or concerns.        Freddy Brasher MD   Cardiothoracic Surgeon    Transcribed from ambient dictation for Freddy Brasher MD by Maurilio Mathew.  03/02/23   10:00 CST    Patient or patient representative verbalized consent to the visit recording.  I have personally performed the services described in this document as transcribed by the above individual, and it is both accurate and complete.

## 2023-03-06 ENCOUNTER — PREP FOR SURGERY (OUTPATIENT)
Dept: OTHER | Facility: HOSPITAL | Age: 63
End: 2023-03-06
Payer: COMMERCIAL

## 2023-03-06 ENCOUNTER — TELEPHONE (OUTPATIENT)
Dept: CARDIAC SURGERY | Facility: CLINIC | Age: 63
End: 2023-03-06
Payer: COMMERCIAL

## 2023-03-06 DIAGNOSIS — I71.20 THORACIC AORTIC ANEURYSM WITHOUT RUPTURE, UNSPECIFIED PART: Primary | ICD-10-CM

## 2023-03-06 RX ORDER — DEXTROSE MONOHYDRATE 25 G/50ML
10-50 INJECTION, SOLUTION INTRAVENOUS
Status: CANCELLED | OUTPATIENT
Start: 2023-03-06

## 2023-03-06 RX ORDER — SODIUM CHLORIDE 9 MG/ML
40 INJECTION, SOLUTION INTRAVENOUS AS NEEDED
Status: CANCELLED | OUTPATIENT
Start: 2023-03-06

## 2023-03-06 RX ORDER — BUPIVACAINE HCL/0.9 % NACL/PF 0.1 %
2 PLASTIC BAG, INJECTION (ML) EPIDURAL ONCE
Status: CANCELLED | OUTPATIENT
Start: 2023-03-22

## 2023-03-06 RX ORDER — SODIUM CHLORIDE 0.9 % (FLUSH) 0.9 %
10 SYRINGE (ML) INJECTION EVERY 12 HOURS SCHEDULED
Status: CANCELLED | OUTPATIENT
Start: 2023-03-06

## 2023-03-06 RX ORDER — ACETAMINOPHEN 500 MG
1000 TABLET ORAL ONCE
Status: CANCELLED | OUTPATIENT
Start: 2023-03-22

## 2023-03-06 RX ORDER — SODIUM CHLORIDE 9 MG/ML
30 INJECTION, SOLUTION INTRAVENOUS CONTINUOUS PRN
Status: CANCELLED | OUTPATIENT
Start: 2023-03-06

## 2023-03-06 RX ORDER — SODIUM CHLORIDE 0.9 % (FLUSH) 0.9 %
10 SYRINGE (ML) INJECTION AS NEEDED
Status: CANCELLED | OUTPATIENT
Start: 2023-03-06

## 2023-03-06 RX ORDER — NICOTINE POLACRILEX 4 MG
15 LOZENGE BUCCAL
Status: CANCELLED | OUTPATIENT
Start: 2023-03-06

## 2023-03-06 RX ORDER — SODIUM CHLORIDE 0.9 % (FLUSH) 0.9 %
30 SYRINGE (ML) INJECTION ONCE AS NEEDED
Status: CANCELLED | OUTPATIENT
Start: 2023-03-06

## 2023-03-06 NOTE — TELEPHONE ENCOUNTER
Surgery orders are in for 3/22/2023.  Please call patient with prework information and confirm that he does not take Plavix or anticoagulation.  He has reported prior that he has dentures.

## 2023-03-08 NOTE — TELEPHONE ENCOUNTER
03/07/2023    Pt aware of prework date/time and surgery date 03/22 arrival at 0500. NPO and no meds after midnight. Pt aware of Bactroban prescription and instructions for use. Pt confirms he is not taking anticoagulation medication or Plavix. Pt voiced understanding to all.

## 2023-03-20 ENCOUNTER — ANESTHESIA EVENT (OUTPATIENT)
Dept: PERIOP | Facility: HOSPITAL | Age: 63
DRG: 220 | End: 2023-03-20
Payer: COMMERCIAL

## 2023-03-20 ENCOUNTER — HOSPITAL ENCOUNTER (OUTPATIENT)
Dept: GENERAL RADIOLOGY | Facility: HOSPITAL | Age: 63
Discharge: HOME | DRG: 220 | End: 2023-03-20
Payer: COMMERCIAL

## 2023-03-20 ENCOUNTER — PRE-ADMISSION TESTING (OUTPATIENT)
Dept: PREADMISSION TESTING | Facility: HOSPITAL | Age: 63
DRG: 220 | End: 2023-03-20
Payer: COMMERCIAL

## 2023-03-20 VITALS
HEIGHT: 64 IN | OXYGEN SATURATION: 96 % | RESPIRATION RATE: 18 BRPM | HEART RATE: 73 BPM | WEIGHT: 133.38 LBS | BODY MASS INDEX: 22.77 KG/M2 | SYSTOLIC BLOOD PRESSURE: 127 MMHG | DIASTOLIC BLOOD PRESSURE: 56 MMHG

## 2023-03-20 DIAGNOSIS — I71.20 THORACIC AORTIC ANEURYSM WITHOUT RUPTURE, UNSPECIFIED PART: ICD-10-CM

## 2023-03-20 LAB
ABO GROUP BLD: NORMAL
ALBUMIN SERPL-MCNC: 4.3 G/DL (ref 3.5–5.2)
ALBUMIN/GLOB SERPL: 1.7 G/DL
ALP SERPL-CCNC: 145 U/L (ref 39–117)
ALT SERPL W P-5'-P-CCNC: 15 U/L (ref 1–41)
ANION GAP SERPL CALCULATED.3IONS-SCNC: 7 MMOL/L (ref 5–15)
APTT PPP: 30.3 SECONDS (ref 24.1–35)
AST SERPL-CCNC: 19 U/L (ref 1–40)
BASOPHILS # BLD AUTO: 0.05 10*3/MM3 (ref 0–0.2)
BASOPHILS NFR BLD AUTO: 0.6 % (ref 0–1.5)
BILIRUB SERPL-MCNC: 0.2 MG/DL (ref 0–1.2)
BILIRUB UR QL STRIP: NEGATIVE
BLD GP AB SCN SERPL QL: NEGATIVE
BUN SERPL-MCNC: 12 MG/DL (ref 8–23)
BUN/CREAT SERPL: 18.5 (ref 7–25)
CALCIUM SPEC-SCNC: 8.8 MG/DL (ref 8.6–10.5)
CHLORIDE SERPL-SCNC: 106 MMOL/L (ref 98–107)
CLARITY UR: CLEAR
CO2 SERPL-SCNC: 27 MMOL/L (ref 22–29)
COLOR UR: YELLOW
CREAT SERPL-MCNC: 0.65 MG/DL (ref 0.76–1.27)
DEPRECATED RDW RBC AUTO: 46.1 FL (ref 37–54)
EGFRCR SERPLBLD CKD-EPI 2021: 105.9 ML/MIN/1.73
EOSINOPHIL # BLD AUTO: 0.5 10*3/MM3 (ref 0–0.4)
EOSINOPHIL NFR BLD AUTO: 5.9 % (ref 0.3–6.2)
ERYTHROCYTE [DISTWIDTH] IN BLOOD BY AUTOMATED COUNT: 12.9 % (ref 12.3–15.4)
GLOBULIN UR ELPH-MCNC: 2.6 GM/DL
GLUCOSE SERPL-MCNC: 101 MG/DL (ref 65–99)
GLUCOSE UR STRIP-MCNC: NEGATIVE MG/DL
HBA1C MFR BLD: 5.3 % (ref 4.8–5.6)
HCT VFR BLD AUTO: 39.7 % (ref 37.5–51)
HGB BLD-MCNC: 12.8 G/DL (ref 13–17.7)
HGB UR QL STRIP.AUTO: NEGATIVE
IMM GRANULOCYTES # BLD AUTO: 0.03 10*3/MM3 (ref 0–0.05)
IMM GRANULOCYTES NFR BLD AUTO: 0.4 % (ref 0–0.5)
INR PPP: 0.97 (ref 0.91–1.09)
KETONES UR QL STRIP: ABNORMAL
LEUKOCYTE ESTERASE UR QL STRIP.AUTO: NEGATIVE
LYMPHOCYTES # BLD AUTO: 2.16 10*3/MM3 (ref 0.7–3.1)
LYMPHOCYTES NFR BLD AUTO: 25.3 % (ref 19.6–45.3)
MCH RBC QN AUTO: 31.1 PG (ref 26.6–33)
MCHC RBC AUTO-ENTMCNC: 32.2 G/DL (ref 31.5–35.7)
MCV RBC AUTO: 96.6 FL (ref 79–97)
MONOCYTES # BLD AUTO: 0.71 10*3/MM3 (ref 0.1–0.9)
MONOCYTES NFR BLD AUTO: 8.3 % (ref 5–12)
NEUTROPHILS NFR BLD AUTO: 5.08 10*3/MM3 (ref 1.7–7)
NEUTROPHILS NFR BLD AUTO: 59.5 % (ref 42.7–76)
NITRITE UR QL STRIP: NEGATIVE
NRBC BLD AUTO-RTO: 0 /100 WBC (ref 0–0.2)
PH UR STRIP.AUTO: 6 [PH] (ref 5–8)
PLATELET # BLD AUTO: 213 10*3/MM3 (ref 140–450)
PMV BLD AUTO: 10 FL (ref 6–12)
POTASSIUM SERPL-SCNC: 4.5 MMOL/L (ref 3.5–5.2)
PROT SERPL-MCNC: 6.9 G/DL (ref 6–8.5)
PROT UR QL STRIP: NEGATIVE
PROTHROMBIN TIME: 13 SECONDS (ref 11.8–14.8)
RBC # BLD AUTO: 4.11 10*6/MM3 (ref 4.14–5.8)
RH BLD: NEGATIVE
SARS-COV-2 RNA RESP QL NAA+PROBE: NOT DETECTED
SODIUM SERPL-SCNC: 140 MMOL/L (ref 136–145)
SP GR UR STRIP: 1.02 (ref 1–1.03)
T&S EXPIRATION DATE: NORMAL
UROBILINOGEN UR QL STRIP: ABNORMAL
WBC NRBC COR # BLD: 8.53 10*3/MM3 (ref 3.4–10.8)

## 2023-03-20 PROCEDURE — 83036 HEMOGLOBIN GLYCOSYLATED A1C: CPT

## 2023-03-20 PROCEDURE — 93005 ELECTROCARDIOGRAM TRACING: CPT

## 2023-03-20 PROCEDURE — 93010 ELECTROCARDIOGRAM REPORT: CPT | Performed by: HOSPITALIST

## 2023-03-20 PROCEDURE — 85610 PROTHROMBIN TIME: CPT

## 2023-03-20 PROCEDURE — 85025 COMPLETE CBC W/AUTO DIFF WBC: CPT

## 2023-03-20 PROCEDURE — 85730 THROMBOPLASTIN TIME PARTIAL: CPT

## 2023-03-20 PROCEDURE — 87635 SARS-COV-2 COVID-19 AMP PRB: CPT

## 2023-03-20 PROCEDURE — 86850 RBC ANTIBODY SCREEN: CPT

## 2023-03-20 PROCEDURE — 86901 BLOOD TYPING SEROLOGIC RH(D): CPT

## 2023-03-20 PROCEDURE — 86900 BLOOD TYPING SEROLOGIC ABO: CPT

## 2023-03-20 PROCEDURE — 71046 X-RAY EXAM CHEST 2 VIEWS: CPT

## 2023-03-20 PROCEDURE — 36415 COLL VENOUS BLD VENIPUNCTURE: CPT

## 2023-03-20 PROCEDURE — 81003 URINALYSIS AUTO W/O SCOPE: CPT

## 2023-03-20 PROCEDURE — C9803 HOPD COVID-19 SPEC COLLECT: HCPCS

## 2023-03-20 PROCEDURE — 80053 COMPREHEN METABOLIC PANEL: CPT

## 2023-03-20 NOTE — ANESTHESIA PREPROCEDURE EVALUATION
Anesthesia Evaluation     Patient summary reviewed   no history of anesthetic complications:               Airway   Mallampati: II  Dental    (+) edentulous    Pulmonary    (+) a smoker Current,   (-) asthma, sleep apnea  Cardiovascular     (+) valvular problems/murmurs AI, hyperlipidemia,   (-) hypertension, past MI, CAD, dysrhythmias, cardiac stents    ROS comment: Echo:    Left ventricular systolic function is normal. Left ventricular ejection fraction appears to be 61 - 65%.  •  Left ventricular diastolic function was normal.  •  Normal right ventricular cavity size and systolic function noted.  •  The aortic valve appears to be bicuspid.  •  Moderate aortic valve regurgitation is present.  •  Mild dilation of the ascending aorta is present, measuring 4 cm on the visualized portion of the ascending aorta.         Neuro/Psych  (-) seizures, TIA, CVA  GI/Hepatic/Renal/Endo    (-) liver disease, no renal disease, diabetes    Musculoskeletal     Abdominal    Substance History      OB/GYN          Other                        Anesthesia Plan    ASA 4     general, Merle, CVL and PAC     (No C/I to RINA)  intravenous induction     Anesthetic plan, risks, benefits, and alternatives have been provided, discussed and informed consent has been obtained with: patient.    Use of blood products discussed with patient  Consented to blood products.       CODE STATUS:

## 2023-03-21 LAB
QT INTERVAL: 392 MS
QTC INTERVAL: 388 MS

## 2023-03-22 ENCOUNTER — APPOINTMENT (OUTPATIENT)
Dept: GENERAL RADIOLOGY | Facility: HOSPITAL | Age: 63
DRG: 220 | End: 2023-03-22
Payer: COMMERCIAL

## 2023-03-22 ENCOUNTER — ANESTHESIA (OUTPATIENT)
Dept: PERIOP | Facility: HOSPITAL | Age: 63
DRG: 220 | End: 2023-03-22
Payer: COMMERCIAL

## 2023-03-22 ENCOUNTER — HOSPITAL ENCOUNTER (INPATIENT)
Facility: HOSPITAL | Age: 63
LOS: 3 days | Discharge: HOME | DRG: 220 | End: 2023-03-25
Attending: SURGERY | Admitting: SURGERY
Payer: COMMERCIAL

## 2023-03-22 ENCOUNTER — APPOINTMENT (OUTPATIENT)
Dept: CARDIOLOGY | Facility: HOSPITAL | Age: 63
DRG: 220 | End: 2023-03-22
Payer: COMMERCIAL

## 2023-03-22 DIAGNOSIS — I71.20 THORACIC AORTIC ANEURYSM WITHOUT RUPTURE, UNSPECIFIED PART: ICD-10-CM

## 2023-03-22 DIAGNOSIS — Z74.09 IMPAIRED MOBILITY: ICD-10-CM

## 2023-03-22 PROBLEM — E78.5 HYPERLIPIDEMIA: Status: ACTIVE | Noted: 2023-03-22

## 2023-03-22 PROBLEM — Z72.0 TOBACCO USE: Status: ACTIVE | Noted: 2023-03-22

## 2023-03-22 LAB
A-A DO2: 210.2 MMHG
ALBUMIN SERPL-MCNC: 3.6 G/DL (ref 3.5–5.2)
ALBUMIN SERPL-MCNC: 4.1 G/DL (ref 3.5–5.2)
ANION GAP SERPL CALCULATED.3IONS-SCNC: 9 MMOL/L (ref 5–15)
ANION GAP SERPL CALCULATED.3IONS-SCNC: 9 MMOL/L (ref 5–15)
APTT PPP: 41.9 SECONDS (ref 24.1–35)
ARTERIAL PATENCY WRIST A: ABNORMAL
ATMOSPHERIC PRESS: 750 MMHG
ATMOSPHERIC PRESS: 751 MMHG
ATMOSPHERIC PRESS: 752 MMHG
ATMOSPHERIC PRESS: 752 MMHG
ATMOSPHERIC PRESS: 753 MMHG
BASE EXCESS BLDA CALC-SCNC: -1 MMOL/L (ref 0–2)
BASE EXCESS BLDA CALC-SCNC: 0.1 MMOL/L (ref 0–2)
BASE EXCESS BLDA CALC-SCNC: 0.8 MMOL/L (ref 0–2)
BASE EXCESS BLDA CALC-SCNC: 0.9 MMOL/L (ref 0–2)
BASE EXCESS BLDA CALC-SCNC: 2.2 MMOL/L (ref 0–2)
BASE EXCESS BLDA CALC-SCNC: 2.9 MMOL/L (ref 0–2)
BASE EXCESS BLDV CALC-SCNC: 1.6 MMOL/L (ref 0–2)
BASOPHILS # BLD AUTO: 0.01 10*3/MM3 (ref 0–0.2)
BASOPHILS NFR BLD AUTO: 0.1 % (ref 0–1.5)
BDY SITE: ABNORMAL
BODY TEMPERATURE: 37 C
BUN SERPL-MCNC: 11 MG/DL (ref 8–23)
BUN SERPL-MCNC: 11 MG/DL (ref 8–23)
BUN/CREAT SERPL: 16.2 (ref 7–25)
BUN/CREAT SERPL: 19 (ref 7–25)
CA-I BLD-MCNC: 4.34 MG/DL (ref 4.6–5.4)
CA-I BLD-MCNC: 4.41 MG/DL (ref 4.6–5.4)
CA-I BLD-MCNC: 4.86 MG/DL (ref 4.6–5.4)
CA-I BLD-MCNC: 5 MG/DL (ref 4.6–5.4)
CA-I BLD-MCNC: 5.2 MG/DL (ref 4.6–5.4)
CALCIUM SPEC-SCNC: 8.7 MG/DL (ref 8.6–10.5)
CALCIUM SPEC-SCNC: 8.8 MG/DL (ref 8.6–10.5)
CHLORIDE SERPL-SCNC: 108 MMOL/L (ref 98–107)
CHLORIDE SERPL-SCNC: 110 MMOL/L (ref 98–107)
CO2 SERPL-SCNC: 24 MMOL/L (ref 22–29)
CO2 SERPL-SCNC: 25 MMOL/L (ref 22–29)
COHGB MFR BLD: 1.6 % (ref 0–5)
COHGB MFR BLD: 1.9 % (ref 0–5)
COHGB MFR BLD: 2 % (ref 0–5)
COHGB MFR BLD: 2.1 % (ref 0–5)
COHGB MFR BLD: 2.4 % (ref 0–5)
COHGB MFR BLD: 2.4 % (ref 0–5)
CREAT SERPL-MCNC: 0.58 MG/DL (ref 0.76–1.27)
CREAT SERPL-MCNC: 0.68 MG/DL (ref 0.76–1.27)
D-LACTATE SERPL-SCNC: 1.7 MMOL/L (ref 0.5–2)
D-LACTATE SERPL-SCNC: 2.9 MMOL/L (ref 0.5–2)
DEPRECATED RDW RBC AUTO: 46.1 FL (ref 37–54)
DEPRECATED RDW RBC AUTO: 46.2 FL (ref 37–54)
EGFRCR SERPLBLD CKD-EPI 2021: 104.4 ML/MIN/1.73
EGFRCR SERPLBLD CKD-EPI 2021: 109.6 ML/MIN/1.73
EOSINOPHIL # BLD AUTO: 0.17 10*3/MM3 (ref 0–0.4)
EOSINOPHIL NFR BLD AUTO: 1.3 % (ref 0.3–6.2)
ERYTHROCYTE [DISTWIDTH] IN BLOOD BY AUTOMATED COUNT: 13 % (ref 12.3–15.4)
ERYTHROCYTE [DISTWIDTH] IN BLOOD BY AUTOMATED COUNT: 13.2 % (ref 12.3–15.4)
FIBRINOGEN PPP-MCNC: 314 MG/DL (ref 240–460)
GAS FLOW AIRWAY: 1.5 LPM
GAS FLOW AIRWAY: 2 LPM
GAS FLOW AIRWAY: 2 LPM
GLUCOSE BLDC GLUCOMTR-MCNC: 103 MG/DL (ref 70–130)
GLUCOSE BLDC GLUCOMTR-MCNC: 125 MG/DL (ref 70–130)
GLUCOSE BLDC GLUCOMTR-MCNC: 139 MG/DL (ref 70–130)
GLUCOSE BLDC GLUCOMTR-MCNC: 148 MG/DL (ref 70–130)
GLUCOSE BLDC GLUCOMTR-MCNC: 151 MG/DL (ref 70–130)
GLUCOSE BLDC GLUCOMTR-MCNC: 162 MG/DL (ref 70–130)
GLUCOSE BLDC GLUCOMTR-MCNC: 169 MG/DL (ref 70–130)
GLUCOSE BLDC GLUCOMTR-MCNC: 170 MG/DL (ref 70–130)
GLUCOSE BLDC GLUCOMTR-MCNC: 176 MG/DL (ref 70–130)
GLUCOSE BLDC GLUCOMTR-MCNC: 186 MG/DL (ref 70–130)
GLUCOSE SERPL-MCNC: 140 MG/DL (ref 65–99)
GLUCOSE SERPL-MCNC: 154 MG/DL (ref 65–99)
HCO3 BLDA-SCNC: 24.7 MMOL/L (ref 20–26)
HCO3 BLDA-SCNC: 25.3 MMOL/L (ref 20–26)
HCO3 BLDA-SCNC: 25.3 MMOL/L (ref 20–26)
HCO3 BLDA-SCNC: 26.3 MMOL/L (ref 20–26)
HCO3 BLDA-SCNC: 26.6 MMOL/L (ref 20–26)
HCO3 BLDA-SCNC: 26.9 MMOL/L (ref 20–26)
HCO3 BLDV-SCNC: 27 MMOL/L (ref 22–28)
HCT VFR BLD AUTO: 30.8 % (ref 37.5–51)
HCT VFR BLD AUTO: 33.7 % (ref 37.5–51)
HCT VFR BLD CALC: 26.3 % (ref 38–51)
HCT VFR BLD CALC: 28.7 % (ref 38–51)
HCT VFR BLD CALC: 29.6 % (ref 38–51)
HCT VFR BLD CALC: 33 % (ref 38–51)
HCT VFR BLD CALC: 37.3 % (ref 38–51)
HGB BLD-MCNC: 10.2 G/DL (ref 13–17.7)
HGB BLD-MCNC: 10.8 G/DL (ref 13–17.7)
HGB BLDA-MCNC: 10.8 G/DL (ref 14–18)
HGB BLDA-MCNC: 12.2 G/DL (ref 14–18)
HGB BLDA-MCNC: 8.6 G/DL (ref 14–18)
HGB BLDA-MCNC: 9.4 G/DL (ref 14–18)
HGB BLDA-MCNC: 9.4 G/DL (ref 14–18)
HGB BLDA-MCNC: 9.6 G/DL (ref 14–18)
IMM GRANULOCYTES # BLD AUTO: 0.11 10*3/MM3 (ref 0–0.05)
IMM GRANULOCYTES NFR BLD AUTO: 0.8 % (ref 0–0.5)
INHALED O2 CONCENTRATION: 100 %
INHALED O2 CONCENTRATION: 100 %
INHALED O2 CONCENTRATION: 30 %
INHALED O2 CONCENTRATION: 60 %
INHALED O2 CONCENTRATION: 60 %
INHALED O2 CONCENTRATION: 70 %
INHALED O2 CONCENTRATION: 90 %
INR PPP: 1.31 (ref 0.91–1.09)
LYMPHOCYTES # BLD AUTO: 1.17 10*3/MM3 (ref 0.7–3.1)
LYMPHOCYTES NFR BLD AUTO: 8.9 % (ref 19.6–45.3)
Lab: ABNORMAL
Lab: NORMAL
MCH RBC QN AUTO: 30.7 PG (ref 26.6–33)
MCH RBC QN AUTO: 31.8 PG (ref 26.6–33)
MCHC RBC AUTO-ENTMCNC: 32 G/DL (ref 31.5–35.7)
MCHC RBC AUTO-ENTMCNC: 33.1 G/DL (ref 31.5–35.7)
MCV RBC AUTO: 95.7 FL (ref 79–97)
MCV RBC AUTO: 96 FL (ref 79–97)
METHGB BLD QL: 0.6 % (ref 0–3)
METHGB BLD QL: 0.7 % (ref 0–3)
METHGB BLD QL: 0.7 % (ref 0–3)
METHGB BLD QL: 0.8 % (ref 0–3)
METHGB BLD QL: 0.9 % (ref 0–3)
METHGB BLD QL: 1.1 % (ref 0–3)
MODALITY: ABNORMAL
MONOCYTES # BLD AUTO: 0.6 10*3/MM3 (ref 0.1–0.9)
MONOCYTES NFR BLD AUTO: 4.5 % (ref 5–12)
NEUTROPHILS NFR BLD AUTO: 11.15 10*3/MM3 (ref 1.7–7)
NEUTROPHILS NFR BLD AUTO: 84.4 % (ref 42.7–76)
NOTE: ABNORMAL
NOTIFIED BY: ABNORMAL
NOTIFIED WHO: ABNORMAL
NRBC BLD AUTO-RTO: 0 /100 WBC (ref 0–0.2)
OXYHGB MFR BLDV: 88.7 % (ref 60–85)
OXYHGB MFR BLDV: 97 % (ref 94–99)
OXYHGB MFR BLDV: 97.4 % (ref 94–99)
OXYHGB MFR BLDV: 97.4 % (ref 94–99)
OXYHGB MFR BLDV: 97.7 % (ref 94–99)
OXYHGB MFR BLDV: 97.8 % (ref 94–99)
PCO2 BLDA: 32.6 MM HG (ref 35–45)
PCO2 BLDA: 36.6 MM HG (ref 35–45)
PCO2 BLDA: 39.2 MM HG (ref 35–45)
PCO2 BLDA: 44.4 MM HG (ref 35–45)
PCO2 BLDA: 48 MM HG (ref 35–45)
PCO2 BLDA: 48.2 MM HG (ref 35–45)
PCO2 BLDV: 45.5 MM HG (ref 41–51)
PCO2 TEMP ADJ BLD: 32.6 MM HG (ref 35–45)
PCO2 TEMP ADJ BLD: 36.6 MM HG (ref 35–45)
PCO2 TEMP ADJ BLD: 39.2 MM HG (ref 35–45)
PCO2 TEMP ADJ BLD: 44.4 MM HG (ref 35–45)
PCO2 TEMP ADJ BLD: 48 MM HG (ref 35–45)
PCO2 TEMP ADJ BLD: 48.2 MM HG (ref 35–45)
PEEP RESPIRATORY: 10 CM[H2O]
PEEP RESPIRATORY: 10 CM[H2O]
PH BLDA: 7.33 PH UNITS (ref 7.35–7.45)
PH BLDA: 7.35 PH UNITS (ref 7.35–7.45)
PH BLDA: 7.38 PH UNITS (ref 7.35–7.45)
PH BLDA: 7.41 PH UNITS (ref 7.35–7.45)
PH BLDA: 7.47 PH UNITS (ref 7.35–7.45)
PH BLDA: 7.5 PH UNITS (ref 7.35–7.45)
PH BLDV: 7.38 PH UNITS (ref 7.32–7.42)
PH, TEMP CORRECTED: 7.33 PH UNITS (ref 7.35–7.45)
PH, TEMP CORRECTED: 7.35 PH UNITS (ref 7.35–7.45)
PH, TEMP CORRECTED: 7.38 PH UNITS (ref 7.35–7.45)
PH, TEMP CORRECTED: 7.41 PH UNITS (ref 7.35–7.45)
PH, TEMP CORRECTED: 7.47 PH UNITS (ref 7.35–7.45)
PH, TEMP CORRECTED: 7.5 PH UNITS (ref 7.35–7.45)
PHOSPHATE SERPL-MCNC: 2.1 MG/DL (ref 2.5–4.5)
PHOSPHATE SERPL-MCNC: 2.3 MG/DL (ref 2.5–4.5)
PLATELET # BLD AUTO: 127 10*3/MM3 (ref 140–450)
PLATELET # BLD AUTO: 132 10*3/MM3 (ref 140–450)
PMV BLD AUTO: 10 FL (ref 6–12)
PMV BLD AUTO: 9.7 FL (ref 6–12)
PO2 BLDA: 161 MM HG (ref 83–108)
PO2 BLDA: 482 MM HG (ref 83–108)
PO2 BLDA: 511 MM HG (ref 83–108)
PO2 BLDA: 529 MM HG (ref 83–108)
PO2 BLDA: 537 MM HG (ref 83–108)
PO2 BLDA: 78 MM HG (ref 83–108)
PO2 BLDV: 59.9 MM HG (ref 27–53)
PO2 TEMP ADJ BLD: 161 MM HG (ref 83–108)
PO2 TEMP ADJ BLD: 482 MM HG (ref 83–108)
PO2 TEMP ADJ BLD: 511 MM HG (ref 83–108)
PO2 TEMP ADJ BLD: 529 MM HG (ref 83–108)
PO2 TEMP ADJ BLD: 537 MM HG (ref 83–108)
PO2 TEMP ADJ BLD: 78 MM HG (ref 83–108)
POTASSIUM BLDA-SCNC: 3.6 MMOL/L (ref 3.5–5.2)
POTASSIUM BLDA-SCNC: 4 MMOL/L (ref 3.5–5.2)
POTASSIUM BLDA-SCNC: 4.3 MMOL/L (ref 3.5–5.2)
POTASSIUM BLDA-SCNC: 4.9 MMOL/L (ref 3.5–5.2)
POTASSIUM BLDA-SCNC: 5.7 MMOL/L (ref 3.5–5.2)
POTASSIUM BLDV-SCNC: 5.5 MMOL/L (ref 3.5–5.2)
POTASSIUM SERPL-SCNC: 3.8 MMOL/L (ref 3.5–5.2)
POTASSIUM SERPL-SCNC: 4 MMOL/L (ref 3.5–5.2)
PROTHROMBIN TIME: 16.5 SECONDS (ref 11.8–14.8)
PSV: 10 CMH2O
PSV: 10 CMH2O
RBC # BLD AUTO: 3.21 10*6/MM3 (ref 4.14–5.8)
RBC # BLD AUTO: 3.52 10*6/MM3 (ref 4.14–5.8)
SAO2 % BLDCOA: 95.9 % (ref 94–99)
SAO2 % BLDCOA: 99.5 % (ref 94–99)
SAO2 % BLDCOA: >100.1 % (ref 94–99)
SAO2 % BLDCOV: 91.7 % (ref 45–75)
SET MECH RESP RATE: 20
SET MECH RESP RATE: 22
SODIUM BLDA-SCNC: 139 MMOL/L (ref 136–145)
SODIUM BLDA-SCNC: 140 MMOL/L (ref 136–145)
SODIUM BLDA-SCNC: 141 MMOL/L (ref 136–145)
SODIUM BLDA-SCNC: 142 MMOL/L (ref 136–145)
SODIUM BLDA-SCNC: 142 MMOL/L (ref 136–145)
SODIUM BLDV-SCNC: 140 MMOL/L (ref 136–145)
SODIUM SERPL-SCNC: 142 MMOL/L (ref 136–145)
SODIUM SERPL-SCNC: 143 MMOL/L (ref 136–145)
VENTILATOR MODE: ABNORMAL
VT ON VENT VENT: 500 ML
VT ON VENT VENT: 500 ML
WBC NRBC COR # BLD: 13.21 10*3/MM3 (ref 3.4–10.8)
WBC NRBC COR # BLD: 8.66 10*3/MM3 (ref 3.4–10.8)

## 2023-03-22 PROCEDURE — 25010000002 MIDAZOLAM HCL (PF) 5 MG/5ML SOLUTION: Performed by: NURSE ANESTHETIST, CERTIFIED REGISTERED

## 2023-03-22 PROCEDURE — 82805 BLOOD GASES W/O2 SATURATION: CPT

## 2023-03-22 PROCEDURE — 86901 BLOOD TYPING SEROLOGIC RH(D): CPT

## 2023-03-22 PROCEDURE — 93010 ELECTROCARDIOGRAM REPORT: CPT | Performed by: INTERNAL MEDICINE

## 2023-03-22 PROCEDURE — 94799 UNLISTED PULMONARY SVC/PX: CPT

## 2023-03-22 PROCEDURE — 25010000002 VANCOMYCIN PER 500 MG: Performed by: SURGERY

## 2023-03-22 PROCEDURE — 83605 ASSAY OF LACTIC ACID: CPT | Performed by: SURGERY

## 2023-03-22 PROCEDURE — 88304 TISSUE EXAM BY PATHOLOGIST: CPT | Performed by: SURGERY

## 2023-03-22 PROCEDURE — 85730 THROMBOPLASTIN TIME PARTIAL: CPT | Performed by: SURGERY

## 2023-03-22 PROCEDURE — 85027 COMPLETE CBC AUTOMATED: CPT | Performed by: SURGERY

## 2023-03-22 PROCEDURE — C1889 IMPLANT/INSERT DEVICE, NOC: HCPCS | Performed by: SURGERY

## 2023-03-22 PROCEDURE — 93325 DOPPLER ECHO COLOR FLOW MAPG: CPT | Performed by: INTERNAL MEDICINE

## 2023-03-22 PROCEDURE — 94640 AIRWAY INHALATION TREATMENT: CPT

## 2023-03-22 PROCEDURE — 0 BUPIVACAINE LIPOSOME 1.3 % SUSPENSION: Performed by: SURGERY

## 2023-03-22 PROCEDURE — 83050 HGB METHEMOGLOBIN QUAN: CPT

## 2023-03-22 PROCEDURE — C9290 INJ, BUPIVACAINE LIPOSOME: HCPCS | Performed by: SURGERY

## 2023-03-22 PROCEDURE — 25010000002 FENTANYL CITRATE (PF) 250 MCG/5ML SOLUTION: Performed by: NURSE ANESTHETIST, CERTIFIED REGISTERED

## 2023-03-22 PROCEDURE — 93005 ELECTROCARDIOGRAM TRACING: CPT | Performed by: SURGERY

## 2023-03-22 PROCEDURE — 33866 AORTIC HEMIARCH GRAFT: CPT | Performed by: SURGERY

## 2023-03-22 PROCEDURE — 71045 X-RAY EXAM CHEST 1 VIEW: CPT

## 2023-03-22 PROCEDURE — 86920 COMPATIBILITY TEST SPIN: CPT

## 2023-03-22 PROCEDURE — 25810000003 DEXTROSE 5 % WITH KCL 20 MEQ 20 MEQ/L SOLUTION: Performed by: SURGERY

## 2023-03-22 PROCEDURE — 25010000002 METHYLPREDNISOLONE PER 125 MG: Performed by: NURSE ANESTHETIST, CERTIFIED REGISTERED

## 2023-03-22 PROCEDURE — 80069 RENAL FUNCTION PANEL: CPT | Performed by: SURGERY

## 2023-03-22 PROCEDURE — 82330 ASSAY OF CALCIUM: CPT

## 2023-03-22 PROCEDURE — 25010000002 PHENYLEPHRINE 10 MG/ML SOLUTION: Performed by: NURSE ANESTHETIST, CERTIFIED REGISTERED

## 2023-03-22 PROCEDURE — 36600 WITHDRAWAL OF ARTERIAL BLOOD: CPT

## 2023-03-22 PROCEDURE — 25010000002 VANCOMYCIN 1 G RECONSTITUTED SOLUTION 1 EACH VIAL: Performed by: SURGERY

## 2023-03-22 PROCEDURE — 25010000002 PROPOFOL 10 MG/ML EMULSION: Performed by: NURSE ANESTHETIST, CERTIFIED REGISTERED

## 2023-03-22 PROCEDURE — 25010000002 MIDAZOLAM PER 1 MG: Performed by: NURSE ANESTHETIST, CERTIFIED REGISTERED

## 2023-03-22 PROCEDURE — 5A1221Z PERFORMANCE OF CARDIAC OUTPUT, CONTINUOUS: ICD-10-PCS | Performed by: SURGERY

## 2023-03-22 PROCEDURE — 94002 VENT MGMT INPAT INIT DAY: CPT

## 2023-03-22 PROCEDURE — 25010000002 HEPARIN (PORCINE) PER 1000 UNITS: Performed by: NURSE ANESTHETIST, CERTIFIED REGISTERED

## 2023-03-22 PROCEDURE — 25010000002 MIDAZOLAM PER 1 MG: Performed by: ANESTHESIOLOGY

## 2023-03-22 PROCEDURE — 82803 BLOOD GASES ANY COMBINATION: CPT

## 2023-03-22 PROCEDURE — 86900 BLOOD TYPING SEROLOGIC ABO: CPT

## 2023-03-22 PROCEDURE — 93318 ECHO TRANSESOPHAGEAL INTRAOP: CPT

## 2023-03-22 PROCEDURE — 82962 GLUCOSE BLOOD TEST: CPT

## 2023-03-22 PROCEDURE — 02RF0JZ REPLACEMENT OF AORTIC VALVE WITH SYNTHETIC SUBSTITUTE, OPEN APPROACH: ICD-10-PCS | Performed by: SURGERY

## 2023-03-22 PROCEDURE — 25010000002 HEPARIN (PORCINE) PER 1000 UNITS: Performed by: SURGERY

## 2023-03-22 PROCEDURE — 85025 COMPLETE CBC W/AUTO DIFF WBC: CPT | Performed by: SURGERY

## 2023-03-22 PROCEDURE — 82375 ASSAY CARBOXYHB QUANT: CPT

## 2023-03-22 PROCEDURE — 88305 TISSUE EXAM BY PATHOLOGIST: CPT | Performed by: SURGERY

## 2023-03-22 PROCEDURE — 25010000002 CEFAZOLIN PER 500 MG: Performed by: NURSE PRACTITIONER

## 2023-03-22 PROCEDURE — 25010000002 PROTAMINE SULFATE PER 10 MG: Performed by: NURSE ANESTHETIST, CERTIFIED REGISTERED

## 2023-03-22 PROCEDURE — 63710000001 INSULIN REGULAR HUMAN PER 5 UNITS: Performed by: NURSE ANESTHETIST, CERTIFIED REGISTERED

## 2023-03-22 PROCEDURE — C1760 CLOSURE DEV, VASC: HCPCS | Performed by: SURGERY

## 2023-03-22 PROCEDURE — 02RX0JZ REPLACEMENT OF THORACIC AORTA, ASCENDING/ARCH WITH SYNTHETIC SUBSTITUTE, OPEN APPROACH: ICD-10-PCS | Performed by: SURGERY

## 2023-03-22 PROCEDURE — C1768 GRAFT, VASCULAR: HCPCS | Performed by: SURGERY

## 2023-03-22 PROCEDURE — 0 INSULIN REGULAR HUMAN PER 5 UNITS: Performed by: SURGERY

## 2023-03-22 PROCEDURE — 33863 ASCENDING AORTIC GRAFT: CPT | Performed by: SURGERY

## 2023-03-22 PROCEDURE — C1713 ANCHOR/SCREW BN/BN,TIS/BN: HCPCS | Performed by: SURGERY

## 2023-03-22 PROCEDURE — 85384 FIBRINOGEN ACTIVITY: CPT | Performed by: SURGERY

## 2023-03-22 PROCEDURE — 25010000002 MORPHINE PER 10 MG: Performed by: SURGERY

## 2023-03-22 PROCEDURE — 82820 HEMOGLOBIN-OXYGEN AFFINITY: CPT

## 2023-03-22 PROCEDURE — 93312 ECHO TRANSESOPHAGEAL: CPT | Performed by: INTERNAL MEDICINE

## 2023-03-22 PROCEDURE — 85610 PROTHROMBIN TIME: CPT | Performed by: SURGERY

## 2023-03-22 DEVICE — IMPLANTABLE DEVICE: Type: IMPLANTABLE DEVICE | Site: HEART | Status: FUNCTIONAL

## 2023-03-22 DEVICE — KT HEMOST ABS SURGIFOAM PORCN 1GRAM: Type: IMPLANTABLE DEVICE | Site: CHEST | Status: FUNCTIONAL

## 2023-03-22 DEVICE — COR-KNOT MINI® COMBO KITBASE PACKAGE TYPE - KITEACH STERILE PACKAGE KIT CONTAINS (2) SINGLE PATIENT USE COR-KNOT MINI® DEVICES AND (12) COR-KNOT® QUICK LOADS®.
Type: IMPLANTABLE DEVICE | Site: HEART | Status: FUNCTIONAL
Brand: COR-KNOT MINI®

## 2023-03-22 DEVICE — VLV AORTA INSPRIS RESILIA 23MM: Type: IMPLANTABLE DEVICE | Site: HEART | Status: FUNCTIONAL

## 2023-03-22 DEVICE — COR-KNOT® MIS COMBO KIT
Type: IMPLANTABLE DEVICE | Site: HEART | Status: FUNCTIONAL
Brand: COR-KNOT®

## 2023-03-22 DEVICE — IMPLANTABLE DEVICE: Type: IMPLANTABLE DEVICE | Site: AORTA | Status: FUNCTIONAL

## 2023-03-22 DEVICE — WR SUT NONABS MF SS V40 1/2CIR TC 6/0 18IN M649G: Type: IMPLANTABLE DEVICE | Site: STERNUM | Status: FUNCTIONAL

## 2023-03-22 DEVICE — IMPLANTABLE DEVICE: Type: IMPLANTABLE DEVICE | Site: CHEST | Status: FUNCTIONAL

## 2023-03-22 DEVICE — PLEDGET INCISIONLINE REINF TFE SFT PTFE 1.5X3X7MM WHT: Type: IMPLANTABLE DEVICE | Site: HEART | Status: FUNCTIONAL

## 2023-03-22 RX ORDER — ACETAMINOPHEN 10 MG/ML
1000 INJECTION, SOLUTION INTRAVENOUS EVERY 8 HOURS
Status: COMPLETED | OUTPATIENT
Start: 2023-03-22 | End: 2023-03-22

## 2023-03-22 RX ORDER — LIDOCAINE 50 MG/G
2 PATCH TOPICAL
Status: DISCONTINUED | OUTPATIENT
Start: 2023-03-23 | End: 2023-03-22

## 2023-03-22 RX ORDER — NICOTINE POLACRILEX 4 MG
15 LOZENGE BUCCAL
Status: DISCONTINUED | OUTPATIENT
Start: 2023-03-22 | End: 2023-03-23

## 2023-03-22 RX ORDER — DEXTROSE MONOHYDRATE 25 G/50ML
10-50 INJECTION, SOLUTION INTRAVENOUS
Status: DISCONTINUED | OUTPATIENT
Start: 2023-03-22 | End: 2023-03-22 | Stop reason: HOSPADM

## 2023-03-22 RX ORDER — SODIUM CHLORIDE 9 MG/ML
30 INJECTION, SOLUTION INTRAVENOUS CONTINUOUS PRN
Status: DISCONTINUED | OUTPATIENT
Start: 2023-03-22 | End: 2023-03-22 | Stop reason: HOSPADM

## 2023-03-22 RX ORDER — PROPOFOL 10 MG/ML
VIAL (ML) INTRAVENOUS AS NEEDED
Status: DISCONTINUED | OUTPATIENT
Start: 2023-03-22 | End: 2023-03-22 | Stop reason: SURG

## 2023-03-22 RX ORDER — SODIUM CHLORIDE 9 MG/ML
40 INJECTION, SOLUTION INTRAVENOUS AS NEEDED
Status: DISCONTINUED | OUTPATIENT
Start: 2023-03-22 | End: 2023-03-22 | Stop reason: HOSPADM

## 2023-03-22 RX ORDER — ASPIRIN 81 MG/1
162 TABLET, CHEWABLE ORAL ONCE
Status: COMPLETED | OUTPATIENT
Start: 2023-03-23 | End: 2023-03-23

## 2023-03-22 RX ORDER — SODIUM CHLORIDE 0.9 % (FLUSH) 0.9 %
30 SYRINGE (ML) INJECTION ONCE AS NEEDED
Status: DISCONTINUED | OUTPATIENT
Start: 2023-03-22 | End: 2023-03-22 | Stop reason: HOSPADM

## 2023-03-22 RX ORDER — MORPHINE SULFATE 2 MG/ML
2 INJECTION, SOLUTION INTRAMUSCULAR; INTRAVENOUS
Status: DISCONTINUED | OUTPATIENT
Start: 2023-03-22 | End: 2023-03-23

## 2023-03-22 RX ORDER — POTASSIUM CHLORIDE, DEXTROSE MONOHYDRATE 150; 5 MG/100ML; G/100ML
30 INJECTION, SOLUTION INTRAVENOUS CONTINUOUS
Status: DISCONTINUED | OUTPATIENT
Start: 2023-03-22 | End: 2023-03-23

## 2023-03-22 RX ORDER — DEXMEDETOMIDINE HYDROCHLORIDE 4 UG/ML
.2-1.5 INJECTION, SOLUTION INTRAVENOUS
Status: DISCONTINUED | OUTPATIENT
Start: 2023-03-22 | End: 2023-03-23

## 2023-03-22 RX ORDER — ACETAMINOPHEN 650 MG/1
650 SUPPOSITORY RECTAL EVERY 4 HOURS PRN
Status: DISCONTINUED | OUTPATIENT
Start: 2023-03-23 | End: 2023-03-22

## 2023-03-22 RX ORDER — HEPARIN SODIUM 1000 [USP'U]/ML
INJECTION, SOLUTION INTRAVENOUS; SUBCUTANEOUS AS NEEDED
Status: DISCONTINUED | OUTPATIENT
Start: 2023-03-22 | End: 2023-03-22 | Stop reason: SURG

## 2023-03-22 RX ORDER — NOREPINEPHRINE BIT/0.9 % NACL 8 MG/250ML
.02-.3 INFUSION BOTTLE (ML) INTRAVENOUS CONTINUOUS PRN
Status: DISCONTINUED | OUTPATIENT
Start: 2023-03-22 | End: 2023-03-23

## 2023-03-22 RX ORDER — OXYCODONE HYDROCHLORIDE 5 MG/1
5 TABLET ORAL EVERY 4 HOURS PRN
Status: DISCONTINUED | OUTPATIENT
Start: 2023-03-22 | End: 2023-03-25 | Stop reason: HOSPADM

## 2023-03-22 RX ORDER — MAGNESIUM HYDROXIDE 1200 MG/15ML
LIQUID ORAL AS NEEDED
Status: DISCONTINUED | OUTPATIENT
Start: 2023-03-22 | End: 2023-03-22 | Stop reason: HOSPADM

## 2023-03-22 RX ORDER — MIDAZOLAM HYDROCHLORIDE 1 MG/ML
INJECTION, SOLUTION INTRAMUSCULAR; INTRAVENOUS AS NEEDED
Status: DISCONTINUED | OUTPATIENT
Start: 2023-03-22 | End: 2023-03-22 | Stop reason: SURG

## 2023-03-22 RX ORDER — ONDANSETRON 2 MG/ML
4 INJECTION INTRAMUSCULAR; INTRAVENOUS EVERY 6 HOURS PRN
Status: DISCONTINUED | OUTPATIENT
Start: 2023-03-22 | End: 2023-03-25 | Stop reason: HOSPADM

## 2023-03-22 RX ORDER — DEXMEDETOMIDINE HYDROCHLORIDE 4 UG/ML
INJECTION, SOLUTION INTRAVENOUS CONTINUOUS PRN
Status: DISCONTINUED | OUTPATIENT
Start: 2023-03-22 | End: 2023-03-22 | Stop reason: SURG

## 2023-03-22 RX ORDER — IPRATROPIUM BROMIDE AND ALBUTEROL SULFATE 2.5; .5 MG/3ML; MG/3ML
3 SOLUTION RESPIRATORY (INHALATION)
Status: DISCONTINUED | OUTPATIENT
Start: 2023-03-22 | End: 2023-03-23

## 2023-03-22 RX ORDER — BISACODYL 5 MG/1
10 TABLET, DELAYED RELEASE ORAL 2 TIMES DAILY
Status: DISCONTINUED | OUTPATIENT
Start: 2023-03-23 | End: 2023-03-25 | Stop reason: HOSPADM

## 2023-03-22 RX ORDER — METOPROLOL TARTRATE 5 MG/5ML
INJECTION INTRAVENOUS AS NEEDED
Status: DISCONTINUED | OUTPATIENT
Start: 2023-03-22 | End: 2023-03-22 | Stop reason: SURG

## 2023-03-22 RX ORDER — FENTANYL CITRATE 50 UG/ML
INJECTION, SOLUTION INTRAMUSCULAR; INTRAVENOUS AS NEEDED
Status: DISCONTINUED | OUTPATIENT
Start: 2023-03-22 | End: 2023-03-22 | Stop reason: SURG

## 2023-03-22 RX ORDER — ATORVASTATIN CALCIUM 10 MG/1
20 TABLET, FILM COATED ORAL NIGHTLY
Status: DISCONTINUED | OUTPATIENT
Start: 2023-03-23 | End: 2023-03-25 | Stop reason: HOSPADM

## 2023-03-22 RX ORDER — LIDOCAINE HYDROCHLORIDE 20 MG/ML
INJECTION, SOLUTION EPIDURAL; INFILTRATION; INTRACAUDAL; PERINEURAL AS NEEDED
Status: DISCONTINUED | OUTPATIENT
Start: 2023-03-22 | End: 2023-03-22 | Stop reason: SURG

## 2023-03-22 RX ORDER — MEPERIDINE HYDROCHLORIDE 50 MG/ML
25 INJECTION INTRAMUSCULAR; INTRAVENOUS; SUBCUTANEOUS
Status: DISCONTINUED | OUTPATIENT
Start: 2023-03-22 | End: 2023-03-23

## 2023-03-22 RX ORDER — ACETAMINOPHEN 160 MG/5ML
650 SOLUTION ORAL EVERY 4 HOURS PRN
Status: DISCONTINUED | OUTPATIENT
Start: 2023-03-23 | End: 2023-03-22

## 2023-03-22 RX ORDER — ASPIRIN 81 MG/1
81 TABLET ORAL DAILY
Status: DISCONTINUED | OUTPATIENT
Start: 2023-03-24 | End: 2023-03-25 | Stop reason: HOSPADM

## 2023-03-22 RX ORDER — ALBUTEROL SULFATE 2.5 MG/3ML
2.5 SOLUTION RESPIRATORY (INHALATION) EVERY 4 HOURS PRN
Status: DISCONTINUED | OUTPATIENT
Start: 2023-03-22 | End: 2023-03-23

## 2023-03-22 RX ORDER — SODIUM CHLORIDE 0.9 % (FLUSH) 0.9 %
3 SYRINGE (ML) INJECTION EVERY 12 HOURS SCHEDULED
Status: DISCONTINUED | OUTPATIENT
Start: 2023-03-22 | End: 2023-03-22 | Stop reason: HOSPADM

## 2023-03-22 RX ORDER — SODIUM CHLORIDE 0.9 % (FLUSH) 0.9 %
3-10 SYRINGE (ML) INJECTION AS NEEDED
Status: DISCONTINUED | OUTPATIENT
Start: 2023-03-22 | End: 2023-03-22 | Stop reason: HOSPADM

## 2023-03-22 RX ORDER — ESCITALOPRAM OXALATE 10 MG/1
10 TABLET ORAL DAILY
COMMUNITY

## 2023-03-22 RX ORDER — ENOXAPARIN SODIUM 100 MG/ML
40 INJECTION SUBCUTANEOUS DAILY
Status: DISCONTINUED | OUTPATIENT
Start: 2023-03-23 | End: 2023-03-25 | Stop reason: HOSPADM

## 2023-03-22 RX ORDER — ROCURONIUM BROMIDE 10 MG/ML
INJECTION, SOLUTION INTRAVENOUS AS NEEDED
Status: DISCONTINUED | OUTPATIENT
Start: 2023-03-22 | End: 2023-03-22 | Stop reason: SURG

## 2023-03-22 RX ORDER — PANTOPRAZOLE SODIUM 40 MG/1
40 TABLET, DELAYED RELEASE ORAL
Status: DISCONTINUED | OUTPATIENT
Start: 2023-03-23 | End: 2023-03-25 | Stop reason: HOSPADM

## 2023-03-22 RX ORDER — OXYCODONE HYDROCHLORIDE 5 MG/1
10 TABLET ORAL EVERY 4 HOURS PRN
Status: DISCONTINUED | OUTPATIENT
Start: 2023-03-22 | End: 2023-03-25 | Stop reason: HOSPADM

## 2023-03-22 RX ORDER — METHYLPREDNISOLONE SODIUM SUCCINATE 125 MG/2ML
INJECTION, POWDER, LYOPHILIZED, FOR SOLUTION INTRAMUSCULAR; INTRAVENOUS AS NEEDED
Status: DISCONTINUED | OUTPATIENT
Start: 2023-03-22 | End: 2023-03-22 | Stop reason: SURG

## 2023-03-22 RX ORDER — MIDAZOLAM HYDROCHLORIDE 1 MG/ML
2 INJECTION INTRAMUSCULAR; INTRAVENOUS
Status: DISCONTINUED | OUTPATIENT
Start: 2023-03-22 | End: 2023-03-22 | Stop reason: HOSPADM

## 2023-03-22 RX ORDER — SODIUM CHLORIDE 9 MG/ML
INJECTION, SOLUTION INTRAVENOUS CONTINUOUS PRN
Status: DISCONTINUED | OUTPATIENT
Start: 2023-03-22 | End: 2023-03-22 | Stop reason: SURG

## 2023-03-22 RX ORDER — SODIUM CHLORIDE, SODIUM LACTATE, POTASSIUM CHLORIDE, CALCIUM CHLORIDE 600; 310; 30; 20 MG/100ML; MG/100ML; MG/100ML; MG/100ML
100 INJECTION, SOLUTION INTRAVENOUS CONTINUOUS
Status: DISCONTINUED | OUTPATIENT
Start: 2023-03-22 | End: 2023-03-22

## 2023-03-22 RX ORDER — LIDOCAINE 50 MG/G
2 PATCH TOPICAL
Status: DISCONTINUED | OUTPATIENT
Start: 2023-03-22 | End: 2023-03-25 | Stop reason: HOSPADM

## 2023-03-22 RX ORDER — VANCOMYCIN HYDROCHLORIDE 1 G/200ML
INJECTION, SOLUTION INTRAVENOUS CONTINUOUS PRN
Status: COMPLETED | OUTPATIENT
Start: 2023-03-22 | End: 2023-03-22

## 2023-03-22 RX ORDER — LIDOCAINE HYDROCHLORIDE 10 MG/ML
0.5 INJECTION, SOLUTION EPIDURAL; INFILTRATION; INTRACAUDAL; PERINEURAL ONCE AS NEEDED
Status: DISCONTINUED | OUTPATIENT
Start: 2023-03-22 | End: 2023-03-22 | Stop reason: HOSPADM

## 2023-03-22 RX ORDER — NICOTINE POLACRILEX 4 MG
15 LOZENGE BUCCAL
Status: DISCONTINUED | OUTPATIENT
Start: 2023-03-22 | End: 2023-03-22 | Stop reason: HOSPADM

## 2023-03-22 RX ORDER — SODIUM CHLORIDE 0.9 % (FLUSH) 0.9 %
10 SYRINGE (ML) INJECTION EVERY 12 HOURS SCHEDULED
Status: DISCONTINUED | OUTPATIENT
Start: 2023-03-22 | End: 2023-03-22 | Stop reason: HOSPADM

## 2023-03-22 RX ORDER — ACETAMINOPHEN 325 MG/1
650 TABLET ORAL EVERY 4 HOURS PRN
Status: DISCONTINUED | OUTPATIENT
Start: 2023-03-23 | End: 2023-03-22

## 2023-03-22 RX ORDER — SODIUM CHLORIDE, SODIUM LACTATE, POTASSIUM CHLORIDE, CALCIUM CHLORIDE 600; 310; 30; 20 MG/100ML; MG/100ML; MG/100ML; MG/100ML
1000 INJECTION, SOLUTION INTRAVENOUS CONTINUOUS
Status: DISCONTINUED | OUTPATIENT
Start: 2023-03-22 | End: 2023-03-22

## 2023-03-22 RX ORDER — CHLORHEXIDINE GLUCONATE 0.12 MG/ML
15 RINSE ORAL EVERY 12 HOURS
Status: DISCONTINUED | OUTPATIENT
Start: 2023-03-22 | End: 2023-03-25 | Stop reason: HOSPADM

## 2023-03-22 RX ORDER — PHENYLEPHRINE HYDROCHLORIDE 10 MG/ML
INJECTION INTRAVENOUS AS NEEDED
Status: DISCONTINUED | OUTPATIENT
Start: 2023-03-22 | End: 2023-03-22 | Stop reason: SURG

## 2023-03-22 RX ORDER — PROTAMINE SULFATE 10 MG/ML
INJECTION, SOLUTION INTRAVENOUS AS NEEDED
Status: DISCONTINUED | OUTPATIENT
Start: 2023-03-22 | End: 2023-03-22 | Stop reason: SURG

## 2023-03-22 RX ORDER — OXYCODONE AND ACETAMINOPHEN 7.5; 325 MG/1; MG/1
1 TABLET ORAL EVERY 8 HOURS PRN
COMMUNITY

## 2023-03-22 RX ORDER — ACETAMINOPHEN 500 MG
1000 TABLET ORAL ONCE
Status: COMPLETED | OUTPATIENT
Start: 2023-03-22 | End: 2023-03-22

## 2023-03-22 RX ORDER — ACETAMINOPHEN 325 MG/1
650 TABLET ORAL EVERY 6 HOURS SCHEDULED
Status: DISCONTINUED | OUTPATIENT
Start: 2023-03-23 | End: 2023-03-25 | Stop reason: HOSPADM

## 2023-03-22 RX ORDER — NITROGLYCERIN 20 MG/100ML
INJECTION INTRAVENOUS CONTINUOUS PRN
Status: DISCONTINUED | OUTPATIENT
Start: 2023-03-22 | End: 2023-03-22 | Stop reason: SURG

## 2023-03-22 RX ORDER — SODIUM CHLORIDE, SODIUM LACTATE, POTASSIUM CHLORIDE, CALCIUM CHLORIDE 600; 310; 30; 20 MG/100ML; MG/100ML; MG/100ML; MG/100ML
30 INJECTION, SOLUTION INTRAVENOUS CONTINUOUS
Status: DISCONTINUED | OUTPATIENT
Start: 2023-03-22 | End: 2023-03-22

## 2023-03-22 RX ORDER — POLYETHYLENE GLYCOL 3350 17 G/17G
17 POWDER, FOR SOLUTION ORAL DAILY
Status: DISCONTINUED | OUTPATIENT
Start: 2023-03-23 | End: 2023-03-25 | Stop reason: HOSPADM

## 2023-03-22 RX ORDER — BUPIVACAINE HCL/0.9 % NACL/PF 0.1 %
2 PLASTIC BAG, INJECTION (ML) EPIDURAL ONCE
Status: COMPLETED | OUTPATIENT
Start: 2023-03-22 | End: 2023-03-22

## 2023-03-22 RX ORDER — SODIUM CHLORIDE 0.9 % (FLUSH) 0.9 %
3 SYRINGE (ML) INJECTION AS NEEDED
Status: DISCONTINUED | OUTPATIENT
Start: 2023-03-22 | End: 2023-03-22 | Stop reason: HOSPADM

## 2023-03-22 RX ORDER — VECURONIUM BROMIDE 1 MG/ML
INJECTION, POWDER, LYOPHILIZED, FOR SOLUTION INTRAVENOUS AS NEEDED
Status: DISCONTINUED | OUTPATIENT
Start: 2023-03-22 | End: 2023-03-22 | Stop reason: SURG

## 2023-03-22 RX ORDER — SODIUM CHLORIDE 0.9 % (FLUSH) 0.9 %
10 SYRINGE (ML) INJECTION AS NEEDED
Status: DISCONTINUED | OUTPATIENT
Start: 2023-03-22 | End: 2023-03-22 | Stop reason: HOSPADM

## 2023-03-22 RX ORDER — SODIUM CHLORIDE, SODIUM LACTATE, POTASSIUM CHLORIDE, CALCIUM CHLORIDE 600; 310; 30; 20 MG/100ML; MG/100ML; MG/100ML; MG/100ML
INJECTION, SOLUTION INTRAVENOUS CONTINUOUS PRN
Status: DISCONTINUED | OUTPATIENT
Start: 2023-03-22 | End: 2023-03-22 | Stop reason: SURG

## 2023-03-22 RX ORDER — CHLORHEXIDINE GLUCONATE 0.12 MG/ML
15 RINSE ORAL EVERY 12 HOURS SCHEDULED
Status: DISCONTINUED | OUTPATIENT
Start: 2023-03-22 | End: 2023-03-22 | Stop reason: SDUPTHER

## 2023-03-22 RX ORDER — DEXTROSE MONOHYDRATE 25 G/50ML
10-50 INJECTION, SOLUTION INTRAVENOUS
Status: DISCONTINUED | OUTPATIENT
Start: 2023-03-22 | End: 2023-03-23

## 2023-03-22 RX ADMIN — ACETAMINOPHEN 1000 MG: 10 INJECTION, SOLUTION INTRAVENOUS at 13:58

## 2023-03-22 RX ADMIN — DEXMEDETOMIDINE HYDROCHLORIDE 0.5 MCG/KG/HR: 4 INJECTION, SOLUTION INTRAVENOUS at 09:34

## 2023-03-22 RX ADMIN — LIDOCAINE HYDROCHLORIDE 100 MG: 20 INJECTION, SOLUTION EPIDURAL; INFILTRATION; INTRACAUDAL; PERINEURAL at 07:22

## 2023-03-22 RX ADMIN — NITROGLYCERIN 16.67 MCG/MIN: 20 INJECTION INTRAVENOUS at 12:05

## 2023-03-22 RX ADMIN — AMINOCAPROIC ACID 5 G: 250 INJECTION, SOLUTION INTRAVENOUS at 07:59

## 2023-03-22 RX ADMIN — LIDOCAINE 2 PATCH: 700 PATCH TOPICAL at 21:42

## 2023-03-22 RX ADMIN — IPRATROPIUM BROMIDE AND ALBUTEROL SULFATE 3 ML: .5; 3 SOLUTION RESPIRATORY (INHALATION) at 14:05

## 2023-03-22 RX ADMIN — Medication 1 G: at 11:36

## 2023-03-22 RX ADMIN — POTASSIUM CHLORIDE AND DEXTROSE MONOHYDRATE 30 ML/HR: 150; 5 INJECTION, SOLUTION INTRAVENOUS at 13:57

## 2023-03-22 RX ADMIN — SODIUM CHLORIDE 2.5 MG/HR: 9 INJECTION, SOLUTION INTRAVENOUS at 12:32

## 2023-03-22 RX ADMIN — MIDAZOLAM HYDROCHLORIDE 3 MG: 1 INJECTION, SOLUTION INTRAMUSCULAR; INTRAVENOUS at 07:15

## 2023-03-22 RX ADMIN — ACETAMINOPHEN 1000 MG: 500 TABLET, FILM COATED ORAL at 05:57

## 2023-03-22 RX ADMIN — PROPOFOL INJECTABLE EMULSION 100 MG: 10 INJECTION, EMULSION INTRAVENOUS at 09:40

## 2023-03-22 RX ADMIN — Medication 2 G: at 07:30

## 2023-03-22 RX ADMIN — PHENYLEPHRINE HYDROCHLORIDE 100 MCG: 10 INJECTION INTRAVENOUS at 08:44

## 2023-03-22 RX ADMIN — METHYLPREDNISOLONE SODIUM SUCCINATE 1000 MG: 125 INJECTION, POWDER, FOR SOLUTION INTRAMUSCULAR; INTRAVENOUS at 08:58

## 2023-03-22 RX ADMIN — VECURONIUM BROMIDE 3 MG: 1 INJECTION, POWDER, LYOPHILIZED, FOR SOLUTION INTRAVENOUS at 10:50

## 2023-03-22 RX ADMIN — VECURONIUM BROMIDE 2 MG: 1 INJECTION, POWDER, LYOPHILIZED, FOR SOLUTION INTRAVENOUS at 10:03

## 2023-03-22 RX ADMIN — PHENYLEPHRINE HYDROCHLORIDE 100 MCG: 10 INJECTION INTRAVENOUS at 08:00

## 2023-03-22 RX ADMIN — PROPOFOL INJECTABLE EMULSION 50 MG: 10 INJECTION, EMULSION INTRAVENOUS at 09:36

## 2023-03-22 RX ADMIN — INSULIN HUMAN 5 UNITS: 100 INJECTION, SOLUTION PARENTERAL at 12:25

## 2023-03-22 RX ADMIN — PROPOFOL INJECTABLE EMULSION 100 MG: 10 INJECTION, EMULSION INTRAVENOUS at 09:44

## 2023-03-22 RX ADMIN — Medication 1 APPLICATION: at 05:57

## 2023-03-22 RX ADMIN — MORPHINE SULFATE 2 MG: 2 INJECTION, SOLUTION INTRAMUSCULAR; INTRAVENOUS at 19:47

## 2023-03-22 RX ADMIN — PROPOFOL INJECTABLE EMULSION 100 MG: 10 INJECTION, EMULSION INTRAVENOUS at 09:33

## 2023-03-22 RX ADMIN — MIDAZOLAM 2 MG: 1 INJECTION INTRAMUSCULAR; INTRAVENOUS at 11:20

## 2023-03-22 RX ADMIN — FENTANYL CITRATE 500 MCG: 50 INJECTION, SOLUTION INTRAMUSCULAR; INTRAVENOUS at 07:22

## 2023-03-22 RX ADMIN — VECURONIUM BROMIDE 5 MG: 1 INJECTION, POWDER, LYOPHILIZED, FOR SOLUTION INTRAVENOUS at 08:56

## 2023-03-22 RX ADMIN — PROPOFOL INJECTABLE EMULSION 50 MG: 10 INJECTION, EMULSION INTRAVENOUS at 12:25

## 2023-03-22 RX ADMIN — SODIUM CHLORIDE 2.2 UNITS/HR: 9 INJECTION, SOLUTION INTRAVENOUS at 15:08

## 2023-03-22 RX ADMIN — ROCURONIUM BROMIDE 100 MG: 10 INJECTION, SOLUTION INTRAVENOUS at 07:22

## 2023-03-22 RX ADMIN — FENTANYL CITRATE 150 MCG: 50 INJECTION, SOLUTION INTRAMUSCULAR; INTRAVENOUS at 11:46

## 2023-03-22 RX ADMIN — HEPARIN SODIUM 30000 UNITS: 1000 INJECTION, SOLUTION INTRAVENOUS; SUBCUTANEOUS at 08:24

## 2023-03-22 RX ADMIN — SODIUM CHLORIDE, POTASSIUM CHLORIDE, SODIUM LACTATE AND CALCIUM CHLORIDE: 600; 310; 30; 20 INJECTION, SOLUTION INTRAVENOUS at 07:48

## 2023-03-22 RX ADMIN — SODIUM CHLORIDE, POTASSIUM CHLORIDE, SODIUM LACTATE AND CALCIUM CHLORIDE 1000 ML: 600; 310; 30; 20 INJECTION, SOLUTION INTRAVENOUS at 06:08

## 2023-03-22 RX ADMIN — MIDAZOLAM HYDROCHLORIDE 2 MG: 1 INJECTION, SOLUTION INTRAMUSCULAR; INTRAVENOUS at 09:33

## 2023-03-22 RX ADMIN — POTASSIUM CHLORIDE AND DEXTROSE MONOHYDRATE 30 ML/HR: 150; 5 INJECTION, SOLUTION INTRAVENOUS at 13:56

## 2023-03-22 RX ADMIN — ACETAMINOPHEN 1000 MG: 10 INJECTION, SOLUTION INTRAVENOUS at 21:19

## 2023-03-22 RX ADMIN — VECURONIUM BROMIDE 2 MG: 1 INJECTION, POWDER, LYOPHILIZED, FOR SOLUTION INTRAVENOUS at 11:20

## 2023-03-22 RX ADMIN — PROPOFOL INJECTABLE EMULSION 100 MG: 10 INJECTION, EMULSION INTRAVENOUS at 09:43

## 2023-03-22 RX ADMIN — MORPHINE SULFATE 2 MG: 2 INJECTION, SOLUTION INTRAMUSCULAR; INTRAVENOUS at 19:06

## 2023-03-22 RX ADMIN — FENTANYL CITRATE 500 MCG: 50 INJECTION, SOLUTION INTRAMUSCULAR; INTRAVENOUS at 08:17

## 2023-03-22 RX ADMIN — Medication 1 APPLICATION: at 18:08

## 2023-03-22 RX ADMIN — SODIUM CHLORIDE, POTASSIUM CHLORIDE, SODIUM LACTATE AND CALCIUM CHLORIDE 30 ML/HR: 600; 310; 30; 20 INJECTION, SOLUTION INTRAVENOUS at 06:08

## 2023-03-22 RX ADMIN — FENTANYL CITRATE 150 MCG: 50 INJECTION, SOLUTION INTRAMUSCULAR; INTRAVENOUS at 12:00

## 2023-03-22 RX ADMIN — PROPOFOL INJECTABLE EMULSION 50 MG: 10 INJECTION, EMULSION INTRAVENOUS at 09:34

## 2023-03-22 RX ADMIN — MIDAZOLAM HYDROCHLORIDE 2 MG: 2 INJECTION, SOLUTION INTRAMUSCULAR; INTRAVENOUS at 06:45

## 2023-03-22 RX ADMIN — IPRATROPIUM BROMIDE AND ALBUTEROL SULFATE 3 ML: .5; 3 SOLUTION RESPIRATORY (INHALATION) at 19:10

## 2023-03-22 RX ADMIN — PHENYLEPHRINE HYDROCHLORIDE 100 MCG: 10 INJECTION INTRAVENOUS at 08:10

## 2023-03-22 RX ADMIN — OXYCODONE HYDROCHLORIDE 5 MG: 5 TABLET ORAL at 21:34

## 2023-03-22 RX ADMIN — INSULIN HUMAN 5 UNITS: 100 INJECTION, SOLUTION PARENTERAL at 12:49

## 2023-03-22 RX ADMIN — SODIUM CHLORIDE: 9 INJECTION, SOLUTION INTRAVENOUS at 07:48

## 2023-03-22 RX ADMIN — METOPROLOL TARTRATE 1 MG: 1 INJECTION, SOLUTION INTRAVENOUS at 08:19

## 2023-03-22 RX ADMIN — PHENYLEPHRINE HYDROCHLORIDE 100 MCG: 10 INJECTION INTRAVENOUS at 08:45

## 2023-03-22 RX ADMIN — CHLORHEXIDINE GLUCONATE 15 ML: 1.2 RINSE BUCCAL at 18:08

## 2023-03-22 RX ADMIN — OXYCODONE HYDROCHLORIDE 5 MG: 5 TABLET ORAL at 21:19

## 2023-03-22 RX ADMIN — PROPOFOL INJECTABLE EMULSION 70 MG: 10 INJECTION, EMULSION INTRAVENOUS at 07:22

## 2023-03-22 RX ADMIN — FENTANYL CITRATE 200 MCG: 50 INJECTION, SOLUTION INTRAMUSCULAR; INTRAVENOUS at 12:24

## 2023-03-22 RX ADMIN — PROTAMINE SULFATE 200 MG: 10 INJECTION, SOLUTION INTRAVENOUS at 11:51

## 2023-03-22 RX ADMIN — VANCOMYCIN HYDROCHLORIDE 1000 MG: 1 INJECTION, POWDER, LYOPHILIZED, FOR SOLUTION INTRAVENOUS at 20:54

## 2023-03-22 NOTE — ANESTHESIA PROCEDURE NOTES
Arterial Line      Patient reassessed immediately prior to procedure    Patient location during procedure: pre-op  Start time: 3/22/2023 6:58 AM   Line placed for hemodynamic monitoring, ABGs/Labs/ISTAT and MD/Surgeon request.  Performed By   Anesthesiologist: Cindy Brantley MD   Preanesthetic Checklist  Completed: patient identified, IV checked, site marked, risks and benefits discussed, surgical consent, monitors and equipment checked, pre-op evaluation and timeout performed  Arterial Line Prep    Sterile Tech: gloves, sterile barriers and mask  Prep: ChloraPrep  Patient monitoring: blood pressure monitoring, continuous pulse oximetry and EKG  Arterial Line Procedure   Laterality:left  Location:  radial artery  Catheter size: 20 G   Guidance: ultrasound guided  PROCEDURE NOTE/ULTRASOUND INTERPRETATION.  Using ultrasound guidance the potential vascular sites for insertion of the catheter were visualized to determine the patency of the vessel to be used for vascular access.  After selecting the appropriate site for insertion, the needle was visualized under ultrasound being inserted into the radial artery, followed by ultrasound confirmation of wire and catheter placement. There were no abnormalities seen on ultrasound; an image was taken; and the patient tolerated the procedure with no complications.   Number of attempts: 2 (One attempt by SRNA/CRNA unable to thread wire to pass catheter, second attempt without complication)  Successful placement: yes   Post Assessment   Dressing Type: occlusive dressing applied, secured with tape and wrist guard applied.   Complications no  Circ/Move/Sens Assessment: normal.   Patient Tolerance: patient tolerated the procedure well with no apparent complications

## 2023-03-22 NOTE — ANESTHESIA PROCEDURE NOTES
Airway  Urgency: elective    Date/Time: 3/22/2023 7:25 AM  Airway not difficult    General Information and Staff    Patient location during procedure: OR  CRNA/CAA: Francesca Butcher CRNA    Indications and Patient Condition  Indications for airway management: airway protection    Preoxygenated: yes  Mask difficulty assessment: 1 - vent by mask    Final Airway Details  Final airway type: endotracheal airway      Successful airway: ETT  Cuffed: yes   Successful intubation technique: video laryngoscopy  Facilitating devices/methods: intubating stylet  Endotracheal tube insertion site: oral  Blade: Nieves  Blade size: 4 (3.5)  ETT size (mm): 7.5  Cormack-Lehane Classification: grade I - full view of glottis  Placement verified by: chest auscultation and capnometry   Cuff volume (mL): 8  Measured from: gums  ETT/EBT to gums (cm): 24  Number of attempts at approach: 1  Assessment: lips, teeth, and gum same as pre-op and atraumatic intubation

## 2023-03-22 NOTE — PROGRESS NOTES
"Pharmacy Dosing Service  Pharmacokinetics  Vancomycin Initial Evaluation  Assessment/Action/Plan:  Pre-op dose: 1000 mg  Current Order: Vancomycin 1000 mg IVPB every 12 hours  Current end date:3/24/2023  Levels: no levels ordered  Additional systemic antimicrobial agent(s): none    Vancomycin dosage initiated based on population pharmacokinetic parameters. Pharmacy will continue to follow daily and adjust dose accordingly.     Subjective:  Lyndon Campos is a 62 y.o. male with a Vancomycin \"Pharmacy to Dose\" consult for surgical prophylaxis, day 1 of 2 of treatment.    AUC Model Data:  Regimen: 1000 mg IV every 12 hours.  Exposure target: AUC24 (range)400-600 mg/L.hr   AUC24,ss: 529 mg/L.hr  PAUC*: 78 %  Ctrough,ss: 15.7 mg/L  Pconc*: 31 %  Tox.: 11 %      Objective:  Ht: 162.5 cm (63.98\"); Wt: 57.9 kg (127 lb 10.3 oz)  Estimated Creatinine Clearance: 95.3 mL/min (A) (by C-G formula based on SCr of 0.65 mg/dL (L)).   Creatinine   Date Value Ref Range Status   03/20/2023 0.65 (L) 0.76 - 1.27 mg/dL Final   02/22/2023 0.46 (L) 0.76 - 1.27 mg/dL Final   02/02/2023 0.80 0.60 - 1.30 mg/dL Final     Comment:     Serial Number: [id number]:  [id number]   01/06/2023 0.61 (L) 0.76 - 1.27 mg/dL Final   03/14/2018 0.7 0.5 - 1.2 mg/dL Final   03/07/2018 0.6 0.5 - 1.2 mg/dL Final   02/28/2018 0.5 0.5 - 1.2 mg/dL Final      Lab Results   Component Value Date    WBC 8.53 03/20/2023    WBC 10.75 02/22/2023    WBC 16.67 (H) 01/06/2023      Baseline culture results:  Microbiology Results (last 10 days)       Procedure Component Value - Date/Time    COVID PRE-OP / PRE-PROCEDURE SCREENING ORDER (NO ISOLATION) - Swab, Nasopharynx [[id number]]  (Normal) Collected: 03/20/23 1351    Lab Status: Final result Specimen: Swab from Nasopharynx Updated: 03/20/23 1716    Narrative:      The following orders were created for panel order COVID PRE-OP / PRE-PROCEDURE SCREENING ORDER (NO ISOLATION) - Swab, Nasopharynx.  Procedure                        "        Abnormality         Status                     ---------                               -----------         ------                     COVID-19,CEPHEID/SARBJIT,CO...[[id number]]  Normal              Final result                 Please view results for these tests on the individual orders.    COVID-19,CEPHEID/SARBJIT,COR/SANGITA/[facility]A/[facility] IN-HOUSE(OR EMERGENT/ADD-ON),NP SWAB IN TRANSPORT MEDIA 3-4 HR TAT, RT-PCR - Swab, Nasopharynx [[id number]]  (Normal) Collected: 03/20/23 1351    Lab Status: Final result Specimen: Swab from Nasopharynx Updated: 03/20/23 1433     COVID19 Not Detected    Narrative:      Fact sheet for providers: https://www.fda.gov/media/557184/download     Fact sheet for patients: https://www.fda.gov/media/164040/download  Fact sheet for providers: https://www.fda.gov/media/582192/download    Fact sheet for patients: https://www.fda.gov/media/381805/download    Test performed by PCR.            Adalberto Gunn, PharmD  03/22/23 13:32 CDT

## 2023-03-22 NOTE — OP NOTE
Cardiac Surgery Operative Note     Date of Procedure:  3/22/2023     Preoperative Diagnosis:   Aortic Root Aneurysm and Ascending Aortic Aneurysm  Moderate Aortic Insufficiency   Hypertension  Hyperlipidemia  Current Active Smoker     Postoperative Diagnosis:  Same     Procedures Performed:  1. Ascending aorta and aortic root replacement with valved conduit and coronary reconstruction, Bio-Bentall Procedure; CPT 81269  2. Aortic Hemiarch Replacement with beveled open distal anastomosis under arch vessels with circulatory arrest; CPT +39347     Procedure Summary:   Aortic Root Replacement with BioBentall (30mm Dacron graft, 23mm Inspiris valve), Ascending Aortic Replacement with Hemiarch Replacement with 26mm Side Branched Dacron graft     Surgeon:  Freddy Brasher MD  Assistants:  Kaci Snell [facility]  Anesthesia Staff:  Cindy Brantley MD  Anesthesia Type:  General     Estimated Blood Loss:  Minimal (Cell Saver)     Drains:  1. 24 [place] Edu drains x2-anterior and posterior mediastinum     Specimens:  Aorta and Aortic Valve     Operative Indications:  Mr. Campos is 63 yo male, presented to me with aortic root and ascending aortic aneurysm.  He had moderate aortic insufficiency, suspected bicuspid aortic valve.  He is of short stature, his aortic area to height ratio was >10cm/m2.  Given this I discussed with him the risks and benefits of proceeding with aortic root replacement and ascending/hemiarch replacement.  He understood the risks and agreed to proceed.        Operative Findings:   Enlarged aortic root and proximal ascending aorta, aortic dilation up to the level of innominate artery  Tricuspid Aortic Valve, there was restriction of the right coronary cusp and it measured 15mm in length, NCC was 21mm, LCC was 19mm  Aortic root replacement with a back table constructed biologic valved conduit, 23 mm Inspiris valve with 30 mm Dacron graft      Ascending aorta and hemiarch replacement with a beveled distal  anastomosis under circulatory arrest underneath head vessels, 26 mm side branched Dacron graft used     Transesophageal echocardiography showed normal LV and RV function with well seated valved conduit.       Total Circulatory Arrest time (with RCP): 16 minutes   Total aortic cross-clamp time: 142 minutes  Total cardiac bypass time: 181 minutes     Operative description in detail:  The patient was taken to the operative suite where he was placed in a supine position.  Induction of general anesthesia and placement of a single-lumen endotracheal tube was performed without remark.  Appropriate arterial and venous access was established without remark.  A right IJ central venous catheter was placed followed by a [place] pulmonary artery catheter by anesthesia staff. The patient was then prepped and draped in the usual and sterile surgical fashion.  A timeout was performed.  Perioperative antibiotics were administered. Beta blocker was given.     Median sternotomy was performed in standard fashion. Hemostasis was ensured along sternal edges.  Midline mediastinal fat and thymus were divided and pericardium opened.  A pericardial well was created.  The distal ascending aorta and right atrial appendage were cannulated for cardiopulmonary bypass standard fashion.  Aortic line was tested and was satisfactory.  The SVC was cannulated for retrograde cerebral perfusion in standard fashion.  A right superior pulmonary vein LV vent was placed in standard fashion.  A combined cardioplegia/aortic root vent set was secured with a horizontal mattress 4-0 Prolene suture.  Retrograde cardioplegia catheter was inserted into the coronary sinus to the free wall the right atrium.  With an appropriate ACT cardiopulmonary bypass was commenced.  Cooling was begun with a goal temperature of 22°C.  During cooling the ascending aorta was dissected out further until it was free from the pulmonary artery and arch vessels were identified.  With that, I  proceeded to apply the aortic cross-clamp and administered cardioplegia utilizing standard cardioplegia in an antegrade and retrograde fashion.  With this there was prompt arrest.  Total standard dose was given.  Cardioplegia was given every 15-20 mins via retrograde, antegrade and direct coronary administration.     I then began dissection of the aortic root.  Stay stitches were placed at the tops of each commissure.  The valve was inspected and was per above findings.  Given restricted RCC, I elected to proceed with aortic root replacement with bioBentall and not attempt VSARR.  The aortic valve was excised in its entirety.  I then sharply developed left and right coronary buttons and tagged the buttons with pledgeted 4-0 Prolene sutures at the top.  The aortic root was then carefully dissected out down to the level of the annulus.  With this being achieved the patient's temperature was near 22 degrees in appropriate level for circulatory arrest.  An additional dose of cardioplegia was given.     Propofol was bolused until the BIS monitor had a reading of 0.  The head was packed in ice.  Steroids were given.  The cardiopulmonary bypass circuit was stopped aortic cannula removed and the cross-clamp removed.  We began RCP perfusion at 10 mL/min/kg.  The aorta was trimmed up to the previously marked hemiarch position.  The 26 mm branched Dacron graft was beveled and then trimmed with an appropriate bevel for the distal anastomosis.  Distal anastomosis was constructed with a running 3-0 Prolene suture.  After completion of the anastomosis the aortic arch and graft were de-aired.  Cardiopulmonary bypass was resumed at full flow through the sidebranch of the ascending aortic graft.  Hemostasis was ensured along the distal anastomosis.  Suture line was reinforced with Bio-Glue.  After 5 minutes rewarming was begun.     We then returned attention to the aortic root.  The annulus was measured and measured to easily fit a  23 mm valve.  A composite biologic valve graft conduit was created with a 23 mm Inspiris valve and a 30 mm Dacron graft.  The aortic annulus had horizontal mattress 2-0 Tycron sutures placed around its entirety with pledgets on the ventricular side.  The sutures were then passed through the biologic valved graft conduit.  It was seated and secured in place with cor knot suture securement.  The aortic root graft was trimmed.  We then measured the left button and marked its appropriate position on the graft.  Coronary button hole was created with eye cautery, left coronary was anastomosed to the aortic valve conduit with a running 5-0 Prolene suture.  The aortic root was then test pressurized and there was adequate hemostasis at the left coronary button.  We then turned our attention to the right coronary button, with heart full it was measured to appropriate position and marked on the aortic root graft.  Coronary buttonhole was created with eye cautery, right coronary was anastomosed to the aortic valve conduit with a running 5-0 Prolene suture.  The aortic root was test pressurized and there was adequate hemostasis at both buttons and a dose of cardioplegia was given.  Bioglue was used to reinforce the root and all suture lines.     I then measured the ascending aortic graft and aortic root graft to appropriate length and trimmed appropriately.  A graft to graft anastomosis was created with a running 4-0 Prolene suture.  With that being accomplished aortic root vent was secured in place just above the graft to graft anastomosis.     With that being accomplished, a terminal hotshot was administered.  The patient was placed in trendelenburg position.  Upon completion of terminal hotshot and placement of temporary epicardial pacing wires, with the aortic vent on high and pump flows diminished, the aortic cross-clamp was released.  With that, full support was implemented.  A nonworking beating phase was implemented.   Ventilation restored.  The retrograde cardioplegia catheter was removed and site oversewn as a matter of routine.       While on cardiopulmonary bypass, vent in the right superior pulmonary vein was removed and there was adequate hemostasis.  With all in readiness, the heart was allowed to fill and then weaned off cardiopulmonary bypass.  We removed the aortic root vent/cardioplegia cannula set once we ensured no intracardiac air on echocardiogram.  Its associated pursestring suture was tied securely. The table was now placed in neutral position.  I decannulated both venous lines and snared down their associated pursestring sutures.  Systemic intravenous protamine was administered.  All associated blood volume was returned to the patient. With continued good hemodynamics, I divided the arterial line by securing suture around the sidebranch of the aortic graft.  At this time I tied down the previously snared venous pursestring suture.  The mediastinum was drained with 24 [place] Edu drains placed anteriorly and posteriorly.  I surveyed the chest and hemostasis was pristine.  The sternum was reapproximated with stainless sterile wires placed in an interrupted fashion.  In layers anatomically the soft tissue planes were reapproximated. Instruments, sharps, and sponge counts were reported as correct.      Complications: None     Disposition: Transferred to ICU in stable and guarded condition.     Freddy Brasher M.D.  Cardiothoracic Surgeon

## 2023-03-22 NOTE — ANESTHESIA POSTPROCEDURE EVALUATION
"Patient: Lyndon Campos    Procedure Summary     Date: 03/22/23 Room / Location:  [facility] OR 16 /  [facility] OR    Anesthesia Start: 0710 Anesthesia Stop: 1327    Procedure: AORTIC ROOT, ASCENDING AORTA REPAIR 30 MM HEMASHIELD STRAIGHT GRAFT, HEMIARCH REPLACEMENT 26 MM HEMASHIELD SINGLE ARM GRAFT / AORTIC VALVE REPLACEMENT 23MM INSPIRIS VALVE, WITH CIRCULATORY ARREST, AND TRANSESOPHAGEAL ECHOCARDIOGRAM (Chest) Diagnosis:       Thoracic aortic aneurysm without rupture, unspecified part (HCC)      (Thoracic aortic aneurysm without rupture, unspecified part [I71.20])    Surgeons: Freddy Brasher MD Provider: Francesca Butcher CRNA    Anesthesia Type: general, [place], CVL, PAC ASA Status: 4          Anesthesia Type: general, [place], CVL, PAC    Vitals  Vitals Value Taken Time   BP 85/64 03/22/23 1322   Temp     Pulse 80 03/22/23 1328   Resp     SpO2 100 % 03/22/23 1328   Vitals shown include unvalidated device data.        Post Anesthesia Care and Evaluation    Patient location during evaluation: ICU  Patient participation: complete - patient cannot participate  Post-procedure mental status: sedated on vent.  Pain management: adequate    Airway patency: patent  Anesthetic complications: No anesthetic complications  PONV Status: none  Cardiovascular status: acceptable  Respiratory status: acceptable  Hydration status: acceptable    Comments: /58 (BP Location: Left arm, Patient Position: Lying)   Pulse 54   Temp 98.4 °F (36.9 °C) (Temporal)   Resp 16   Ht 162.5 cm (63.98\")   Wt 57.9 kg (127 lb 10.3 oz)   SpO2 97%   BMI 21.93 kg/m²   Patient discharged from PACU based on Vanessa score. See RN notes for further details.      "

## 2023-03-22 NOTE — INTERVAL H&P NOTE
No significant change since last visit.  Discussed again the risks and benefits of proceeding with aortic root replacement, ascending aorta replacement with hemiarch.  He understands and agrees to proceed.    Freddy Brasher M.D.  Cardiothoracic Surgeon

## 2023-03-23 ENCOUNTER — APPOINTMENT (OUTPATIENT)
Dept: GENERAL RADIOLOGY | Facility: HOSPITAL | Age: 63
DRG: 220 | End: 2023-03-23
Payer: COMMERCIAL

## 2023-03-23 LAB
ALBUMIN SERPL-MCNC: 4.2 G/DL (ref 3.5–5.2)
ANION GAP SERPL CALCULATED.3IONS-SCNC: 10 MMOL/L (ref 5–15)
ARTERIAL PATENCY WRIST A: ABNORMAL
ARTERIAL PATENCY WRIST A: NORMAL
ATMOSPHERIC PRESS: 749 MMHG
ATMOSPHERIC PRESS: 753 MMHG
BASE EXCESS BLDA CALC-SCNC: -0.6 MMOL/L (ref 0–2)
BASE EXCESS BLDA CALC-SCNC: 0.3 MMOL/L (ref 0–2)
BASOPHILS # BLD AUTO: 0.02 10*3/MM3 (ref 0–0.2)
BASOPHILS NFR BLD AUTO: 0.1 % (ref 0–1.5)
BDY SITE: ABNORMAL
BDY SITE: NORMAL
BODY TEMPERATURE: 37 C
BODY TEMPERATURE: 37 C
BUN SERPL-MCNC: 10 MG/DL (ref 8–23)
BUN/CREAT SERPL: 23.3 (ref 7–25)
CA-I BLD-MCNC: 4.79 MG/DL (ref 4.6–5.4)
CALCIUM SPEC-SCNC: 8.8 MG/DL (ref 8.6–10.5)
CHLORIDE SERPL-SCNC: 105 MMOL/L (ref 98–107)
CO2 SERPL-SCNC: 24 MMOL/L (ref 22–29)
COHGB MFR BLD: 2 % (ref 0–5)
CPAP: 5 CMH2O
CREAT SERPL-MCNC: 0.43 MG/DL (ref 0.76–1.27)
DEPRECATED RDW RBC AUTO: 44.8 FL (ref 37–54)
EGFRCR SERPLBLD CKD-EPI 2021: 119.9 ML/MIN/1.73
EOSINOPHIL # BLD AUTO: 0 10*3/MM3 (ref 0–0.4)
EOSINOPHIL NFR BLD AUTO: 0 % (ref 0.3–6.2)
ERYTHROCYTE [DISTWIDTH] IN BLOOD BY AUTOMATED COUNT: 13.1 % (ref 12.3–15.4)
GLUCOSE BLDC GLUCOMTR-MCNC: 126 MG/DL (ref 70–130)
GLUCOSE BLDC GLUCOMTR-MCNC: 134 MG/DL (ref 70–130)
GLUCOSE BLDC GLUCOMTR-MCNC: 140 MG/DL (ref 70–130)
GLUCOSE BLDC GLUCOMTR-MCNC: 143 MG/DL (ref 70–130)
GLUCOSE BLDC GLUCOMTR-MCNC: 145 MG/DL (ref 70–130)
GLUCOSE BLDC GLUCOMTR-MCNC: 153 MG/DL (ref 70–130)
GLUCOSE BLDC GLUCOMTR-MCNC: 158 MG/DL (ref 70–130)
GLUCOSE SERPL-MCNC: 142 MG/DL (ref 65–99)
HCO3 BLDA-SCNC: 25.8 MMOL/L (ref 20–26)
HCO3 BLDA-SCNC: 26.6 MMOL/L (ref 20–26)
HCT VFR BLD AUTO: 33.7 % (ref 37.5–51)
HCT VFR BLD CALC: 36.8 % (ref 38–51)
HGB BLD-MCNC: 11.1 G/DL (ref 13–17.7)
HGB BLDA-MCNC: 12 G/DL (ref 14–18)
INHALED O2 CONCENTRATION: 100 %
INHALED O2 CONCENTRATION: 30 %
INR PPP: 1.11 (ref 0.91–1.09)
LYMPHOCYTES # BLD AUTO: 0.68 10*3/MM3 (ref 0.7–3.1)
LYMPHOCYTES NFR BLD AUTO: 4 % (ref 19.6–45.3)
Lab: ABNORMAL
Lab: NORMAL
MAGNESIUM SERPL-MCNC: 1.9 MG/DL (ref 1.6–2.4)
MAXIMAL PREDICTED HEART RATE: 157 BPM
MCH RBC QN AUTO: 31.1 PG (ref 26.6–33)
MCHC RBC AUTO-ENTMCNC: 32.9 G/DL (ref 31.5–35.7)
MCV RBC AUTO: 94.4 FL (ref 79–97)
METHGB BLD QL: 0.7 % (ref 0–3)
MODALITY: ABNORMAL
MODALITY: NORMAL
MONOCYTES # BLD AUTO: 0.77 10*3/MM3 (ref 0.1–0.9)
MONOCYTES NFR BLD AUTO: 4.5 % (ref 5–12)
NEUTROPHILS NFR BLD AUTO: 15.62 10*3/MM3 (ref 1.7–7)
NEUTROPHILS NFR BLD AUTO: 90.9 % (ref 42.7–76)
NOTE: ABNORMAL
NOTIFIED BY: ABNORMAL
NOTIFIED WHO: ABNORMAL
OXYHGB MFR BLDV: 97.5 % (ref 94–99)
PCO2 BLDA: 44 MM HG (ref 35–45)
PCO2 BLDA: 54.2 MM HG (ref 35–45)
PCO2 TEMP ADJ BLD: 44 MM HG (ref 35–45)
PCO2 TEMP ADJ BLD: 54.2 MM HG (ref 35–45)
PEEP RESPIRATORY: NORMAL CM[H2O]
PH BLDA: 7.3 PH UNITS (ref 7.35–7.45)
PH BLDA: 7.38 PH UNITS (ref 7.35–7.45)
PH, TEMP CORRECTED: 7.3 PH UNITS (ref 7.35–7.45)
PH, TEMP CORRECTED: 7.38 PH UNITS (ref 7.35–7.45)
PHOSPHATE SERPL-MCNC: 3.7 MG/DL (ref 2.5–4.5)
PLATELET # BLD AUTO: 131 10*3/MM3 (ref 140–450)
PMV BLD AUTO: 9.6 FL (ref 6–12)
PO2 BLDA: 105 MM HG (ref 83–108)
PO2 BLDA: 527 MM HG (ref 83–108)
PO2 TEMP ADJ BLD: 105 MM HG (ref 83–108)
PO2 TEMP ADJ BLD: 527 MM HG (ref 83–108)
POTASSIUM BLDA-SCNC: 3.9 MMOL/L (ref 3.5–5.2)
POTASSIUM SERPL-SCNC: 4.3 MMOL/L (ref 3.5–5.2)
PROTHROMBIN TIME: 14.5 SECONDS (ref 11.8–14.8)
PSV: 10 CMH2O
RBC # BLD AUTO: 3.57 10*6/MM3 (ref 4.14–5.8)
SAO2 % BLDCOA: 98.6 % (ref 94–99)
SAO2 % BLDCOA: >100.1 % (ref 94–99)
SODIUM BLDA-SCNC: 141 MMOL/L (ref 136–145)
SODIUM SERPL-SCNC: 139 MMOL/L (ref 136–145)
STRESS TARGET HR: 133 BPM
VENTILATOR MODE: ABNORMAL
VENTILATOR MODE: NORMAL
WBC NRBC COR # BLD: 17.18 10*3/MM3 (ref 3.4–10.8)

## 2023-03-23 PROCEDURE — 71045 X-RAY EXAM CHEST 1 VIEW: CPT

## 2023-03-23 PROCEDURE — 97162 PT EVAL MOD COMPLEX 30 MIN: CPT

## 2023-03-23 PROCEDURE — 93005 ELECTROCARDIOGRAM TRACING: CPT | Performed by: SURGERY

## 2023-03-23 PROCEDURE — 85610 PROTHROMBIN TIME: CPT | Performed by: SURGERY

## 2023-03-23 PROCEDURE — 80069 RENAL FUNCTION PANEL: CPT | Performed by: SURGERY

## 2023-03-23 PROCEDURE — 82962 GLUCOSE BLOOD TEST: CPT

## 2023-03-23 PROCEDURE — 25010000002 VANCOMYCIN 1 G RECONSTITUTED SOLUTION 1 EACH VIAL: Performed by: SURGERY

## 2023-03-23 PROCEDURE — 83735 ASSAY OF MAGNESIUM: CPT | Performed by: SURGERY

## 2023-03-23 PROCEDURE — 25010000002 VANCOMYCIN 1 G RECONSTITUTED SOLUTION 1 EACH VIAL: Performed by: NURSE PRACTITIONER

## 2023-03-23 PROCEDURE — 25010000002 ENOXAPARIN PER 10 MG: Performed by: SURGERY

## 2023-03-23 PROCEDURE — 94799 UNLISTED PULMONARY SVC/PX: CPT

## 2023-03-23 PROCEDURE — 93010 ELECTROCARDIOGRAM REPORT: CPT | Performed by: INTERNAL MEDICINE

## 2023-03-23 PROCEDURE — 85025 COMPLETE CBC W/AUTO DIFF WBC: CPT | Performed by: SURGERY

## 2023-03-23 PROCEDURE — 97116 GAIT TRAINING THERAPY: CPT

## 2023-03-23 RX ORDER — METHOCARBAMOL 500 MG/1
500 TABLET, FILM COATED ORAL EVERY 8 HOURS SCHEDULED
Status: DISCONTINUED | OUTPATIENT
Start: 2023-03-23 | End: 2023-03-25 | Stop reason: HOSPADM

## 2023-03-23 RX ORDER — LIDOCAINE 50 MG/G
2 PATCH TOPICAL
Status: DISCONTINUED | OUTPATIENT
Start: 2023-03-23 | End: 2023-03-23

## 2023-03-23 RX ADMIN — Medication 1 APPLICATION: at 17:17

## 2023-03-23 RX ADMIN — POLYETHYLENE GLYCOL 3350 17 G: 17 POWDER, FOR SOLUTION ORAL at 08:09

## 2023-03-23 RX ADMIN — OXYCODONE HYDROCHLORIDE 10 MG: 5 TABLET ORAL at 20:07

## 2023-03-23 RX ADMIN — NICARDIPINE HYDROCHLORIDE 5 MG/HR: 25 INJECTION, SOLUTION INTRAVENOUS at 08:41

## 2023-03-23 RX ADMIN — NICARDIPINE HYDROCHLORIDE 7.5 MG/HR: 25 INJECTION, SOLUTION INTRAVENOUS at 00:07

## 2023-03-23 RX ADMIN — ACETAMINOPHEN 650 MG: 325 TABLET ORAL at 05:13

## 2023-03-23 RX ADMIN — Medication 1 APPLICATION: at 05:13

## 2023-03-23 RX ADMIN — ACETAMINOPHEN 650 MG: 325 TABLET ORAL at 23:07

## 2023-03-23 RX ADMIN — IPRATROPIUM BROMIDE AND ALBUTEROL SULFATE 3 ML: .5; 3 SOLUTION RESPIRATORY (INHALATION) at 14:04

## 2023-03-23 RX ADMIN — VANCOMYCIN HYDROCHLORIDE 1000 MG: 1 INJECTION, POWDER, LYOPHILIZED, FOR SOLUTION INTRAVENOUS at 20:07

## 2023-03-23 RX ADMIN — OXYCODONE HYDROCHLORIDE 5 MG: 5 TABLET ORAL at 12:10

## 2023-03-23 RX ADMIN — ENOXAPARIN SODIUM 40 MG: 100 INJECTION SUBCUTANEOUS at 08:16

## 2023-03-23 RX ADMIN — CHLORHEXIDINE GLUCONATE 15 ML: 1.2 RINSE BUCCAL at 17:17

## 2023-03-23 RX ADMIN — OXYCODONE HYDROCHLORIDE 10 MG: 5 TABLET ORAL at 01:27

## 2023-03-23 RX ADMIN — ASPIRIN 162 MG: 81 TABLET, CHEWABLE ORAL at 08:09

## 2023-03-23 RX ADMIN — LIDOCAINE 2 PATCH: 700 PATCH TOPICAL at 23:05

## 2023-03-23 RX ADMIN — METOPROLOL TARTRATE 25 MG: 25 TABLET, FILM COATED ORAL at 08:16

## 2023-03-23 RX ADMIN — METOPROLOL TARTRATE 25 MG: 25 TABLET, FILM COATED ORAL at 20:07

## 2023-03-23 RX ADMIN — PANTOPRAZOLE SODIUM 40 MG: 40 TABLET, DELAYED RELEASE ORAL at 05:13

## 2023-03-23 RX ADMIN — BISACODYL 10 MG: 5 TABLET ORAL at 08:09

## 2023-03-23 RX ADMIN — METHOCARBAMOL 500 MG: 500 TABLET, FILM COATED ORAL at 15:04

## 2023-03-23 RX ADMIN — IPRATROPIUM BROMIDE AND ALBUTEROL SULFATE 3 ML: .5; 3 SOLUTION RESPIRATORY (INHALATION) at 06:06

## 2023-03-23 RX ADMIN — OXYCODONE HYDROCHLORIDE 5 MG: 5 TABLET ORAL at 06:24

## 2023-03-23 RX ADMIN — ACETAMINOPHEN 650 MG: 325 TABLET ORAL at 12:10

## 2023-03-23 RX ADMIN — METHOCARBAMOL 500 MG: 500 TABLET, FILM COATED ORAL at 08:20

## 2023-03-23 RX ADMIN — ACETAMINOPHEN 650 MG: 325 TABLET ORAL at 17:17

## 2023-03-23 RX ADMIN — VANCOMYCIN HYDROCHLORIDE 1000 MG: 1 INJECTION, POWDER, LYOPHILIZED, FOR SOLUTION INTRAVENOUS at 08:10

## 2023-03-23 RX ADMIN — IPRATROPIUM BROMIDE AND ALBUTEROL SULFATE 3 ML: .5; 3 SOLUTION RESPIRATORY (INHALATION) at 10:21

## 2023-03-23 RX ADMIN — CHLORHEXIDINE GLUCONATE 15 ML: 1.2 RINSE BUCCAL at 05:14

## 2023-03-23 RX ADMIN — OXYCODONE HYDROCHLORIDE 10 MG: 5 TABLET ORAL at 16:25

## 2023-03-23 RX ADMIN — NICARDIPINE HYDROCHLORIDE 7.5 MG/HR: 25 INJECTION, SOLUTION INTRAVENOUS at 04:27

## 2023-03-23 RX ADMIN — METHOCARBAMOL 500 MG: 500 TABLET, FILM COATED ORAL at 22:08

## 2023-03-23 RX ADMIN — BISACODYL 10 MG: 5 TABLET ORAL at 20:08

## 2023-03-23 RX ADMIN — ATORVASTATIN CALCIUM 20 MG: 10 TABLET, FILM COATED ORAL at 20:07

## 2023-03-23 NOTE — PROGRESS NOTES
"Patient name: Lyndon Campos  Patient : 1960  VISIT # [id number]  MR #[id number]    Procedure:Procedure(s):  AORTIC ROOT, ASCENDING AORTA REPAIR 30 MM HEMASHIELD STRAIGHT GRAFT, HEMIARCH REPLACEMENT 26 MM HEMASHIELD SINGLE ARM GRAFT / AORTIC VALVE REPLACEMENT 23MM INSPIRIS VALVE, WITH CIRCULATORY ARREST, AND TRANSESOPHAGEAL ECHOCARDIOGRAM  Procedure Date:3/22/2023  POD:1 Day Post-Op    Subjective     Patient was extubated overnight and is tolerating room air.  Weight is up 1 pound from baseline.  Hemodynamically stable.  Due to walk with physical therapy.  1000 on incentive.    Telemetry: Sinus rhythm 90s  IV drips: Insulin, Cardene, IV fluids       Objective     Baseline weight 127 pounds  Visit Vitals  /59   Pulse 90   Temp 99 °F (37.2 °C) (Oral)   Resp 16   Ht 162.5 cm (63.98\")   Wt 58.1 kg (128 lb)   SpO2 95%   BMI 21.99 kg/m²       Intake/Output Summary (Last 24 hours) at 3/23/2023 0827  Last data filed at 3/23/2023 0810  Gross per 24 hour   Intake 4980.52 ml   Output 5000 ml   Net -19.48 ml      mL in 24 hours, serosanguineous, no airleak    Lab:     CBC:  Results from last 7 days   Lab Units 23  1659 23  1323   WBC 10*3/mm3 17.18* 8.66 13.21*   HEMATOCRIT % 33.7* 33.7* 30.8*   PLATELETS 10*3/mm3 131* 127* 132*          BMP:  Results from last 7 days   Lab Units 23  1659 23  1332 23  1323   SODIUM mmol/L 139 142  --  143   SODIUM, ARTERIAL mmol/L  --   --  142  --    POTASSIUM mmol/L 4.3 4.0  --  3.8   CHLORIDE mmol/L 105 108*  --  110*   CO2 mmol/L 24.0 25.0  --  24.0   GLUCOSE mg/dL 142* 154*  --  140*   BUN mg/dL 10 11  --  11   CREATININE mg/dL 0.43* 0.58*  --  0.68*          COAG:  Results from last 7 days   Lab Units 23  0222 23  1323   INR  1.11* 1.31*   APTT seconds  --  41.9*       IMAGES:       Imaging Results (Last 24 Hours)     Procedure Component Value Units Date/Time    XR Chest 1 View [[id number]] " Collected: 03/23/23 0723     Updated: 03/23/23 0727    Narrative:      HISTORY: Postop heart surgery     CXR: A frontal view the chest obtained     COMPARISON: 03/22/2023     FINDINGS: Removal of the endotracheal and Wilmore-Imelda catheters. Right IJ  sheath remains in place. Median sternotomy wires with prosthetic  coronary valve.     Lungs appear hyperinflated. Probable patchy basilar atelectasis with  borderline prominence of central pulmonary vascular structures. No  pneumothorax or pleural effusion.       Impression:      1. Interval extubation with removal of Wilmore-Imelda catheter. Mediastinal  surgical changes. Emphysema with probable basilar atelectasis.  Questionable venous congestion.  This report was finalized on 03/23/2023 07:24 by Dr. Karlene Duarte MD.    XR Chest 1 View [[id number]] Collected: 03/22/23 1414     Updated: 03/22/23 1419    Narrative:      HISTORY: Postop line and tube assessment     CXR: A frontal view the chest obtained.     COMPARISON: 03/20/2023     FINDINGS: The endotracheal tube is appropriate in position. A right IJ  Wilmore-Imelda catheter tip is present within the right main pulmonary  artery. Prosthetic coronary valve with median sternotomy wires.  Epicardial pacer leads.     Emphysematous changes of lungs. Bibasilar densities favoring  atelectasis. No pleural effusion or pneumothorax identified. Heart is  normal in size.       Impression:      1. Appropriate positioning of the endotracheal tube and right Wilmore-Imelda  catheter. Mediastinal surgical changes with stable basilar atelectasis  and underlying emphysema.  This report was finalized on 03/22/2023 14:16 by Dr. Karlene Duarte MD.        CXR: Chest tubes in stable position with no pneumothorax.  Bibasilar atelectasis.    Physical Exam:  General: Alert, oriented. No apparent distress.   Cardiovascular: Regular rate and rhythm without murmur, rubs, or gallops.    Pulmonary: Clear to auscultation bilaterally without wheezing, rubs, or  rales.  Chest: Sternotomy incision clean, dry, and intact. Sternum stable. No clicks.  Mediastinal chest tubes to 20 cm suction. No air leak. Fluid is serosanguineous.   Abdomen: Soft, nondistended, and nontender.  Extremities: Warm, moves all extremities. No edema.   Neurologic:  Grossly intact with no focal deficits.            Impression:  Thoracic aortic aneurysm without rupture  Nonrheumatic aortic valve insufficiency  Tobacco use  Hyperlipidemia        Plan:  Begin bowel regimen  Increase beta-blocker  Wean Cardene as tolerated   Discontinue insulin drip  Multimodality pain control regimen  Routine postcardiac surgery care  Encourage pulmonary toilet and ambulation  Remain in ICU for now.  We will reassess later today for possible transfer to  if able to wean off Cardene and walks well with physical therapy.  Discussed with patient and nursing      JUDD Fraser  03/23/23  08:27 CDT

## 2023-03-23 NOTE — PLAN OF CARE
Goal Outcome Evaluation:  Plan of Care Reviewed With: patient           Outcome Evaluation: PT eval complete. He is alert and oriented x 4. Daughter present during eval. They were educated on sternal precautions. He needs CGA/min assist to stand and to ambulate. Ambulates 200 ft around the unit. Good pace and step length with gait. Tolerates all activity. PT will cont to progress gait, balance and strengthening. Anticipatehe will recover and d.c home with family support.

## 2023-03-23 NOTE — PLAN OF CARE
Goal Outcome Evaluation:      Patient stood and ambulated 260' with CGA. Reviewed sternal precautions. RA SATs 97 -97%. HR 79 -86. /67 - 118/68. Tolerated well.

## 2023-03-23 NOTE — CASE MANAGEMENT/SOCIAL WORK
Discharge Planning Assessment  [facility]     Patient Name: Lyndon Campos  MRN: [id number]  Today's Date: 3/23/2023    Admit Date: 3/22/2023        Discharge Needs Assessment     Row Name 03/23/23 1047       Living Environment    People in Home sibling(s);other (see comments)    Name(s) of People in Home 2 sisters and cousin    Current Living Arrangements home    Primary Care Provided by self    Provides Primary Care For no one    Family Caregiver if Needed sibling(s)    Able to Return to Prior Arrangements yes       Resource/Environmental Concerns    Resource/Environmental Concerns none       Transition Planning    Patient/Family Anticipates Transition to home with family    Transportation Anticipated family or friend will provide       Discharge Needs Assessment    Readmission Within the Last 30 Days no previous admission in last 30 days    Equipment Currently Used at Home none    Concerns to be Addressed no discharge needs identified    Equipment Needed After Discharge none    Discharge Coordination/Progress spoke with patient who lives with 2 sisters and cousin; has RX coverage and PCP; independent at home prior to illness will follow for DC needs               Discharge Plan    No documentation.               Continued Care and Services - Admitted Since 3/22/2023    Coordination has not been started for this encounter.          Demographic Summary    No documentation.                Functional Status    No documentation.                Psychosocial    No documentation.                Abuse/Neglect    No documentation.                Legal    No documentation.                Substance Abuse    No documentation.                Patient Forms    No documentation.                   Yaneth Bahena RN

## 2023-03-23 NOTE — PLAN OF CARE
Goal Outcome Evaluation:  Plan of Care Reviewed With: patient, family        Progress: improving  Outcome Evaluation: Patient moved to 4b today from ICU. VSS/RA. NSR 80s per tele. Patient c/o pain to the left shoulder and back and incisional, medicated with good relief. Continue to encourage to use the IS/cough and deep breathing. Continue monitoring and notify MD of any significant changes.

## 2023-03-23 NOTE — THERAPY EVALUATION
Patient Name: Lyndon Campos  : 1960    MRN: [id number]                              Today's Date: 3/23/2023       Admit Date: 3/22/2023    Visit Dx:     ICD-10-CM ICD-9-CM   1. Thoracic aortic aneurysm without rupture, unspecified part  I71.20 441.2   2. 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)    • Hx SBO      Past Surgical History:   Procedure Laterality Date   • ABDOMINAL SURGERY     • ASCENDING ARCH/HEMIARCH REPLACEMENT N/A 3/22/2023    Procedure: AORTIC ROOT, ASCENDING AORTA REPAIR 30 MM HEMASHIELD STRAIGHT GRAFT, HEMIARCH REPLACEMENT 26 MM HEMASHIELD SINGLE ARM GRAFT / AORTIC VALVE REPLACEMENT 23MM INSPIRIS VALVE, WITH CIRCULATORY ARREST, AND TRANSESOPHAGEAL ECHOCARDIOGRAM;  Surgeon: Freddy Brasher MD;  Location:  [facility] OR;  Service: Cardiothoracic;  Laterality: N/A;   • CARDIAC CATHETERIZATION N/A 2023    Procedure: Coronary angiography;  Surgeon: German Maria MD;  Location:  [facility] CATH INVASIVE LOCATION;  Service: Cardiology;  Laterality: N/A;   • CHOLECYSTECTOMY     • EYE SURGERY     • KNEE SURGERY Left       General Information     Row Name 23 0758          Physical Therapy Time and Intention    Document Type evaluation  s/p 3/22 Aortic root replacement, ascending aortic replacement w hemiarch replacement  -RAJ     Mode of Treatment physical therapy  -RAJ     Row Name 23 0758          General  Information    Patient Profile Reviewed yes  -RAJ     Prior Level of Function independent:;all household mobility;ADL's  -RAJ     Existing Precautions/Restrictions fall;sternal  chest tube, external pacemaker  -RAJ     Barriers to Rehab medically complex  -RAJ     Row Name 03/23/23 0758          Living Environment    People in Home child(titi), adult  will stay with daughter after d.c  -RAJ     Row Name 03/23/23 0758          Home Main Entrance    Number of Stairs, Main Entrance five  -RAJ     Stair Railings, Main Entrance railing on left side (ascending)  -RAJ     Row Name 03/23/23 0758          Stairs Within Home, Primary    Number of Stairs, Within Home, Primary none  -RAJ     Row Name 03/23/23 0758          Cognition    Orientation Status (Cognition) oriented x 4  -RAJ     Row Name 03/23/23 0758          Safety Issues, Functional Mobility    Impairments Affecting Function (Mobility) balance;endurance/activity tolerance;strength;shortness of breath;pain  -RAJ           User Key  (r) = Recorded By, (t) = Taken By, (c) = Cosigned By    Initials Name Provider Type    Juan Monroe, PT DPT Physical Therapist               Mobility     Row Name 03/23/23 0758          Bed Mobility    Comment, (Bed Mobility) in chair  -RAJ     Row Name 03/23/23 0758          Sit-Stand Transfer    Sit-Stand [place] (Transfers) contact guard;minimum assist (75% patient effort)  -RAJ     Row Name 03/23/23 0758          Gait/Stairs (Locomotion)    [place] Level (Gait) contact guard;minimum assist (75% patient effort)  -RAJ     Distance in Feet (Gait) 200 ft  -RAJ     Comment, (Gait/Stairs) good pace and step length with gait  -RAJ           User Key  (r) = Recorded By, (t) = Taken By, (c) = Cosigned By    Initials Name Provider Type    Juan Monroe, PT DPT Physical Therapist               Obj/Interventions     Row Name 03/23/23 0758          Range of Motion Comprehensive    General Range of Motion bilateral lower extremity ROM WFL   -RAJ     Row Name 03/23/23 0758          Strength Comprehensive (MMT)    Comment, General Manual Muscle Testing (MMT) Assessment B LE grossly 4-/5  -RAJ     Row Name 03/23/23 0758          Balance    Balance Assessment sitting dynamic balance;standing dynamic balance  -RAJ     Dynamic Sitting Balance supervision  -RAJ     Position, Sitting Balance unsupported  -RAJ     Dynamic Standing Balance contact guard;minimal assist  -RAJ     Position/Device Used, Standing Balance supported  -RAJ           User Key  (r) = Recorded By, (t) = Taken By, (c) = Cosigned By    Initials Name Provider Type    Juan Monroe, PT DPT Physical Therapist               Goals/Plan     Row Name 03/23/23 0758          Bed Mobility Goal 1 (PT)    Activity/Assistive Device (Bed Mobility Goal 1, PT) sit to supine/supine to sit  -RAJ     [place] Level/Cues Needed (Bed Mobility Goal 1, PT) supervision required  -RAJ     Time Frame (Bed Mobility Goal 1, PT) long term goal (LTG);10 days  -RAJ     Progress/Outcomes (Bed Mobility Goal 1, PT) new goal  -RAJ     Row Name 03/23/23 0758          Transfer Goal 1 (PT)    Activity/Assistive Device (Transfer Goal 1, PT) sit-to-stand/stand-to-sit;bed-to-chair/chair-to-bed  -RAJ     [place] Level/Cues Needed (Transfer Goal 1, PT) supervision required  -RAJ     Time Frame (Transfer Goal 1, PT) long term goal (LTG);10 days  -RAJ     Progress/Outcome (Transfer Goal 1, PT) new goal  -RAJ     Row Name 03/23/23 0758          Gait Training Goal 1 (PT)    Activity/Assistive Device (Gait Training Goal 1, PT) gait (walking locomotion);decrease fall risk;improve balance and speed;increase endurance/gait distance  -RAJ     [place] Level (Gait Training Goal 1, PT) supervision required  -RAJ     Distance (Gait Training Goal 1, PT) 250 ft  -RAJ     Time Frame (Gait Training Goal 1, PT) long term goal (LTG);10 days  -RAJ     Progress/Outcome (Gait Training Goal 1, PT) new goal  -RAJ     Row Name 03/23/23 0758          Stairs  Goal 1 (PT)    Activity/Assistive Device (Stairs Goal 1, PT) ascending stairs;descending stairs;using handrail, left  -RAJ     [place] Level/Cues Needed (Stairs Goal 1, PT) supervision required  -RAJ     Number of Stairs (Stairs Goal 1, PT) 5 steps  -RAJ     Time Frame (Stairs Goal 1, PT) long term goal (LTG);10 days  -RAJ     Progress/Outcome (Stairs Goal 1, PT) new goal  -RAJ     Row Name 03/23/23 0758          Therapy Assessment/Plan (PT)    Planned Therapy Interventions (PT) bed mobility training;transfer training;gait training;balance training;home exercise program;patient/family education;postural re-education;stair training;strengthening  -RAJ           User Key  (r) = Recorded By, (t) = Taken By, (c) = Cosigned By    Initials Name Provider Type    Juan Monroe, PT DPT Physical Therapist               Clinical Impression     Row Name 03/23/23 0758          Pain    Pain Intervention(s) Repositioned;Ambulation/increased activity  -RAJ     Additional Documentation Pain Scale: FACES Pre/Post-Treatment (Group)  -RAJ     Row Name 03/23/23 0758          Pain Scale: FACES Pre/Post-Treatment    Pain: FACES Scale, Pretreatment 4-->hurts little more  -RAJ     Pain Location incisional  -RAJ     Pain Location - chest  -RAJ     Row Name 03/23/23 0758          Plan of Care Review    Plan of Care Reviewed With patient  -RAJ     Outcome Evaluation PT eval complete. He is alert and oriented x 4. Daughter present during eval. They were educated on sternal precautions. He needs CGA/min assist to stand and to ambulate. Ambulates 200 ft around the unit. Good pace and step length with gait. Tolerates all activity. PT will cont to progress gait, balance and strengthening. Anticipatehe will recover and d.c home with family support.  -RAJ     Row Name 03/23/23 0758          Therapy Assessment/Plan (PT)    Patient/Family Therapy Goals Statement (PT) go home  -RAJ     Rehab Potential (PT) good, to achieve stated therapy goals  -RAJ      Criteria for Skilled Interventions Met (PT) yes;meets criteria;skilled treatment is necessary  -RAJ     Therapy Frequency (PT) 2 times/day  -RAJ     Predicted Duration of Therapy Intervention (PT) until d.c  -RAJ     Row Name 03/23/23 0758          Vital Signs    Pre Systolic BP Rehab 108  -RAJ     Pre Treatment Diastolic BP 58  -RAJ     Post Systolic BP Rehab 125  -RAJ     Post Treatment Diastolic BP 62  -RAJ     Pretreatment Heart Rate (beats/min) 84  -RAJ     Posttreatment Heart Rate (beats/min) 84  -RAJ     Pre SpO2 (%) 97  -RAJ     O2 Delivery Pre Treatment room air  -RAJ     O2 Delivery Intra Treatment room air  -RAJ     Post SpO2 (%) 97  -RAJ     O2 Delivery Post Treatment room air  -RAJ     Pre Patient Position Sitting  -RAJ     Intra Patient Position Standing  -RAJ     Post Patient Position Sitting  -RAJ     Row Name 03/23/23 0758          Positioning and Restraints    Pre-Treatment Position sitting in chair/recliner  -RAJ     Post Treatment Position chair  -RAJ     In Chair reclined;call light within reach;encouraged to call for assist;RUE elevated;LUE elevated;with family/caregiver;patient within staff view;with nsg  -RAJ           User Key  (r) = Recorded By, (t) = Taken By, (c) = Cosigned By    Initials Name Provider Type    Juan Monroe, PT DPT Physical Therapist               Outcome Measures     Row Name 03/23/23 0758          How much help from another person do you currently need...    Turning from your back to your side while in flat bed without using bedrails? 3  -RAJ     Moving from lying on back to sitting on the side of a flat bed without bedrails? 3  -RAJ     Moving to and from a bed to a chair (including a wheelchair)? 3  -RAJ     Standing up from a chair using your arms (e.g., wheelchair, bedside chair)? 3  -RAJ     Climbing 3-5 steps with a railing? 2  -RAJ     To walk in hospital room? 3  -RAJ     AM-PAC 6 Clicks Score (PT) 17  -RAJ     Highest level of mobility 5 --> Static standing  -RAJ     Row Name  03/23/23 0758          Functional Assessment    Outcome Measure Options AM-PAC 6 Clicks Basic Mobility (PT)  -RAJ           User Key  (r) = Recorded By, (t) = Taken By, (c) = Cosigned By    Initials Name Provider Type    Juan Monroe PT DPT Physical Therapist                             Physical Therapy Education     Title: PT OT SLP Therapies (In Progress)     Topic: Physical Therapy (In Progress)     Point: Mobility training (Done)     Learning Progress Summary           Patient Acceptance, E, VU,DU,NR by RAJ at 3/23/2023 0758    Comment: Benefits of activity, progression of PT, sternal prec                   Point: Home exercise program (Not Started)     Learner Progress:  Not documented in this visit.          Point: Body mechanics (Not Started)     Learner Progress:  Not documented in this visit.          Point: Precautions (Done)     Learning Progress Summary           Patient Acceptance, E, VU,DU,NR by RAJ at 3/23/2023 0758    Comment: Benefits of activity, progression of PT, sternal prec                               User Key     Initials Effective Dates Name Provider Type Jaime ANTHONY 02/03/23 -  Juan Mckeon PT DPT Physical Therapist PT              PT Recommendation and Plan  Planned Therapy Interventions (PT): bed mobility training, transfer training, gait training, balance training, home exercise program, patient/family education, postural re-education, stair training, strengthening  Plan of Care Reviewed With: patient  Outcome Evaluation: PT eval complete. He is alert and oriented x 4. Daughter present during eval. They were educated on sternal precautions. He needs CGA/min assist to stand and to ambulate. Ambulates 200 ft around the unit. Good pace and step length with gait. Tolerates all activity. PT will cont to progress gait, balance and strengthening. Anticipatehe will recover and d.c home with family support.     Time Calculation:    PT Charges     Row Name 03/23/23 0856              Time Calculation    Start Time 0758  -RAJ      Stop Time 0854  -RAJ      Time Calculation (min) 56 min  -RAJ      PT Received On 03/23/23  -RAJ      PT Goal Re-Cert Due Date 04/02/23  -RAJ            User Key  (r) = Recorded By, (t) = Taken By, (c) = Cosigned By    Initials Name Provider Type    Juan Monroe, PT DPT Physical Therapist              Therapy Charges for Today     Code Description Service Date Service Provider Modifiers Qty    50118260148 HC PT EVAL MOD COMPLEXITY 4 3/23/2023 Juan Mckeon, PT DPT GP 1          PT G-Codes  Outcome Measure Options: AM-PAC 6 Clicks Basic Mobility (PT)  AM-PAC 6 Clicks Score (PT): 17  PT Discharge Summary  Anticipated Discharge Disposition (PT): home with assist, home with 24/7 care    Juan Mckeon, PT DPT  3/23/2023

## 2023-03-24 ENCOUNTER — APPOINTMENT (OUTPATIENT)
Dept: GENERAL RADIOLOGY | Facility: HOSPITAL | Age: 63
DRG: 220 | End: 2023-03-24
Payer: COMMERCIAL

## 2023-03-24 ENCOUNTER — APPOINTMENT (OUTPATIENT)
Dept: CT IMAGING | Facility: HOSPITAL | Age: 63
DRG: 220 | End: 2023-03-24
Payer: COMMERCIAL

## 2023-03-24 LAB
ANION GAP SERPL CALCULATED.3IONS-SCNC: 9 MMOL/L (ref 5–15)
BH BB BLOOD EXPIRATION DATE: NORMAL
BH BB BLOOD EXPIRATION DATE: NORMAL
BH BB BLOOD TYPE BARCODE: 600
BH BB BLOOD TYPE BARCODE: 600
BH BB DISPENSE STATUS: NORMAL
BH BB DISPENSE STATUS: NORMAL
BH BB PRODUCT CODE: NORMAL
BH BB PRODUCT CODE: NORMAL
BH BB UNIT NUMBER: NORMAL
BH BB UNIT NUMBER: NORMAL
BUN SERPL-MCNC: 16 MG/DL (ref 8–23)
BUN/CREAT SERPL: 29.6 (ref 7–25)
CALCIUM SPEC-SCNC: 9.3 MG/DL (ref 8.6–10.5)
CHLORIDE SERPL-SCNC: 102 MMOL/L (ref 98–107)
CO2 SERPL-SCNC: 26 MMOL/L (ref 22–29)
CREAT SERPL-MCNC: 0.54 MG/DL (ref 0.76–1.27)
CROSSMATCH INTERPRETATION: NORMAL
CROSSMATCH INTERPRETATION: NORMAL
DEPRECATED RDW RBC AUTO: 48.1 FL (ref 37–54)
EGFRCR SERPLBLD CKD-EPI 2021: 112 ML/MIN/1.73
ERYTHROCYTE [DISTWIDTH] IN BLOOD BY AUTOMATED COUNT: 13.3 % (ref 12.3–15.4)
GLUCOSE SERPL-MCNC: 128 MG/DL (ref 65–99)
HCT VFR BLD AUTO: 36.5 % (ref 37.5–51)
HGB BLD-MCNC: 11.6 G/DL (ref 13–17.7)
LAB AP CASE REPORT: NORMAL
Lab: NORMAL
MCH RBC QN AUTO: 31.4 PG (ref 26.6–33)
MCHC RBC AUTO-ENTMCNC: 31.8 G/DL (ref 31.5–35.7)
MCV RBC AUTO: 98.6 FL (ref 79–97)
PATH REPORT.FINAL DX SPEC: NORMAL
PATH REPORT.GROSS SPEC: NORMAL
PLATELET # BLD AUTO: 139 10*3/MM3 (ref 140–450)
PMV BLD AUTO: 10.8 FL (ref 6–12)
POTASSIUM SERPL-SCNC: 5.2 MMOL/L (ref 3.5–5.2)
QT INTERVAL: 368 MS
QT INTERVAL: 460 MS
QTC INTERVAL: 407 MS
QTC INTERVAL: 429 MS
RBC # BLD AUTO: 3.7 10*6/MM3 (ref 4.14–5.8)
SODIUM SERPL-SCNC: 137 MMOL/L (ref 136–145)
UNIT  ABO: NORMAL
UNIT  ABO: NORMAL
UNIT  RH: NORMAL
UNIT  RH: NORMAL
WBC NRBC COR # BLD: 18.09 10*3/MM3 (ref 3.4–10.8)

## 2023-03-24 PROCEDURE — 71275 CT ANGIOGRAPHY CHEST: CPT

## 2023-03-24 PROCEDURE — 25510000001 IOPAMIDOL PER 1 ML: Performed by: SURGERY

## 2023-03-24 PROCEDURE — 85027 COMPLETE CBC AUTOMATED: CPT | Performed by: NURSE PRACTITIONER

## 2023-03-24 PROCEDURE — 25010000002 ENOXAPARIN PER 10 MG: Performed by: NURSE PRACTITIONER

## 2023-03-24 PROCEDURE — 71045 X-RAY EXAM CHEST 1 VIEW: CPT

## 2023-03-24 PROCEDURE — 97116 GAIT TRAINING THERAPY: CPT

## 2023-03-24 PROCEDURE — 80048 BASIC METABOLIC PNL TOTAL CA: CPT | Performed by: NURSE PRACTITIONER

## 2023-03-24 RX ADMIN — LIDOCAINE 2 PATCH: 700 PATCH TOPICAL at 21:34

## 2023-03-24 RX ADMIN — OXYCODONE HYDROCHLORIDE 10 MG: 5 TABLET ORAL at 08:26

## 2023-03-24 RX ADMIN — METHOCARBAMOL 500 MG: 500 TABLET, FILM COATED ORAL at 15:00

## 2023-03-24 RX ADMIN — METOPROLOL TARTRATE 25 MG: 25 TABLET, FILM COATED ORAL at 20:04

## 2023-03-24 RX ADMIN — ACETAMINOPHEN 650 MG: 325 TABLET ORAL at 17:44

## 2023-03-24 RX ADMIN — OXYCODONE HYDROCHLORIDE 10 MG: 5 TABLET ORAL at 12:32

## 2023-03-24 RX ADMIN — OXYCODONE HYDROCHLORIDE 10 MG: 5 TABLET ORAL at 04:14

## 2023-03-24 RX ADMIN — BISACODYL 10 MG: 5 TABLET ORAL at 08:25

## 2023-03-24 RX ADMIN — METHOCARBAMOL 500 MG: 500 TABLET, FILM COATED ORAL at 21:33

## 2023-03-24 RX ADMIN — OXYCODONE HYDROCHLORIDE 10 MG: 5 TABLET ORAL at 00:10

## 2023-03-24 RX ADMIN — ASPIRIN 81 MG: 81 TABLET, COATED ORAL at 08:27

## 2023-03-24 RX ADMIN — PANTOPRAZOLE SODIUM 40 MG: 40 TABLET, DELAYED RELEASE ORAL at 08:26

## 2023-03-24 RX ADMIN — IOPAMIDOL 100 ML: 755 INJECTION, SOLUTION INTRAVENOUS at 16:23

## 2023-03-24 RX ADMIN — ACETAMINOPHEN 650 MG: 325 TABLET ORAL at 12:32

## 2023-03-24 RX ADMIN — OXYCODONE HYDROCHLORIDE 10 MG: 5 TABLET ORAL at 20:04

## 2023-03-24 RX ADMIN — ATORVASTATIN CALCIUM 20 MG: 10 TABLET, FILM COATED ORAL at 20:04

## 2023-03-24 RX ADMIN — ACETAMINOPHEN 650 MG: 325 TABLET ORAL at 08:26

## 2023-03-24 RX ADMIN — METOPROLOL TARTRATE 25 MG: 25 TABLET, FILM COATED ORAL at 08:27

## 2023-03-24 RX ADMIN — METHOCARBAMOL 500 MG: 500 TABLET, FILM COATED ORAL at 08:25

## 2023-03-24 RX ADMIN — ENOXAPARIN SODIUM 40 MG: 100 INJECTION SUBCUTANEOUS at 08:25

## 2023-03-24 NOTE — PLAN OF CARE
Goal Outcome Evaluation:  Plan of Care Reviewed With: patient        Progress: no change  Outcome Evaluation: BP elevated at times, 02 sats WNL on RA, ambulated in halls x2 this shift, 30ML out of CT at beginning of shift, trouble getting pain under control with his left shoulder, Sinus on telemetry.

## 2023-03-24 NOTE — THERAPY TREATMENT NOTE
Acute Care - Physical Therapy Treatment Note  [facility]     Patient Name: Lyndon Campos  : 1960  MRN: [id number]  Today's Date: 3/24/2023      Visit Dx:     ICD-10-CM ICD-9-CM   1. Thoracic aortic aneurysm without rupture, unspecified part  I71.20 441.2   2. 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)    • Hx SBO      Past Surgical History:   Procedure Laterality Date   • ABDOMINAL SURGERY     • ASCENDING ARCH/HEMIARCH REPLACEMENT N/A 3/22/2023    Procedure: AORTIC ROOT, ASCENDING AORTA REPAIR 30 MM HEMASHIELD STRAIGHT GRAFT, HEMIARCH REPLACEMENT 26 MM HEMASHIELD SINGLE ARM GRAFT / AORTIC VALVE REPLACEMENT 23MM INSPIRIS VALVE, WITH CIRCULATORY ARREST, AND TRANSESOPHAGEAL ECHOCARDIOGRAM;  Surgeon: Freddy Brasher MD;  Location: [facility] OR;  Service: Cardiothoracic;  Laterality: N/A;   • CARDIAC CATHETERIZATION N/A 2023    Procedure: Coronary angiography;  Surgeon: German Maria MD;  Location:  [facility] CATH INVASIVE LOCATION;  Service: Cardiology;  Laterality: N/A;   • CHOLECYSTECTOMY     • EYE SURGERY     • KNEE SURGERY Left      PT Assessment (last 12 hours)     PT Evaluation and Treatment     Row Name 23 1028          Physical Therapy Time and Intention    Subjective Information complains of;fatigue;pain  -ANJEL     Document Type therapy note (daily note)  -ANJEL     Mode of Treatment physical therapy  -     Row Name  PA initiated for sildenafil and sent to the med auth dept.   03/24/23 1028          General Information    Existing Precautions/Restrictions fall;sternal  -ANJEL     Row Name 03/24/23 1028          Pain    Posttreatment Pain Rating 4/10  -ANJEL     Pain Location incisional  -ANJEL     Pain Location - chest  -ANJEL     Row Name 03/24/23 1028          Sit-Stand Transfer    Sit-Stand [place] (Transfers) standby assist  -ANJEL     Row Name 03/24/23 1028          Stand-Sit Transfer    Stand-Sit [place] (Transfers) verbal cues;standby assist  -ANJEL     Row Name 03/24/23 1028          Gait/Stairs (Locomotion)    [place] Level (Gait) contact guard  -ANJEL     Distance in Feet (Gait) 400  -ANJEL     Deviations/Abnormal Patterns (Gait) bienvenido decreased;stride length decreased  -ANJEL     [place] Level (Stairs) stand by assist;verbal cues  -ANJEL     Number of Steps (Stairs) 5  -ANJEL     Ascending Technique (Stairs) step-to-step  -ANJEL     Descending Technique (Stairs) step-to-step  -ANJEL     Row Name             Wound 03/22/23 0814 midline sternal Incision    Wound - Properties Group Placement Date: 03/22/23  -CH Placement Time: 0814  -CH Present on Hospital Admission: N  -CH Orientation: midline  -CH Location: sternal  -CH Primary Wound Type: Incision  -CH Additional Comments: MASTISOL, STERI-STRIPS, MEPILEX DRESSING  -CH    Retired Wound - Properties Group Placement Date: 03/22/23  -CH Placement Time: 0814  -CH Present on Hospital Admission: N  -CH Orientation: midline  -CH Location: sternal  -CH Primary Wound Type: Incision  -CH Additional Comments: MASTISOL, STERI-STRIPS, MEPILEX DRESSING  -CH    Retired Wound - Properties Group Date first assessed: 03/22/23  -CH Time first assessed: 0814  -CH Present on Hospital Admission: N  -CH Location: sternal  -CH Primary Wound Type: Incision  -CH Additional Comments: MASTISOL, STERI-STRIPS, MEPILEX DRESSING  -CH    Row Name 03/24/23 1028          Vital Signs    Pre SpO2 (%) 96  -ANJEL     O2 Delivery Pre Treatment room air  -ANJEL     Post SpO2 (%) 97  -ANJEL      O2 Delivery Post Treatment room air  -     Row Name 03/24/23 1028          Positioning and Restraints    Pre-Treatment Position sitting in chair/recliner  -ANJEL     Post Treatment Position chair  -ANJEL     In Chair sitting;with other staff  -           User Key  (r) = Recorded By, (t) = Taken By, (c) = Cosigned By    Initials Name Provider Type    Corrina Mccullough PTA Physical Therapist Assistant    Rohit Banerjee RN Registered Nurse                Physical Therapy Education     Title: PT OT SLP Therapies (In Progress)     Topic: Physical Therapy (In Progress)     Point: Mobility training (Done)     Learning Progress Summary           Patient Acceptance, E,D, DU,VU by ANJEL at 3/24/2023 1108    Comment: safe and effecient stair climbing    Acceptance, E, VU,DU,NR by RAJ at 3/23/2023 0758    Comment: Benefits of activity, progression of PT, sternal prec                   Point: Home exercise program (Not Started)     Learner Progress:  Not documented in this visit.          Point: Body mechanics (Not Started)     Learner Progress:  Not documented in this visit.          Point: Precautions (Done)     Learning Progress Summary           Patient Acceptance, E, VU,DU,NR by RAJ at 3/23/2023 0758    Comment: Benefits of activity, progression of PT, sternal prec                               User Key     Initials Effective Dates Name Provider Type Discipline    RAJ 02/03/23 -  Juan Mckeon PT DPT Physical Therapist PT    ANJEL 02/03/23 -  Corrina Stubbs PTA Physical Therapist Assistant PT              PT Recommendation and Plan         Outcome Measures     Row Name 03/24/23 1100             How much help from another person do you currently need...    Turning from your back to your side while in flat bed without using bedrails? 3  -ANJEL      Moving from lying on back to sitting on the side of a flat bed without bedrails? 3  -ANJEL      Moving to and from a bed to a chair (including a wheelchair)? 4  -ANJEL       Standing up from a chair using your arms (e.g., wheelchair, bedside chair)? 4  no arms  -ANJEL      Climbing 3-5 steps with a railing? 4  -ANJEL      To walk in hospital room? 3  -ANJEL      AM-PAC 6 Clicks Score (PT) 21  -ANJEL            User Key  (r) = Recorded By, (t) = Taken By, (c) = Cosigned By    Initials Name Provider Type    Corrina Mccullough PTA Physical Therapist Assistant                 Time Calculation:     Therapy Charges for Today     Code Description Service Date Service Provider Modifiers Qty    [id number] HC GAIT TRAINING EA 15 MIN 3/23/2023 Corrina Stubbs PTA GP 2          PT G-Codes  Outcome Measure Options: AM-PAC 6 Clicks Basic Mobility (PT)  AM-PAC 6 Clicks Score (PT): 21    Corrina Stubbs PTA  3/24/2023     Complex Repair And Rhombic Flap Text: The defect edges were debeveled with a #15 scalpel blade.  The primary defect was closed partially with a complex linear closure.  Given the location of the remaining defect, shape of the defect and the proximity to free margins a rhombic flap was deemed most appropriate for complete closure of the defect.  Using a sterile surgical marker, an appropriate advancement flap was drawn incorporating the defect and placing the expected incisions within the relaxed skin tension lines where possible.    The area thus outlined was incised deep to adipose tissue with a #15 scalpel blade.  The skin margins were undermined to an appropriate distance in all directions utilizing iris scissors.

## 2023-03-24 NOTE — PLAN OF CARE
Goal Outcome Evaluation:           Progress: improving  Outcome Evaluation: Pt c/o pain to midsternal chest and left shoulder relieved by PO PRN pain medication. Ambulated in hallway 4 x this shift. MST pulled this AM. S/ST  PVC on telemetry. VSS. Safety maintained. BM this shift.

## 2023-03-24 NOTE — PLAN OF CARE
Goal Outcome Evaluation:   Patient stands and sits with SBA. Ambulated 425 with CGA and 1 standing rest. Verbally instructed patient and sister on car transfers. Verbally instructed on bed transfers. Patient denied opportunity to practice bed transfers at this time. Reviewed sternal precautions.

## 2023-03-24 NOTE — DISCHARGE SUMMARY
[facility] Cardiothoracic Surgery  DISCHARGE SUMMARY        Date of Admission: 3/22/2023  Date of Discharge: 3/25/2023  Primary Care Physician: Ian Allan MD    Discharge Diagnoses:  Active Hospital Problems    Diagnosis    • **Thoracic aortic aneurysm without rupture    • Tobacco use    • Hyperlipidemia    • Thoracic aortic aneurysm without rupture, unspecified part    • Nonrheumatic aortic valve insufficiency        Procedures Performed:   Ascending aorta and aortic root replacement with valved conduit and coronary reconstruction, Bio-Bentall Procedure; Aortic Hemiarch Replacement with beveled open distal anastomosis under arch vessels with circulatory arrest by Dr. Brasher on 3/22/2023    HPI:  Mr. Lyndon Campos is a 62 y.o. male who presented to Dr. Brasher as an outpatient with aortic root and ascending aortic aneurysm.  He also had moderate aortic insufficiency with suspected bicuspid aortic valve.  He is a current everyday smoker but otherwise healthy.  Preoperative work-up was obtained and he was deemed a suitable candidate for surgery.  Dr. Brasher discussed with him the risk and benefits of proceeding with aortic root replacement with ascending/hemiarch replacement.  He verbalized understanding and was agreeable to proceed.    Hospital Course: On 3/22/2023, Mr. Campos was taken to the operating room for ascending aorta and aortic root replacement bio Bentall procedure, aortic hemiarch replacement with circulatory arrest.  See separate op note by Dr. Brasher detailing his operation.  Following surgery he was transferred to the ICU in stable but guarded condition.  He remained hemodynamically stable and was extubated without remark during the evening hours following surgery.  He demonstrated an intact neurological exam and was tolerating nasal cannula.  He did require low-dose Cardene on the morning of postop day 1 but was ultimately weaned off.  He was tolerating room air and was able  to walk a lap around the ICU with physical therapy, therefore he was transferred to  on the afternoon of postop day 1.  The remainder of his hospital stay was significant for encouraging pulmonary toilet and ambulation as well as pain control.  Mediastinal chest tubes were removed without remark on postop day 2.  He is eating well and he is demonstrated adequate bowel function.  He has met all physical therapy goals.  Repeat CTA chest was obtained on postop day 2 revealing stability.  Pacing wires were removed without remark and he meets criteria for discharge home on postop day 3.  The patient is agreeable to this plan.    He will be discharged home on aspirin only.  He will not be discharged home with Lasix as he is well below his baseline weight.    Condition on Discharge:  Neurologically intact and has good pain control.  He is eating well and has demonstrable good bowel function.  The sternum is stable without clicks and the saphenectomy incisions are healing nicely.  The heart is in normal sinus rhythm.  He has met all physical therapy criteria and verbalizes understanding of sternotomy precautions.   He verbalizes understanding of a separately handed out cardiac surgery handout.       Discharge Disposition:  Home or Self Care [1]    Discharge Medications:     Discharge Medications      New Medications      Instructions Start Date   aspirin 81 MG EC tablet   81 mg, Oral, Daily      metoprolol tartrate 25 MG tablet  Commonly known as: LOPRESSOR   25 mg, Oral, Every 12 Hours Scheduled      pantoprazole 40 MG EC tablet  Commonly known as: PROTONIX   40 mg, Oral, Every Early Morning         Continue These Medications      Instructions Start Date   diphenhydrAMINE 25 mg capsule  Commonly known as: BENADRYL   25 mg, Oral, Every 6 Hours PRN      escitalopram 10 MG tablet  Commonly known as: LEXAPRO   10 mg, Oral, Daily      oxyCODONE-acetaminophen 7.5-325 MG per tablet  Commonly known as: PERCOCET   1 tablet, Oral,  Every 8 Hours PRN      rosuvastatin 10 MG tablet  Commonly known as: CRESTOR   10 mg, Oral, Daily      tiZANidine 4 MG tablet  Commonly known as: ZANAFLEX   1 tablet, Oral, Every 12 Hours Scheduled      traZODone 50 MG tablet  Commonly known as: DESYREL   50 mg, Oral, Nightly PRN         Stop These Medications    ondansetron ODT 4 MG disintegrating tablet  Commonly known as: ZOFRAN-ODT            Discharge Diet: Regular diet     Discharge Care Plan / Instructions: Please see the separately handed out cardiac surgery handout.  He will not be referred to cardiac rehab at this time due to recent sternotomy.  We will reassess at his postop follow-up visit.    Activity at Discharge:   No heavy lifting greater than 5 pounds or a gallon of milk while maintaining sternal precautions.  Lyndon Campos has been instructed on an exercise  regiment as detailed in a handed out cardiac surgery handout.    Tobacco:  Patient has been counseled as to smoking cessation. We discussed its benefits in regards to immediate recovery and the long-term benefits of avoidance of tobacco products.  We discussed the benefit of long-term health that tobacco cessation would convey.      BMI: BMI is within normal parameters. No other follow-up for BMI required.      Follow-up Appointments: Lyndon Campos  is requested to see Ian Allan MD within 1-2 weeks from time of discharge, to see Yaneth WHALEY in 1 week, to follow-up with Dr. Brasher in 6 weeks,  and to follow-up with Dr. Maria of the cardiology service in 6 weeks.

## 2023-03-24 NOTE — PLAN OF CARE
Goal Outcome Evaluation:      Patient stands and sits with SBA. Ambulated 400' with SBA. Climbed 5 steps efficiently and safely with SBA. Reviewed sternal precautions. RA SATs 97 -96%.  Will benefit from increased ambulation.

## 2023-03-24 NOTE — PROGRESS NOTES
"Patient name: Lyndon Campos  Patient : 1960  VISIT # [id number]  MR #[id number]    Procedure:Procedure(s):  AORTIC ROOT, ASCENDING AORTA REPAIR 30 MM HEMASHIELD STRAIGHT GRAFT, HEMIARCH REPLACEMENT 26 MM HEMASHIELD SINGLE ARM GRAFT / AORTIC VALVE REPLACEMENT 23MM INSPIRIS VALVE, WITH CIRCULATORY ARREST, AND TRANSESOPHAGEAL ECHOCARDIOGRAM  Procedure Date:3/22/2023  POD:2 Days Post-Op    Subjective     Patient is resting in chair tolerating room air.  Weight is down 5 pounds today and he is below his baseline weight.  He has had a bowel movement.  Walking 260 feet with physical therapy.    Telemetry: Sinus rhythm 80s         Objective     Baseline weight 127 pounds  Visit Vitals  /76 (BP Location: Right arm, Patient Position: Sitting)   Pulse 89   Temp 98.4 °F (36.9 °C) (Oral)   Resp 18   Ht 162.5 cm (63.98\")   Wt 55.8 kg (123 lb)   SpO2 98%   BMI 21.13 kg/m²       Intake/Output Summary (Last 24 hours) at 3/24/2023 0928  Last data filed at 3/24/2023 0900  Gross per 24 hour   Intake 1699 ml   Output 1705 ml   Net -6 ml     MCT 80 mL in 24 hours, serosanguineous, no airleak    Lab:     CBC:  Results from last 7 days   Lab Units 23  03223  1659   WBC 10*3/mm3 18.09* 17.18* 8.66   HEMATOCRIT % 36.5* 33.7* 33.7*   PLATELETS 10*3/mm3 139* 131* 127*          BMP:  Results from last 7 days   Lab Units 23  1659   SODIUM mmol/L 137 139 142   POTASSIUM mmol/L 5.2 4.3 4.0   CHLORIDE mmol/L 102 105 108*   CO2 mmol/L 26.0 24.0 25.0   GLUCOSE mg/dL 128* 142* 154*   BUN mg/dL 16 10 11   CREATININE mg/dL 0.54* 0.43* 0.58*          COAG:  Results from last 7 days   Lab Units 23  1323   INR  1.11* 1.31*   APTT seconds  --  41.9*       IMAGES:       Imaging Results (Last 24 Hours)     Procedure Component Value Units Date/Time    XR Chest 1 View [[id number]] Collected: 23     Updated: 23    Narrative:      " EXAMINATION: XR CHEST 1 VW- 3/24/2023 8:05 AM CDT     HISTORY: Post-Op Heart Surgery     REPORT: A frontal view of the chest was obtained.     COMPARISON: Chest x-ray 3/23/2020 0306 hours.     The right IJ catheter sheath has been removed, no pneumothorax is  identified. The lungs are hyperinflated compatible with emphysema. No  pulmonary infiltrate is identified. There is no pleural effusion.  Previous median sternotomy and cardiac valve replacement surgery is  noted. There is ectasia of the thoracic aorta. Heart size appears  normal. Question of an old fracture involving the right first rib.       Impression:      Removal of right IJ sheath, no pneumothorax is seen.  Advanced emphysema. No acute cardiopulmonary abnormality.  This report was finalized on 03/24/2023 08:10 by Dr. Jeet Dey MD.        CXR: Chest tubes in stable position with no pneumothorax.  Bibasilar atelectasis.  Emphysematous changes noted    Patient was seen and examined at bedside and there are no changes to physical exam.  Physical Exam:  General: Alert, oriented. No apparent distress.   Cardiovascular: Regular rate and rhythm without murmur, rubs, or gallops.    Pulmonary: Clear to auscultation bilaterally without wheezing, rubs, or rales.  Chest: Sternotomy incision clean, dry, and intact. Sternum stable. No clicks.  Mediastinal chest tubes to 20 cm suction. No air leak. Fluid is serosanguineous.   Abdomen: Soft, nondistended, and nontender.  Extremities: Warm, moves all extremities. No edema.   Neurologic:  Grossly intact with no focal deficits.            Impression:  Thoracic aortic aneurysm without rupture  Nonrheumatic aortic valve insufficiency  Tobacco use  Hyperlipidemia        Plan:  Discontinue mediastinal chest tubes  Multimodality pain control regimen  Routine postcardiac surgery care  Encourage pulmonary toilet and ambulation  CTA chest today  Overall he is doing very well and anticipate discharge home likely  tomorrow  Discussed with patient and nursing      Yaneth Dominguez, JUDD  03/24/23  09:28 CDT

## 2023-03-25 ENCOUNTER — READMISSION MANAGEMENT (OUTPATIENT)
Dept: CALL CENTER | Facility: HOSPITAL | Age: 63
End: 2023-03-25
Payer: COMMERCIAL

## 2023-03-25 ENCOUNTER — APPOINTMENT (OUTPATIENT)
Dept: GENERAL RADIOLOGY | Facility: HOSPITAL | Age: 63
DRG: 220 | End: 2023-03-25
Payer: COMMERCIAL

## 2023-03-25 VITALS
BODY MASS INDEX: 20.25 KG/M2 | DIASTOLIC BLOOD PRESSURE: 56 MMHG | WEIGHT: 118.6 LBS | OXYGEN SATURATION: 97 % | TEMPERATURE: 97.2 F | HEART RATE: 79 BPM | SYSTOLIC BLOOD PRESSURE: 92 MMHG | HEIGHT: 64 IN | RESPIRATION RATE: 16 BRPM

## 2023-03-25 LAB
ANION GAP SERPL CALCULATED.3IONS-SCNC: 6 MMOL/L (ref 5–15)
BUN SERPL-MCNC: 17 MG/DL (ref 8–23)
BUN/CREAT SERPL: 31.5 (ref 7–25)
CALCIUM SPEC-SCNC: 9 MG/DL (ref 8.6–10.5)
CHLORIDE SERPL-SCNC: 104 MMOL/L (ref 98–107)
CO2 SERPL-SCNC: 28 MMOL/L (ref 22–29)
CREAT SERPL-MCNC: 0.54 MG/DL (ref 0.76–1.27)
DEPRECATED RDW RBC AUTO: 46.7 FL (ref 37–54)
EGFRCR SERPLBLD CKD-EPI 2021: 112 ML/MIN/1.73
ERYTHROCYTE [DISTWIDTH] IN BLOOD BY AUTOMATED COUNT: 13 % (ref 12.3–15.4)
GLUCOSE SERPL-MCNC: 107 MG/DL (ref 65–99)
HCT VFR BLD AUTO: 31.4 % (ref 37.5–51)
HGB BLD-MCNC: 10.1 G/DL (ref 13–17.7)
MCH RBC QN AUTO: 31.3 PG (ref 26.6–33)
MCHC RBC AUTO-ENTMCNC: 32.2 G/DL (ref 31.5–35.7)
MCV RBC AUTO: 97.2 FL (ref 79–97)
PLATELET # BLD AUTO: 137 10*3/MM3 (ref 140–450)
PMV BLD AUTO: 10.6 FL (ref 6–12)
POTASSIUM SERPL-SCNC: 4.4 MMOL/L (ref 3.5–5.2)
RBC # BLD AUTO: 3.23 10*6/MM3 (ref 4.14–5.8)
SODIUM SERPL-SCNC: 138 MMOL/L (ref 136–145)
WBC NRBC COR # BLD: 14.84 10*3/MM3 (ref 3.4–10.8)

## 2023-03-25 PROCEDURE — 80048 BASIC METABOLIC PNL TOTAL CA: CPT | Performed by: NURSE PRACTITIONER

## 2023-03-25 PROCEDURE — 71046 X-RAY EXAM CHEST 2 VIEWS: CPT

## 2023-03-25 PROCEDURE — 25010000002 ENOXAPARIN PER 10 MG: Performed by: NURSE PRACTITIONER

## 2023-03-25 PROCEDURE — 85027 COMPLETE CBC AUTOMATED: CPT | Performed by: NURSE PRACTITIONER

## 2023-03-25 RX ORDER — PANTOPRAZOLE SODIUM 40 MG/1
40 TABLET, DELAYED RELEASE ORAL
Qty: 30 TABLET | Refills: 0 | Status: SHIPPED | OUTPATIENT
Start: 2023-03-25

## 2023-03-25 RX ORDER — MIDAZOLAM HYDROCHLORIDE 1 MG/ML
INJECTION INTRAMUSCULAR; INTRAVENOUS AS NEEDED
Status: DISCONTINUED | OUTPATIENT
Start: 2023-03-22 | End: 2023-03-25 | Stop reason: SURG

## 2023-03-25 RX ORDER — ASPIRIN 81 MG/1
81 TABLET ORAL DAILY
Qty: 30 TABLET | Refills: 2 | Status: SHIPPED | OUTPATIENT
Start: 2023-03-25

## 2023-03-25 RX ADMIN — CHLORHEXIDINE GLUCONATE 15 ML: 1.2 RINSE BUCCAL at 05:21

## 2023-03-25 RX ADMIN — OXYCODONE HYDROCHLORIDE 10 MG: 5 TABLET ORAL at 00:17

## 2023-03-25 RX ADMIN — ACETAMINOPHEN 650 MG: 325 TABLET ORAL at 00:18

## 2023-03-25 RX ADMIN — OXYCODONE HYDROCHLORIDE 10 MG: 5 TABLET ORAL at 04:49

## 2023-03-25 RX ADMIN — OXYCODONE HYDROCHLORIDE 5 MG: 5 TABLET ORAL at 09:58

## 2023-03-25 RX ADMIN — ASPIRIN 81 MG: 81 TABLET, COATED ORAL at 08:25

## 2023-03-25 RX ADMIN — ENOXAPARIN SODIUM 40 MG: 100 INJECTION SUBCUTANEOUS at 08:25

## 2023-03-25 RX ADMIN — METHOCARBAMOL 500 MG: 500 TABLET, FILM COATED ORAL at 06:02

## 2023-03-25 RX ADMIN — PANTOPRAZOLE SODIUM 40 MG: 40 TABLET, DELAYED RELEASE ORAL at 05:21

## 2023-03-25 RX ADMIN — ACETAMINOPHEN 650 MG: 325 TABLET ORAL at 05:21

## 2023-03-25 RX ADMIN — METOPROLOL TARTRATE 25 MG: 25 TABLET, FILM COATED ORAL at 08:25

## 2023-03-25 NOTE — THERAPY DISCHARGE NOTE
Acute Care - Physical Therapy Discharge Summary  [facility]       Patient Name: Lyndon Campos  : 1960  MRN: [id number]    Today's Date: 3/25/2023                 Admit Date: 3/22/2023      PT Recommendation and Plan    Visit Dx:    ICD-10-CM ICD-9-CM   1. Thoracic aortic aneurysm without rupture, unspecified part  I71.20 441.2   2. Impaired mobility  Z74.09 799.89        Outcome Measures     Row Name 23 1100             How much help from another person do you currently need...    Turning from your back to your side while in flat bed without using bedrails? 3  -ANJEL      Moving from lying on back to sitting on the side of a flat bed without bedrails? 3  -ANJEL      Moving to and from a bed to a chair (including a wheelchair)? 4  -ANJEL      Standing up from a chair using your arms (e.g., wheelchair, bedside chair)? 4  no arms  -ANJEL      Climbing 3-5 steps with a railing? 4  -ANJEL      To walk in hospital room? 3  -ANJEL      AM-PAC 6 Clicks Score (PT) 21  -ANJEL            User Key  (r) = Recorded By, (t) = Taken By, (c) = Cosigned By    Initials Name Provider Type    Corrina Mccullough, SG Physical Therapist Assistant                     PT Rehab Goals     Row Name 23 1611             Bed Mobility Goal 1 (PT)    Activity/Assistive Device (Bed Mobility Goal 1, PT) sit to supine/supine to sit  -KJ      [place] Level/Cues Needed (Bed Mobility Goal 1, PT) supervision required  -KJ      Time Frame (Bed Mobility Goal 1, PT) long term goal (LTG);10 days  -KJ      Progress/Outcomes (Bed Mobility Goal 1, PT) goal met  -KJ         Transfer Goal 1 (PT)    Activity/Assistive Device (Transfer Goal 1, PT) sit-to-stand/stand-to-sit;bed-to-chair/chair-to-bed  -KJ      [place] Level/Cues Needed (Transfer Goal 1, PT) supervision required  -KJ      Time Frame (Transfer Goal 1, PT) long term goal (LTG);10 days  -KJ      Progress/Outcome (Transfer Goal 1, PT) goal met  -KJ         Gait Training Goal 1 (PT)     Activity/Assistive Device (Gait Training Goal 1, PT) gait (walking locomotion);decrease fall risk;improve balance and speed;increase endurance/gait distance  -KJ      [place] Level (Gait Training Goal 1, PT) supervision required  -KJ      Distance (Gait Training Goal 1, PT) 250 ft  -KJ      Time Frame (Gait Training Goal 1, PT) long term goal (LTG);10 days  -KJ      Progress/Outcome (Gait Training Goal 1, PT) goal met  -KJ         Stairs Goal 1 (PT)    Activity/Assistive Device (Stairs Goal 1, PT) ascending stairs;descending stairs;using handrail, left  -KJ      [place] Level/Cues Needed (Stairs Goal 1, PT) supervision required  -KJ      Number of Stairs (Stairs Goal 1, PT) 5 steps  -KJ      Time Frame (Stairs Goal 1, PT) long term goal (LTG);10 days  -KJ      Progress/Outcome (Stairs Goal 1, PT) goal met  -KJ            User Key  (r) = Recorded By, (t) = Taken By, (c) = Cosigned By    Initials Name Provider Type Discipline    Yaneth Carvalho PTA Physical Therapist Assistant PT                    PT Discharge Summary  Anticipated Discharge Disposition (PT): home  Reason for Discharge: Discharge from facility  Outcomes Achieved: Refer to plan of care for updates on goals achieved  Discharge Destination: Home      Yaneth Ruffin PTA   3/25/2023

## 2023-03-25 NOTE — PROGRESS NOTES
"Patient name: Lyndon Campos  Patient : 1960  VISIT # [id number]  MR #[id number]    Procedure:Procedure(s):  AORTIC ROOT, ASCENDING AORTA REPAIR 30 MM HEMASHIELD STRAIGHT GRAFT, HEMIARCH REPLACEMENT 26 MM HEMASHIELD SINGLE ARM GRAFT / AORTIC VALVE REPLACEMENT 23MM INSPIRIS VALVE, WITH CIRCULATORY ARREST, AND TRANSESOPHAGEAL ECHOCARDIOGRAM  Procedure Date:3/22/2023  POD:3 Days Post-Op    Subjective         Resting in chair.  Ambulating 425 feet with PT.  Remains on room air with SPO2 of 93%.  Weight is down 5 pounds and down 11 pounds from baseline.  WBC 14.84, afebrile.  Hemoglobin 10.1 with hematocrit of 31.4 and a platelet count of 137,000.  Creatinine 0.54, sodium 138 and potassium 4.4.  Has been having multiple bowel movements.  Reports pain is overall controlled. Eager to be discharged home.         Objective     Telemetry: Sinus rhythm 80s    Baseline weight 127 pounds  Visit Vitals  BP 99/64 (BP Location: Right arm, Patient Position: Lying)   Pulse 83   Temp 98.7 °F (37.1 °C) (Oral)   Resp 16   Ht 162.5 cm (63.98\")   Wt 53.8 kg (118 lb 9.6 oz)   SpO2 93%   BMI 20.37 kg/m²       Intake/Output Summary (Last 24 hours) at 3/25/2023 0729  Last data filed at 3/25/2023 0433  Gross per 24 hour   Intake 820 ml   Output 1700 ml   Net -880 ml       Lab:     CBC:  Results from last 7 days   Lab Units 23   WBC 10*3/mm3 14.84* 18.09* 17.18*   HEMATOCRIT % 31.4* 36.5* 33.7*   PLATELETS 10*3/mm3 137* 139* 131*          BMP:  Results from last 7 days   Lab Units 23   SODIUM mmol/L 138 137 139   POTASSIUM mmol/L 4.4 5.2 4.3   CHLORIDE mmol/L 104 102 105   CO2 mmol/L 28.0 26.0 24.0   GLUCOSE mg/dL 107* 128* 142*   BUN mg/dL 17 16 10   CREATININE mg/dL 0.54* 0.54* 0.43*          COAG:  Results from last 7 days   Lab Units 23  0222 23  1323   INR  1.11* 1.31*   APTT seconds  --  41.9*       IMAGES:       Imaging Results (Last " 24 Hours)     Procedure Component Value Units Date/Time    XR Chest PA & Lateral [[id number]] Resulted: 03/25/23 0500     Updated: 03/25/23 0501    CT Angiogram Chest [[id number]] Collected: 03/24/23 1637     Updated: 03/24/23 1712    Narrative:      EXAMINATION: CT ANGIOGRAM CHEST- 3/24/2023 4:37 PM CDT     HISTORY: Aortic aneurysm, known or suspected; I71.20-Thoracic aortic  aneurysm, without rupture, unspecified; Z74.09-Other reduced mobility     DOSE: 547 mGycm (Automatic exposure control technique was implemented in  an effort to keep the radiation dose as low as possible without  compromising image quality)     REPORT:  Spiral CT of the chest was performed after administration of intravenous  contrast from the thoracic inlet through the upper abdomen using CTA  protocol, which includes reconstructed maximum intensity projection  (MIP)coronal and sagittal images.     Comparison: CTA chest 2/2/2023.     The contrast bolus is satisfactory, there is mild respiratory motion  artifact. The central pulmonary arteries are normal caliber, no filling  defects are seen in the pulmonary arteries. The patient has undergone  aortic valve replacement, aortic root and ascending aorta repair with a  graft. Postoperative air is seen at the sternotomy site and within the  anterior mediastinum, there is moderate volume of apparent hemorrhage,  within the superior anterior mediastinum, with some medial displacement  of the ascending aorta and a new moderate-sized pericardial effusion,  which has a maximum thickness of 11 mm. No contrast extravasation is  identified. Caliber of the repaired ascending aorta equals 2.9 cm  maximum, compared with 4.6 cm on the preoperative study. There is normal  patency of the left main coronary artery, LAD and branches. There is a  small right pleural effusion, bibasilar atelectasis is present. There is  a tiny pneumothorax at the right lung apex. Emphysematous changes are  present as before. There is  a small stable noncalcified subpleural  nodule in the right lower lobe which measures 7 mm. In the upper  abdomen, pneumobilia is present, as seen before. Cholecystectomy clips  are noted.       Impression:      1. Postoperative changes of median sternotomy with aortic valve  replacement, and the operative notes indicate replacement of the  ascending aorta and hemiarch replacement, there is a moderate volume of  mediastinal hemorrhage. There is a new moderate-sized pericardial  effusion or hemopericardium. The volume of mediastinal hemorrhage and  the new pericardial effusion is concerning for postoperative bleeding,  though no contrast extravasation is identified to indicate active  bleeding currently. There is mild medial displacement of the ascending  aorta mildly mediastinal hematoma. Small bore mediastinal drains are  present.  2. Small new right pleural effusion, bibasilar atelectasis.  3. Tiny apical pneumothorax on the right.     STAT REPORT called to Dr. Freddy Brasher 1650 hours 3/24/2023.     This report was finalized on 03/24/2023 17:09 by Dr. Jeet Dey MD.    XR Chest 1 View [[id number]] Collected: 03/24/23 0805     Updated: 03/24/23 0813    Narrative:      EXAMINATION: XR CHEST 1 VW- 3/24/2023 8:05 AM CDT     HISTORY: Post-Op Heart Surgery     REPORT: A frontal view of the chest was obtained.     COMPARISON: Chest x-ray 3/23/2020 0306 hours.     The right IJ catheter sheath has been removed, no pneumothorax is  identified. The lungs are hyperinflated compatible with emphysema. No  pulmonary infiltrate is identified. There is no pleural effusion.  Previous median sternotomy and cardiac valve replacement surgery is  noted. There is ectasia of the thoracic aorta. Heart size appears  normal. Question of an old fracture involving the right first rib.       Impression:      Removal of right IJ sheath, no pneumothorax is seen.  Advanced emphysema. No acute cardiopulmonary abnormality.  This report was  finalized on 03/24/2023 08:10 by Dr. Jeet Dey MD.        My impression of radiographic findings:  CTA of the chest on 3/24/2023: postoperative changes/aortic valve and hemiarch replacement  CXR 3/25/2023: No pleural effusion.  No pneumothorax.  Emphysematous changes, stable.  No significant atelectasis.      Physical Exam:  General: Alert, oriented. No apparent distress.   Cardiovascular: Regular rate and rhythm without murmur, rubs, or gallops.    Pulmonary: Clear to auscultation bilaterally without wheezing, rubs, or rales.  Chest: Sternotomy incision clean, dry, and intact. Sternum stable. No clicks.    Abdomen: Soft, nondistended, and nontender.  Extremities: Warm, moves all extremities. No edema.   Neurologic:  Grossly intact with no focal deficits.            Impression:  Thoracic aortic aneurysm without rupture  Nonrheumatic aortic valve insufficiency  Tobacco use  Hyperlipidemia        Plan:  Encourage pulmonary toilet and ambulation  Discharge home today    Follow-up appointments have been arranged with JUDD Fraser on 3/31/2023 at 10:30 AM and with Dr. Freddy Brasher on 5/4/2023 at 8:15 AM      Discussed with patient and nursing      JUDD Oliveros  03/25/23  07:29 CDT

## 2023-03-26 NOTE — OUTREACH NOTE
Prep Survey    Flowsheet Row Responses   [facility] facility patient discharged from? [facility]   Is LACE score < 7 ? No   Eligibility Readm Mgmt   Does the patient have one of the following disease processes/diagnoses(primary or secondary)? Cardiothoracic surgery   Does the patient have Home health ordered? No   Is there a DME ordered? No   Medication alerts for this patient ASA    Prep survey completed? Yes          Qiana GOMEZ - Registered Nurse

## 2023-03-27 NOTE — PAYOR COMM NOTE
"REF;  [id number]    [facility]  [facility]  [phone]  OR  FAX  [phone]      Christa Campos (62 y.o. Male)     Date of Birth   1960    Social Security Number       Address   [address] [place] [id number]    Home Phone   [phone]    MRN   [id number]       [employer]   [facility]    Marital Status   Single                            Admission Date   3/22/23    Admission Type   Elective    Admitting Provider   Brasher, Freddy LÓPEZ MD    Attending Provider       Department, Room/Bed   [facility] 4B, 434/1       Discharge Date   3/25/2023    Discharge Disposition   Home or Self Care    Discharge Destination   Home                            Attending Provider: (none)   Allergies: Dilaudid [Hydromorphone Hcl]    Isolation: None   Infection: None   Code Status: Prior    Ht: 162.5 cm (63.98\")   Wt: 53.8 kg (118 lb 9.6 oz)    Admission Cmt: None   Principal Problem: Thoracic aortic aneurysm without rupture (HCC) [I71.20]                 Active Insurance as of 3/22/2023     Primary Coverage     Payor Plan Insurance Group Employer/Plan Group    [employer]HE[employer] BLUE CROSS ANTHEM BLUE CROSS BLUE SHIELD PPO [id number]     Payor Plan Address Payor Plan Phone Number Payor Plan Fax Number Effective Dates    [address] [phone]  1/1/2023 - None Entered    [place] [address]       Subscriber Name Subscriber Birth Date Member ID       CHRISTA CAMPOS 1960 [id number]                 Emergency Contacts      (Rel.) Home Phone Work Phone Mobile Phone    ChaoPeace (Sister) [phone] -- [phone]    Gay Carrero (Relative) -- -- [phone]    Melanie Reagan (Daughter) -- -- [phone]                Discharge Order (From admission, onward)     Start     Ordered    03/25/23 0930  Discharge patient  Once        Expected Discharge Date: 03/25/23    Discharge Disposition: Home or Self Care    Physician of Record for Attribution - Please select from Treatment Team: BRASHER, " MARIA M LÓPEZ [[id number]]    Review needed by CMO to determine Physician of Record: No       Question Answer Comment   Physician of Record for Attribution - Please select from Treatment Team WEEKS, MARIA M LÓPEZ    Review needed by CMO to determine Physician of Record No        03/25/23 0940

## 2023-03-28 NOTE — PROGRESS NOTES
Subjective   Chief Complaint   Patient presents with   • Post-op Follow-up     Pt had aortic root, ascending aorta repair on 03/22/2023       Patient ID: Lyndon Campos is a 62 y.o. male who is here for follow-up having had Ascending aorta and aortic root replacement with valved conduit and coronary reconstruction, Bio-Bentall Procedure; Aortic Hemiarch Replacement with beveled open distal anastomosis under arch vessels with circulatory arrest by Dr. Brasher on 3/22/2023    History of Present Illness  Mr. Campos is a 62-year-old male who underwent ascending aorta and aortic root replacement, aortic hemiarch replacement with circulatory arrest by Dr. Brasher on 3/22/2023.  Postoperative recovery was without remark.  Repeat CTA of the chest was obtained on postop day 2 revealing stability.  He was ultimately discharged home on postop day 3.  He is taking aspirin 81 mg daily.  He was not discharged home with Lasix as he was well below his baseline weight.  Weight today is up 5 pounds and he is still below his baseline weight.     He complains of some surgical pain but his main complaint today is right shoulder pain.  He does see pain management for chronic pain and is scheduled to see them on 4/12/2023.  He states that he is out of pain medicine.  He was not discharged home with an additional prescription for pain medication and was advised to continue his preop pain medication regimen as recommended by pain management.    Post operative recovery was uneventful without any major complications. Sleep habits are fair.  Pain control has been fair. No fevers/sweats/chills. No drainage from incisions.  No sternal clicks. No chest pain or shortness of breath. Appetite is good.  He is not diabetic. Denies tobacco use.  Ambulating 5-10 minutes twice daily.   Overall he is doing very well.    The following portions of the patient's history were reviewed and updated as appropriate: allergies, current medications, past family history,  "past medical history, past social history, past surgical history and problem list.    Review of Systems   Constitutional: Negative for chills, diaphoresis, fatigue and fever.   HENT: Negative for trouble swallowing and voice change.    Eyes: Negative for visual disturbance.   Respiratory: Negative for chest tightness and shortness of breath.    Cardiovascular: Negative for chest pain, palpitations and leg swelling.   Gastrointestinal: Negative for abdominal pain, diarrhea, nausea and vomiting.   Musculoskeletal: Negative for arthralgias and myalgias.   Skin: Negative for color change, pallor, rash and wound.   Neurological: Negative for dizziness, syncope and light-headedness.   Psychiatric/Behavioral: Negative for agitation, confusion and sleep disturbance.       Objective   Visit Vitals  /58 (BP Location: Right arm, Patient Position: Sitting, Cuff Size: Adult)   Pulse 62   Ht 162.5 cm (63.98\")   Wt 56 kg (123 lb 6.4 oz)   SpO2 97%   BMI 21.20 kg/m²       Physical Exam  Vitals reviewed.   Constitutional:       General: He is not in acute distress.  HENT:      Head: Normocephalic.   Eyes:      Pupils: Pupils are equal, round, and reactive to light.   Cardiovascular:      Rate and Rhythm: Normal rate and regular rhythm.      Heart sounds: Normal heart sounds. No murmur heard.  Pulmonary:      Breath sounds: Normal breath sounds. No wheezing or rales.   Abdominal:      General: There is no distension.      Palpations: Abdomen is soft.      Tenderness: There is no abdominal tenderness.   Musculoskeletal:         General: No swelling or tenderness.   Skin:     General: Skin is warm and dry.      Comments: Sternum is stable, no clicks.  Sternal incision is clean dry and intact and healing nicely.   Neurological:      General: No focal deficit present.      Mental Status: He is alert and oriented to person, place, and time.   Psychiatric:         Mood and Affect: Mood normal.         Behavior: Behavior normal.       "   Thought Content: Thought content normal.         Judgment: Judgment normal.             Assessment & Plan     Diagnoses and all orders for this visit:    1. Aneurysm of ascending aorta without rupture (HCC) (Primary)  -     Adult Transthoracic Echo Complete W/ Cont if Necessary Per Protocol; Future    2. Mixed hyperlipidemia    3. Tobacco use           Overall, Lyndon Campos is doing well.  We discussed current sternotomy precautions and how these will not be advanced yet.  I have advised him to contact his pain management provider to discuss refill on pain medication with them.  If they are not able to see him sooner or refill his pain medication and are okay with me prescribing a very short course of pain medication for him I am willing to do this if needed.  He will call me back and let me know.  Following post op cardiac surgery home instructions. Provided support and encouragement. All questions have been answered to the best of my ability. Will discuss starting cardiac rehabilitation at official 1 month post op visit. Patient has follow up with Dr. Brasher in a few weeks with repeat transthoracic echo prior to appointment. Patient has follow up with Cardiology in a few weeks. Patient has been instructed to contact our office with any questions or concerns should they arise prior to the next office visit.     BMI is within normal parameters. No other follow-up for BMI required.    Lyndon Campos  reports that he quit smoking 10 days ago. His smoking use included cigarettes. He started smoking about 34 years ago. He has a 17.00 pack-year smoking history. He has never used smokeless tobacco.. I have educated him on the risk of diseases from using tobacco products such as cancer, COPD and heart disease.     I spent 3  minutes counseling the patient.    Keep scheduled follow-up with Dr. Brasher next month.  Call the office sooner should any issues arise prior to neck scheduled office visit.  Follow-up echo prior to neck  scheduled visit with Dr. Brasher.

## 2023-03-29 ENCOUNTER — READMISSION MANAGEMENT (OUTPATIENT)
Dept: CALL CENTER | Facility: HOSPITAL | Age: 63
End: 2023-03-29
Payer: COMMERCIAL

## 2023-03-29 NOTE — OUTREACH NOTE
CT Surgery Week 1 Survey    Flowsheet Row Responses   [facility] patient discharged from? [place]   Does the patient have one of the following disease processes/diagnoses(primary or secondary)? Cardiothoracic surgery   Week 1 attempt successful? No  [[facility] did reach dtr but she does not live in [place], stated it's best to call patient]   Call start time 1436   Unsuccessful attempts Attempt 1   Is patient permission given to speak with other caregiver? Yes            Fannie MARSHALL - Registered Nurse

## 2023-03-31 ENCOUNTER — OFFICE VISIT (OUTPATIENT)
Dept: CARDIAC SURGERY | Facility: CLINIC | Age: 63
End: 2023-03-31
Payer: COMMERCIAL

## 2023-03-31 VITALS
HEART RATE: 62 BPM | WEIGHT: 123.4 LBS | HEIGHT: 64 IN | BODY MASS INDEX: 21.07 KG/M2 | OXYGEN SATURATION: 97 % | SYSTOLIC BLOOD PRESSURE: 108 MMHG | DIASTOLIC BLOOD PRESSURE: 58 MMHG

## 2023-03-31 DIAGNOSIS — Z72.0 TOBACCO USE: ICD-10-CM

## 2023-03-31 DIAGNOSIS — I71.21 ANEURYSM OF ASCENDING AORTA WITHOUT RUPTURE: Primary | ICD-10-CM

## 2023-03-31 DIAGNOSIS — E78.2 MIXED HYPERLIPIDEMIA: ICD-10-CM

## 2023-03-31 PROCEDURE — 99024 POSTOP FOLLOW-UP VISIT: CPT | Performed by: NURSE PRACTITIONER

## 2023-04-05 ENCOUNTER — READMISSION MANAGEMENT (OUTPATIENT)
Dept: CALL CENTER | Facility: HOSPITAL | Age: 63
End: 2023-04-05
Payer: COMMERCIAL

## 2023-04-05 NOTE — OUTREACH NOTE
CT Surgery Week 2 Survey    Flowsheet Row Responses   [facility] patient discharged from? [place]   Does the patient have one of the following disease processes/diagnoses(primary or secondary)? Cardiothoracic surgery   Week 2 attempt successful? Yes   Call start time 1241   Call end time 1242   Person spoke with today (if not patient) and relationship patient   Meds reviewed with patient/caregiver? Yes   Is the patient having any side effects they believe may be caused by any medication additions or changes? No   Does the patient have all medications related to this admission filled (includes all antibiotics, pain medications, cardiac medications, etc.) Yes   Is the patient taking all medications as directed (includes completed medication regime)? Yes   Does the patient have a primary care provider?  Yes   Does the patient have an appointment scheduled with their C/T surgeon? Yes   Has the patient kept scheduled appointments due by today? Yes   Psychosocial issues? No   Did the patient receive a copy of their discharge instructions? Yes   Nursing interventions Reviewed instructions with patient   What is the patient's perception of their health status since discharge? Improving   Nursing interventions Nurse provided patient education   Is the patient/caregiver able to teach back signs and symptoms of incisional infection? Increased redness, swelling or pain at the incisonal site, Fever, Pus or odor from incision, Incisional warmth, Increased drainage or bleeding   Is the patient/caregiver able to teach back steps to recovery at home? Set small, achievable goals for return to baseline health, Rest and rebuild strength, gradually increase activity, Make a list of questions for surgeon's appointment   Is the patient/caregiver able to teach back the hierarchy of who to call/visit for symptoms/problems? PCP, Specialist, Home health nurse, Urgent Care, ED, 911 Yes   Week 2 call completed? Yes   Is the patient  interested in additional calls from an ambulatory ?  NOTE:  applies to high risk patients requiring additional follow-up. No   Graduated/Revoked comments States doing very well. Denies any needs.           Sandor GOMEZ - Registered Nurse

## 2023-05-03 ENCOUNTER — TELEPHONE (OUTPATIENT)
Dept: CARDIAC SURGERY | Facility: CLINIC | Age: 63
End: 2023-05-03

## 2023-05-03 NOTE — TELEPHONE ENCOUNTER
This patient no longer has insurance and did not wish to keep Echo appt. I spoke with Dr Brasher re: this on Monday and he advised okay to not have echo, but still keep OV on 5/4.

## 2023-05-04 ENCOUNTER — OFFICE VISIT (OUTPATIENT)
Dept: CARDIAC SURGERY | Facility: CLINIC | Age: 63
End: 2023-05-04
Payer: MEDICAID

## 2023-05-04 VITALS
DIASTOLIC BLOOD PRESSURE: 76 MMHG | BODY MASS INDEX: 21.34 KG/M2 | SYSTOLIC BLOOD PRESSURE: 106 MMHG | WEIGHT: 125 LBS | OXYGEN SATURATION: 96 % | HEART RATE: 78 BPM | HEIGHT: 64 IN

## 2023-05-04 DIAGNOSIS — Q23.1 BICUSPID AORTIC VALVE: Primary | ICD-10-CM

## 2023-05-04 DIAGNOSIS — I71.21 ANEURYSM OF ASCENDING AORTA WITHOUT RUPTURE: ICD-10-CM

## 2023-05-04 PROCEDURE — 99024 POSTOP FOLLOW-UP VISIT: CPT | Performed by: SURGERY

## 2023-05-04 NOTE — LETTER
May 7, 2023       No Recipients    Patient: Lyndon Campos   YOB: 1960   Date of Visit: 5/4/2023       Dear Ian Allan MD,    Lyndon Campos was in my office today. Below are the relevant portions of my assessment and plan of care.    Mr. Campos is a 62-year-old male who is now 6 weeks status post aortic root replacement with a bio bentall and hemiarch replacement for an aortic root and ascending aortic aneurysm as well as bicuspid valve with moderate AI. He has done very well since surgery. I am very happy with his progress. He is back to doing normal activities more or less. We discussed returning to normal lifting on a progressive basis. He understands and agrees. Okay for him to return to driving and return to any work he wants to do.     He currently does not have insurance and is paying out of pocket for most of his medical care and therefore wants to limit things. This is why he did not go to his echocardiogram that was scheduled. I have encouraged him after his move to [place] to establish care with a primary care physician followed by a cardiologist and likely needs a new echocardiogram for baseline comparison for future for his aortic valve. After that can have echo at 4 to 5-year interval. He is going to take my business card and I am happy to discuss his case with any cardiologist, he establishes his care with in [place]. He is doing excellent from a mobility standpoint and given the cost concerns, we will not refer to a cardiac rehab at this time.     Thank you for trusting me with the care of Mr. Campos. Please do not hesitate to call with questions or concerns.         Sincerely,        Freddy Brasher MD        CC:   No Recipients

## 2023-05-04 NOTE — PROGRESS NOTES
[facility] Cardiothoracic Surgery  PROGRESS NOTE     Subjective:    Procedure performed: Aortic Root Replacement with BioBentall (30mm Dacron graft, 23mm Inspiris valve), Ascending Aortic Replacement with Hemiarch Replacement with 26mm Side Branched Dacron graft  Date of Procedure: 3/22/2023    He feels better daily. Dr. Ian Allan was impressed with how well he was doing. He is planning on moving to [place] on 06/2023. Healing well without problem.  No significant pain.      Objective:    Wt current:       05/04/23 0803   Weight: 56.7 kg (125 lb)         PE:  Vitals:    05/04/23 0803   BP: 106/76   Pulse: 78   SpO2: 96%     GENERAL: NAD, resting comfortably, normal color  CARDIOVASCULAR: regular, regular rate, sinus, stable sternum, incision healing appropriately  PULMONARY: Normal bilateral breath sounds, no labored breathing  ABDOMEN: soft, nt/nd  EXTREMITIES: mild peripheral edema, normal pulses, normal ROM        Lab Results (last 72 hours)     ** No results found for the last 72 hours. **            Assessment & Plan     Mr. Campos is a 62-year-old male who is now 6 weeks status post aortic root replacement with a bio bentall and hemiarch replacement for an aortic root and ascending aortic aneurysm as well as bicuspid valve with moderate AI. He has done very well since surgery. I am very happy with his progress. He is back to doing normal activities more or less. We discussed returning to normal lifting on a progressive basis. He understands and agrees. Okay for him to return to driving and return to any work he wants to do.     He currently does not have insurance and is paying out of pocket for most of his medical care and therefore wants to limit things. This is why he did not go to his echocardiogram that was scheduled. I have encouraged him after his move to [place] to establish care with a primary care physician followed by a cardiologist and likely needs a new  echocardiogram for baseline comparison for future for his aortic valve. After that can have echo at 4 to 5-year interval. He is going to take my business card and I am happy to discuss his case with any cardiologist, he establishes his care with in [place]. He is doing excellent from a mobility standpoint and given the cost concerns, we will not refer to a cardiac rehab at this time.     Thank you for trusting me with the care of Mr. Campos. Please do not hesitate to call with questions or concerns.    Freddy Brasher   Cardiothoracic Surgeon      Transcribed from ambient dictation for Freddy Brasher MD by Cinthya Tim Quality .  05/04/23   11:18 CDT    Patient or patient representative verbalized consent to the visit recording.  I have personally performed the services described in this document as transcribed by the above individual, and it is both accurate and complete.

## 2023-05-08 ENCOUNTER — OFFICE VISIT (OUTPATIENT)
Dept: CARDIOLOGY | Facility: CLINIC | Age: 63
End: 2023-05-08
Payer: MEDICAID

## 2023-05-08 VITALS
OXYGEN SATURATION: 98 % | HEIGHT: 64 IN | BODY MASS INDEX: 21 KG/M2 | DIASTOLIC BLOOD PRESSURE: 60 MMHG | WEIGHT: 123 LBS | SYSTOLIC BLOOD PRESSURE: 128 MMHG | HEART RATE: 64 BPM

## 2023-05-08 DIAGNOSIS — I71.21 ANEURYSM OF ASCENDING AORTA WITHOUT RUPTURE: ICD-10-CM

## 2023-05-08 DIAGNOSIS — Z95.2 S/P AVR: Primary | ICD-10-CM

## 2023-05-08 NOTE — PROGRESS NOTES
Subjective:     Encounter Date:05/08/2023      Patient ID: Lyndon Campos is a 62 y.o. male with ascending aortic aneurysm, bicuspid aortic valve with moderate aortic valve regurgitation, now status post aortic root replacement with bio Bentall (30 mm Dacron graft, 23 mm Inspiris valve), ascending aortic arch replacement with hemiarch (26 mm sidebranch dacryon graft), now presenting for follow-up.    Chief Complaint: Here for follow-up, aortic disease, aortic valvular disease    History of Present Illness    This patient presents today for hospital follow-up.  He underwent aortic root and aortic valve replacement earlier this year.  He is scheduled to move to [place] to be near family in the near future therefore is only here for hospital follow-up visit.  He has recovered well.  He notes some generalized fatigue but otherwise has been active after his surgery and has not noticed any limitations to activities necessarily.  He denies having palpitations, chest pain.  He denies having significant shortness of breath or dyspnea with exertion.  No lightheadedness, dizziness or syncope.  He remains on aspirin and is tolerating this well.  He is also on beta-blocker therapy.  No side effects with current medications.  Overall, he says that he is very pleased with his recovery after surgery.      Current Outpatient Medications:   •  aspirin 81 MG EC tablet, Take 1 tablet by mouth Daily., Disp: 30 tablet, Rfl: 2  •  diphenhydrAMINE (BENADRYL) 25 mg capsule, Take 1 capsule by mouth Every 6 (Six) Hours As Needed., Disp: , Rfl:   •  metoprolol tartrate (LOPRESSOR) 25 MG tablet, Take 1 tablet by mouth Every 12 (Twelve) Hours., Disp: 60 tablet, Rfl: 2  •  oxyCODONE-acetaminophen (PERCOCET) 7.5-325 MG per tablet, Take 1 tablet by mouth Every 8 (Eight) Hours As Needed., Disp: , Rfl:     Allergies   Allergen Reactions   • Dilaudid [Hydromorphone Hcl] Headache     Social History     Tobacco Use   • Smoking status: Former      Packs/day: 0.50     Years: 34.00     Pack years: 17.00     Types: Cigarettes     Start date:      Quit date: 3/21/2023     Years since quittin.1   • Smokeless tobacco: Never   • Tobacco comments:     Quit the day before heart surgery 2023   Substance Use Topics   • Alcohol use: Never     Comment: 0     Review of Systems   Constitutional: Positive for malaise/fatigue. Negative for fever and weight loss.   Cardiovascular: Negative for chest pain, dyspnea on exertion, leg swelling, orthopnea, palpitations, paroxysmal nocturnal dyspnea and syncope.   Respiratory: Negative for cough, shortness of breath and wheezing.    Gastrointestinal: Negative for abdominal pain, nausea and vomiting.   Neurological: Negative for dizziness, headaches and loss of balance.       ECG 12 Lead    Date/Time: 2023 10:33 AM  Performed by: German Maria MD  Authorized by: German Maria MD   Comparison: compared with previous ECG from 3/23/2023  Similar to previous ECG  Rhythm: sinus bradycardia  Rate: bradycardic  BPM: 57  Conduction: conduction normal  QRS axis: normal  Other findings: non-specific ST-T wave changes    Clinical impression: non-specific ECG             Objective:     Vitals reviewed.   Constitutional:       General: Not in acute distress.     Appearance: Well-developed and not in distress.   Eyes:      Extraocular Movements: Extraocular movements intact.   HENT:      Head: Normocephalic and atraumatic.   Pulmonary:      Effort: Pulmonary effort is normal.      Breath sounds: Normal breath sounds. No wheezing. No rhonchi. No rales.   Cardiovascular:      Normal rate. Regular rhythm.      Murmurs: There is a grade 1/6 systolic murmur.      No gallop.   Pulses:     Intact distal pulses.   Edema:     Peripheral edema absent.   Abdominal:      General: Bowel sounds are normal. There is no distension.      Palpations: Abdomen is soft.      Tenderness: There is no abdominal tenderness.   Skin:      "General: Skin is warm and dry. There is no cyanosis.      Findings: No erythema or rash.   Neurological:      Mental Status: Alert and oriented to person, place, and time.      Cranial Nerves: No cranial nerve deficit.       /60   Pulse 64   Ht 162.6 cm (64\")   Wt 55.8 kg (123 lb)   SpO2 98%   BMI 21.11 kg/m²     Data/Lab Review:     Intra-op RINA 3/22/23:  •  Left ventricular systolic function is normal.  •  Moderate aortic valve regurgitation is present.  •  Mild dilation of the visualized portion of the ascending aorta is present.  •  There is interval placement of a bioprosthetic arctic valve that appears to be functioning normally.    Pre-op Coronary Angiography 2/27/23:    Left Main Coronary Artery: The left main coronary artery arises from the left coronary cusp and trifurcates into the LAD, ramus intermedius and left circumflex arteries. Luminal irregularities are present, but no significant disease.     Left Anterior Descending Artery: The left anterior descending artery arises from the distal left main coronary artery and supplies a medium size first diagonal branch and then terminates at the apex.  The LAD contains mild irregularities but no significant disease.      Ramus intermedius: The ramus intermedius arises from the distal left main coronary artery and contains no significant disease.     Left Circumflex: The left circumflex artery arises from the distal left main artery and supplies several OM branches and no significant disease.     Right Coronary Artery: The right coronary artery arises from the right coronary cusp and is dominant for the posterior circulation.  The right coronary artery contains mild disease in the proximal third of the vessel.  The PDA is small and free of any significant disease.        Assessment:          Diagnosis Plan   1. S/P AVR  ECG 12 Lead      2. Aneurysm of ascending aorta without rupture               Plan:       This patient is stable at this time after " aortic root and aortic valve replacement.  He will be moving to [place] to be near family in the near future therefore will establish care when he arrives there locally.  At this time, he is in need of no further cardiac testing here from our office.  He does remain on aspirin and beta-blocker therapy.  No changes would be recommended at this time.  We did discuss that if he needs records transferred, would be happy to assist.  If the patient has any other questions or concerns while still living in our area, he would be instructed to return, otherwise we will see him back as needed at this time.

## (undated) DEVICE — SYR LUERLOK 20CC BX/50

## (undated) DEVICE — GLIDESHEATH SLENDER STAINLESS STEEL KIT: Brand: GLIDESHEATH SLENDER

## (undated) DEVICE — SPHINCTEROTOME: Brand: JAGTOME RX 39

## (undated) DEVICE — SOLIDIFIER LIQUI LOC PLUS 2000CC

## (undated) DEVICE — 6 FOOT DISPOSABLE EXTENSION CABLE WITH SAFE CONNECT / SCREW-DOWN

## (undated) DEVICE — RETRIEVAL BALLOON CATHETER: Brand: EXTRACTOR™ PRO XL

## (undated) DEVICE — SUT PROLN 5/0 RB2 D/A 30IN 8710H

## (undated) DEVICE — BAPTIST TURNOVER KIT: Brand: MEDLINE INDUSTRIES, INC.

## (undated) DEVICE — BLAKE SILICONE DRAINS CARDIO CONNECTOR 2:1: Brand: BLAKE

## (undated) DEVICE — PK OPN HEART 30

## (undated) DEVICE — PAD, DEFIB, ADULT, RADIOTRANS, PHYSIO: Brand: MEDLINE

## (undated) DEVICE — TIBURON BRACHIAL ANGIOGRAPHY DRAPE: Brand: CONVERTORS

## (undated) DEVICE — TRAP FLD MINIVAC MEGADYNE 100ML

## (undated) DEVICE — Device: Brand: MEDEX

## (undated) DEVICE — SUP ARMBRD ART/LINE BLU

## (undated) DEVICE — NDL CNTR 40CT FM MAG: Brand: MEDLINE INDUSTRIES, INC.

## (undated) DEVICE — TR BAND RADIAL ARTERY COMPRESSION DEVICE: Brand: TR BAND

## (undated) DEVICE — CATH IV ANGIO FEP 14GA 5.25IN ORNG 10PK

## (undated) DEVICE — CANN NASL ETCO2 LO/FLO A/

## (undated) DEVICE — GLV SURG BIOGEL M LTX PF 7 1/2

## (undated) DEVICE — SYRINGE,PISTON,IRRIGATION,60ML,STERILE: Brand: MEDLINE

## (undated) DEVICE — SOL IRR NACL 0.9PCT BT 1000ML

## (undated) DEVICE — SUREFIT, DUAL DISPERSIVE ELECTRODE, CONTACT QUALITY MONITOR: Brand: SUREFIT

## (undated) DEVICE — INTENDED FOR TISSUE SEPARATION, AND OTHER PROCEDURES THAT REQUIRE A SHARP SURGICAL BLADE TO PUNCTURE OR CUT.: Brand: BARD-PARKER ® STAINLESS STEEL BLADES

## (undated) DEVICE — SUT PROLN 4/0 RB1 36IN 4PK M8557

## (undated) DEVICE — Device: Brand: RETRACT-O-TAPE 18G X 30.5CM BLUNT NEEDLE

## (undated) DEVICE — Device

## (undated) DEVICE — TUBING IRRIG SPYGLS

## (undated) DEVICE — STERNUM BLADE, OFFSET (32.0 X 0.8 X 6.3MM)

## (undated) DEVICE — DRAIN,WOUND,ROUND,24FR,5/16",FULL-FLUTED: Brand: MEDLINE

## (undated) DEVICE — ANTIBACTERIAL UNDYED BRAIDED (POLYGLACTIN 910), SYNTHETIC ABSORBABLE SUTURE: Brand: COATED VICRYL

## (undated) DEVICE — RADIFOCUS OPTITORQUE ANGIOGRAPHIC CATHETER: Brand: OPTITORQUE

## (undated) DEVICE — PK CATH CARD 30 CA/4

## (undated) DEVICE — ENDO KIT,LOURDES HOSPITAL: Brand: MEDLINE INDUSTRIES, INC.

## (undated) DEVICE — SUT POLY BR TP 2STRND 1/8X30IN

## (undated) DEVICE — SUT ETHIB 2/0 SH SH 36IN X523H

## (undated) DEVICE — CLTH CLENS READYCLEANSE PERI CARE PK/5

## (undated) DEVICE — GLV SURG BIOGEL LTX PF 6 1/2

## (undated) DEVICE — OPTIFOAM GENTLE SA, POSTOP, 4X12: Brand: MEDLINE

## (undated) DEVICE — SUT GORE TT9 CV6 30IN 6M06A

## (undated) DEVICE — CAUTRY HI TEMP FINETP

## (undated) DEVICE — SUT SILK 1 LBYRNTH TIES 30IN A307H

## (undated) DEVICE — SUT PROLN 3/0 SH D/A 36IN 8522H

## (undated) DEVICE — DRAPE,SPLIT,CV,CLR ANES,STERILE: Brand: MEDLINE

## (undated) DEVICE — SUT SILK 2/0 FS BLK 18IN 685G

## (undated) DEVICE — SYSTEM BX CAP BILI RAP EXCHG CAP LOK DEV COMPATIBLE W/ OLY

## (undated) DEVICE — FORCEPS BX 240CM 2.4MM L NDL RAD JAW 4 M00513334

## (undated) DEVICE — PK SET UP ANES OPN HEART 30

## (undated) DEVICE — SPHINCTEROTOME: Brand: DREAMTOME™ RX 44